# Patient Record
Sex: FEMALE | Race: OTHER | HISPANIC OR LATINO
[De-identification: names, ages, dates, MRNs, and addresses within clinical notes are randomized per-mention and may not be internally consistent; named-entity substitution may affect disease eponyms.]

---

## 2017-01-10 ENCOUNTER — APPOINTMENT (OUTPATIENT)
Age: 27
End: 2017-01-10

## 2017-02-14 ENCOUNTER — APPOINTMENT (OUTPATIENT)
Dept: VASCULAR SURGERY | Facility: CLINIC | Age: 27
End: 2017-02-14

## 2017-04-27 ENCOUNTER — APPOINTMENT (OUTPATIENT)
Age: 27
End: 2017-04-27

## 2017-08-03 ENCOUNTER — APPOINTMENT (OUTPATIENT)
Age: 27
End: 2017-08-03
Payer: MEDICAID

## 2017-08-03 PROCEDURE — 36901 INTRO CATH DIALYSIS CIRCUIT: CPT

## 2017-09-06 ENCOUNTER — OTHER (OUTPATIENT)
Age: 27
End: 2017-09-06

## 2017-09-13 ENCOUNTER — ASOB RESULT (OUTPATIENT)
Age: 27
End: 2017-09-13

## 2017-09-13 ENCOUNTER — LABORATORY RESULT (OUTPATIENT)
Age: 27
End: 2017-09-13

## 2017-09-13 ENCOUNTER — APPOINTMENT (OUTPATIENT)
Dept: ANTEPARTUM | Facility: CLINIC | Age: 27
End: 2017-09-13
Payer: MEDICAID

## 2017-09-13 ENCOUNTER — APPOINTMENT (OUTPATIENT)
Dept: MATERNAL FETAL MEDICINE | Facility: CLINIC | Age: 27
End: 2017-09-13
Payer: MEDICAID

## 2017-09-13 VITALS
HEIGHT: 59 IN | DIASTOLIC BLOOD PRESSURE: 68 MMHG | WEIGHT: 126.4 LBS | SYSTOLIC BLOOD PRESSURE: 120 MMHG | BODY MASS INDEX: 25.48 KG/M2

## 2017-09-13 DIAGNOSIS — O09.90 SUPERVISION OF HIGH RISK PREGNANCY, UNSPECIFIED, UNSPECIFIED TRIMESTER: ICD-10-CM

## 2017-09-13 PROCEDURE — 76801 OB US < 14 WKS SINGLE FETUS: CPT

## 2017-09-13 PROCEDURE — 99243 OFF/OP CNSLTJ NEW/EST LOW 30: CPT

## 2017-09-14 ENCOUNTER — LABORATORY RESULT (OUTPATIENT)
Age: 27
End: 2017-09-14

## 2017-09-15 RX ORDER — CALCIUM ACETATE 667 MG/1
667 CAPSULE ORAL
Qty: 270 | Refills: 3 | Status: DISCONTINUED | COMMUNITY
Start: 2017-04-07 | End: 2017-09-15

## 2017-09-22 PROBLEM — O09.90 HIGH RISK PREGNANCY, ANTEPARTUM: Status: ACTIVE | Noted: 2017-09-13

## 2017-10-02 ENCOUNTER — ASOB RESULT (OUTPATIENT)
Age: 27
End: 2017-10-02

## 2017-10-02 ENCOUNTER — LABORATORY RESULT (OUTPATIENT)
Age: 27
End: 2017-10-02

## 2017-10-02 ENCOUNTER — APPOINTMENT (OUTPATIENT)
Dept: MATERNAL FETAL MEDICINE | Facility: CLINIC | Age: 27
End: 2017-10-02

## 2017-10-02 ENCOUNTER — APPOINTMENT (OUTPATIENT)
Dept: ANTEPARTUM | Facility: CLINIC | Age: 27
End: 2017-10-02
Payer: MEDICAID

## 2017-10-02 ENCOUNTER — NON-APPOINTMENT (OUTPATIENT)
Age: 27
End: 2017-10-02

## 2017-10-02 ENCOUNTER — OUTPATIENT (OUTPATIENT)
Dept: OUTPATIENT SERVICES | Facility: HOSPITAL | Age: 27
LOS: 1 days | End: 2017-10-02
Payer: MEDICAID

## 2017-10-02 ENCOUNTER — APPOINTMENT (OUTPATIENT)
Dept: MATERNAL FETAL MEDICINE | Facility: CLINIC | Age: 27
End: 2017-10-02
Payer: MEDICAID

## 2017-10-02 VITALS
BODY MASS INDEX: 25.6 KG/M2 | SYSTOLIC BLOOD PRESSURE: 120 MMHG | HEIGHT: 59 IN | DIASTOLIC BLOOD PRESSURE: 60 MMHG | WEIGHT: 127 LBS

## 2017-10-02 DIAGNOSIS — O09.899 SUPERVISION OF OTHER HIGH RISK PREGNANCIES, UNSPECIFIED TRIMESTER: ICD-10-CM

## 2017-10-02 DIAGNOSIS — Z98.89 OTHER SPECIFIED POSTPROCEDURAL STATES: Chronic | ICD-10-CM

## 2017-10-02 DIAGNOSIS — I77.0 ARTERIOVENOUS FISTULA, ACQUIRED: Chronic | ICD-10-CM

## 2017-10-02 LAB
BILIRUB UR QL STRIP: NEGATIVE
COLLECTION METHOD: NORMAL
GLUCOSE UR-MCNC: NEGATIVE
HCG UR QL: 0.2 EU/DL
HGB UR QL STRIP.AUTO: NORMAL
KETONES UR-MCNC: NEGATIVE
LEUKOCYTE ESTERASE UR QL STRIP: NEGATIVE
NITRITE UR QL STRIP: NEGATIVE
PH UR STRIP: >=9
PROT UR STRIP-MCNC: NORMAL
SP GR UR STRIP: 1.01

## 2017-10-02 PROCEDURE — 88141 CYTOPATH C/V INTERPRET: CPT

## 2017-10-02 PROCEDURE — 76813 OB US NUCHAL MEAS 1 GEST: CPT | Mod: 26

## 2017-10-02 PROCEDURE — 99214 OFFICE O/P EST MOD 30 MIN: CPT

## 2017-10-02 PROCEDURE — 36416 COLLJ CAPILLARY BLOOD SPEC: CPT

## 2017-10-03 LAB
C TRACH RRNA SPEC QL NAA+PROBE: SIGNIFICANT CHANGE UP
C TRACH RRNA SPEC QL NAA+PROBE: SIGNIFICANT CHANGE UP
C TRACH+GC RRNA SPEC QL PROBE: SIGNIFICANT CHANGE UP
CREAT ?TM UR-MCNC: 80 MG/DL — SIGNIFICANT CHANGE UP
CULTURE RESULTS: SIGNIFICANT CHANGE UP
N GONORRHOEA RRNA SPEC QL NAA+PROBE: SIGNIFICANT CHANGE UP
N GONORRHOEA RRNA SPEC QL NAA+PROBE: SIGNIFICANT CHANGE UP
PROT ?TM UR-MCNC: 80 MG/DL — HIGH (ref 0–12)
PROT/CREAT UR-RTO: 1 RATIO — HIGH (ref 0–0.2)
SPECIMEN SOURCE: SIGNIFICANT CHANGE UP
SPECIMEN SOURCE: SIGNIFICANT CHANGE UP
T3FREE SERPL-MCNC: 3.59 PG/ML — SIGNIFICANT CHANGE UP (ref 1.8–4.6)
T4 AB SER-ACNC: 12 UG/DL — SIGNIFICANT CHANGE UP (ref 4.6–12)
TSH SERPL-MCNC: 0.01 UIU/ML — LOW (ref 0.27–4.2)

## 2017-10-04 DIAGNOSIS — Z3A.12 12 WEEKS GESTATION OF PREGNANCY: ICD-10-CM

## 2017-10-04 DIAGNOSIS — O34.219 MATERNAL CARE FOR UNSPECIFIED TYPE SCAR FROM PREVIOUS CESAREAN DELIVERY: ICD-10-CM

## 2017-10-04 DIAGNOSIS — N18.5 CHRONIC KIDNEY DISEASE, STAGE 5: ICD-10-CM

## 2017-10-05 ENCOUNTER — APPOINTMENT (OUTPATIENT)
Dept: VASCULAR SURGERY | Facility: CLINIC | Age: 27
End: 2017-10-05
Payer: MEDICAID

## 2017-10-05 LAB
1ST TRIMESTER DATA: SIGNIFICANT CHANGE UP
ADDENDUM DOC: SIGNIFICANT CHANGE UP
AFP AMN-MCNC: SIGNIFICANT CHANGE UP
B-HCG FREE SERPL-MCNC: SIGNIFICANT CHANGE UP
CLINICAL BIOCHEMIST REVIEW: ABNORMAL
CLINICAL BIOCHEMIST REVIEW: SIGNIFICANT CHANGE UP
DEMOGRAPHIC DATA: SIGNIFICANT CHANGE UP
NT: SIGNIFICANT CHANGE UP
PAPP-A SERPL-ACNC: SIGNIFICANT CHANGE UP
SCREEN-FOOTER: SIGNIFICANT CHANGE UP
SCREEN-RECOMMENDATIONS: SIGNIFICANT CHANGE UP

## 2017-10-05 PROCEDURE — 99213 OFFICE O/P EST LOW 20 MIN: CPT | Mod: 25

## 2017-10-05 PROCEDURE — 93990 DOPPLER FLOW TESTING: CPT

## 2017-10-09 LAB — CYTOLOGY SPEC DOC CYTO: SIGNIFICANT CHANGE UP

## 2017-10-10 DIAGNOSIS — E83.39 OTHER DISORDERS OF PHOSPHORUS METABOLISM: ICD-10-CM

## 2017-10-11 ENCOUNTER — OTHER (OUTPATIENT)
Age: 27
End: 2017-10-11

## 2017-10-13 ENCOUNTER — LABORATORY RESULT (OUTPATIENT)
Age: 27
End: 2017-10-13

## 2017-10-13 ENCOUNTER — APPOINTMENT (OUTPATIENT)
Dept: PEDIATRIC MEDICAL GENETICS | Facility: CLINIC | Age: 27
End: 2017-10-13
Payer: MEDICAID

## 2017-10-13 PROCEDURE — 96040: CPT

## 2017-10-16 ENCOUNTER — LABORATORY RESULT (OUTPATIENT)
Age: 27
End: 2017-10-16

## 2017-10-16 ENCOUNTER — OUTPATIENT (OUTPATIENT)
Dept: OUTPATIENT SERVICES | Facility: HOSPITAL | Age: 27
LOS: 1 days | End: 2017-10-16
Payer: MEDICAID

## 2017-10-16 ENCOUNTER — APPOINTMENT (OUTPATIENT)
Dept: MATERNAL FETAL MEDICINE | Facility: CLINIC | Age: 27
End: 2017-10-16
Payer: MEDICAID

## 2017-10-16 ENCOUNTER — NON-APPOINTMENT (OUTPATIENT)
Age: 27
End: 2017-10-16

## 2017-10-16 VITALS
SYSTOLIC BLOOD PRESSURE: 106 MMHG | BODY MASS INDEX: 26 KG/M2 | DIASTOLIC BLOOD PRESSURE: 56 MMHG | HEIGHT: 59 IN | WEIGHT: 129 LBS

## 2017-10-16 DIAGNOSIS — Z98.89 OTHER SPECIFIED POSTPROCEDURAL STATES: Chronic | ICD-10-CM

## 2017-10-16 DIAGNOSIS — I77.0 ARTERIOVENOUS FISTULA, ACQUIRED: Chronic | ICD-10-CM

## 2017-10-16 PROCEDURE — 81003 URINALYSIS AUTO W/O SCOPE: CPT

## 2017-10-16 PROCEDURE — 88175 CYTOPATH C/V AUTO FLUID REDO: CPT

## 2017-10-16 PROCEDURE — 84481 FREE ASSAY (FT-3): CPT

## 2017-10-16 PROCEDURE — 81003 URINALYSIS AUTO W/O SCOPE: CPT | Mod: QW,NC

## 2017-10-16 PROCEDURE — G0463: CPT

## 2017-10-16 PROCEDURE — 82575 CREATININE CLEARANCE TEST: CPT

## 2017-10-16 PROCEDURE — 76813 OB US NUCHAL MEAS 1 GEST: CPT

## 2017-10-16 PROCEDURE — 87086 URINE CULTURE/COLONY COUNT: CPT

## 2017-10-16 PROCEDURE — 99213 OFFICE O/P EST LOW 20 MIN: CPT

## 2017-10-16 PROCEDURE — 84436 ASSAY OF TOTAL THYROXINE: CPT

## 2017-10-16 PROCEDURE — 80053 COMPREHEN METABOLIC PANEL: CPT

## 2017-10-16 PROCEDURE — 84443 ASSAY THYROID STIM HORMONE: CPT

## 2017-10-16 PROCEDURE — 84156 ASSAY OF PROTEIN URINE: CPT

## 2017-10-17 LAB
ALBUMIN SERPL ELPH-MCNC: 3.7 G/DL — SIGNIFICANT CHANGE UP (ref 3.3–5)
ALP SERPL-CCNC: 84 U/L — SIGNIFICANT CHANGE UP (ref 40–120)
ALT FLD-CCNC: 12 U/L — SIGNIFICANT CHANGE UP (ref 10–45)
ANION GAP SERPL CALC-SCNC: 18 MMOL/L — HIGH (ref 5–17)
AST SERPL-CCNC: 16 U/L — SIGNIFICANT CHANGE UP (ref 10–40)
BILIRUB SERPL-MCNC: 0.2 MG/DL — SIGNIFICANT CHANGE UP (ref 0.2–1.2)
BODY SURFACE AREA CALCULATION: 1.73 M2 — SIGNIFICANT CHANGE UP
BUN SERPL-MCNC: 4 MG/DL — LOW (ref 7–23)
CALCIUM SERPL-MCNC: 9.4 MG/DL — SIGNIFICANT CHANGE UP (ref 8.4–10.5)
CHLORIDE SERPL-SCNC: 95 MMOL/L — LOW (ref 96–108)
CO2 SERPL-SCNC: 28 MMOL/L — SIGNIFICANT CHANGE UP (ref 22–31)
COLLECT DURATION TIME UR: 24 HR — SIGNIFICANT CHANGE UP
CREAT CL ?TM UR+SERPL-VRATE: 15 ML/MIN — LOW (ref 75–115)
CREAT SERPL-MCNC: 1.51 MG/DL — HIGH (ref 0.5–1.3)
CREAT SERPL-MCNC: 1.51 MG/DL — HIGH (ref 0.5–1.3)
GLUCOSE SERPL-MCNC: 80 MG/DL — SIGNIFICANT CHANGE UP (ref 70–99)
POTASSIUM SERPL-MCNC: 3.9 MMOL/L — SIGNIFICANT CHANGE UP (ref 3.5–5.3)
POTASSIUM SERPL-SCNC: 3.9 MMOL/L — SIGNIFICANT CHANGE UP (ref 3.5–5.3)
PROT SERPL-MCNC: 6.6 G/DL — SIGNIFICANT CHANGE UP (ref 6–8.3)
SODIUM SERPL-SCNC: 141 MMOL/L — SIGNIFICANT CHANGE UP (ref 135–145)
TOTAL VOLUME - 24 HOUR: 700 ML — SIGNIFICANT CHANGE UP
URINE CREATININE CALCULATION: 0.3 G/24 H — LOW (ref 0.8–1.8)

## 2017-10-19 ENCOUNTER — NON-APPOINTMENT (OUTPATIENT)
Age: 27
End: 2017-10-19

## 2017-10-24 DIAGNOSIS — O09.891 SUPERVISION OF OTHER HIGH RISK PREGNANCIES, FIRST TRIMESTER: ICD-10-CM

## 2017-10-27 DIAGNOSIS — O26.839 PREGNANCY RELATED RENAL DISEASE, UNSPECIFIED TRIMESTER: ICD-10-CM

## 2017-10-27 DIAGNOSIS — O09.90 SUPERVISION OF HIGH RISK PREGNANCY, UNSPECIFIED, UNSPECIFIED TRIMESTER: ICD-10-CM

## 2017-10-27 DIAGNOSIS — O28.9 UNSPECIFIED ABNORMAL FINDINGS ON ANTENATAL SCREENING OF MOTHER: ICD-10-CM

## 2017-10-30 ENCOUNTER — APPOINTMENT (OUTPATIENT)
Dept: ANTEPARTUM | Facility: CLINIC | Age: 27
End: 2017-10-30

## 2017-10-30 ENCOUNTER — APPOINTMENT (OUTPATIENT)
Dept: MATERNAL FETAL MEDICINE | Facility: CLINIC | Age: 27
End: 2017-10-30

## 2017-11-27 ENCOUNTER — ASOB RESULT (OUTPATIENT)
Age: 27
End: 2017-11-27

## 2017-11-27 ENCOUNTER — APPOINTMENT (OUTPATIENT)
Dept: ANTEPARTUM | Facility: CLINIC | Age: 27
End: 2017-11-27
Payer: MEDICAID

## 2017-11-27 ENCOUNTER — OUTPATIENT (OUTPATIENT)
Dept: OUTPATIENT SERVICES | Facility: HOSPITAL | Age: 27
LOS: 1 days | End: 2017-11-27
Payer: MEDICAID

## 2017-11-27 ENCOUNTER — APPOINTMENT (OUTPATIENT)
Dept: MATERNAL FETAL MEDICINE | Facility: CLINIC | Age: 27
End: 2017-11-27
Payer: MEDICAID

## 2017-11-27 VITALS
DIASTOLIC BLOOD PRESSURE: 66 MMHG | BODY MASS INDEX: 27.01 KG/M2 | WEIGHT: 134 LBS | SYSTOLIC BLOOD PRESSURE: 120 MMHG | HEIGHT: 59 IN

## 2017-11-27 DIAGNOSIS — I77.0 ARTERIOVENOUS FISTULA, ACQUIRED: Chronic | ICD-10-CM

## 2017-11-27 DIAGNOSIS — Z98.89 OTHER SPECIFIED POSTPROCEDURAL STATES: Chronic | ICD-10-CM

## 2017-11-27 DIAGNOSIS — O09.899 SUPERVISION OF OTHER HIGH RISK PREGNANCIES, UNSPECIFIED TRIMESTER: ICD-10-CM

## 2017-11-27 LAB
BILIRUB UR QL STRIP: NEGATIVE
COLLECTION METHOD: NORMAL
GLUCOSE UR-MCNC: NEGATIVE
HCG UR QL: 0.2 EU/DL
HGB UR QL STRIP.AUTO: NORMAL
KETONES UR-MCNC: NEGATIVE
LEUKOCYTE ESTERASE UR QL STRIP: NEGATIVE
NITRITE UR QL STRIP: NEGATIVE
PH UR STRIP: 8.5
PROT UR STRIP-MCNC: NORMAL
SP GR UR STRIP: 1.01

## 2017-11-27 PROCEDURE — 76811 OB US DETAILED SNGL FETUS: CPT | Mod: 26

## 2017-11-27 PROCEDURE — G0463: CPT

## 2017-11-27 PROCEDURE — 76816 OB US FOLLOW-UP PER FETUS: CPT

## 2017-11-27 PROCEDURE — 81003 URINALYSIS AUTO W/O SCOPE: CPT | Mod: QW,NC

## 2017-11-27 PROCEDURE — 81003 URINALYSIS AUTO W/O SCOPE: CPT

## 2017-11-27 PROCEDURE — 99213 OFFICE O/P EST LOW 20 MIN: CPT | Mod: 25,GC

## 2017-11-29 ENCOUNTER — NON-APPOINTMENT (OUTPATIENT)
Age: 27
End: 2017-11-29

## 2017-12-01 ENCOUNTER — OUTPATIENT (OUTPATIENT)
Dept: OUTPATIENT SERVICES | Facility: HOSPITAL | Age: 27
LOS: 1 days | End: 2017-12-01
Payer: MEDICAID

## 2017-12-01 DIAGNOSIS — Z98.89 OTHER SPECIFIED POSTPROCEDURAL STATES: Chronic | ICD-10-CM

## 2017-12-01 DIAGNOSIS — I77.0 ARTERIOVENOUS FISTULA, ACQUIRED: Chronic | ICD-10-CM

## 2017-12-01 PROCEDURE — G9001: CPT

## 2017-12-05 DIAGNOSIS — N18.5 CHRONIC KIDNEY DISEASE, STAGE 5: ICD-10-CM

## 2017-12-05 DIAGNOSIS — O28.1 ABNORMAL BIOCHEMICAL FINDING ON ANTENATAL SCREENING OF MOTHER: ICD-10-CM

## 2017-12-05 DIAGNOSIS — O34.219 MATERNAL CARE FOR UNSPECIFIED TYPE SCAR FROM PREVIOUS CESAREAN DELIVERY: ICD-10-CM

## 2017-12-12 ENCOUNTER — APPOINTMENT (OUTPATIENT)
Dept: OTHER | Facility: CLINIC | Age: 27
End: 2017-12-12

## 2017-12-14 DIAGNOSIS — E55.9 VITAMIN D DEFICIENCY, UNSPECIFIED: ICD-10-CM

## 2017-12-26 ENCOUNTER — TRANSCRIPTION ENCOUNTER (OUTPATIENT)
Age: 27
End: 2017-12-26

## 2017-12-26 ENCOUNTER — INPATIENT (INPATIENT)
Facility: HOSPITAL | Age: 27
LOS: 0 days | Discharge: HOME | DRG: 781 | End: 2017-12-26
Attending: OBSTETRICS & GYNECOLOGY | Admitting: OBSTETRICS & GYNECOLOGY
Payer: MEDICAID

## 2017-12-26 VITALS — WEIGHT: 130.07 LBS | HEIGHT: 57 IN

## 2017-12-26 VITALS — HEART RATE: 102 BPM | TEMPERATURE: 99 F | DIASTOLIC BLOOD PRESSURE: 69 MMHG | SYSTOLIC BLOOD PRESSURE: 127 MMHG

## 2017-12-26 DIAGNOSIS — N18.9 CHRONIC KIDNEY DISEASE, UNSPECIFIED: ICD-10-CM

## 2017-12-26 DIAGNOSIS — N18.6 END STAGE RENAL DISEASE: ICD-10-CM

## 2017-12-26 DIAGNOSIS — Z98.89 OTHER SPECIFIED POSTPROCEDURAL STATES: Chronic | ICD-10-CM

## 2017-12-26 DIAGNOSIS — I10 ESSENTIAL (PRIMARY) HYPERTENSION: ICD-10-CM

## 2017-12-26 DIAGNOSIS — I77.0 ARTERIOVENOUS FISTULA, ACQUIRED: Chronic | ICD-10-CM

## 2017-12-26 DIAGNOSIS — N18.5 CHRONIC KIDNEY DISEASE, STAGE 5: ICD-10-CM

## 2017-12-26 LAB
ALBUMIN SERPL ELPH-MCNC: 3.1 G/DL — LOW (ref 3.3–5)
ALP SERPL-CCNC: 99 U/L — SIGNIFICANT CHANGE UP (ref 40–120)
ALT FLD-CCNC: 14 U/L RC — SIGNIFICANT CHANGE UP (ref 10–45)
ANION GAP SERPL CALC-SCNC: 13 MMOL/L — SIGNIFICANT CHANGE UP (ref 5–17)
AST SERPL-CCNC: 12 U/L — SIGNIFICANT CHANGE UP (ref 10–40)
BILIRUB SERPL-MCNC: 0.1 MG/DL — LOW (ref 0.2–1.2)
BLD GP AB SCN SERPL QL: NEGATIVE — SIGNIFICANT CHANGE UP
BUN SERPL-MCNC: 24 MG/DL — HIGH (ref 7–23)
CALCIUM SERPL-MCNC: 8.9 MG/DL — SIGNIFICANT CHANGE UP (ref 8.4–10.5)
CHLORIDE SERPL-SCNC: 100 MMOL/L — SIGNIFICANT CHANGE UP (ref 96–108)
CO2 SERPL-SCNC: 21 MMOL/L — LOW (ref 22–31)
CREAT SERPL-MCNC: 4.35 MG/DL — HIGH (ref 0.5–1.3)
GLUCOSE SERPL-MCNC: 72 MG/DL — SIGNIFICANT CHANGE UP (ref 70–99)
HCT VFR BLD CALC: 28.7 % — LOW (ref 34.5–45)
HGB BLD-MCNC: 10.3 G/DL — LOW (ref 11.5–15.5)
MCHC RBC-ENTMCNC: 32.2 PG — SIGNIFICANT CHANGE UP (ref 27–34)
MCHC RBC-ENTMCNC: 35.9 GM/DL — SIGNIFICANT CHANGE UP (ref 32–36)
MCV RBC AUTO: 89.7 FL — SIGNIFICANT CHANGE UP (ref 80–100)
PLATELET # BLD AUTO: 207 K/UL — SIGNIFICANT CHANGE UP (ref 150–400)
POTASSIUM SERPL-MCNC: 3.4 MMOL/L — LOW (ref 3.5–5.3)
POTASSIUM SERPL-SCNC: 3.4 MMOL/L — LOW (ref 3.5–5.3)
PROT SERPL-MCNC: 6.3 G/DL — SIGNIFICANT CHANGE UP (ref 6–8.3)
RBC # BLD: 3.2 M/UL — LOW (ref 3.8–5.2)
RBC # FLD: 13.1 % — SIGNIFICANT CHANGE UP (ref 10.3–14.5)
RH IG SCN BLD-IMP: POSITIVE — SIGNIFICANT CHANGE UP
SODIUM SERPL-SCNC: 134 MMOL/L — LOW (ref 135–145)
WBC # BLD: 9.6 K/UL — SIGNIFICANT CHANGE UP (ref 3.8–10.5)
WBC # FLD AUTO: 9.6 K/UL — SIGNIFICANT CHANGE UP (ref 3.8–10.5)

## 2017-12-26 PROCEDURE — 59025 FETAL NON-STRESS TEST: CPT

## 2017-12-26 PROCEDURE — 99223 1ST HOSP IP/OBS HIGH 75: CPT

## 2017-12-26 PROCEDURE — 86901 BLOOD TYPING SEROLOGIC RH(D): CPT

## 2017-12-26 PROCEDURE — 85027 COMPLETE CBC AUTOMATED: CPT

## 2017-12-26 PROCEDURE — 86900 BLOOD TYPING SEROLOGIC ABO: CPT

## 2017-12-26 PROCEDURE — 90935 HEMODIALYSIS ONE EVALUATION: CPT | Mod: GC

## 2017-12-26 PROCEDURE — 86850 RBC ANTIBODY SCREEN: CPT

## 2017-12-26 PROCEDURE — 80053 COMPREHEN METABOLIC PANEL: CPT

## 2017-12-26 RX ORDER — DOXERCALCIFEROL 2.5 UG/1
1 CAPSULE ORAL ONCE
Qty: 0 | Refills: 0 | Status: COMPLETED | OUTPATIENT
Start: 2017-12-26 | End: 2017-12-26

## 2017-12-26 RX ORDER — CITRIC ACID/SODIUM CITRATE 300-500 MG
15 SOLUTION, ORAL ORAL EVERY 4 HOURS
Qty: 0 | Refills: 0 | Status: DISCONTINUED | OUTPATIENT
Start: 2017-12-26 | End: 2017-12-26

## 2017-12-26 RX ORDER — ERYTHROPOIETIN 10000 [IU]/ML
2000 INJECTION, SOLUTION INTRAVENOUS; SUBCUTANEOUS ONCE
Qty: 0 | Refills: 0 | Status: COMPLETED | OUTPATIENT
Start: 2017-12-26 | End: 2017-12-26

## 2017-12-26 RX ORDER — OXYTOCIN 10 UNIT/ML
333.33 VIAL (ML) INJECTION
Qty: 20 | Refills: 0 | Status: DISCONTINUED | OUTPATIENT
Start: 2017-12-26 | End: 2017-12-26

## 2017-12-26 RX ADMIN — DOXERCALCIFEROL 1 MICROGRAM(S): 2.5 CAPSULE ORAL at 17:40

## 2017-12-26 RX ADMIN — ERYTHROPOIETIN 2000 UNIT(S): 10000 INJECTION, SOLUTION INTRAVENOUS; SUBCUTANEOUS at 17:39

## 2017-12-26 NOTE — CONSULT NOTE ADULT - ASSESSMENT
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

## 2017-12-26 NOTE — CONSULT NOTE ADULT - ATTENDING COMMENTS
Pregnant female, with ESRD on HD, now to receive HD inpatient as needs fetal monitoring with HD.   Seen on HD.   Tolerating HD well, without any UF.   BP maintained during HD.   AVF working well.   Clinically stable, plan for next HD tomorrow.

## 2017-12-26 NOTE — CONSULT NOTE ADULT - SUBJECTIVE AND OBJECTIVE BOX
[facility] DIVISION OF KIDNEY DISEASES AND HYPERTENSION -- INITIAL CONSULT NOTE  --------------------------------------------------------------------------------  HPI:        PAST HISTORY  --------------------------------------------------------------------------------  PAST MEDICAL & SURGICAL HISTORY:  Chronic kidney disease (CKD) stage G5/A1, glomerular filtration rate (GFR) less than or equal to 15 mL/min/1.73 square meter and albuminuria creatinine ratio less than 30 mg/g  A-V fistula: GALO, 16  History of : ,   H/O cervical biopsy: in the context of ELISE II    FAMILY HISTORY:  No pertinent family history in first degree relatives: Pt denied knowledge of anyone with renal failure in the family    PAST SOCIAL HISTORY:    ALLERGIES & MEDICATIONS  --------------------------------------------------------------------------------  Allergies    No Known Allergies    Intolerances      Standing Inpatient Medications    PRN Inpatient Medications      REVIEW OF SYSTEMS  --------------------------------------------------------------------------------  Gen: No weight changes, fatigue, fevers/chills, weakness  Skin: No rashes  Head/Eyes/Ears/Mouth: No headache; Normal hearing; Normal vision w/o blurriness; No sinus pain/discomfort, sore throat  Respiratory: No dyspnea, cough, wheezing, hemoptysis  CV: No chest pain, PND, orthopnea  GI: No abdominal pain, diarrhea, constipation, nausea, vomiting, melena, hematochezia  : No increased frequency, dysuria, hematuria, nocturia  MSK: No joint pain/swelling; no back pain; no edema  Neuro: No dizziness/lightheadedness, weakness, seizures, numbness, tingling  Heme: No easy bruising or bleeding  Endo: No heat/cold intolerance  Psych: No significant nervousness, anxiety, stress, depression    All other systems were reviewed and are negative, except as noted.    VITALS/PHYSICAL EXAM  --------------------------------------------------------------------------------  T(C): --  HR: --  BP: --  RR: --  SpO2: --  Wt(kg): --  Height (cm): 144.78 (-17 @ 12:07)      Physical Exam:  	Gen: NAD, well-appearing  	HEENT: PERRL, supple neck, clear oropharynx  	Pulm: CTA B/L  	CV: RRR, S1S2; no rub  	Back: No spinal or CVA tenderness; no sacral edema  	Abd: +BS, soft, nontender/nondistended  	: No suprapubic tenderness  	UE: Warm, FROM, no clubbing, intact strength; no edema; no asterixis  	LE: Warm, FROM, no clubbing, intact strength; no edema  	Neuro: No focal deficits, intact gait  	Psych: Normal affect and mood  	Skin: Warm, without rashes  	Vascular access:    LABS/STUDIES  --------------------------------------------------------------------------------                Creatinine Trend:    Urinalysis - [07-10-16 @ 04:19]      Color Red / Appearance Turbid / SG 1.016 / pH 8.5      Gluc 50 / Ketone Negative  / Bili Negative / Urobili Negative       Blood Moderate / Protein >600 / Leuk Est Trace / Nitrite Negative      RBC >50 / WBC  / Hyaline  / Gran  / Sq Epi  / Non Sq Epi  / Bacteria       HbA1c 5.7      [07-15-16 @ 08:42]  TSH 0.01      [10-02-17 @ 23:50]    HBsAb Nonreact      [16 @ 16:34]  HBsAg Nonreact      [16 @ 08:35]  HBcAb Nonreact      [16 @ 08:35]  HCV 0.09, Nonreact      [16 @ 08:35]  HIV Nonreact      [16 @ 08:35]    GISSELLE: titer 1:80, pattern Homogeneous      [16 @ 20:54]  dsDNA <12      [16 @ 20:54]  Free Light Chains: kappa 15.60, lambda 11.80, ratio = 1.32      [ @ 08:35] [facility] DIVISION OF KIDNEY DISEASES AND HYPERTENSION -- INITIAL CONSULT NOTE  --------------------------------------------------------------------------------  HPI:  27 year old pregnant woman with ESRD on HD (6 times/week) coming in for maintenance HD in monitored setting. Pt was started on HD last april and cause of  ESRD was IgA nephropathy. Pt gets HD 6 times/week in [facility] dialysis center via LUE AVF and last HD was on .       PAST HISTORY  --------------------------------------------------------------------------------  PAST MEDICAL & SURGICAL HISTORY:  Chronic kidney disease (CKD) stage G5/A1, glomerular filtration rate (GFR) less than or equal to 15 mL/min/1.73 square meter and albuminuria creatinine ratio less than 30 mg/g  A-V fistula: GALO, 16  History of : ,   H/O cervical biopsy: in the context of ELISE II    FAMILY HISTORY:  No pertinent family history in first degree relatives: Pt denied knowledge of anyone with renal failure in the family    PAST SOCIAL HISTORY:    ALLERGIES & MEDICATIONS  --------------------------------------------------------------------------------  Allergies    No Known Allergies    Intolerances      Standing Inpatient Medications    PRN Inpatient Medications      REVIEW OF SYSTEMS  --------------------------------------------------------------------------------  Gen: No weight changes, fatigue, fevers/chills, weakness  Skin: No rashes  Head/Eyes/Ears/Mouth: No headache; Normal hearing; Normal vision w/o blurriness; No sinus pain/discomfort, sore throat  Respiratory: No dyspnea, cough, wheezing, hemoptysis  CV: No chest pain, PND, orthopnea  GI: No abdominal pain, diarrhea, constipation, nausea, vomiting, melena, hematochezia  : No increased frequency, dysuria, hematuria, nocturia  MSK: No joint pain/swelling; no back pain; no edema  Neuro: No dizziness/lightheadedness, weakness, seizures, numbness, tingling  Heme: No easy bruising or bleeding  Endo: No heat/cold intolerance  Psych: No significant nervousness, anxiety, stress, depression    All other systems were reviewed and are negative, except as noted.    VITALS/PHYSICAL EXAM  --------------------------------------------------------------------------------  T(C): --  HR: --  BP: --  RR: --  SpO2: --  Wt(kg): --  Height (cm): 144.78 (-17 @ 12:07)      Physical Exam:  	Gen: NAD, well-appearing  	HEENT: PERRL, supple neck, clear oropharynx  	Pulm: CTA B/L  	CV: RRR, S1S2; no rub  	Back: No spinal or CVA tenderness; no sacral edema  	Abd: +BS, soft, nontender/nondistended  	: No suprapubic tenderness  	UE: Warm, FROM, no clubbing, intact strength; no edema; no asterixis  	LE: Warm, FROM, no clubbing, intact strength; no edema  	Neuro: No focal deficits, intact gait  	Psych: Normal affect and mood  	Skin: Warm, without rashes  	Vascular access: LUE AVF, thrill+    LABS/STUDIES  --------------------------------------------------------------------------------                Creatinine Trend:    Urinalysis - [07-10-16 @ 04:19]      Color Red / Appearance Turbid / SG 1.016 / pH 8.5      Gluc 50 / Ketone Negative  / Bili Negative / Urobili Negative       Blood Moderate / Protein >600 / Leuk Est Trace / Nitrite Negative      RBC >50 / WBC  / Hyaline  / Gran  / Sq Epi  / Non Sq Epi  / Bacteria       HbA1c 5.7      [07-15-16 @ 08:42]  TSH 0.01      [10-02-17 @ 23:50]    HBsAb Nonreact      [16 @ 16:34]  HBsAg Nonreact      [16 @ 08:35]  HBcAb Nonreact      [16 @ 08:35]  HCV 0.09, Nonreact      [16 @ 08:35]  HIV Nonreact      [16 @ 08:35]    GISSELLE: titer 1:80, pattern Homogeneous      [16 @ 20:54]  dsDNA <12      [16 @ 20:54]  Free Light Chains: kappa 15.60, lambda 11.80, ratio = 1.32      [ @ 08:35]

## 2017-12-26 NOTE — DISCHARGE NOTE ANTEPARTUM - CARE PLAN
Principal Discharge DX:	Chronic kidney disease (CKD) stage G5/A1, glomerular filtration rate (GFR) less than or equal to 15 mL/min/1.73 square meter and albuminuria creatinine ratio less than 30 mg/g  Goal:	wellness  Instructions for follow-up, activity and diet:	Follow up for daily dialysis

## 2017-12-26 NOTE — DISCHARGE NOTE ANTEPARTUM - PATIENT PORTAL LINK FT
“You can access the FollowHealth Patient Portal, offered by [facility], by registering with the following website: [email or web address]”

## 2017-12-26 NOTE — DISCHARGE NOTE ANTEPARTUM - PRINCIPAL DIAGNOSIS
Chronic kidney disease (CKD) stage G5/A1, glomerular filtration rate (GFR) less than or equal to 15 mL/min/1.73 square meter and albuminuria creatinine ratio less than 30 mg/g

## 2017-12-26 NOTE — DISCHARGE NOTE ANTEPARTUM - MEDICATION SUMMARY - MEDICATIONS TO TAKE
I will START or STAY ON the medications listed below when I get home from the hospital:    aspirin 81 mg oral tablet  -- 1 tab(s) by mouth once a day  -- Indication: For to decrease risk pec    ferrous sulfate 324 mg (65 mg elemental iron) oral tablet  -- 1 tab(s) by mouth once a day  -- Indication: For pregnancy    folic acid 1 mg oral tablet  -- 1 tab(s) by mouth once a day  -- Indication: For pregnancy

## 2017-12-26 NOTE — DISCHARGE NOTE ANTEPARTUM - CARE PROVIDER_API CALL
-,   Please follow up with us 6 weeks after your delivery date for vaginal deliveries or 2 weeks after your delivery date for  sections.      Your postpartum visit will be at [address] [place] [address], 2nd floor.    Please schedule an appointment (PHONE #  [phone] )  Phone: (   )    -  Fax: (   )    -

## 2017-12-26 NOTE — DISCHARGE NOTE ANTEPARTUM - PROVIDER TOKENS
FREE:[LAST:[-],PHONE:[(   )    -],FAX:[(   )    -],ADDRESS:[Please follow up with us 6 weeks after your delivery date for vaginal deliveries or 2 weeks after your delivery date for  sections.      Your postpartum visit will be at [address] [place] [address], 2nd floor.    Please schedule an appointment (PHONE #  [phone] )]]

## 2017-12-26 NOTE — DISCHARGE NOTE ANTEPARTUM - MEDICATION SUMMARY - MEDICATIONS TO STOP TAKING
I will STOP taking the medications listed below when I get home from the hospital:    sodium bicarbonate 650 mg oral tablet  -- 2 tab(s) by mouth 2 times a day (with meals)    calcium acetate 667 mg oral capsule  -- 3 cap(s) by mouth 3 times a day    predniSONE 20 mg oral tablet  -- 3 tab(s) by mouth once a day    Bactrim  mg-160 mg oral tablet  -- 1 tab(s) by mouth 2 times a week  -- Avoid prolonged or excessive exposure to direct and/or artificial sunlight while taking this medication.  Finish all this medication unless otherwise directed by prescriber.  Medication should be taken with plenty of water.    Plavix 75 mg oral tablet  -- 1 tab(s) by mouth once a day    Percocet 5/325 oral tablet  -- 1 tab(s) by mouth every 6 hours, As Needed -for moderate pain MDD:4 tabs

## 2017-12-26 NOTE — DISCHARGE NOTE ANTEPARTUM - HOSPITAL COURSE
26 yo  at [id number] presenting for dialysis for Stage 5 CKD.  Received dialysis without complication.  No OB complaints.  Discharged home to follow up in high risk clinic and for daily dialysis.

## 2017-12-27 ENCOUNTER — INPATIENT (INPATIENT)
Facility: HOSPITAL | Age: 27
LOS: 0 days | Discharge: HOME | DRG: 781 | End: 2017-12-27
Attending: OBSTETRICS & GYNECOLOGY | Admitting: OBSTETRICS & GYNECOLOGY
Payer: MEDICAID

## 2017-12-27 ENCOUNTER — TRANSCRIPTION ENCOUNTER (OUTPATIENT)
Age: 27
End: 2017-12-27

## 2017-12-27 VITALS
TEMPERATURE: 98 F | HEART RATE: 98 BPM | DIASTOLIC BLOOD PRESSURE: 65 MMHG | OXYGEN SATURATION: 100 % | RESPIRATION RATE: 18 BRPM | SYSTOLIC BLOOD PRESSURE: 106 MMHG

## 2017-12-27 VITALS — WEIGHT: 130.07 LBS | HEIGHT: 57 IN

## 2017-12-27 DIAGNOSIS — Z98.89 OTHER SPECIFIED POSTPROCEDURAL STATES: Chronic | ICD-10-CM

## 2017-12-27 DIAGNOSIS — N18.5 CHRONIC KIDNEY DISEASE, STAGE 5: ICD-10-CM

## 2017-12-27 DIAGNOSIS — I77.0 ARTERIOVENOUS FISTULA, ACQUIRED: Chronic | ICD-10-CM

## 2017-12-27 PROCEDURE — 59025 FETAL NON-STRESS TEST: CPT

## 2017-12-27 PROCEDURE — 90935 HEMODIALYSIS ONE EVALUATION: CPT | Mod: GC

## 2017-12-27 RX ORDER — ACETAMINOPHEN 500 MG
975 TABLET ORAL ONCE
Qty: 0 | Refills: 0 | Status: COMPLETED | OUTPATIENT
Start: 2017-12-27 | End: 2017-12-27

## 2017-12-27 RX ADMIN — Medication 975 MILLIGRAM(S): at 18:37

## 2017-12-27 NOTE — DISCHARGE NOTE ANTEPARTUM - ABILITY TO HEAR (WITH HEARING AID OR HEARING APPLIANCE IF NORMALLY USED):
Voicemail:  Patient just saw Dr. Contreras Us and would like to discuss the medications he was prescribed.   12/20 11:24 Adequate: hears normal conversation without difficulty

## 2017-12-27 NOTE — DISCHARGE NOTE ANTEPARTUM - CARE PLAN
Principal Discharge DX:	Chronic kidney disease (CKD) stage G5/A1, glomerular filtration rate (GFR) less than or equal to 15 mL/min/1.73 square meter and albuminuria creatinine ratio less than 30 mg/g  Goal:	dialysis  Instructions for follow-up, activity and diet:	continue daily dialysis  f/u in [facility]

## 2017-12-27 NOTE — DISCHARGE NOTE ANTEPARTUM - CARE PROVIDERS DIRECT ADDRESSES
[email or web address]

## 2017-12-27 NOTE — DISCHARGE NOTE ANTEPARTUM - PATIENT PORTAL LINK FT
“You can access the FollowHealth Patient Portal, offered by [facility], by registering with the following website: [email or web address]”

## 2017-12-27 NOTE — DISCHARGE NOTE ANTEPARTUM - CARE PROVIDER_API CALL
Simmonds, Lisa E (MD), MaternalFetal Medicine; Obstetrics and Gynecology  [address] [place] [address]  Phone: [phone]  Fax: [phone]

## 2017-12-27 NOTE — PROGRESS NOTE ADULT - ATTENDING COMMENTS
Seen on HD, tolerating HD well, with aim of UF ~2.2L, AVF working well and BP well maintained during HD. Plan for next maintenance HD tomorrow.

## 2017-12-27 NOTE — DISCHARGE NOTE ANTEPARTUM - HOSPITAL COURSE
26 yo  at [id number] presenting for dialysis for Stage 5 CKD.  Received dialysis without complication.  No OB complaints.  Discharged home to follow up in high risk clinic and for daily dialysis.

## 2017-12-28 ENCOUNTER — TRANSCRIPTION ENCOUNTER (OUTPATIENT)
Age: 27
End: 2017-12-28

## 2017-12-28 ENCOUNTER — INPATIENT (INPATIENT)
Facility: HOSPITAL | Age: 27
LOS: 0 days | Discharge: HOME | DRG: 781 | End: 2017-12-28
Attending: OBSTETRICS & GYNECOLOGY | Admitting: OBSTETRICS & GYNECOLOGY
Payer: MEDICAID

## 2017-12-28 VITALS
SYSTOLIC BLOOD PRESSURE: 121 MMHG | OXYGEN SATURATION: 100 % | TEMPERATURE: 98 F | WEIGHT: 132.5 LBS | RESPIRATION RATE: 18 BRPM | HEART RATE: 107 BPM | DIASTOLIC BLOOD PRESSURE: 80 MMHG

## 2017-12-28 VITALS
RESPIRATION RATE: 18 BRPM | WEIGHT: 134.04 LBS | HEART RATE: 95 BPM | TEMPERATURE: 98 F | SYSTOLIC BLOOD PRESSURE: 117 MMHG | DIASTOLIC BLOOD PRESSURE: 74 MMHG | OXYGEN SATURATION: 95 %

## 2017-12-28 DIAGNOSIS — Z98.89 OTHER SPECIFIED POSTPROCEDURAL STATES: Chronic | ICD-10-CM

## 2017-12-28 DIAGNOSIS — N18.5 CHRONIC KIDNEY DISEASE, STAGE 5: ICD-10-CM

## 2017-12-28 DIAGNOSIS — R69 ILLNESS, UNSPECIFIED: ICD-10-CM

## 2017-12-28 DIAGNOSIS — I77.0 ARTERIOVENOUS FISTULA, ACQUIRED: Chronic | ICD-10-CM

## 2017-12-28 PROCEDURE — 59050 FETAL MONITOR W/REPORT: CPT

## 2017-12-28 PROCEDURE — 90935 HEMODIALYSIS ONE EVALUATION: CPT | Mod: GC

## 2017-12-28 RX ORDER — DOXERCALCIFEROL 2.5 UG/1
1 CAPSULE ORAL ONCE
Qty: 0 | Refills: 0 | Status: COMPLETED | OUTPATIENT
Start: 2017-12-28 | End: 2017-12-28

## 2017-12-28 RX ORDER — ERYTHROPOIETIN 10000 [IU]/ML
2000 INJECTION, SOLUTION INTRAVENOUS; SUBCUTANEOUS ONCE
Qty: 0 | Refills: 0 | Status: COMPLETED | OUTPATIENT
Start: 2017-12-28 | End: 2017-12-28

## 2017-12-28 RX ADMIN — DOXERCALCIFEROL 1 MICROGRAM(S): 2.5 CAPSULE ORAL at 10:28

## 2017-12-28 RX ADMIN — ERYTHROPOIETIN 2000 UNIT(S): 10000 INJECTION, SOLUTION INTRAVENOUS; SUBCUTANEOUS at 10:28

## 2017-12-28 NOTE — DISCHARGE NOTE ANTEPARTUM - CARE PLAN
Principal Discharge DX:	Chronic kidney disease (CKD) stage G5/A1, glomerular filtration rate (GFR) less than or equal to 15 mL/min/1.73 square meter and albuminuria creatinine ratio less than 30 mg/g  Goal:	dialysis  Instructions for follow-up, activity and diet:	continue daily dialysis, follow up in high risk clinic

## 2017-12-28 NOTE — CONSULT NOTE ADULT - ATTENDING COMMENTS
Seen while receiving HD, tolerating HD well without any intra-dialytic complications or access issues. AVF working well and BP stable during HD. Plan for next maintenance HD tomorrow. Monitor BMP.

## 2017-12-28 NOTE — DISCHARGE NOTE ANTEPARTUM - HOSPITAL COURSE
28 yo  at [id number] presenting for dialysis for Stage 5 CKD.  Received dialysis without complication.  No OB complaints.  Discharged home to follow up in high risk clinic and for daily dialysis.

## 2017-12-28 NOTE — DISCHARGE NOTE ANTEPARTUM - PATIENT PORTAL LINK FT
“You can access the FollowHealth Patient Portal, offered by [facility], by registering with the following website: [email or web address]”

## 2017-12-28 NOTE — DISCHARGE NOTE ANTEPARTUM - CARE PROVIDER_API CALL
Simmonds, Lisa E (MD), MaternalFetal Medicine; Obstetrics and Gynecology  [address] [place] [address]  Phone: [phone]  Fax: [phone]

## 2017-12-28 NOTE — CONSULT NOTE ADULT - SUBJECTIVE AND OBJECTIVE BOX
[facility] DIVISION OF KIDNEY DISEASES AND HYPERTENSION -- INITIAL CONSULT NOTE  --------------------------------------------------------------------------------  HPI:  27 year old pregnant woman with ESRD on HD (6 times/week) coming in for maintenance HD in monitored setting. Pt was started on HD last april and cause of  ESRD was IgA nephropathy. Pt gets HD 6 times/week in [facility] dialysis center via LUE AVF and last HD was on .         PAST HISTORY  --------------------------------------------------------------------------------  PAST MEDICAL & SURGICAL HISTORY:  Chronic kidney disease (CKD) stage G5/A1, glomerular filtration rate (GFR) less than or equal to 15 mL/min/1.73 square meter and albuminuria creatinine ratio less than 30 mg/g  A-V fistula: GALO, 16  History of : ,   H/O cervical biopsy: in the context of ELISE II    FAMILY HISTORY:  No pertinent family history in first degree relatives: Pt denied knowledge of anyone with renal failure in the family    PAST SOCIAL HISTORY:    ALLERGIES & MEDICATIONS  --------------------------------------------------------------------------------  Allergies    No Known Allergies    Intolerances      Standing Inpatient Medications    PRN Inpatient Medications      REVIEW OF SYSTEMS  --------------------------------------------------------------------------------  Gen: No weight changes, fatigue, fevers/chills, weakness  Skin: No rashes  Head/Eyes/Ears/Mouth: No headache; Normal hearing; Normal vision w/o blurriness; No sinus pain/discomfort, sore throat  Respiratory: No dyspnea, cough, wheezing, hemoptysis  CV: No chest pain, PND, orthopnea  GI: No abdominal pain, diarrhea, constipation, nausea, vomiting, melena, hematochezia  : No increased frequency, dysuria, hematuria, nocturia  MSK: No joint pain/swelling; no back pain; no edema  Neuro: No dizziness/lightheadedness, weakness, seizures, numbness, tingling  Heme: No easy bruising or bleeding  Endo: No heat/cold intolerance  Psych: No significant nervousness, anxiety, stress, depression    All other systems were reviewed and are negative, except as noted.    VITALS/PHYSICAL EXAM  --------------------------------------------------------------------------------  T(C): 36.6 (17 @ 09:20), Max: 36.7 (17 @ 13:34)  HR: 95 (17 @ 09:20) (95 - 98)  BP: 117/74 (17 @ 09:20) (106/65 - 117/74)  RR: 18 (17 @ 09:20) (18 - 18)  SpO2: 95% (17 @ 09:20) (95% - 100%)  Wt(kg): --  Height (cm): 144.78 (17 @ 08:17)      17 @ 07:01  -  17 @ 13:20  --------------------------------------------------------  IN: 0 mL / OUT: 25 mL / NET: -25 mL      Physical Exam:  Gen: NAD  	Pulm: CTA B/L  	CV: RRR, S1S2; no rub  	Abd: +BS, soft, nontender/nondistended, gravid  	: No suprapubic tenderness  	UE: Warm, no edema; no asterixis  	LE: Warm,  no edema  	Skin: Warm, without rashes  	Vascular access: LUE AVf, thrill+  LABS/STUDIES  --------------------------------------------------------------------------------              10.3   9.6   >-----------<  207      [17 @ 16:10]              28.7     134  |  100  |  24  ----------------------------<  72      [17 @ 16:10]  3.4   |  21  |  4.35        Ca     8.9     [17 @ 16:10]    TPro  6.3  /  Alb  3.1  /  TBili  0.1  /  DBili  x   /  AST  12  /  ALT  14  /  AlkPhos  99  [17 @ 16:10]          Creatinine Trend:  SCr 4.35 [ 16:10]    Urinalysis - [07-10-16 @ 04:19]      Color Red / Appearance Turbid / SG 1.016 / pH 8.5      Gluc 50 / Ketone Negative  / Bili Negative / Urobili Negative       Blood Moderate / Protein >600 / Leuk Est Trace / Nitrite Negative      RBC >50 / WBC  / Hyaline  / Gran  / Sq Epi  / Non Sq Epi  / Bacteria       HbA1c 5.7      [07-15-16 @ 08:42]  TSH 0.01      [10-02-17 @ 23:50]    HBsAb Nonreact      [16 @ 16:34]  HBsAg Nonreact      [16 @ 08:35]  HBcAb Nonreact      [16 @ 08:35]  HCV 0.09, Nonreact      [16 @ 08:35]  HIV Nonreact      [16 @ 08:35]    GISSELLE: titer 1:80, pattern Homogeneous      [16 @ 20:54]  dsDNA <12      [16 @ 20:54]  Free Light Chains: kappa 15.60, lambda 11.80, ratio = 1.32      [ @ 08:35]

## 2017-12-28 NOTE — DISCHARGE NOTE ANTEPARTUM - CARE PROVIDERS DIRECT ADDRESSES
[email or web address]

## 2017-12-29 ENCOUNTER — INPATIENT (INPATIENT)
Facility: HOSPITAL | Age: 27
LOS: 0 days | Discharge: HOME | DRG: 781 | End: 2017-12-29
Attending: OBSTETRICS & GYNECOLOGY | Admitting: OBSTETRICS & GYNECOLOGY
Payer: MEDICAID

## 2017-12-29 ENCOUNTER — TRANSCRIPTION ENCOUNTER (OUTPATIENT)
Age: 27
End: 2017-12-29

## 2017-12-29 ENCOUNTER — APPOINTMENT (OUTPATIENT)
Dept: ANTEPARTUM | Facility: CLINIC | Age: 27
End: 2017-12-29

## 2017-12-29 ENCOUNTER — APPOINTMENT (OUTPATIENT)
Dept: MATERNAL FETAL MEDICINE | Facility: CLINIC | Age: 27
End: 2017-12-29

## 2017-12-29 VITALS
HEART RATE: 102 BPM | RESPIRATION RATE: 18 BRPM | WEIGHT: 135.58 LBS | SYSTOLIC BLOOD PRESSURE: 121 MMHG | DIASTOLIC BLOOD PRESSURE: 71 MMHG | TEMPERATURE: 98 F | OXYGEN SATURATION: 99 %

## 2017-12-29 VITALS
RESPIRATION RATE: 18 BRPM | TEMPERATURE: 98 F | DIASTOLIC BLOOD PRESSURE: 66 MMHG | OXYGEN SATURATION: 100 % | SYSTOLIC BLOOD PRESSURE: 110 MMHG | WEIGHT: 135.58 LBS | HEART RATE: 92 BPM

## 2017-12-29 DIAGNOSIS — Z98.89 OTHER SPECIFIED POSTPROCEDURAL STATES: Chronic | ICD-10-CM

## 2017-12-29 DIAGNOSIS — N18.5 CHRONIC KIDNEY DISEASE, STAGE 5: ICD-10-CM

## 2017-12-29 DIAGNOSIS — N25.0 RENAL OSTEODYSTROPHY: ICD-10-CM

## 2017-12-29 DIAGNOSIS — I77.0 ARTERIOVENOUS FISTULA, ACQUIRED: Chronic | ICD-10-CM

## 2017-12-29 PROCEDURE — 59025 FETAL NON-STRESS TEST: CPT

## 2017-12-29 PROCEDURE — 59050 FETAL MONITOR W/REPORT: CPT

## 2017-12-29 PROCEDURE — 99233 SBSQ HOSP IP/OBS HIGH 50: CPT

## 2017-12-29 PROCEDURE — 99261: CPT

## 2017-12-29 NOTE — DISCHARGE NOTE ANTEPARTUM - CARE PROVIDER_API CALL
-,   Please follow up with us as scheduled    [address] [facility], 2nd floor.    Please schedule an appointment (PHONE #  [phone] )  Phone: (   )    -  Fax: (   )    -

## 2017-12-29 NOTE — PROGRESS NOTE ADULT - SUBJECTIVE AND OBJECTIVE BOX
[facility] DIVISION OF KIDNEY DISEASES AND HYPERTENSION -- PROGRESS NOTE    Chief complaint: ESRD on HD    24 hour events/subjective: received maintenance HD yesterday,        PAST HISTORY  --------------------------------------------------------------------------------  No significant changes to PMH, PSH, FHx, SHx, unless otherwise noted    ALLERGIES & MEDICATIONS  --------------------------------------------------------------------------------  Allergies    No Known Allergies    Intolerances      Standing Inpatient Medications    PRN Inpatient Medications      REVIEW OF SYSTEMS  --------------------------------------------------------------------------------  Constitutional: [ ]xno  Fever [ ] Chills [ ] Fatigue [ ] Weight change   HEENT: [ ] Blurred vision [ ] Eye Pain [ ] Headache [ ] Runny nose [ ] Sore Throat   Respiratory: [ ] Cough [ ] Wheezing [x ] no Shortness of breath  Cardiovascular: [x ] no Chest Pain [ ] Palpitations [ ] MARR [ ] PND [ ] Orthopnea  Gastrointestinal: [ ] Abdominal Pain [ ] Diarrhea [ ] Constipation [ ] Hemorrhoids [ ] Nausea [ ] Vomiting  Genitourinary: [ ] Nocturia [ ] Dysuria [ ] Incontinence  Extremities: [ ] Swelling [ ] Joint Pain  Neurologic: [ ] Focal deficit [ ] Paresthesias [ ] Syncope  Lymphatic: [ ] Swelling [ ] Lymphadenopathy   Skin: [ ] Rash [ ] Ecchymoses [ ] Wounds [ ] Lesions  Psychiatry: [ ] Depression [ ] Suicidal/Homicidal Ideation [ ] Anxiety [ ] Sleep Disturbances  [x ] 10 point review of systems is otherwise negative except as mentioned above              [ ]Unable to obtain due to   All other systems were reviewed and are negative, except as noted.    VITALS/PHYSICAL EXAM  --------------------------------------------------------------------------------  T(C): 36.7 (12-28-17 @ 13:05), Max: 36.7 (12-28-17 @ 13:05)  HR: 107 (12-28-17 @ 13:05) (107 - 107)  BP: 121/80 (12-28-17 @ 13:05) (121/80 - 121/80)  RR: 18 (12-28-17 @ 13:05) (18 - 18)  SpO2: 100% (12-28-17 @ 13:05) (100% - 100%)  Wt(kg): --        Physical Exam:  	Gen: NAD, well-appearing  	HEENT: on room air  	Pulm: CTA B/L  	CV: normal S1S2; no rub  	Abd: soft                      Back : No sacral edema  	: No an  	LE: no edema  	Skin: Warm, without rashes  	Vascular access: LUE AVF, covered in bandage, with palpable thrill, surrounding skin intact    LABS/STUDIES  --------------------------------------------------------------------------------                Creatinine Trend:  SCr 4.35 [12-26 @ 16:10]        HbA1c 5.7      [07-15-16 @ 08:42]  TSH 0.01      [10-02-17 @ 23:50]

## 2017-12-29 NOTE — DISCHARGE NOTE ANTEPARTUM - PATIENT PORTAL LINK FT
“You can access the FollowHealth Patient Portal, offered by [facility], by registering with the following website: [email or web address]”

## 2017-12-29 NOTE — DISCHARGE NOTE ANTEPARTUM - CARE PLAN
Principal Discharge DX:	Chronic kidney disease (CKD) stage G5/A1, glomerular filtration rate (GFR) less than or equal to 15 mL/min/1.73 square meter and albuminuria creatinine ratio less than 30 mg/g  Goal:	wellness  Instructions for follow-up, activity and diet:	follow up for dialysis as scheduled

## 2017-12-29 NOTE — PROGRESS NOTE ADULT - PROBLEM SELECTOR PLAN 1
on HD 6 times/week. continue maintenance HD today.  Noted to have tachycardia at end of yesterday's HD, will plan to continue future HDs without any UF.   Plan for next maintenance HD tomorrow.   monitor BMP.

## 2017-12-29 NOTE — DISCHARGE NOTE ANTEPARTUM - PROVIDER TOKENS
FREE:[LAST:[-],PHONE:[(   )    -],FAX:[(   )    -],ADDRESS:[Please follow up with us as scheduled    [address] [place] [address], 2nd floor.    Please schedule an appointment (PHONE #  [phone] )]]

## 2017-12-29 NOTE — DISCHARGE NOTE ANTEPARTUM - HOSPITAL COURSE
28 yo  at [id number] presenting for dialysis for Stage 5 CKD.  Received dialysis without complication.  No OB complaints.  Discharged home to follow up in high risk clinic and for daily dialysis.

## 2017-12-30 ENCOUNTER — TRANSCRIPTION ENCOUNTER (OUTPATIENT)
Age: 27
End: 2017-12-30

## 2017-12-30 ENCOUNTER — INPATIENT (INPATIENT)
Facility: HOSPITAL | Age: 27
LOS: 0 days | Discharge: HOME | DRG: 781 | End: 2017-12-30
Attending: OBSTETRICS & GYNECOLOGY | Admitting: OBSTETRICS & GYNECOLOGY
Payer: MEDICAID

## 2017-12-30 VITALS
OXYGEN SATURATION: 100 % | DIASTOLIC BLOOD PRESSURE: 67 MMHG | SYSTOLIC BLOOD PRESSURE: 104 MMHG | HEART RATE: 92 BPM | RESPIRATION RATE: 18 BRPM | TEMPERATURE: 98 F

## 2017-12-30 VITALS — WEIGHT: 130.07 LBS

## 2017-12-30 DIAGNOSIS — N18.5 CHRONIC KIDNEY DISEASE, STAGE 5: ICD-10-CM

## 2017-12-30 DIAGNOSIS — N18.6 END STAGE RENAL DISEASE: ICD-10-CM

## 2017-12-30 DIAGNOSIS — N18.9 CHRONIC KIDNEY DISEASE, UNSPECIFIED: ICD-10-CM

## 2017-12-30 DIAGNOSIS — Z98.89 OTHER SPECIFIED POSTPROCEDURAL STATES: Chronic | ICD-10-CM

## 2017-12-30 DIAGNOSIS — I77.0 ARTERIOVENOUS FISTULA, ACQUIRED: Chronic | ICD-10-CM

## 2017-12-30 DIAGNOSIS — I10 ESSENTIAL (PRIMARY) HYPERTENSION: ICD-10-CM

## 2017-12-30 DIAGNOSIS — N25.0 RENAL OSTEODYSTROPHY: ICD-10-CM

## 2017-12-30 LAB
ALBUMIN SERPL ELPH-MCNC: 3.2 G/DL — LOW (ref 3.3–5)
ALP SERPL-CCNC: 114 U/L — SIGNIFICANT CHANGE UP (ref 40–120)
ALT FLD-CCNC: 15 U/L RC — SIGNIFICANT CHANGE UP (ref 10–45)
ANION GAP SERPL CALC-SCNC: 9 MMOL/L — SIGNIFICANT CHANGE UP (ref 5–17)
APPEARANCE UR: CLEAR — SIGNIFICANT CHANGE UP
APTT BLD: 25.1 SEC — LOW (ref 27.5–37.4)
AST SERPL-CCNC: 13 U/L — SIGNIFICANT CHANGE UP (ref 10–40)
BILIRUB SERPL-MCNC: 0.2 MG/DL — SIGNIFICANT CHANGE UP (ref 0.2–1.2)
BILIRUB UR-MCNC: NEGATIVE — SIGNIFICANT CHANGE UP
BUN SERPL-MCNC: <4 MG/DL — LOW (ref 7–23)
CALCIUM SERPL-MCNC: 8.7 MG/DL — SIGNIFICANT CHANGE UP (ref 8.4–10.5)
CHLORIDE SERPL-SCNC: 103 MMOL/L — SIGNIFICANT CHANGE UP (ref 96–108)
CO2 SERPL-SCNC: 31 MMOL/L — SIGNIFICANT CHANGE UP (ref 22–31)
COLOR SPEC: YELLOW — SIGNIFICANT CHANGE UP
CREAT SERPL-MCNC: 1.2 MG/DL — SIGNIFICANT CHANGE UP (ref 0.5–1.3)
DIFF PNL FLD: ABNORMAL
EPI CELLS # UR: SIGNIFICANT CHANGE UP /HPF
GLUCOSE SERPL-MCNC: 77 MG/DL — SIGNIFICANT CHANGE UP (ref 70–99)
GLUCOSE UR QL: NEGATIVE — SIGNIFICANT CHANGE UP
HCT VFR BLD CALC: 30.1 % — LOW (ref 34.5–45)
HGB BLD-MCNC: 10.8 G/DL — LOW (ref 11.5–15.5)
INR BLD: 0.87 RATIO — LOW (ref 0.88–1.16)
KETONES UR-MCNC: NEGATIVE — SIGNIFICANT CHANGE UP
LEUKOCYTE ESTERASE UR-ACNC: NEGATIVE — SIGNIFICANT CHANGE UP
MCHC RBC-ENTMCNC: 32.5 PG — SIGNIFICANT CHANGE UP (ref 27–34)
MCHC RBC-ENTMCNC: 35.7 GM/DL — SIGNIFICANT CHANGE UP (ref 32–36)
MCV RBC AUTO: 90.9 FL — SIGNIFICANT CHANGE UP (ref 80–100)
NITRITE UR-MCNC: NEGATIVE — SIGNIFICANT CHANGE UP
PH UR: 8.5 — HIGH (ref 5–8)
PLATELET # BLD AUTO: 208 K/UL — SIGNIFICANT CHANGE UP (ref 150–400)
POTASSIUM SERPL-MCNC: 3.9 MMOL/L — SIGNIFICANT CHANGE UP (ref 3.5–5.3)
POTASSIUM SERPL-SCNC: 3.9 MMOL/L — SIGNIFICANT CHANGE UP (ref 3.5–5.3)
PROT SERPL-MCNC: 6.4 G/DL — SIGNIFICANT CHANGE UP (ref 6–8.3)
PROT UR-MCNC: 300 MG/DL
PROTHROM AB SERPL-ACNC: 9.5 SEC — LOW (ref 9.8–12.7)
RBC # BLD: 3.31 M/UL — LOW (ref 3.8–5.2)
RBC # FLD: 13.5 % — SIGNIFICANT CHANGE UP (ref 10.3–14.5)
RBC CASTS # UR COMP ASSIST: >50 /HPF (ref 0–2)
SODIUM SERPL-SCNC: 143 MMOL/L — SIGNIFICANT CHANGE UP (ref 135–145)
SP GR SPEC: 1.01 — SIGNIFICANT CHANGE UP (ref 1.01–1.02)
UROBILINOGEN FLD QL: NEGATIVE — SIGNIFICANT CHANGE UP
WBC # BLD: 9 K/UL — SIGNIFICANT CHANGE UP (ref 3.8–10.5)
WBC # FLD AUTO: 9 K/UL — SIGNIFICANT CHANGE UP (ref 3.8–10.5)
WBC UR QL: SIGNIFICANT CHANGE UP /HPF (ref 0–5)

## 2017-12-30 PROCEDURE — 59025 FETAL NON-STRESS TEST: CPT

## 2017-12-30 PROCEDURE — 85730 THROMBOPLASTIN TIME PARTIAL: CPT

## 2017-12-30 PROCEDURE — 85027 COMPLETE CBC AUTOMATED: CPT

## 2017-12-30 PROCEDURE — 99261: CPT

## 2017-12-30 PROCEDURE — 87086 URINE CULTURE/COLONY COUNT: CPT

## 2017-12-30 PROCEDURE — 85610 PROTHROMBIN TIME: CPT

## 2017-12-30 PROCEDURE — 80053 COMPREHEN METABOLIC PANEL: CPT

## 2017-12-30 PROCEDURE — 81001 URINALYSIS AUTO W/SCOPE: CPT

## 2017-12-30 PROCEDURE — 59050 FETAL MONITOR W/REPORT: CPT

## 2017-12-30 RX ORDER — INFLUENZA VIRUS VACCINE 15; 15; 15; 15 UG/.5ML; UG/.5ML; UG/.5ML; UG/.5ML
0.5 SUSPENSION INTRAMUSCULAR ONCE
Qty: 0 | Refills: 0 | Status: DISCONTINUED | OUTPATIENT
Start: 2017-12-30 | End: 2017-12-30

## 2017-12-30 RX ORDER — ERYTHROPOIETIN 10000 [IU]/ML
2000 INJECTION, SOLUTION INTRAVENOUS; SUBCUTANEOUS ONCE
Qty: 0 | Refills: 0 | Status: DISCONTINUED | OUTPATIENT
Start: 2017-12-30 | End: 2017-12-30

## 2017-12-30 RX ORDER — DOXERCALCIFEROL 2.5 UG/1
1 CAPSULE ORAL ONCE
Qty: 0 | Refills: 0 | Status: DISCONTINUED | OUTPATIENT
Start: 2017-12-30 | End: 2017-12-30

## 2017-12-30 NOTE — CONSULT NOTE ADULT - PROBLEM SELECTOR RECOMMENDATION 9
on HD 6 times/week  - continue maintenance HD today; no UF today as pt w/ tachycardia after prior HD session  - will plan for next HD tomorrow (Sunday), and then Tuesday   - please monitor BMP, every other day

## 2017-12-30 NOTE — DISCHARGE NOTE ANTEPARTUM - PROVIDER TOKENS
FREE:[LAST:[-],PHONE:[(   )    -],FAX:[(   )    -],ADDRESS:[Please follow up with us for dialysis and prenatal care.  If you need to schedule an outpatient appointment please schedule an appointment (PHONE #  [phone] )]]

## 2017-12-30 NOTE — PATIENT PROFILE OB - LANGUAGE ASSISTANCE NEEDED
Yes-Patient/Caregiver accepts free interpretation services.../Patient refused, states she feels comfortable with Burkinan speaking RN Yes-Patient/Caregiver accepts free interpretation services...

## 2017-12-30 NOTE — DISCHARGE NOTE ANTEPARTUM - CARE PROVIDER_API CALL
-,   Please follow up with us for dialysis and prenatal care.  If you need to schedule an outpatient appointment please schedule an appointment (PHONE #  [phone] )  Phone: (   )    -  Fax: (   )    -

## 2017-12-30 NOTE — DISCHARGE NOTE ANTEPARTUM - PATIENT PORTAL LINK FT
“You can access the FollowHealth Patient Portal, offered by [facility], by registering with the following website: [email or web address]”

## 2017-12-30 NOTE — DISCHARGE NOTE ANTEPARTUM - HOSPITAL COURSE
Pt presented for daily dialysis.  C/o hematuria x1 today, renal consulted, stated likely secondary to IgA nephropathy, no intervention indicated.  CBC, CMP and coags drawn after dialysis, appropriate.  Discharged home.  Dialysis without incident.  Renal cleared for discharge.

## 2017-12-30 NOTE — CONSULT NOTE ADULT - SUBJECTIVE AND OBJECTIVE BOX
[facility] DIVISION OF KIDNEY DISEASES AND HYPERTENSION -- FOLLOW UP NOTE  --------------------------------------------------------------------------------  Chief Complaint: ESRD, pregnancy    24 hour events/subjective:  Pt here for dialysis session. Seen during HD at bedside. Tolerating well.       PAST HISTORY  --------------------------------------------------------------------------------  No significant changes to PMH, PSH, FHx, SHx, unless otherwise noted    ALLERGIES & MEDICATIONS  --------------------------------------------------------------------------------  Allergies    No Known Allergies    Intolerances      Standing Inpatient Medications    PRN Inpatient Medications      REVIEW OF SYSTEMS  --------------------------------------------------------------------------------  Gen: no fatigue, fevers/chills, weakness  Skin: No rashes  Respiratory: No dyspnea, cough, wheezing, hemoptysis  CV: No chest pain, PND, orthopnea  GI: No abdominal pain, diarrhea, constipation, nausea, vomiting  : No dysuria, hematuria  MSK: No joint pain/swelling; no edema  Neuro: no confusion    VITALS/PHYSICAL EXAM  --------------------------------------------------------------------------------  T(C): 36.8 (12-30-17 @ 08:55), Max: 36.8 (12-30-17 @ 08:55)  HR: 101 (12-30-17 @ 08:55) (92 - 101)  BP: 113/78 (12-30-17 @ 08:55) (110/66 - 113/78)  RR: 18 (12-30-17 @ 08:55) (18 - 18)  SpO2: 98% (12-30-17 @ 08:55) (98% - 100%)  Wt(kg): --  Height (cm): 144.78 (12-29-17 @ 10:57)      Physical Exam:  	Gen: NAD, well-appearing young female, lying in bed  	HEENT: on room air, NC/AT  	Pulm: CTA B/L  	CV: normal S1S2; no rub  	Abd: soft              Back : No sacral edema  	: No an  	LE: no edema  	Skin: Warm, without rashes  	Vascular access: LUE AVF, + thrill, +bruit, + skin intact    LABS/STUDIES  --------------------------------------------------------------------------------                Creatinine Trend:  SCr 4.35 [12-26 @ 16:10]    Urinalysis - [12-30-17 @ 09:11]      Color Yellow / Appearance Clear / SG 1.012 / pH 8.5      Gluc Negative / Ketone Negative  / Bili Negative / Urobili Negative       Blood Large / Protein 300 / Leuk Est Negative / Nitrite Negative      RBC >50 / WBC 0-2 / Hyaline  / Gran  / Sq Epi  / Non Sq Epi Occasional / Bacteria       HbA1c 5.7      [07-15-16 @ 08:42]  TSH 0.01      [10-02-17 @ 23:50]

## 2017-12-31 ENCOUNTER — TRANSCRIPTION ENCOUNTER (OUTPATIENT)
Age: 27
End: 2017-12-31

## 2017-12-31 ENCOUNTER — INPATIENT (INPATIENT)
Facility: HOSPITAL | Age: 27
LOS: 0 days | Discharge: HOME | DRG: 781 | End: 2017-12-31
Attending: OBSTETRICS & GYNECOLOGY | Admitting: OBSTETRICS & GYNECOLOGY
Payer: MEDICAID

## 2017-12-31 VITALS
HEART RATE: 90 BPM | DIASTOLIC BLOOD PRESSURE: 71 MMHG | SYSTOLIC BLOOD PRESSURE: 120 MMHG | WEIGHT: 137.79 LBS | RESPIRATION RATE: 18 BRPM | TEMPERATURE: 98 F | OXYGEN SATURATION: 99 %

## 2017-12-31 VITALS
TEMPERATURE: 99 F | RESPIRATION RATE: 18 BRPM | OXYGEN SATURATION: 98 % | HEART RATE: 96 BPM | WEIGHT: 136.25 LBS | SYSTOLIC BLOOD PRESSURE: 128 MMHG | DIASTOLIC BLOOD PRESSURE: 74 MMHG

## 2017-12-31 DIAGNOSIS — N18.5 CHRONIC KIDNEY DISEASE, STAGE 5: ICD-10-CM

## 2017-12-31 DIAGNOSIS — I77.0 ARTERIOVENOUS FISTULA, ACQUIRED: Chronic | ICD-10-CM

## 2017-12-31 DIAGNOSIS — Z98.89 OTHER SPECIFIED POSTPROCEDURAL STATES: Chronic | ICD-10-CM

## 2017-12-31 LAB
CULTURE RESULTS: SIGNIFICANT CHANGE UP
SPECIMEN SOURCE: SIGNIFICANT CHANGE UP

## 2017-12-31 PROCEDURE — 99261: CPT

## 2017-12-31 PROCEDURE — 90935 HEMODIALYSIS ONE EVALUATION: CPT | Mod: GC

## 2017-12-31 PROCEDURE — 59050 FETAL MONITOR W/REPORT: CPT

## 2017-12-31 PROCEDURE — 59025 FETAL NON-STRESS TEST: CPT

## 2017-12-31 RX ORDER — ERYTHROPOIETIN 10000 [IU]/ML
2000 INJECTION, SOLUTION INTRAVENOUS; SUBCUTANEOUS ONCE
Qty: 0 | Refills: 0 | Status: COMPLETED | OUTPATIENT
Start: 2017-12-31 | End: 2017-12-31

## 2017-12-31 RX ORDER — DOXERCALCIFEROL 2.5 UG/1
1 CAPSULE ORAL ONCE
Qty: 0 | Refills: 0 | Status: COMPLETED | OUTPATIENT
Start: 2017-12-31 | End: 2017-12-31

## 2017-12-31 RX ADMIN — ERYTHROPOIETIN 2000 UNIT(S): 10000 INJECTION, SOLUTION INTRAVENOUS; SUBCUTANEOUS at 09:50

## 2017-12-31 RX ADMIN — DOXERCALCIFEROL 1 MICROGRAM(S): 2.5 CAPSULE ORAL at 09:50

## 2017-12-31 NOTE — PROGRESS NOTE ADULT - SUBJECTIVE AND OBJECTIVE BOX
[facility] DIVISION OF KIDNEY DISEASES AND HYPERTENSION -- FOLLOW UP NOTE  --------------------------------------------------------------------------------  Chief Complaint:  ESRD, pregnancy    24 hour events/subjective:  Pt feeling well. No acute complaints.      PAST HISTORY  --------------------------------------------------------------------------------  No significant changes to PMH, PSH, FHx, SHx, unless otherwise noted    ALLERGIES & MEDICATIONS  --------------------------------------------------------------------------------  Allergies    No Known Allergies    Intolerances      Standing Inpatient Medications  doxercalciferol Injectable 1 MICROGram(s) IV Push once  epoetin leeann Injectable 2000 Unit(s) IV Push once    PRN Inpatient Medications      REVIEW OF SYSTEMS  --------------------------------------------------------------------------------  Gen: no fatigue, fevers/chills, weakness  Skin: No rashes  Respiratory: No dyspnea, cough, wheezing, hemoptysis  CV: No chest pain, PND, orthopnea  GI: No abdominal pain, diarrhea, constipation, nausea, vomiting  : No dysuria, hematuria  MSK: No joint pain/swelling; no edema  Neuro: no confusion    VITALS/PHYSICAL EXAM  --------------------------------------------------------------------------------  T(C): 36.4 (12-30-17 @ 13:00), Max: 36.4 (12-30-17 @ 13:00)  HR: 92 (12-30-17 @ 13:00) (92 - 92)  BP: 104/67 (12-30-17 @ 13:00) (104/67 - 104/67)  RR: 18 (12-30-17 @ 13:00) (18 - 18)  SpO2: 100% (12-30-17 @ 13:00) (100% - 100%)  Wt(kg): --  Height (cm): 144.78 (12-29-17 @ 10:57)      Physical Exam:  	Gen: NAD, well-appearing young female  	HEENT: on room air, NC/AT  	Pulm: CTA B/L  	CV: normal S1S2; no rub  	Abd: soft              Back : No sacral edema  	: No an  	LE: no edema  	Skin: Warm, without rashes  	Vascular access: GALO AVF, + thrill, +bruit, + skin intact    LABS/STUDIES  --------------------------------------------------------------------------------              10.8   9.0   >-----------<  208      [12-30-17 @ 14:46]              30.1     143  |  103  |  <4  ----------------------------<  77      [12-30-17 @ 14:46]  3.9   |  31  |  1.20        Ca     8.7     [12-30-17 @ 14:46]    TPro  6.4  /  Alb  3.2  /  TBili  0.2  /  DBili  x   /  AST  13  /  ALT  15  /  AlkPhos  114  [12-30-17 @ 14:46]    PT/INR: PT 9.5  , INR 0.87       [12-30-17 @ 13:24]  PTT: 25.1       [12-30-17 @ 13:24]      Creatinine Trend:  SCr 1.20 [12-30 @ 14:46]  SCr 4.35 [12-26 @ 16:10]    Urinalysis - [12-30-17 @ 09:11]      Color Yellow / Appearance Clear / SG 1.012 / pH 8.5      Gluc Negative / Ketone Negative  / Bili Negative / Urobili Negative       Blood Large / Protein 300 / Leuk Est Negative / Nitrite Negative      RBC >50 / WBC 0-2 / Hyaline  / Gran  / Sq Epi  / Non Sq Epi Occasional / Bacteria       HbA1c 5.7      [07-15-16 @ 08:42]  TSH 0.01      [10-02-17 @ 23:50]

## 2017-12-31 NOTE — DISCHARGE NOTE ANTEPARTUM - PATIENT PORTAL LINK FT
“You can access the FollowHealth Patient Portal, offered by [facility], by registering with the following website: [email or web address]”

## 2017-12-31 NOTE — PROGRESS NOTE ADULT - PROBLEM SELECTOR PLAN 1
- on HD 6 times/week  - continue maintenance HD today; no UF today as pt w/ tachycardia after prior HD session  - no HD planned for Monday   - please monitor BMP, every other day.

## 2017-12-31 NOTE — DISCHARGE NOTE ANTEPARTUM - CARE PLAN
Principal Discharge DX:	Chronic kidney disease (CKD) stage G5/A1, glomerular filtration rate (GFR) less than or equal to 15 mL/min/1.73 square meter and albuminuria creatinine ratio less than 30 mg/g  Goal:	wellness  Instructions for follow-up, activity and diet:	follow up for q6 day dialysis

## 2017-12-31 NOTE — DISCHARGE NOTE ANTEPARTUM - CARE PROVIDER_API CALL
-,   Please follow up with us 6 weeks after your delivery date for vaginal deliveries or 2 weeks after your delivery date for  sections.      Your postpartum visit will be at [address] [place] [address], 2nd floor.    Please schedule an appointment (PHONE #  [phone] )  Phone: (   )    -  Fax: (   )    -

## 2017-12-31 NOTE — PROGRESS NOTE ADULT - PROBLEM SELECTOR PLAN 2
- Monitor CBC every other day  - Epo 2000 Units TTS (did not receive Saturday; will schedule for today)

## 2017-12-31 NOTE — DISCHARGE NOTE ANTEPARTUM - PROVIDER TOKENS
FREE:[LAST:[-],PHONE:[(   )    -],FAX:[(   )    -],ADDRESS:[Please follow up with us 6 weeks after your delivery date for vaginal deliveries or 2 weeks after your delivery date for  sections.      Your postpartum visit will be at [address] [place] [address], 2nd floor.    Please schedule an appointment (PHONE #  [phone] )]]

## 2018-01-02 ENCOUNTER — TRANSCRIPTION ENCOUNTER (OUTPATIENT)
Age: 28
End: 2018-01-02

## 2018-01-02 ENCOUNTER — INPATIENT (INPATIENT)
Facility: HOSPITAL | Age: 28
LOS: 0 days | Discharge: HOME | DRG: 781 | End: 2018-01-02
Attending: OBSTETRICS & GYNECOLOGY | Admitting: OBSTETRICS & GYNECOLOGY
Payer: MEDICAID

## 2018-01-02 VITALS
SYSTOLIC BLOOD PRESSURE: 112 MMHG | TEMPERATURE: 98 F | HEART RATE: 105 BPM | OXYGEN SATURATION: 100 % | DIASTOLIC BLOOD PRESSURE: 74 MMHG | RESPIRATION RATE: 15 BRPM

## 2018-01-02 VITALS — WEIGHT: 130.07 LBS

## 2018-01-02 DIAGNOSIS — Z98.89 OTHER SPECIFIED POSTPROCEDURAL STATES: Chronic | ICD-10-CM

## 2018-01-02 DIAGNOSIS — D64.9 ANEMIA, UNSPECIFIED: ICD-10-CM

## 2018-01-02 DIAGNOSIS — I77.0 ARTERIOVENOUS FISTULA, ACQUIRED: Chronic | ICD-10-CM

## 2018-01-02 DIAGNOSIS — N18.6 END STAGE RENAL DISEASE: ICD-10-CM

## 2018-01-02 DIAGNOSIS — N25.0 RENAL OSTEODYSTROPHY: ICD-10-CM

## 2018-01-02 DIAGNOSIS — N18.5 CHRONIC KIDNEY DISEASE, STAGE 5: ICD-10-CM

## 2018-01-02 LAB
ALBUMIN SERPL ELPH-MCNC: 3.1 G/DL — LOW (ref 3.3–5)
ANION GAP SERPL CALC-SCNC: 14 MMOL/L — SIGNIFICANT CHANGE UP (ref 5–17)
BUN SERPL-MCNC: 26 MG/DL — HIGH (ref 7–23)
CALCIUM SERPL-MCNC: 9 MG/DL — SIGNIFICANT CHANGE UP (ref 8.4–10.5)
CHLORIDE SERPL-SCNC: 102 MMOL/L — SIGNIFICANT CHANGE UP (ref 96–108)
CO2 SERPL-SCNC: 20 MMOL/L — LOW (ref 22–31)
CREAT SERPL-MCNC: 4.22 MG/DL — HIGH (ref 0.5–1.3)
GLUCOSE SERPL-MCNC: 68 MG/DL — LOW (ref 70–99)
HCT VFR BLD CALC: 27.1 % — LOW (ref 34.5–45)
HGB BLD-MCNC: 9.4 G/DL — LOW (ref 11.5–15.5)
MCHC RBC-ENTMCNC: 31.3 PG — SIGNIFICANT CHANGE UP (ref 27–34)
MCHC RBC-ENTMCNC: 34.9 GM/DL — SIGNIFICANT CHANGE UP (ref 32–36)
MCV RBC AUTO: 89.8 FL — SIGNIFICANT CHANGE UP (ref 80–100)
PHOSPHATE SERPL-MCNC: 3.1 MG/DL — SIGNIFICANT CHANGE UP (ref 2.5–4.5)
PLATELET # BLD AUTO: 210 K/UL — SIGNIFICANT CHANGE UP (ref 150–400)
POTASSIUM SERPL-MCNC: 3.8 MMOL/L — SIGNIFICANT CHANGE UP (ref 3.5–5.3)
POTASSIUM SERPL-SCNC: 3.8 MMOL/L — SIGNIFICANT CHANGE UP (ref 3.5–5.3)
RBC # BLD: 3.01 M/UL — LOW (ref 3.8–5.2)
RBC # FLD: 13.7 % — SIGNIFICANT CHANGE UP (ref 10.3–14.5)
SODIUM SERPL-SCNC: 136 MMOL/L — SIGNIFICANT CHANGE UP (ref 135–145)
WBC # BLD: 8.9 K/UL — SIGNIFICANT CHANGE UP (ref 3.8–10.5)
WBC # FLD AUTO: 8.9 K/UL — SIGNIFICANT CHANGE UP (ref 3.8–10.5)

## 2018-01-02 PROCEDURE — 90935 HEMODIALYSIS ONE EVALUATION: CPT | Mod: GC

## 2018-01-02 RX ORDER — INFLUENZA VIRUS VACCINE 15; 15; 15; 15 UG/.5ML; UG/.5ML; UG/.5ML; UG/.5ML
0.5 SUSPENSION INTRAMUSCULAR ONCE
Qty: 0 | Refills: 0 | Status: DISCONTINUED | OUTPATIENT
Start: 2018-01-02 | End: 2018-01-02

## 2018-01-02 RX ORDER — ERYTHROPOIETIN 10000 [IU]/ML
3000 INJECTION, SOLUTION INTRAVENOUS; SUBCUTANEOUS ONCE
Qty: 0 | Refills: 0 | Status: COMPLETED | OUTPATIENT
Start: 2018-01-02 | End: 2018-01-02

## 2018-01-02 RX ORDER — DOXERCALCIFEROL 2.5 UG/1
2 CAPSULE ORAL ONCE
Qty: 0 | Refills: 0 | Status: COMPLETED | OUTPATIENT
Start: 2018-01-02 | End: 2018-01-02

## 2018-01-02 RX ADMIN — ERYTHROPOIETIN 3000 UNIT(S): 10000 INJECTION, SOLUTION INTRAVENOUS; SUBCUTANEOUS at 14:22

## 2018-01-02 RX ADMIN — DOXERCALCIFEROL 2 MICROGRAM(S): 2.5 CAPSULE ORAL at 14:24

## 2018-01-02 NOTE — DISCHARGE NOTE OB - CARE PLAN
Principal Discharge DX:	ESRD (end stage renal disease)  Goal:	maintain on dialysys  Instructions for follow-up, activity and diet:	Return to L&D tomorrow for dialysis  Secondary Diagnosis:	Anemia in chronic kidney disease, on chronic dialysis  Secondary Diagnosis:	IUP (intrauterine pregnancy), incidental

## 2018-01-02 NOTE — DISCHARGE NOTE ANTEPARTUM - PATIENT PORTAL LINK FT
“You can access the FollowHealth Patient Portal, offered by [facility], by registering with the following website: [email or web address]”

## 2018-01-02 NOTE — PROGRESS NOTE ADULT - ASSESSMENT
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

## 2018-01-02 NOTE — PROGRESS NOTE ADULT - PROBLEM SELECTOR PLAN 1
Pt with ESRD on HD x 6 days a week. Plan for HD today. Monitor BMP and BP. Plan to give heparin 1000 units bolus and 500/hr infusion with HD tomorrow as pt clotting.

## 2018-01-02 NOTE — DISCHARGE NOTE OB - PATIENT PORTAL LINK FT
“You can access the FollowHealth Patient Portal, offered by [facility], by registering with the following website: [email or web address]”

## 2018-01-02 NOTE — DISCHARGE NOTE OB - HOSPITAL COURSE
Pt. admitted in order to facilitate dialysis which was uncomplicated.  Pt. to return to L&D tomorrow for dialysis

## 2018-01-02 NOTE — DISCHARGE NOTE OB - CARE PROVIDER_API CALL
Jennifer Oreilly), Obstetrics and Gynecology  [address] [address]  Phone: [phone]  Fax: [phone]

## 2018-01-02 NOTE — PROGRESS NOTE ADULT - ATTENDING COMMENTS
Pt ESRD pregnant DELROY 4/16/18 seen on dialysis  Initially with no issues however was noted to clot the dialyzer  3 k bath and epo given with hectorol  Spoke to Dr. Murrell and will give low dose heparin with treatment to prevent dialyzer clotting starting tomorrow's treatment  Reviewed with Dr. De Jesus as well from our division  Continue epo dosing according to h/h  Sheldon for secondary HPT

## 2018-01-02 NOTE — PROGRESS NOTE ADULT - PROBLEM SELECTOR PLAN 3
in setting of renal failure. Continue hectoral 2mcg three times a week.  Monitor calcium, phosphorus and PTH

## 2018-01-02 NOTE — PROGRESS NOTE ADULT - SUBJECTIVE AND OBJECTIVE BOX
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 Pt seen and examined at bedside. Pt denies sob, chest pain.     PAST HISTORY  --------------------------------------------------------------------------------  No significant changes to PMH, PSH, FHx, SHx, unless otherwise noted    ALLERGIES & MEDICATIONS  --------------------------------------------------------------------------------  Allergies    No Known Allergies    Intolerances      Standing Inpatient Medications  influenza   Vaccine 0.5 milliLiter(s) IntraMuscular once    PRN Inpatient Medications      REVIEW OF SYSTEMS  --------------------------------------------------------------------------------  General: no fever  CVS: no chest pain  RESP: no sob, no cough  ABD: no abdominal pain  MSK: no edema     VITALS/PHYSICAL EXAM  --------------------------------------------------------------------------------  T(C): 36.9 (01-02-18 @ 14:45), Max: 36.9 (01-02-18 @ 10:13)  HR: 84 (01-02-18 @ 10:13) (84 - 84)  BP: 134/73 (01-02-18 @ 10:13) (134/73 - 134/73)  RR: 15 (01-02-18 @ 14:45) (15 - 16)  SpO2: 100% (01-02-18 @ 14:45) (100% - 100%)  Wt(kg): --        01-02-18 @ 07:01  -  01-02-18 @ 14:46  --------------------------------------------------------  IN: 0 mL / OUT: 300 mL / NET: -300 mL      Physical Exam:  	Gen: NAD  	HEENT: MMM  	Pulm: CTA B/L  	CV: S1S2  	Abd: Soft, +BS  	Ext: No LE edema B/L                      Neuro: Awake   	Skin: Warm and Dry   	Vascular access: LUE AVF + thrill +bruit, skin intact     LABS/STUDIES  --------------------------------------------------------------------------------              9.4    8.9   >-----------<  210      [01-02-18 @ 10:42]              27.1     136  |  102  |  26  ----------------------------<  68      [01-02-18 @ 10:42]  3.8   |  20  |  4.22        Ca     9.0     [01-02-18 @ 10:42]      Phos  3.1     [01-02-18 @ 10:42]    TPro  x   /  Alb  3.1  /  TBili  x   /  DBili  x   /  AST  x   /  ALT  x   /  AlkPhos  x   [01-02-18 @ 10:42]          Creatinine Trend:  SCr 4.22 [01-02 @ 10:42]  SCr 1.20 [12-30 @ 14:46]  SCr 4.35 [12-26 @ 16:10]    Urinalysis - [12-30-17 @ 09:11]      Color Yellow / Appearance Clear / SG 1.012 / pH 8.5      Gluc Negative / Ketone Negative  / Bili Negative / Urobili Negative       Blood Large / Protein 300 / Leuk Est Negative / Nitrite Negative      RBC >50 / WBC 0-2 / Hyaline  / Gran  / Sq Epi  / Non Sq Epi Occasional / Bacteria       HbA1c 5.7      [07-15-16 @ 08:42]  TSH 0.01      [10-02-17 @ 23:50]

## 2018-01-03 ENCOUNTER — INPATIENT (INPATIENT)
Facility: HOSPITAL | Age: 28
LOS: 0 days | Discharge: HOME | End: 2018-01-03
Attending: OBSTETRICS & GYNECOLOGY | Admitting: OBSTETRICS & GYNECOLOGY
Payer: MEDICAID

## 2018-01-03 ENCOUNTER — NON-APPOINTMENT (OUTPATIENT)
Age: 28
End: 2018-01-03

## 2018-01-03 ENCOUNTER — TRANSCRIPTION ENCOUNTER (OUTPATIENT)
Age: 28
End: 2018-01-03

## 2018-01-03 VITALS
WEIGHT: 139.11 LBS | HEART RATE: 99 BPM | RESPIRATION RATE: 18 BRPM | DIASTOLIC BLOOD PRESSURE: 67 MMHG | OXYGEN SATURATION: 100 % | TEMPERATURE: 98 F | SYSTOLIC BLOOD PRESSURE: 113 MMHG

## 2018-01-03 VITALS
OXYGEN SATURATION: 100 % | RESPIRATION RATE: 18 BRPM | HEART RATE: 94 BPM | SYSTOLIC BLOOD PRESSURE: 110 MMHG | TEMPERATURE: 98 F | DIASTOLIC BLOOD PRESSURE: 65 MMHG

## 2018-01-03 DIAGNOSIS — Z98.89 OTHER SPECIFIED POSTPROCEDURAL STATES: Chronic | ICD-10-CM

## 2018-01-03 DIAGNOSIS — N18.5 CHRONIC KIDNEY DISEASE, STAGE 5: ICD-10-CM

## 2018-01-03 DIAGNOSIS — I77.0 ARTERIOVENOUS FISTULA, ACQUIRED: Chronic | ICD-10-CM

## 2018-01-03 PROCEDURE — 99233 SBSQ HOSP IP/OBS HIGH 50: CPT | Mod: GC

## 2018-01-03 NOTE — PROGRESS NOTE ADULT - PROBLEM SELECTOR PLAN 1
Pt with ESRD on HD x 6 days a week. Plan for HD today. Monitor BMP and BP. Plan to give heparin 1000 units bolus and 500/hr infusion with HD today

## 2018-01-03 NOTE — PROGRESS NOTE ADULT - ATTENDING COMMENTS
Pt ESRD pregnant DELROY 4/16/18 seen on dialysis  So far no clotting issues  3 k bath No UF  Spoke to Dr. Murrell and will give low dose heparin 1000 unit bolus and 500 hourly

## 2018-01-03 NOTE — DISCHARGE NOTE ANTEPARTUM - PATIENT PORTAL LINK FT
“You can access the FollowHealth Patient Portal, offered by [facility], by registering with the following website: [email or web address]”

## 2018-01-03 NOTE — PROGRESS NOTE ADULT - SUBJECTIVE AND OBJECTIVE BOX
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 Pt seen and examined at bedside. Pt denies sob, chest pain.    PAST HISTORY  --------------------------------------------------------------------------------  No significant changes to PMH, PSH, FHx, SHx, unless otherwise noted    ALLERGIES & MEDICATIONS  --------------------------------------------------------------------------------  Allergies    No Known Allergies    Intolerances      Standing Inpatient Medications    PRN Inpatient Medications      REVIEW OF SYSTEMS  --------------------------------------------------------------------------------  General: no fever  CVS: no chest pain  RESP: no sob, no cough  ABD: no abdominal pain  : no dysuria,  MSK: no edema     VITALS/PHYSICAL EXAM  --------------------------------------------------------------------------------  T(C): 36.9 (01-02-18 @ 14:45), Max: 36.9 (01-02-18 @ 10:13)  HR: 105 (01-02-18 @ 14:45) (84 - 105)  BP: 112/74 (01-02-18 @ 14:45) (112/74 - 134/73)  RR: 15 (01-02-18 @ 14:45) (15 - 16)  SpO2: 100% (01-02-18 @ 14:45) (100% - 100%)  Wt(kg): --        Physical Exam:  	Gen: NAD  	HEENT: MMM  	Pulm: CTA B/L  	CV: S1S2  	Abd: Soft, +BS  	Ext: No LE edema B/L                      Neuro: Awake   	Skin: Warm and Dry   	Vascular access: LUE AVF + thrill +bruit, skin intact      LABS/STUDIES  --------------------------------------------------------------------------------              9.4    8.9   >-----------<  210      [01-02-18 @ 10:42]              27.1     136  |  102  |  26  ----------------------------<  68      [01-02-18 @ 10:42]  3.8   |  20  |  4.22        Ca     9.0     [01-02-18 @ 10:42]      Phos  3.1     [01-02-18 @ 10:42]    TPro  x   /  Alb  3.1  /  TBili  x   /  DBili  x   /  AST  x   /  ALT  x   /  AlkPhos  x   [01-02-18 @ 10:42]          Creatinine Trend:  SCr 4.22 [01-02 @ 10:42]  SCr 1.20 [12-30 @ 14:46]  SCr 4.35 [12-26 @ 16:10]    Urinalysis - [12-30-17 @ 09:11]      Color Yellow / Appearance Clear / SG 1.012 / pH 8.5      Gluc Negative / Ketone Negative  / Bili Negative / Urobili Negative       Blood Large / Protein 300 / Leuk Est Negative / Nitrite Negative      RBC >50 / WBC 0-2 / Hyaline  / Gran  / Sq Epi  / Non Sq Epi Occasional / Bacteria       HbA1c 5.7      [07-15-16 @ 08:42]  TSH 0.01      [10-02-17 @ 23:50]

## 2018-01-04 ENCOUNTER — INPATIENT (INPATIENT)
Facility: HOSPITAL | Age: 28
LOS: 0 days | Discharge: HOME | DRG: 781 | End: 2018-01-04
Attending: OBSTETRICS & GYNECOLOGY | Admitting: OBSTETRICS & GYNECOLOGY
Payer: MEDICAID

## 2018-01-04 ENCOUNTER — TRANSCRIPTION ENCOUNTER (OUTPATIENT)
Age: 28
End: 2018-01-04

## 2018-01-04 VITALS
TEMPERATURE: 98 F | SYSTOLIC BLOOD PRESSURE: 119 MMHG | DIASTOLIC BLOOD PRESSURE: 80 MMHG | OXYGEN SATURATION: 100 % | WEIGHT: 137.13 LBS | RESPIRATION RATE: 18 BRPM | HEART RATE: 98 BPM

## 2018-01-04 VITALS — WEIGHT: 130.07 LBS | HEIGHT: 57 IN

## 2018-01-04 DIAGNOSIS — N18.5 CHRONIC KIDNEY DISEASE, STAGE 5: ICD-10-CM

## 2018-01-04 DIAGNOSIS — Z98.89 OTHER SPECIFIED POSTPROCEDURAL STATES: Chronic | ICD-10-CM

## 2018-01-04 DIAGNOSIS — I77.0 ARTERIOVENOUS FISTULA, ACQUIRED: Chronic | ICD-10-CM

## 2018-01-04 PROCEDURE — 59025 FETAL NON-STRESS TEST: CPT

## 2018-01-04 PROCEDURE — 99261: CPT

## 2018-01-04 PROCEDURE — 59050 FETAL MONITOR W/REPORT: CPT

## 2018-01-04 PROCEDURE — 90935 HEMODIALYSIS ONE EVALUATION: CPT

## 2018-01-04 RX ORDER — DOXERCALCIFEROL 2.5 UG/1
2 CAPSULE ORAL ONCE
Qty: 0 | Refills: 0 | Status: COMPLETED | OUTPATIENT
Start: 2018-01-04 | End: 2018-01-04

## 2018-01-04 RX ORDER — ERYTHROPOIETIN 10000 [IU]/ML
3000 INJECTION, SOLUTION INTRAVENOUS; SUBCUTANEOUS ONCE
Qty: 0 | Refills: 0 | Status: COMPLETED | OUTPATIENT
Start: 2018-01-04 | End: 2018-01-04

## 2018-01-04 RX ADMIN — DOXERCALCIFEROL 2 MICROGRAM(S): 2.5 CAPSULE ORAL at 13:03

## 2018-01-04 RX ADMIN — ERYTHROPOIETIN 3000 UNIT(S): 10000 INJECTION, SOLUTION INTRAVENOUS; SUBCUTANEOUS at 13:03

## 2018-01-04 NOTE — DISCHARGE NOTE ANTEPARTUM - CARE PROVIDER_API CALL
[facility],   [address] [place]  Phone: [phone]  Fax: (   )    -

## 2018-01-04 NOTE — DISCHARGE NOTE ANTEPARTUM - CARE PLAN
Principal Discharge DX:	Chronic kidney disease (CKD) stage G5/A1, glomerular filtration rate (GFR) less than or equal to 15 mL/min/1.73 square meter and albuminuria creatinine ratio less than 30 mg/g  Goal:	recovery  Instructions for follow-up, activity and diet:	to be seen on L&D tomorrow for dialysis

## 2018-01-04 NOTE — PROGRESS NOTE ADULT - SUBJECTIVE AND OBJECTIVE BOX
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 Pt seen and examined at bedside. Pt denies sob, chest pain.    PAST HISTORY  --------------------------------------------------------------------------------  No significant changes to PMH, PSH, FHx, SHx, unless otherwise noted    ALLERGIES & MEDICATIONS  --------------------------------------------------------------------------------  Allergies    No Known Allergies    Intolerances      Standing Inpatient Medications  doxercalciferol Injectable 2 MICROGram(s) IV Push once  epoetin leeann Injectable 3000 Unit(s) IV Push once    PRN Inpatient Medications      REVIEW OF SYSTEMS  --------------------------------------------------------------------------------  General: no fever  CVS: no chest pain  RESP: no sob, no cough  ABD: no abdominal pain  MSK: no edema     VITALS/PHYSICAL EXAM  --------------------------------------------------------------------------------  T(C): 36.7 (01-03-18 @ 12:55), Max: 36.7 (01-03-18 @ 12:55)  HR: 94 (01-03-18 @ 12:55) (94 - 94)  BP: 110/65 (01-03-18 @ 12:55) (110/65 - 110/65)  RR: 18 (01-03-18 @ 12:55) (18 - 18)  SpO2: 100% (01-03-18 @ 12:55) (100% - 100%)  Wt(kg): --        Physical Exam:  	Gen: NAD  	HEENT: MMM  	Pulm: CTA B/L  	CV: S1S2  	Abd: Soft, +BS  	Ext: No LE edema B/L                      Neuro: Awake   	Skin: Warm and Dry   	Vascular access: LUE AVF + thrill +bruit, skin intact      LABS/STUDIES  --------------------------------------------------------------------------------              9.4    8.9   >-----------<  210      [01-02-18 @ 10:42]              27.1     136  |  102  |  26  ----------------------------<  68      [01-02-18 @ 10:42]  3.8   |  20  |  4.22        Ca     9.0     [01-02-18 @ 10:42]      Phos  3.1     [01-02-18 @ 10:42]    TPro  x   /  Alb  3.1  /  TBili  x   /  DBili  x   /  AST  x   /  ALT  x   /  AlkPhos  x   [01-02-18 @ 10:42]          Creatinine Trend:  SCr 4.22 [01-02 @ 10:42]  SCr 1.20 [12-30 @ 14:46]  SCr 4.35 [12-26 @ 16:10]    Urinalysis - [12-30-17 @ 09:11]      Color Yellow / Appearance Clear / SG 1.012 / pH 8.5      Gluc Negative / Ketone Negative  / Bili Negative / Urobili Negative       Blood Large / Protein 300 / Leuk Est Negative / Nitrite Negative      RBC >50 / WBC 0-2 / Hyaline  / Gran  / Sq Epi  / Non Sq Epi Occasional / Bacteria       HbA1c 5.7      [07-15-16 @ 08:42]  TSH 0.01      [10-02-17 @ 23:50]

## 2018-01-04 NOTE — DISCHARGE NOTE ANTEPARTUM - PROVIDER TOKENS
FREE:[LAST:[[facility]],PHONE:[phone]],FAX:[(   )    -],ADDRESS:[[address]]]

## 2018-01-04 NOTE — DISCHARGE NOTE ANTEPARTUM - PATIENT PORTAL LINK FT
“You can access the FollowHealth Patient Portal, offered by [facility], by registering with the following website: [email or web address]”

## 2018-01-05 ENCOUNTER — INPATIENT (INPATIENT)
Facility: HOSPITAL | Age: 28
LOS: 0 days | Discharge: HOME | End: 2018-01-05
Attending: OBSTETRICS & GYNECOLOGY | Admitting: OBSTETRICS & GYNECOLOGY
Payer: MEDICAID

## 2018-01-05 VITALS
RESPIRATION RATE: 18 BRPM | DIASTOLIC BLOOD PRESSURE: 75 MMHG | WEIGHT: 139.11 LBS | SYSTOLIC BLOOD PRESSURE: 117 MMHG | HEART RATE: 106 BPM | TEMPERATURE: 98 F

## 2018-01-05 VITALS — WEIGHT: 119.05 LBS

## 2018-01-05 DIAGNOSIS — Z98.89 OTHER SPECIFIED POSTPROCEDURAL STATES: Chronic | ICD-10-CM

## 2018-01-05 DIAGNOSIS — N18.5 CHRONIC KIDNEY DISEASE, STAGE 5: ICD-10-CM

## 2018-01-05 DIAGNOSIS — I77.0 ARTERIOVENOUS FISTULA, ACQUIRED: Chronic | ICD-10-CM

## 2018-01-05 LAB
HBV CORE AB SER-ACNC: SIGNIFICANT CHANGE UP
HBV SURFACE AB SER-ACNC: <3 MIU/ML — LOW
HBV SURFACE AG SER-ACNC: SIGNIFICANT CHANGE UP
HCV AB S/CO SERPL IA: 0.07 S/CO — SIGNIFICANT CHANGE UP
HCV AB SERPL-IMP: SIGNIFICANT CHANGE UP

## 2018-01-05 PROCEDURE — 86803 HEPATITIS C AB TEST: CPT

## 2018-01-05 PROCEDURE — 99261: CPT

## 2018-01-05 PROCEDURE — 86706 HEP B SURFACE ANTIBODY: CPT

## 2018-01-05 PROCEDURE — 86704 HEP B CORE ANTIBODY TOTAL: CPT

## 2018-01-05 PROCEDURE — 87340 HEPATITIS B SURFACE AG IA: CPT

## 2018-01-05 RX ORDER — DOXERCALCIFEROL 2.5 UG/1
2 CAPSULE ORAL ONCE
Qty: 0 | Refills: 0 | Status: COMPLETED | OUTPATIENT
Start: 2018-01-05 | End: 2018-01-05

## 2018-01-05 RX ORDER — INFLUENZA VIRUS VACCINE 15; 15; 15; 15 UG/.5ML; UG/.5ML; UG/.5ML; UG/.5ML
0.5 SUSPENSION INTRAMUSCULAR ONCE
Qty: 0 | Refills: 0 | Status: DISCONTINUED | OUTPATIENT
Start: 2018-01-05 | End: 2018-01-05

## 2018-01-05 RX ORDER — ERYTHROPOIETIN 10000 [IU]/ML
2000 INJECTION, SOLUTION INTRAVENOUS; SUBCUTANEOUS ONCE
Qty: 0 | Refills: 0 | Status: COMPLETED | OUTPATIENT
Start: 2018-01-05 | End: 2018-01-05

## 2018-01-05 RX ADMIN — DOXERCALCIFEROL 2 MICROGRAM(S): 2.5 CAPSULE ORAL at 10:31

## 2018-01-05 RX ADMIN — ERYTHROPOIETIN 2000 UNIT(S): 10000 INJECTION, SOLUTION INTRAVENOUS; SUBCUTANEOUS at 10:30

## 2018-01-05 NOTE — PROGRESS NOTE ADULT - PROBLEM SELECTOR PLAN 1
Pt with ESRD on HD x 6 days a week. Plan for HD today. Monitor BMP and BP. Continue HD with  heparin 1000 units bolus and 500/hr infusion.

## 2018-01-05 NOTE — DISCHARGE NOTE ANTEPARTUM - PROVIDER TOKENS
FREE:[LAST:[-],PHONE:[(   )    -],FAX:[(   )    -],ADDRESS:[Please follow up with us at [facility], (PHONE #  [phone] )]]

## 2018-01-05 NOTE — DISCHARGE NOTE ANTEPARTUM - CARE PROVIDER_API CALL
-,   Please follow up with us at [facility], (PHONE #  [phone] )  Phone: (   )    -  Fax: (   )    -

## 2018-01-05 NOTE — PROGRESS NOTE ADULT - SUBJECTIVE AND OBJECTIVE BOX
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 Pt seen and examined at bedside. Pt denies sob, chest pain.  PAST HISTORY  --------------------------------------------------------------------------------  No significant changes to PMH, PSH, FHx, SHx, unless otherwise noted    ALLERGIES & MEDICATIONS  --------------------------------------------------------------------------------  Allergies    No Known Allergies    Intolerances      Standing Inpatient Medications    PRN Inpatient Medications      REVIEW OF SYSTEMS  --------------------------------------------------------------------------------  General: no fever  CVS: no chest pain  RESP: no sob, no cough  ABD: no abdominal pain  : no dysuria,  MSK: no edema     VITALS/PHYSICAL EXAM  --------------------------------------------------------------------------------  T(C): 36.7 (01-05-18 @ 09:50), Max: 36.8 (01-04-18 @ 14:58)  HR: 86 (01-05-18 @ 09:50) (86 - 98)  BP: 125/84 (01-05-18 @ 09:50) (119/80 - 125/84)  RR: 18 (01-05-18 @ 09:50) (18 - 18)  SpO2: 99% (01-05-18 @ 09:50) (99% - 100%)  Wt(kg): --  Height (cm): 144.78 (01-04-18 @ 10:07)      Physical Exam:  	Gen: NAD  	HEENT: MMM  	Pulm: CTA B/L  	CV: S1S2  	Abd: Soft, +BS  	Ext: No LE edema B/L                      Neuro: Awake   	Skin: Warm and Dry   	Vascular access: LUE AVF + thrill +bruit, skin intact      LABS/STUDIES  --------------------------------------------------------------------------------                Creatinine Trend:  SCr 4.22 [01-02 @ 10:42]  SCr 1.20 [12-30 @ 14:46]  SCr 4.35 [12-26 @ 16:10]    Urinalysis - [12-30-17 @ 09:11]      Color Yellow / Appearance Clear / SG 1.012 / pH 8.5      Gluc Negative / Ketone Negative  / Bili Negative / Urobili Negative       Blood Large / Protein 300 / Leuk Est Negative / Nitrite Negative      RBC >50 / WBC 0-2 / Hyaline  / Gran  / Sq Epi  / Non Sq Epi Occasional / Bacteria       HbA1c 5.7      [07-15-16 @ 08:42]  TSH 0.01      [10-02-17 @ 23:50]

## 2018-01-05 NOTE — DISCHARGE NOTE ANTEPARTUM - CARE PLAN
Principal Discharge DX:	Chronic kidney disease (CKD) stage G5/A1, glomerular filtration rate (GFR) less than or equal to 15 mL/min/1.73 square meter and albuminuria creatinine ratio less than 30 mg/g  Goal:	wellness  Instructions for follow-up, activity and diet:	per routine

## 2018-01-05 NOTE — DISCHARGE NOTE ANTEPARTUM - PATIENT PORTAL LINK FT
“You can access the FollowHealth Patient Portal, offered by [facility], by registering with the following website: [email or web address]”

## 2018-01-06 ENCOUNTER — INPATIENT (INPATIENT)
Facility: HOSPITAL | Age: 28
LOS: 0 days | Discharge: HOME | DRG: 781 | End: 2018-01-06
Attending: OBSTETRICS & GYNECOLOGY | Admitting: OBSTETRICS & GYNECOLOGY
Payer: MEDICAID

## 2018-01-06 ENCOUNTER — TRANSCRIPTION ENCOUNTER (OUTPATIENT)
Age: 28
End: 2018-01-06

## 2018-01-06 VITALS
HEART RATE: 88 BPM | DIASTOLIC BLOOD PRESSURE: 75 MMHG | TEMPERATURE: 98 F | RESPIRATION RATE: 16 BRPM | WEIGHT: 138.89 LBS | OXYGEN SATURATION: 99 % | SYSTOLIC BLOOD PRESSURE: 122 MMHG

## 2018-01-06 VITALS
RESPIRATION RATE: 16 BRPM | DIASTOLIC BLOOD PRESSURE: 77 MMHG | HEART RATE: 90 BPM | WEIGHT: 138.89 LBS | SYSTOLIC BLOOD PRESSURE: 132 MMHG | OXYGEN SATURATION: 99 % | TEMPERATURE: 98 F

## 2018-01-06 DIAGNOSIS — N18.5 CHRONIC KIDNEY DISEASE, STAGE 5: ICD-10-CM

## 2018-01-06 DIAGNOSIS — I77.0 ARTERIOVENOUS FISTULA, ACQUIRED: Chronic | ICD-10-CM

## 2018-01-06 DIAGNOSIS — Z98.89 OTHER SPECIFIED POSTPROCEDURAL STATES: Chronic | ICD-10-CM

## 2018-01-06 LAB
ALBUMIN SERPL ELPH-MCNC: 3 G/DL — LOW (ref 3.3–5)
ALP SERPL-CCNC: 104 U/L — SIGNIFICANT CHANGE UP (ref 40–120)
ALT FLD-CCNC: 9 U/L RC — LOW (ref 10–45)
ANION GAP SERPL CALC-SCNC: 8 MMOL/L — SIGNIFICANT CHANGE UP (ref 5–17)
AST SERPL-CCNC: 13 U/L — SIGNIFICANT CHANGE UP (ref 10–40)
BASOPHILS # BLD AUTO: 0 K/UL — SIGNIFICANT CHANGE UP (ref 0–0.2)
BASOPHILS NFR BLD AUTO: 0.2 % — SIGNIFICANT CHANGE UP (ref 0–2)
BILIRUB SERPL-MCNC: 0.1 MG/DL — LOW (ref 0.2–1.2)
BUN SERPL-MCNC: <4 MG/DL — LOW (ref 7–23)
CALCIUM SERPL-MCNC: 8.5 MG/DL — SIGNIFICANT CHANGE UP (ref 8.4–10.5)
CHLORIDE SERPL-SCNC: 102 MMOL/L — SIGNIFICANT CHANGE UP (ref 96–108)
CO2 SERPL-SCNC: 30 MMOL/L — SIGNIFICANT CHANGE UP (ref 22–31)
CREAT SERPL-MCNC: 1.23 MG/DL — SIGNIFICANT CHANGE UP (ref 0.5–1.3)
EOSINOPHIL # BLD AUTO: 0 K/UL — SIGNIFICANT CHANGE UP (ref 0–0.5)
EOSINOPHIL NFR BLD AUTO: 0.4 % — SIGNIFICANT CHANGE UP (ref 0–6)
GLUCOSE SERPL-MCNC: 73 MG/DL — SIGNIFICANT CHANGE UP (ref 70–99)
HCT VFR BLD CALC: 27.2 % — LOW (ref 34.5–45)
HGB BLD-MCNC: 9.4 G/DL — LOW (ref 11.5–15.5)
LYMPHOCYTES # BLD AUTO: 1.2 K/UL — SIGNIFICANT CHANGE UP (ref 1–3.3)
LYMPHOCYTES # BLD AUTO: 18.2 % — SIGNIFICANT CHANGE UP (ref 13–44)
MCHC RBC-ENTMCNC: 31.3 PG — SIGNIFICANT CHANGE UP (ref 27–34)
MCHC RBC-ENTMCNC: 34.5 GM/DL — SIGNIFICANT CHANGE UP (ref 32–36)
MCV RBC AUTO: 90.9 FL — SIGNIFICANT CHANGE UP (ref 80–100)
MONOCYTES # BLD AUTO: 0.6 K/UL — SIGNIFICANT CHANGE UP (ref 0–0.9)
MONOCYTES NFR BLD AUTO: 9.7 % — SIGNIFICANT CHANGE UP (ref 2–14)
NEUTROPHILS # BLD AUTO: 4.7 K/UL — SIGNIFICANT CHANGE UP (ref 1.8–7.4)
NEUTROPHILS NFR BLD AUTO: 71.5 % — SIGNIFICANT CHANGE UP (ref 43–77)
PLATELET # BLD AUTO: 229 K/UL — SIGNIFICANT CHANGE UP (ref 150–400)
POTASSIUM SERPL-MCNC: 4.3 MMOL/L — SIGNIFICANT CHANGE UP (ref 3.5–5.3)
POTASSIUM SERPL-SCNC: 4.3 MMOL/L — SIGNIFICANT CHANGE UP (ref 3.5–5.3)
PROT SERPL-MCNC: 6.1 G/DL — SIGNIFICANT CHANGE UP (ref 6–8.3)
RBC # BLD: 3 M/UL — LOW (ref 3.8–5.2)
RBC # FLD: 13.7 % — SIGNIFICANT CHANGE UP (ref 10.3–14.5)
SODIUM SERPL-SCNC: 140 MMOL/L — SIGNIFICANT CHANGE UP (ref 135–145)
WBC # BLD: 6.6 K/UL — SIGNIFICANT CHANGE UP (ref 3.8–10.5)
WBC # FLD AUTO: 6.6 K/UL — SIGNIFICANT CHANGE UP (ref 3.8–10.5)

## 2018-01-06 PROCEDURE — 99261: CPT

## 2018-01-06 PROCEDURE — 59050 FETAL MONITOR W/REPORT: CPT

## 2018-01-06 PROCEDURE — 90935 HEMODIALYSIS ONE EVALUATION: CPT | Mod: GC

## 2018-01-06 PROCEDURE — 85027 COMPLETE CBC AUTOMATED: CPT

## 2018-01-06 PROCEDURE — 80053 COMPREHEN METABOLIC PANEL: CPT

## 2018-01-06 PROCEDURE — 59025 FETAL NON-STRESS TEST: CPT

## 2018-01-06 NOTE — PROGRESS NOTE ADULT - SUBJECTIVE AND OBJECTIVE BOX
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 Pt seen and examined at bedside. Pt denies sob, chest pain.  PAST HISTORY  --------------------------------------------------------------------------------  No significant changes to PMH, PSH, FHx, SHx, unless otherwise noted    ALLERGIES & MEDICATIONS  --------------------------------------------------------------------------------  Allergies    No Known Allergies    Intolerances      Standing Inpatient Medications    PRN Inpatient Medications      REVIEW OF SYSTEMS  --------------------------------------------------------------------------------  General: no fever  CVS: no chest pain  RESP: no sob, no cough  ABD: no abdominal pain  : no dysuria,  MSK: no edema     VITALS/PHYSICAL EXAM  --------------------------------------------------------------------------------  T(C): 36.7 (01-05-18 @ 09:50), Max: 36.8 (01-04-18 @ 14:58)  HR: 86 (01-05-18 @ 09:50) (86 - 98)  BP: 125/84 (01-05-18 @ 09:50) (119/80 - 125/84)  RR: 18 (01-05-18 @ 09:50) (18 - 18)  SpO2: 99% (01-05-18 @ 09:50) (99% - 100%)  Wt(kg): --  Height (cm): 144.78 (01-04-18 @ 10:07)      Physical Exam:  	Gen: NAD  	HEENT: MMM  	Pulm: CTA B/L  	CV: S1S2  	Abd: Soft, +BS  	Ext: No LE edema B/L                      Neuro: Awake   	Skin: Warm and Dry   	Vascular access: LUE AVF + thrill +bruit, skin intact      LABS/STUDIES  --------------------------------------------------------------------------------      Creatinine Trend:  SCr 4.22 [01-02 @ 10:42]  SCr 1.20 [12-30 @ 14:46]  SCr 4.35 [12-26 @ 16:10]    Urinalysis - [12-30-17 @ 09:11]      Color Yellow / Appearance Clear / SG 1.012 / pH 8.5      Gluc Negative / Ketone Negative  / Bili Negative / Urobili Negative       Blood Large / Protein 300 / Leuk Est Negative / Nitrite Negative      RBC >50 / WBC 0-2 / Hyaline  / Gran  / Sq Epi  / Non Sq Epi Occasional / Bacteria       HbA1c 5.7      [07-15-16 @ 08:42]  TSH 0.01      [10-02-17 @ 23:50]

## 2018-01-06 NOTE — PROGRESS NOTE ADULT - ATTENDING COMMENTS
Pt. seen while receiving HD, tolerating HD well without any intra-dialytic complications. No UF with HD. BP well maintained and LUE AVF working well during HD. Clinically stable, plan for next maintenance HD on Monday.

## 2018-01-06 NOTE — PROGRESS NOTE ADULT - PROBLEM SELECTOR PLAN 3
in setting of renal failure. Continue hectoral 2mcg three times a week.  Monitor calcium, phosphorus and PTH in setting of renal failure. Continue Hectorol 2mcg three times a week.  Monitor calcium, phosphorus and PTH

## 2018-01-06 NOTE — DISCHARGE NOTE ANTEPARTUM - CARE PROVIDER_API CALL
Tammy Murrell), MaternalFetal Medicine; Obstetrics and Gynecology  [address] [place] [address]  Phone: [phone]  Fax: [phone]

## 2018-01-06 NOTE — DISCHARGE NOTE ANTEPARTUM - CARE PLAN
Principal Discharge DX:	Chronic kidney disease (CKD) stage G5/A1, glomerular filtration rate (GFR) less than or equal to 15 mL/min/1.73 square meter and albuminuria creatinine ratio less than 30 mg/g  Goal:	Return Monday for Dialysis  Instructions for follow-up, activity and diet:	As above

## 2018-01-06 NOTE — DISCHARGE NOTE ANTEPARTUM - PATIENT PORTAL LINK FT
“You can access the FollowHealth Patient Portal, offered by [facility], by registering with the following website: [email or web address]”

## 2018-01-08 ENCOUNTER — TRANSCRIPTION ENCOUNTER (OUTPATIENT)
Age: 28
End: 2018-01-08

## 2018-01-08 ENCOUNTER — INPATIENT (INPATIENT)
Facility: HOSPITAL | Age: 28
LOS: 0 days | Discharge: HOME | DRG: 781 | End: 2018-01-08
Attending: OBSTETRICS & GYNECOLOGY | Admitting: OBSTETRICS & GYNECOLOGY
Payer: MEDICAID

## 2018-01-08 VITALS
HEART RATE: 81 BPM | RESPIRATION RATE: 18 BRPM | OXYGEN SATURATION: 100 % | SYSTOLIC BLOOD PRESSURE: 119 MMHG | TEMPERATURE: 98 F | DIASTOLIC BLOOD PRESSURE: 75 MMHG

## 2018-01-08 VITALS
OXYGEN SATURATION: 100 % | TEMPERATURE: 98 F | SYSTOLIC BLOOD PRESSURE: 119 MMHG | HEART RATE: 80 BPM | DIASTOLIC BLOOD PRESSURE: 72 MMHG | RESPIRATION RATE: 18 BRPM

## 2018-01-08 DIAGNOSIS — I77.0 ARTERIOVENOUS FISTULA, ACQUIRED: Chronic | ICD-10-CM

## 2018-01-08 DIAGNOSIS — Z98.89 OTHER SPECIFIED POSTPROCEDURAL STATES: Chronic | ICD-10-CM

## 2018-01-08 DIAGNOSIS — N18.5 CHRONIC KIDNEY DISEASE, STAGE 5: ICD-10-CM

## 2018-01-08 LAB
ALBUMIN SERPL ELPH-MCNC: 2.8 G/DL — LOW (ref 3.3–5)
ALP SERPL-CCNC: 98 U/L — SIGNIFICANT CHANGE UP (ref 40–120)
ALT FLD-CCNC: 8 U/L RC — LOW (ref 10–45)
ANION GAP SERPL CALC-SCNC: 13 MMOL/L — SIGNIFICANT CHANGE UP (ref 5–17)
AST SERPL-CCNC: 10 U/L — SIGNIFICANT CHANGE UP (ref 10–40)
BASOPHILS # BLD AUTO: 0 K/UL — SIGNIFICANT CHANGE UP (ref 0–0.2)
BASOPHILS NFR BLD AUTO: 0 % — SIGNIFICANT CHANGE UP (ref 0–2)
BILIRUB SERPL-MCNC: 0.1 MG/DL — LOW (ref 0.2–1.2)
BUN SERPL-MCNC: 19 MG/DL — SIGNIFICANT CHANGE UP (ref 7–23)
CALCIUM SERPL-MCNC: 8.4 MG/DL — SIGNIFICANT CHANGE UP (ref 8.4–10.5)
CHLORIDE SERPL-SCNC: 105 MMOL/L — SIGNIFICANT CHANGE UP (ref 96–108)
CO2 SERPL-SCNC: 20 MMOL/L — LOW (ref 22–31)
CREAT SERPL-MCNC: 4.13 MG/DL — HIGH (ref 0.5–1.3)
EOSINOPHIL # BLD AUTO: 0.1 K/UL — SIGNIFICANT CHANGE UP (ref 0–0.5)
EOSINOPHIL NFR BLD AUTO: 0.7 % — SIGNIFICANT CHANGE UP (ref 0–6)
GLUCOSE 1H P MEAL SERPL-MCNC: 133 MG/DL — SIGNIFICANT CHANGE UP (ref 70–134)
GLUCOSE SERPL-MCNC: 73 MG/DL — SIGNIFICANT CHANGE UP (ref 70–99)
HCT VFR BLD CALC: 24.9 % — LOW (ref 34.5–45)
HGB BLD-MCNC: 8.5 G/DL — LOW (ref 11.5–15.5)
LYMPHOCYTES # BLD AUTO: 1.4 K/UL — SIGNIFICANT CHANGE UP (ref 1–3.3)
LYMPHOCYTES # BLD AUTO: 15.2 % — SIGNIFICANT CHANGE UP (ref 13–44)
MCHC RBC-ENTMCNC: 31 PG — SIGNIFICANT CHANGE UP (ref 27–34)
MCHC RBC-ENTMCNC: 33.9 GM/DL — SIGNIFICANT CHANGE UP (ref 32–36)
MCV RBC AUTO: 91.3 FL — SIGNIFICANT CHANGE UP (ref 80–100)
MONOCYTES # BLD AUTO: 0.6 K/UL — SIGNIFICANT CHANGE UP (ref 0–0.9)
MONOCYTES NFR BLD AUTO: 6.7 % — SIGNIFICANT CHANGE UP (ref 2–14)
NEUTROPHILS # BLD AUTO: 7.4 K/UL — SIGNIFICANT CHANGE UP (ref 1.8–7.4)
NEUTROPHILS NFR BLD AUTO: 77.4 % — HIGH (ref 43–77)
PLAT MORPH BLD: NORMAL — SIGNIFICANT CHANGE UP
PLATELET # BLD AUTO: 232 K/UL — SIGNIFICANT CHANGE UP (ref 150–400)
POTASSIUM SERPL-MCNC: 3.9 MMOL/L — SIGNIFICANT CHANGE UP (ref 3.5–5.3)
POTASSIUM SERPL-SCNC: 3.9 MMOL/L — SIGNIFICANT CHANGE UP (ref 3.5–5.3)
PROT SERPL-MCNC: 5.7 G/DL — LOW (ref 6–8.3)
RBC # BLD: 2.73 M/UL — LOW (ref 3.8–5.2)
RBC # FLD: 14.1 % — SIGNIFICANT CHANGE UP (ref 10.3–14.5)
RBC BLD AUTO: SIGNIFICANT CHANGE UP
SODIUM SERPL-SCNC: 138 MMOL/L — SIGNIFICANT CHANGE UP (ref 135–145)
WBC # BLD: 9.6 K/UL — SIGNIFICANT CHANGE UP (ref 3.8–10.5)
WBC # FLD AUTO: 9.6 K/UL — SIGNIFICANT CHANGE UP (ref 3.8–10.5)

## 2018-01-08 PROCEDURE — 85027 COMPLETE CBC AUTOMATED: CPT

## 2018-01-08 PROCEDURE — 80053 COMPREHEN METABOLIC PANEL: CPT

## 2018-01-08 PROCEDURE — 90935 HEMODIALYSIS ONE EVALUATION: CPT | Mod: GC

## 2018-01-08 PROCEDURE — 99261: CPT

## 2018-01-08 PROCEDURE — 59025 FETAL NON-STRESS TEST: CPT

## 2018-01-08 PROCEDURE — 82950 GLUCOSE TEST: CPT

## 2018-01-08 RX ORDER — DOXERCALCIFEROL 2.5 UG/1
2 CAPSULE ORAL ONCE
Qty: 0 | Refills: 0 | Status: COMPLETED | OUTPATIENT
Start: 2018-01-08 | End: 2018-01-08

## 2018-01-08 RX ORDER — ERYTHROPOIETIN 10000 [IU]/ML
2000 INJECTION, SOLUTION INTRAVENOUS; SUBCUTANEOUS ONCE
Qty: 0 | Refills: 0 | Status: COMPLETED | OUTPATIENT
Start: 2018-01-08 | End: 2018-01-08

## 2018-01-08 RX ADMIN — ERYTHROPOIETIN 2000 UNIT(S): 10000 INJECTION, SOLUTION INTRAVENOUS; SUBCUTANEOUS at 13:30

## 2018-01-08 RX ADMIN — DOXERCALCIFEROL 2 MICROGRAM(S): 2.5 CAPSULE ORAL at 13:31

## 2018-01-08 NOTE — DISCHARGE NOTE ANTEPARTUM - HOSPITAL COURSE
26 yo  @ 26+0 presents for dialysis, uncomplicated. Pt underwent GCT today, f/u results. Return for dialysis 18.

## 2018-01-08 NOTE — DISCHARGE NOTE ANTEPARTUM - CARE PROVIDER_API CALL
Tammy Murrell), MaternalFetal Medicine; Obstetrics and Gynecology  [address] [place] [address]  Phone: [phone]  Fax: [phone]

## 2018-01-08 NOTE — DISCHARGE NOTE ANTEPARTUM - PATIENT PORTAL LINK FT
“You can access the FollowHealth Patient Portal, offered by [facility], by registering with the following website: [email or web address]”

## 2018-01-08 NOTE — PROGRESS NOTE ADULT - SUBJECTIVE AND OBJECTIVE BOX
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 Pt seen and examined at bedside. Pt denies sob, chest pain.    PAST HISTORY  --------------------------------------------------------------------------------  No significant changes to PMH, PSH, FHx, SHx, unless otherwise noted    ALLERGIES & MEDICATIONS  --------------------------------------------------------------------------------  Allergies    No Known Allergies    Intolerances      Standing Inpatient Medications  doxercalciferol Injectable 2 MICROGram(s) IV Push once  epoetin leeann Injectable 2000 Unit(s) IV Push once    PRN Inpatient Medications      REVIEW OF SYSTEMS  --------------------------------------------------------------------------------  General: no fever  CVS: no chest pain  RESP: no sob, no cough  ABD: no abdominal pain  MSK: no edema     VITALS/PHYSICAL EXAM  --------------------------------------------------------------------------------  T(C): --  HR: --  BP: --  RR: --  SpO2: --  Wt(kg): --        Physical Exam:  	Gen: NAD  	HEENT: MMM  	Pulm: CTA B/L  	CV: S1S2  	Abd: Soft, +BS  	Ext: No LE edema B/L                      Neuro: Awake   	Skin: Warm and Dry   	Vascular access: LUE AVF + thrill +bruit, skin intact    LABS/STUDIES  --------------------------------------------------------------------------------              9.4    6.6   >-----------<  229      [01-06-18 @ 16:44]              27.2     138  |  105  |  19  ----------------------------<  73      [01-08-18 @ 11:24]  3.9   |  20  |  4.13        Ca     8.4     [01-08-18 @ 11:24]    TPro  5.7  /  Alb  2.8  /  TBili  0.1  /  DBili  x   /  AST  10  /  ALT  8   /  AlkPhos  98  [01-08-18 @ 11:24]          Creatinine Trend:  SCr 4.13 [01-08 @ 11:24]  SCr 1.23 [01-06 @ 16:44]  SCr 4.22 [01-02 @ 10:42]  SCr 1.20 [12-30 @ 14:46]  SCr 4.35 [12-26 @ 16:10]    Urinalysis - [12-30-17 @ 09:11]      Color Yellow / Appearance Clear / SG 1.012 / pH 8.5      Gluc Negative / Ketone Negative  / Bili Negative / Urobili Negative       Blood Large / Protein 300 / Leuk Est Negative / Nitrite Negative      RBC >50 / WBC 0-2 / Hyaline  / Gran  / Sq Epi  / Non Sq Epi Occasional / Bacteria       HbA1c 5.7      [07-15-16 @ 08:42]  TSH 0.01      [10-02-17 @ 23:50]    HBsAb <3.0      [01-05-18 @ 13:45]  HBsAg Nonreact      [01-05-18 @ 13:45]  HBcAb Nonreact      [01-05-18 @ 13:45]  HCV 0.07, Nonreact      [01-05-18 @ 13:45]

## 2018-01-08 NOTE — PROGRESS NOTE ADULT - PROBLEM SELECTOR PLAN 3
in setting of renal failure. Continue Hectorol 2mcg three times a week.  Monitor calcium, phosphorus and PTH

## 2018-01-08 NOTE — DISCHARGE NOTE ANTEPARTUM - CARE PLAN
Principal Discharge DX:	Chronic kidney disease (CKD) stage G5/A1, glomerular filtration rate (GFR) less than or equal to 15 mL/min/1.73 square meter and albuminuria creatinine ratio less than 30 mg/g  Goal:	dialysis  Instructions for follow-up, activity and diet:	return for dialysis 1/9/18

## 2018-01-09 ENCOUNTER — NON-APPOINTMENT (OUTPATIENT)
Age: 28
End: 2018-01-09

## 2018-01-09 ENCOUNTER — INPATIENT (INPATIENT)
Facility: HOSPITAL | Age: 28
LOS: 0 days | Discharge: HOME | End: 2018-01-09
Attending: OBSTETRICS & GYNECOLOGY | Admitting: OBSTETRICS & GYNECOLOGY
Payer: MEDICAID

## 2018-01-09 ENCOUNTER — TRANSCRIPTION ENCOUNTER (OUTPATIENT)
Age: 28
End: 2018-01-09

## 2018-01-09 VITALS
HEART RATE: 84 BPM | WEIGHT: 142.2 LBS | TEMPERATURE: 98 F | OXYGEN SATURATION: 100 % | DIASTOLIC BLOOD PRESSURE: 78 MMHG | SYSTOLIC BLOOD PRESSURE: 137 MMHG | RESPIRATION RATE: 18 BRPM

## 2018-01-09 VITALS
DIASTOLIC BLOOD PRESSURE: 83 MMHG | SYSTOLIC BLOOD PRESSURE: 130 MMHG | WEIGHT: 142.2 LBS | TEMPERATURE: 98 F | RESPIRATION RATE: 18 BRPM | OXYGEN SATURATION: 100 % | HEART RATE: 82 BPM

## 2018-01-09 DIAGNOSIS — Z98.89 OTHER SPECIFIED POSTPROCEDURAL STATES: Chronic | ICD-10-CM

## 2018-01-09 DIAGNOSIS — N18.5 CHRONIC KIDNEY DISEASE, STAGE 5: ICD-10-CM

## 2018-01-09 DIAGNOSIS — I77.0 ARTERIOVENOUS FISTULA, ACQUIRED: Chronic | ICD-10-CM

## 2018-01-09 RX ORDER — ERYTHROPOIETIN 10000 [IU]/ML
4000 INJECTION, SOLUTION INTRAVENOUS; SUBCUTANEOUS ONCE
Qty: 0 | Refills: 0 | Status: COMPLETED | OUTPATIENT
Start: 2018-01-09 | End: 2018-01-09

## 2018-01-09 RX ADMIN — ERYTHROPOIETIN 4000 UNIT(S): 10000 INJECTION, SOLUTION INTRAVENOUS; SUBCUTANEOUS at 09:33

## 2018-01-09 NOTE — DISCHARGE NOTE ANTEPARTUM - CARE PROVIDER_API CALL
-,   Please follow up with us at L&D for dialysis and prenatal care.  If you need to be seen as an outpatient you can contact us at clinic (PHONE #  [phone] )  Phone: (   )    -  Fax: (   )    -

## 2018-01-09 NOTE — DISCHARGE NOTE ANTEPARTUM - PATIENT PORTAL LINK FT
“You can access the FollowHealth Patient Portal, offered by [facility], by registering with the following website: [email or web address]”

## 2018-01-09 NOTE — DISCHARGE NOTE ANTEPARTUM - CARE PLAN
Principal Discharge DX:	Chronic kidney disease (CKD) stage G5/A1, glomerular filtration rate (GFR) less than or equal to 15 mL/min/1.73 square meter and albuminuria creatinine ratio less than 30 mg/g  Goal:	wellness  Instructions for follow-up, activity and diet:	follow up for dialysis

## 2018-01-09 NOTE — DISCHARGE NOTE ANTEPARTUM - PROVIDER TOKENS
FREE:[LAST:[-],PHONE:[(   )    -],FAX:[(   )    -],ADDRESS:[Please follow up with us at L&D for dialysis and prenatal care.  If you need to be seen as an outpatient you can contact us at clinic (PHONE #  [phone] )]]

## 2018-01-09 NOTE — PROGRESS NOTE ADULT - SUBJECTIVE AND OBJECTIVE BOX
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 Pt seen and examined at bedside. Pt denies sob, chest pain.  PAST HISTORY  --------------------------------------------------------------------------------  No significant changes to PMH, PSH, FHx, SHx, unless otherwise noted    ALLERGIES & MEDICATIONS  --------------------------------------------------------------------------------  Allergies    No Known Allergies    Intolerances      Standing Inpatient Medications    PRN Inpatient Medications      REVIEW OF SYSTEMS  --------------------------------------------------------------------------------  General: no fever  CVS: no chest pain  RESP: no sob, no cough  ABD: no abdominal pain  MSK: no edema     VITALS/PHYSICAL EXAM  --------------------------------------------------------------------------------  T(C): 36.7 (01-09-18 @ 09:25), Max: 36.9 (01-08-18 @ 15:05)  HR: 84 (01-09-18 @ 09:25) (80 - 84)  BP: 137/78 (01-09-18 @ 09:25) (119/72 - 137/78)  RR: 18 (01-09-18 @ 09:25) (18 - 18)  SpO2: 100% (01-09-18 @ 09:25) (100% - 100%)  Wt(kg): --        Physical Exam:  	Gen: NAD  	HEENT: MMM  	Pulm: CTA B/L  	CV: S1S2  	Abd: Soft, +BS  	Ext: No LE edema B/L                      Neuro: Awake   	Skin: Warm and Dry   	Vascular access: LUE AVF + thrill +bruit, skin intact    LABS/STUDIES  --------------------------------------------------------------------------------              8.5    9.6   >-----------<  232      [01-08-18 @ 11:24]              24.9     138  |  105  |  19  ----------------------------<  73      [01-08-18 @ 11:24]  3.9   |  20  |  4.13        Ca     8.4     [01-08-18 @ 11:24]    TPro  5.7  /  Alb  2.8  /  TBili  0.1  /  DBili  x   /  AST  10  /  ALT  8   /  AlkPhos  98  [01-08-18 @ 11:24]          Creatinine Trend:  SCr 4.13 [01-08 @ 11:24]  SCr 1.23 [01-06 @ 16:44]  SCr 4.22 [01-02 @ 10:42]  SCr 1.20 [12-30 @ 14:46]  SCr 4.35 [12-26 @ 16:10]    Urinalysis - [12-30-17 @ 09:11]      Color Yellow / Appearance Clear / SG 1.012 / pH 8.5      Gluc Negative / Ketone Negative  / Bili Negative / Urobili Negative       Blood Large / Protein 300 / Leuk Est Negative / Nitrite Negative      RBC >50 / WBC 0-2 / Hyaline  / Gran  / Sq Epi  / Non Sq Epi Occasional / Bacteria       HbA1c 5.7      [07-15-16 @ 08:42]  TSH 0.01      [10-02-17 @ 23:50]    HBsAb <3.0      [01-05-18 @ 13:45]  HBsAg Nonreact      [01-05-18 @ 13:45]  HBcAb Nonreact      [01-05-18 @ 13:45]  HCV 0.07, Nonreact      [01-05-18 @ 13:45]

## 2018-01-09 NOTE — PROGRESS NOTE ADULT - PROBLEM SELECTOR PLAN 3
in setting of renal failure.   -Continue Hectorol 2mcg three times a week.    -Monitor calcium, phosphorus and PTH

## 2018-01-10 ENCOUNTER — INPATIENT (INPATIENT)
Facility: HOSPITAL | Age: 28
LOS: 0 days | Discharge: HOME | DRG: 781 | End: 2018-01-10
Attending: OBSTETRICS & GYNECOLOGY | Admitting: OBSTETRICS & GYNECOLOGY
Payer: MEDICAID

## 2018-01-10 ENCOUNTER — TRANSCRIPTION ENCOUNTER (OUTPATIENT)
Age: 28
End: 2018-01-10

## 2018-01-10 VITALS
HEART RATE: 77 BPM | SYSTOLIC BLOOD PRESSURE: 141 MMHG | OXYGEN SATURATION: 100 % | WEIGHT: 142.42 LBS | TEMPERATURE: 98 F | DIASTOLIC BLOOD PRESSURE: 93 MMHG | RESPIRATION RATE: 16 BRPM

## 2018-01-10 VITALS
WEIGHT: 142.42 LBS | RESPIRATION RATE: 15 BRPM | SYSTOLIC BLOOD PRESSURE: 123 MMHG | HEART RATE: 88 BPM | TEMPERATURE: 99 F | DIASTOLIC BLOOD PRESSURE: 77 MMHG | OXYGEN SATURATION: 100 %

## 2018-01-10 DIAGNOSIS — Z98.89 OTHER SPECIFIED POSTPROCEDURAL STATES: Chronic | ICD-10-CM

## 2018-01-10 DIAGNOSIS — I77.0 ARTERIOVENOUS FISTULA, ACQUIRED: Chronic | ICD-10-CM

## 2018-01-10 DIAGNOSIS — N18.5 CHRONIC KIDNEY DISEASE, STAGE 5: ICD-10-CM

## 2018-01-10 PROCEDURE — 59050 FETAL MONITOR W/REPORT: CPT

## 2018-01-10 PROCEDURE — 59025 FETAL NON-STRESS TEST: CPT

## 2018-01-10 PROCEDURE — 99261: CPT

## 2018-01-10 RX ORDER — ERYTHROPOIETIN 10000 [IU]/ML
4000 INJECTION, SOLUTION INTRAVENOUS; SUBCUTANEOUS
Qty: 0 | Refills: 0 | Status: DISCONTINUED | OUTPATIENT
Start: 2018-01-10 | End: 2018-01-10

## 2018-01-10 RX ORDER — ERYTHROPOIETIN 10000 [IU]/ML
4000 INJECTION, SOLUTION INTRAVENOUS; SUBCUTANEOUS ONCE
Qty: 0 | Refills: 0 | Status: ACTIVE | OUTPATIENT
Start: 2018-01-10

## 2018-01-10 RX ORDER — DOXERCALCIFEROL 2.5 UG/1
2 CAPSULE ORAL ONCE
Qty: 0 | Refills: 0 | Status: ACTIVE | OUTPATIENT
Start: 2018-01-10

## 2018-01-10 RX ORDER — DOXERCALCIFEROL 2.5 UG/1
2 CAPSULE ORAL
Qty: 0 | Refills: 0 | Status: DISCONTINUED | OUTPATIENT
Start: 2018-01-10 | End: 2018-01-10

## 2018-01-10 RX ADMIN — ERYTHROPOIETIN 4000 UNIT(S): 10000 INJECTION, SOLUTION INTRAVENOUS; SUBCUTANEOUS at 11:42

## 2018-01-10 RX ADMIN — DOXERCALCIFEROL 2 MICROGRAM(S): 2.5 CAPSULE ORAL at 11:43

## 2018-01-10 NOTE — DISCHARGE NOTE ANTEPARTUM - PATIENT PORTAL LINK FT
“You can access the FollowHealth Patient Portal, offered by [facility], by registering with the following website: [email or web address]”

## 2018-01-10 NOTE — DISCHARGE NOTE ANTEPARTUM - HOSPITAL COURSE
26 wks and 2 days pregnant w/ stage 5 CKD arrived for dialysis performed without complication, reassuring fetal testing.

## 2018-01-10 NOTE — PROGRESS NOTE ADULT - SUBJECTIVE AND OBJECTIVE BOX
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 Pt seen and examined at bedside. Pt denies sob, chest pain.  PAST HISTORY  --------------------------------------------------------------------------------  No significant changes to PMH, PSH, FHx, SHx, unless otherwise noted    ALLERGIES & MEDICATIONS  --------------------------------------------------------------------------------  Allergies    No Known Allergies    Intolerances      Standing Inpatient Medications  doxercalciferol Injectable 2 MICROGram(s) IV Push once  epoetin leeann Injectable 4000 Unit(s) IV Push once    PRN Inpatient Medications      REVIEW OF SYSTEMS  --------------------------------------------------------------------------------  General: no fever  CVS: no chest pain  RESP: no sob, no cough  ABD: no abdominal pain  : no dysuria,  MSK: no edema     VITALS/PHYSICAL EXAM  --------------------------------------------------------------------------------  T(C): 36.8 (01-09-18 @ 13:30), Max: 36.8 (01-09-18 @ 13:30)  HR: 82 (01-09-18 @ 13:30) (82 - 84)  BP: 130/83 (01-09-18 @ 13:30) (130/83 - 137/78)  RR: 18 (01-09-18 @ 13:30) (18 - 18)  SpO2: 100% (01-09-18 @ 13:30) (100% - 100%)  Wt(kg): --        01-09-18 @ 07:01  -  01-10-18 @ 07:00  --------------------------------------------------------  IN: 0 mL / OUT: 0 mL / NET: 0 mL      Physical Exam:  	Gen: NAD  	HEENT: MMM  	Pulm: CTA B/L  	CV: S1S2  	Abd: Soft, +BS  	Ext: No LE edema B/L                      Neuro: Awake   	Skin: Warm and Dry   	Vascular access: LUE AVF + thrill +bruit, skin intact    LABS/STUDIES  --------------------------------------------------------------------------------              8.5    9.6   >-----------<  232      [01-08-18 @ 11:24]              24.9     138  |  105  |  19  ----------------------------<  73      [01-08-18 @ 11:24]  3.9   |  20  |  4.13        Ca     8.4     [01-08-18 @ 11:24]    TPro  5.7  /  Alb  2.8  /  TBili  0.1  /  DBili  x   /  AST  10  /  ALT  8   /  AlkPhos  98  [01-08-18 @ 11:24]          Creatinine Trend:  SCr 4.13 [01-08 @ 11:24]  SCr 1.23 [01-06 @ 16:44]  SCr 4.22 [01-02 @ 10:42]  SCr 1.20 [12-30 @ 14:46]  SCr 4.35 [12-26 @ 16:10]    Urinalysis - [12-30-17 @ 09:11]      Color Yellow / Appearance Clear / SG 1.012 / pH 8.5      Gluc Negative / Ketone Negative  / Bili Negative / Urobili Negative       Blood Large / Protein 300 / Leuk Est Negative / Nitrite Negative      RBC >50 / WBC 0-2 / Hyaline  / Gran  / Sq Epi  / Non Sq Epi Occasional / Bacteria       HbA1c 5.7      [07-15-16 @ 08:42]  TSH 0.01      [10-02-17 @ 23:50]    HBsAb <3.0      [01-05-18 @ 13:45]  HBsAg Nonreact      [01-05-18 @ 13:45]  HBcAb Nonreact      [01-05-18 @ 13:45]  HCV 0.07, Nonreact      [01-05-18 @ 13:45]

## 2018-01-10 NOTE — DISCHARGE NOTE ANTEPARTUM - CARE PLAN
Principal Discharge DX:	Chronic kidney disease (CKD) stage G5/A1, glomerular filtration rate (GFR) less than or equal to 15 mL/min/1.73 square meter and albuminuria creatinine ratio less than 30 mg/g  Goal:	continued dialysis daily  Instructions for follow-up, activity and diet:	regular diet, follow-up as scheduled.

## 2018-01-10 NOTE — DISCHARGE NOTE ANTEPARTUM - CARE PROVIDER_API CALL
Tammy Murrell), MaternalFetal Medicine; Obstetrics and Gynecology  [address] [place] [address]  Phone: [phone]  Fax: [phone]

## 2018-01-11 ENCOUNTER — INPATIENT (INPATIENT)
Facility: HOSPITAL | Age: 28
LOS: 0 days | Discharge: HOME | DRG: 781 | End: 2018-01-11
Attending: OBSTETRICS & GYNECOLOGY | Admitting: OBSTETRICS & GYNECOLOGY
Payer: MEDICAID

## 2018-01-11 ENCOUNTER — TRANSCRIPTION ENCOUNTER (OUTPATIENT)
Age: 28
End: 2018-01-11

## 2018-01-11 VITALS
RESPIRATION RATE: 16 BRPM | TEMPERATURE: 98 F | OXYGEN SATURATION: 100 % | SYSTOLIC BLOOD PRESSURE: 138 MMHG | DIASTOLIC BLOOD PRESSURE: 72 MMHG | HEART RATE: 84 BPM

## 2018-01-11 VITALS
DIASTOLIC BLOOD PRESSURE: 74 MMHG | HEART RATE: 84 BPM | TEMPERATURE: 98 F | SYSTOLIC BLOOD PRESSURE: 122 MMHG | RESPIRATION RATE: 16 BRPM | OXYGEN SATURATION: 100 %

## 2018-01-11 DIAGNOSIS — Z98.89 OTHER SPECIFIED POSTPROCEDURAL STATES: Chronic | ICD-10-CM

## 2018-01-11 DIAGNOSIS — O99.89 OTHER SPECIFIED DISEASES AND CONDITIONS COMPLICATING PREGNANCY, CHILDBIRTH AND THE PUERPERIUM: ICD-10-CM

## 2018-01-11 DIAGNOSIS — I77.0 ARTERIOVENOUS FISTULA, ACQUIRED: Chronic | ICD-10-CM

## 2018-01-11 DIAGNOSIS — N18.5 CHRONIC KIDNEY DISEASE, STAGE 5: ICD-10-CM

## 2018-01-11 PROCEDURE — 90935 HEMODIALYSIS ONE EVALUATION: CPT | Mod: GC

## 2018-01-11 NOTE — DISCHARGE NOTE ANTEPARTUM - CARE PLAN
Principal Discharge DX:	Chronic kidney disease (CKD) stage G5/A1, glomerular filtration rate (GFR) less than or equal to 15 mL/min/1.73 square meter and albuminuria creatinine ratio less than 30 mg/g  Goal:	dialysis  Instructions for follow-up, activity and diet:	return 1/12  Instructions for follow-up, activity and diet:	return for dialysis 1/12

## 2018-01-11 NOTE — PROGRESS NOTE ADULT - ATTENDING COMMENTS
ESRD on HD 6days a week  Has not required UF at HD  3 K bath  Anemia: we had increased her epo at dialysis this week  Repeat on labs Monday and weekly  Continue hectoral

## 2018-01-11 NOTE — DISCHARGE NOTE OB - PATIENT PORTAL LINK FT
“You can access the FollowHealth Patient Portal, offered by [facility], by registering with the following website: [email or web address]”

## 2018-01-11 NOTE — PROGRESS NOTE ADULT - SUBJECTIVE AND OBJECTIVE BOX
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 Pt seen and examined at bedside. Pt denies sob, chest pain.  PAST HISTORY  --------------------------------------------------------------------------------  No significant changes to PMH, PSH, FHx, SHx, unless otherwise noted    ALLERGIES & MEDICATIONS  --------------------------------------------------------------------------------  Allergies    No Known Allergies    Intolerances      Standing Inpatient Medications    PRN Inpatient Medications      REVIEW OF SYSTEMS  --------------------------------------------------------------------------------  General: no fever  CVS: no chest pain  RESP: no sob, no cough  ABD: no abdominal pain  : no dysuria,  MSK: no edema     VITALS/PHYSICAL EXAM  --------------------------------------------------------------------------------  T(C): 37.1 (01-10-18 @ 13:15), Max: 37.1 (01-10-18 @ 13:15)  HR: 88 (01-10-18 @ 13:15) (88 - 88)  BP: 123/77 (01-10-18 @ 13:15) (123/77 - 123/77)  RR: 15 (01-10-18 @ 13:15) (15 - 15)  SpO2: 100% (01-10-18 @ 13:15) (100% - 100%)  Wt(kg): --        Physical Exam:  	Gen: NAD  	HEENT: MMM  	Pulm: CTA B/L  	CV: S1S2  	Abd: Soft, +BS  	Ext: No LE edema B/L                      Neuro: Awake   	Skin: Warm and Dry   	Vascular access: LUE AVF + thrill +bruit, skin intact      LABS/STUDIES  --------------------------------------------------------------------------------                Creatinine Trend:  SCr 4.13 [01-08 @ 11:24]  SCr 1.23 [01-06 @ 16:44]  SCr 4.22 [01-02 @ 10:42]  SCr 1.20 [12-30 @ 14:46]  SCr 4.35 [12-26 @ 16:10]    Urinalysis - [12-30-17 @ 09:11]      Color Yellow / Appearance Clear / SG 1.012 / pH 8.5      Gluc Negative / Ketone Negative  / Bili Negative / Urobili Negative       Blood Large / Protein 300 / Leuk Est Negative / Nitrite Negative      RBC >50 / WBC 0-2 / Hyaline  / Gran  / Sq Epi  / Non Sq Epi Occasional / Bacteria       HbA1c 5.7      [07-15-16 @ 08:42]  TSH 0.01      [10-02-17 @ 23:50]    HBsAb <3.0      [01-05-18 @ 13:45]  HBsAg Nonreact      [01-05-18 @ 13:45]  HBcAb Nonreact      [01-05-18 @ 13:45]  HCV 0.07, Nonreact      [01-05-18 @ 13:45]

## 2018-01-11 NOTE — DISCHARGE NOTE ANTEPARTUM - CARE PROVIDER_API CALL
Tammy Murrell), MaternalFetal Medicine; Obstetrics and Gynecology  [address] [place] [address]  Phone: [phone]  Fax: [phone]

## 2018-01-11 NOTE — DISCHARGE NOTE ANTEPARTUM - CARE PROVIDERS DIRECT ADDRESSES
[email or web address]

## 2018-01-11 NOTE — PROGRESS NOTE ADULT - ASSESSMENT
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

## 2018-01-12 ENCOUNTER — TRANSCRIPTION ENCOUNTER (OUTPATIENT)
Age: 28
End: 2018-01-12

## 2018-01-12 ENCOUNTER — INPATIENT (INPATIENT)
Facility: HOSPITAL | Age: 28
LOS: 0 days | Discharge: HOME | DRG: 781 | End: 2018-01-12
Attending: OBSTETRICS & GYNECOLOGY | Admitting: OBSTETRICS & GYNECOLOGY
Payer: MEDICAID

## 2018-01-12 VITALS
DIASTOLIC BLOOD PRESSURE: 84 MMHG | OXYGEN SATURATION: 100 % | RESPIRATION RATE: 17 BRPM | TEMPERATURE: 98 F | SYSTOLIC BLOOD PRESSURE: 128 MMHG | HEART RATE: 89 BPM

## 2018-01-12 VITALS — WEIGHT: 130.07 LBS | HEIGHT: 61 IN

## 2018-01-12 DIAGNOSIS — N18.5 CHRONIC KIDNEY DISEASE, STAGE 5: ICD-10-CM

## 2018-01-12 DIAGNOSIS — I77.0 ARTERIOVENOUS FISTULA, ACQUIRED: Chronic | ICD-10-CM

## 2018-01-12 DIAGNOSIS — Z98.89 OTHER SPECIFIED POSTPROCEDURAL STATES: Chronic | ICD-10-CM

## 2018-01-12 PROCEDURE — 90935 HEMODIALYSIS ONE EVALUATION: CPT | Mod: GC

## 2018-01-12 RX ORDER — ERYTHROPOIETIN 10000 [IU]/ML
4000 INJECTION, SOLUTION INTRAVENOUS; SUBCUTANEOUS ONCE
Qty: 0 | Refills: 0 | Status: COMPLETED | OUTPATIENT
Start: 2018-01-12 | End: 2018-01-12

## 2018-01-12 RX ORDER — DOXERCALCIFEROL 2.5 UG/1
2 CAPSULE ORAL ONCE
Qty: 0 | Refills: 0 | Status: COMPLETED | OUTPATIENT
Start: 2018-01-12 | End: 2018-01-12

## 2018-01-12 RX ADMIN — ERYTHROPOIETIN 4000 UNIT(S): 10000 INJECTION, SOLUTION INTRAVENOUS; SUBCUTANEOUS at 10:43

## 2018-01-12 RX ADMIN — DOXERCALCIFEROL 2 MICROGRAM(S): 2.5 CAPSULE ORAL at 10:43

## 2018-01-12 NOTE — DISCHARGE NOTE ANTEPARTUM - PATIENT PORTAL LINK FT
“You can access the FollowHealth Patient Portal, offered by [facility], by registering with the following website: [email or web address]”

## 2018-01-12 NOTE — DISCHARGE NOTE ANTEPARTUM - CARE PROVIDER_API CALL
Tammy Murrell), MaternalFetal Medicine; Obstetrics and Gynecology  [address] [place] [address]  Phone: [phone]  Fax: [phone]

## 2018-01-12 NOTE — DISCHARGE NOTE ANTEPARTUM - HOSPITAL COURSE
Patient presented for scheduled dialysis. Uncomplicated, discharged home to return for dialysis tomorrow.

## 2018-01-12 NOTE — PROGRESS NOTE ADULT - SUBJECTIVE AND OBJECTIVE BOX
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 Pt seen and examined at bedside. Pt denies sob, chest pain.    PAST HISTORY  --------------------------------------------------------------------------------  No significant changes to PMH, PSH, FHx, SHx, unless otherwise noted    ALLERGIES & MEDICATIONS  --------------------------------------------------------------------------------  Allergies    No Known Allergies    Intolerances      Standing Inpatient Medications  doxercalciferol Injectable 2 MICROGram(s) IV Push once  epoetin leeann Injectable 4000 Unit(s) IV Push once    PRN Inpatient Medications      REVIEW OF SYSTEMS  --------------------------------------------------------------------------------  General: no fever  CVS: no chest pain  RESP: no sob, no cough  ABD: no abdominal pain  MSK: no edema     VITALS/PHYSICAL EXAM  --------------------------------------------------------------------------------  T(C): 36.9 (01-11-18 @ 14:30), Max: 36.9 (01-11-18 @ 14:30)  HR: 84 (01-11-18 @ 14:30) (84 - 84)  BP: 138/72 (01-11-18 @ 14:30) (122/74 - 138/72)  RR: 16 (01-11-18 @ 14:30) (16 - 16)  SpO2: 100% (01-11-18 @ 14:30) (100% - 100%)  Wt(kg): --  Height (cm): 154.94 (01-12-18 @ 08:38)      Physical Exam:  	Gen: NAD  	HEENT: MMM  	Pulm: CTA B/L  	CV: S1S2  	Abd: Soft, +BS  	Ext: No LE edema B/L                      Neuro: Awake   	Skin: Warm and Dry   	Vascular access: LUE AVF + thrill +bruit, skin intact    LABS/STUDIES  --------------------------------------------------------------------------------                Creatinine Trend:  SCr 4.13 [01-08 @ 11:24]  SCr 1.23 [01-06 @ 16:44]  SCr 4.22 [01-02 @ 10:42]  SCr 1.20 [12-30 @ 14:46]  SCr 4.35 [12-26 @ 16:10]    Urinalysis - [12-30-17 @ 09:11]      Color Yellow / Appearance Clear / SG 1.012 / pH 8.5      Gluc Negative / Ketone Negative  / Bili Negative / Urobili Negative       Blood Large / Protein 300 / Leuk Est Negative / Nitrite Negative      RBC >50 / WBC 0-2 / Hyaline  / Gran  / Sq Epi  / Non Sq Epi Occasional / Bacteria       HbA1c 5.7      [07-15-16 @ 08:42]  TSH 0.01      [10-02-17 @ 23:50]    HBsAb <3.0      [01-05-18 @ 13:45]  HBsAg Nonreact      [01-05-18 @ 13:45]  HBcAb Nonreact      [01-05-18 @ 13:45]  HCV 0.07, Nonreact      [01-05-18 @ 13:45]

## 2018-01-12 NOTE — DISCHARGE NOTE ANTEPARTUM - CARE PLAN
Principal Discharge DX:	Chronic kidney disease (CKD) stage G5/A1, glomerular filtration rate (GFR) less than or equal to 15 mL/min/1.73 square meter and albuminuria creatinine ratio less than 30 mg/g  Goal:	Return to normal activity.  Instructions for follow-up, activity and diet:	Return for dialysis tomorrow 1/13.

## 2018-01-12 NOTE — PROGRESS NOTE ADULT - ATTENDING COMMENTS
ESRD on HD 6days a week- seen on HD  Has not required UF at HD  3 K bath  Anemia: we had increased her epo at dialysis this week  Repeat on labs Monday and weekly  Continue hectoral for secondary HPT

## 2018-01-13 ENCOUNTER — INPATIENT (INPATIENT)
Facility: HOSPITAL | Age: 28
LOS: 0 days | Discharge: HOME | DRG: 781 | End: 2018-01-13
Attending: OBSTETRICS & GYNECOLOGY | Admitting: OBSTETRICS & GYNECOLOGY
Payer: MEDICAID

## 2018-01-13 ENCOUNTER — TRANSCRIPTION ENCOUNTER (OUTPATIENT)
Age: 28
End: 2018-01-13

## 2018-01-13 VITALS
SYSTOLIC BLOOD PRESSURE: 124 MMHG | DIASTOLIC BLOOD PRESSURE: 74 MMHG | WEIGHT: 139.99 LBS | OXYGEN SATURATION: 98 % | TEMPERATURE: 99 F | RESPIRATION RATE: 18 BRPM | HEART RATE: 95 BPM

## 2018-01-13 VITALS
DIASTOLIC BLOOD PRESSURE: 82 MMHG | OXYGEN SATURATION: 98 % | RESPIRATION RATE: 18 BRPM | SYSTOLIC BLOOD PRESSURE: 129 MMHG | HEART RATE: 85 BPM | TEMPERATURE: 98 F

## 2018-01-13 DIAGNOSIS — N18.5 CHRONIC KIDNEY DISEASE, STAGE 5: ICD-10-CM

## 2018-01-13 DIAGNOSIS — Z98.89 OTHER SPECIFIED POSTPROCEDURAL STATES: Chronic | ICD-10-CM

## 2018-01-13 DIAGNOSIS — I77.0 ARTERIOVENOUS FISTULA, ACQUIRED: Chronic | ICD-10-CM

## 2018-01-13 LAB
ALBUMIN SERPL ELPH-MCNC: 3 G/DL — LOW (ref 3.3–5)
ALP SERPL-CCNC: 100 U/L — SIGNIFICANT CHANGE UP (ref 40–120)
ALT FLD-CCNC: 8 U/L RC — LOW (ref 10–45)
ANION GAP SERPL CALC-SCNC: 10 MMOL/L — SIGNIFICANT CHANGE UP (ref 5–17)
APTT BLD: 23.2 SEC — LOW (ref 27.5–37.4)
AST SERPL-CCNC: 12 U/L — SIGNIFICANT CHANGE UP (ref 10–40)
BASOPHILS # BLD AUTO: 0 K/UL — SIGNIFICANT CHANGE UP (ref 0–0.2)
BASOPHILS NFR BLD AUTO: 0.2 % — SIGNIFICANT CHANGE UP (ref 0–2)
BILIRUB SERPL-MCNC: 0.1 MG/DL — LOW (ref 0.2–1.2)
BUN SERPL-MCNC: 12 MG/DL — SIGNIFICANT CHANGE UP (ref 7–23)
CALCIUM SERPL-MCNC: 8.6 MG/DL — SIGNIFICANT CHANGE UP (ref 8.4–10.5)
CHLORIDE SERPL-SCNC: 101 MMOL/L — SIGNIFICANT CHANGE UP (ref 96–108)
CO2 SERPL-SCNC: 25 MMOL/L — SIGNIFICANT CHANGE UP (ref 22–31)
CREAT ?TM UR-MCNC: 21 MG/DL — SIGNIFICANT CHANGE UP
CREAT SERPL-MCNC: 2.89 MG/DL — HIGH (ref 0.5–1.3)
EOSINOPHIL # BLD AUTO: 0.1 K/UL — SIGNIFICANT CHANGE UP (ref 0–0.5)
EOSINOPHIL NFR BLD AUTO: 1.1 % — SIGNIFICANT CHANGE UP (ref 0–6)
FIBRINOGEN PPP-MCNC: 632 MG/DL — HIGH (ref 310–510)
GLUCOSE SERPL-MCNC: 66 MG/DL — LOW (ref 70–99)
HCT VFR BLD CALC: 26.6 % — LOW (ref 34.5–45)
HGB BLD-MCNC: 9 G/DL — LOW (ref 11.5–15.5)
INR BLD: 0.86 RATIO — LOW (ref 0.88–1.16)
LDH SERPL L TO P-CCNC: 143 U/L — SIGNIFICANT CHANGE UP (ref 50–242)
LYMPHOCYTES # BLD AUTO: 1.4 K/UL — SIGNIFICANT CHANGE UP (ref 1–3.3)
LYMPHOCYTES # BLD AUTO: 17.4 % — SIGNIFICANT CHANGE UP (ref 13–44)
MCHC RBC-ENTMCNC: 31.1 PG — SIGNIFICANT CHANGE UP (ref 27–34)
MCHC RBC-ENTMCNC: 33.7 GM/DL — SIGNIFICANT CHANGE UP (ref 32–36)
MCV RBC AUTO: 92.3 FL — SIGNIFICANT CHANGE UP (ref 80–100)
MONOCYTES # BLD AUTO: 0.6 K/UL — SIGNIFICANT CHANGE UP (ref 0–0.9)
MONOCYTES NFR BLD AUTO: 7.4 % — SIGNIFICANT CHANGE UP (ref 2–14)
NEUTROPHILS # BLD AUTO: 6 K/UL — SIGNIFICANT CHANGE UP (ref 1.8–7.4)
NEUTROPHILS NFR BLD AUTO: 73.8 % — SIGNIFICANT CHANGE UP (ref 43–77)
PLATELET # BLD AUTO: 201 K/UL — SIGNIFICANT CHANGE UP (ref 150–400)
POTASSIUM SERPL-MCNC: 3.7 MMOL/L — SIGNIFICANT CHANGE UP (ref 3.5–5.3)
POTASSIUM SERPL-SCNC: 3.7 MMOL/L — SIGNIFICANT CHANGE UP (ref 3.5–5.3)
PROT ?TM UR-MCNC: 55 MG/DL — HIGH (ref 0–12)
PROT SERPL-MCNC: 5.7 G/DL — LOW (ref 6–8.3)
PROT/CREAT UR-RTO: 2.6 RATIO — HIGH (ref 0–0.2)
PROTHROM AB SERPL-ACNC: 9.3 SEC — LOW (ref 9.8–12.7)
RBC # BLD: 2.88 M/UL — LOW (ref 3.8–5.2)
RBC # FLD: 14.9 % — HIGH (ref 10.3–14.5)
SODIUM SERPL-SCNC: 136 MMOL/L — SIGNIFICANT CHANGE UP (ref 135–145)
URATE SERPL-MCNC: 3.2 MG/DL — SIGNIFICANT CHANGE UP (ref 2.5–7)
WBC # BLD: 8.2 K/UL — SIGNIFICANT CHANGE UP (ref 3.8–10.5)
WBC # FLD AUTO: 8.2 K/UL — SIGNIFICANT CHANGE UP (ref 3.8–10.5)

## 2018-01-13 NOTE — PROGRESS NOTE ADULT - PROBLEM SELECTOR PLAN 1
Pt with ESRD on HD x 6 days a week. Plan for HD today. Monitor BMP and BP. Continue HD with  heparin 1000 units bolus and 500/hr infusion.  Plan to check pregnancy labs on Monday.

## 2018-01-13 NOTE — PROGRESS NOTE ADULT - SUBJECTIVE AND OBJECTIVE BOX
[facility] DIVISION OF KIDNEY DISEASES AND HYPERTENSION -- FOLLOW UP NOTE  --------------------------------------------------------------------------------  Chief Complaint:  ESRD on HD in pregnancy    24 hour events/subjective:  Patient denies any complaints at this time        PAST HISTORY  --------------------------------------------------------------------------------  No significant changes to PMH, PSH, FHx, SHx, unless otherwise noted    ALLERGIES & MEDICATIONS  --------------------------------------------------------------------------------  Allergies    No Known Allergies    Intolerances      Standing Inpatient Medications    PRN Inpatient Medications      REVIEW OF SYSTEMS  --------------------------------------------------------------------------------  Gen: No fevers/chills  Skin: No rashes  Head/Eyes/Ears/Mouth: No headache  Respiratory: No dyspnea  CV: No chest pain  GI: No abdominal pain  : No dysuria  MSK: No edema  Neuro: No dizziness/lightheadedness    VITALS/PHYSICAL EXAM  --------------------------------------------------------------------------------  T(C): 36.7 (01-12-18 @ 14:43), Max: 36.8 (01-12-18 @ 12:15)  HR: 89 (01-12-18 @ 14:43) (84 - 89)  BP: 128/84 (01-12-18 @ 14:43) (128/84 - 129/87)  RR: 17 (01-12-18 @ 14:43) (17 - 18)  SpO2: 100% (01-12-18 @ 14:43) (100% - 100%)  Wt(kg): --  Height (cm): 154.94 (01-12-18 @ 08:38)      Physical Exam:  	Gen: NAD, well-appearing  	HEENT: MMM  	Pulm: CTA B/L  	CV: RRR, S1S2; no rub  	Abd: +BS, soft, nontender/nondistended  	: No suprapubic tenderness  	UE:  no edema  	LE:  no edema  	Neuro: No focal deficits  	Psych: Normal affect and mood  	Skin: Warm  	Vascular access: +bruit and thrill in AVF    LABS/STUDIES  --------------------------------------------------------------------------------      Creatinine Trend:  SCr 4.13 [01-08 @ 11:24]  SCr 1.23 [01-06 @ 16:44]  SCr 4.22 [01-02 @ 10:42]  SCr 1.20 [12-30 @ 14:46]  SCr 4.35 [12-26 @ 16:10]

## 2018-01-13 NOTE — DISCHARGE NOTE ANTEPARTUM - PATIENT PORTAL LINK FT
“You can access the FollowHealth Patient Portal, offered by [facility], by registering with the following website: [email or web address]”

## 2018-01-13 NOTE — DISCHARGE NOTE ANTEPARTUM - PLAN OF CARE
return for dialysis Patient presented for scheduled dialysis. Uncomplicated, discharged home to return for dialysis tomorrow.

## 2018-01-13 NOTE — DISCHARGE NOTE ANTEPARTUM - CARE PROVIDER_API CALL
Tammy Murrell), MaternalFetal Medicine; Obstetrics and Gynecology  [address] [place] [address]  Phone: [phone]  Fax: [phone]

## 2018-01-13 NOTE — DISCHARGE NOTE ANTEPARTUM - CARE PLAN
Principal Discharge DX:	Chronic kidney disease (CKD) stage G5/A1, glomerular filtration rate (GFR) less than or equal to 15 mL/min/1.73 square meter and albuminuria creatinine ratio less than 30 mg/g  Goal:	return for dialysis  Instructions for follow-up, activity and diet:	Patient presented for scheduled dialysis. Uncomplicated, discharged home to return for dialysis tomorrow.

## 2018-01-15 ENCOUNTER — TRANSCRIPTION ENCOUNTER (OUTPATIENT)
Age: 28
End: 2018-01-15

## 2018-01-15 ENCOUNTER — INPATIENT (INPATIENT)
Facility: HOSPITAL | Age: 28
LOS: 0 days | Discharge: HOME | DRG: 781 | End: 2018-01-15
Attending: OBSTETRICS & GYNECOLOGY | Admitting: OBSTETRICS & GYNECOLOGY
Payer: MEDICAID

## 2018-01-15 VITALS
RESPIRATION RATE: 18 BRPM | DIASTOLIC BLOOD PRESSURE: 71 MMHG | HEART RATE: 91 BPM | TEMPERATURE: 99 F | WEIGHT: 139.11 LBS | OXYGEN SATURATION: 98 % | SYSTOLIC BLOOD PRESSURE: 122 MMHG

## 2018-01-15 VITALS — HEIGHT: 67 IN | WEIGHT: 293 LBS

## 2018-01-15 DIAGNOSIS — N18.5 CHRONIC KIDNEY DISEASE, STAGE 5: ICD-10-CM

## 2018-01-15 DIAGNOSIS — I77.0 ARTERIOVENOUS FISTULA, ACQUIRED: Chronic | ICD-10-CM

## 2018-01-15 DIAGNOSIS — Z98.89 OTHER SPECIFIED POSTPROCEDURAL STATES: Chronic | ICD-10-CM

## 2018-01-15 LAB
ALBUMIN SERPL ELPH-MCNC: 3 G/DL — LOW (ref 3.3–5)
ALBUMIN SERPL ELPH-MCNC: 3 G/DL — LOW (ref 3.3–5)
ALP SERPL-CCNC: 109 U/L — SIGNIFICANT CHANGE UP (ref 40–120)
ALT FLD-CCNC: 9 U/L RC — LOW (ref 10–45)
ANION GAP SERPL CALC-SCNC: 12 MMOL/L — SIGNIFICANT CHANGE UP (ref 5–17)
AST SERPL-CCNC: 10 U/L — SIGNIFICANT CHANGE UP (ref 10–40)
BASOPHILS # BLD AUTO: 0 K/UL — SIGNIFICANT CHANGE UP (ref 0–0.2)
BASOPHILS NFR BLD AUTO: 0.2 % — SIGNIFICANT CHANGE UP (ref 0–2)
BILIRUB DIRECT SERPL-MCNC: 0.1 MG/DL — SIGNIFICANT CHANGE UP (ref 0–0.2)
BILIRUB INDIRECT FLD-MCNC: 0 MG/DL — LOW (ref 0.2–1)
BILIRUB SERPL-MCNC: 0.1 MG/DL — LOW (ref 0.2–1.2)
BUN SERPL-MCNC: 23 MG/DL — SIGNIFICANT CHANGE UP (ref 7–23)
CALCIUM SERPL-MCNC: 8.6 MG/DL — SIGNIFICANT CHANGE UP (ref 8.4–10.5)
CHLORIDE SERPL-SCNC: 101 MMOL/L — SIGNIFICANT CHANGE UP (ref 96–108)
CO2 SERPL-SCNC: 20 MMOL/L — LOW (ref 22–31)
CREAT SERPL-MCNC: 4.13 MG/DL — HIGH (ref 0.5–1.3)
EOSINOPHIL # BLD AUTO: 0.1 K/UL — SIGNIFICANT CHANGE UP (ref 0–0.5)
EOSINOPHIL NFR BLD AUTO: 0.8 % — SIGNIFICANT CHANGE UP (ref 0–6)
GLUCOSE SERPL-MCNC: 80 MG/DL — SIGNIFICANT CHANGE UP (ref 70–99)
HCT VFR BLD CALC: 26 % — LOW (ref 34.5–45)
HGB BLD-MCNC: 9.2 G/DL — LOW (ref 11.5–15.5)
LDH SERPL L TO P-CCNC: 137 U/L — SIGNIFICANT CHANGE UP (ref 50–242)
LYMPHOCYTES # BLD AUTO: 1.4 K/UL — SIGNIFICANT CHANGE UP (ref 1–3.3)
LYMPHOCYTES # BLD AUTO: 15.6 % — SIGNIFICANT CHANGE UP (ref 13–44)
MCHC RBC-ENTMCNC: 31.8 PG — SIGNIFICANT CHANGE UP (ref 27–34)
MCHC RBC-ENTMCNC: 35.3 GM/DL — SIGNIFICANT CHANGE UP (ref 32–36)
MCV RBC AUTO: 90.2 FL — SIGNIFICANT CHANGE UP (ref 80–100)
MONOCYTES # BLD AUTO: 0.7 K/UL — SIGNIFICANT CHANGE UP (ref 0–0.9)
MONOCYTES NFR BLD AUTO: 7.2 % — SIGNIFICANT CHANGE UP (ref 2–14)
NEUTROPHILS # BLD AUTO: 7.1 K/UL — SIGNIFICANT CHANGE UP (ref 1.8–7.4)
NEUTROPHILS NFR BLD AUTO: 76.2 % — SIGNIFICANT CHANGE UP (ref 43–77)
PHOSPHATE SERPL-MCNC: 3.8 MG/DL — SIGNIFICANT CHANGE UP (ref 2.5–4.5)
PLATELET # BLD AUTO: 185 K/UL — SIGNIFICANT CHANGE UP (ref 150–400)
POTASSIUM SERPL-MCNC: 4.3 MMOL/L — SIGNIFICANT CHANGE UP (ref 3.5–5.3)
POTASSIUM SERPL-SCNC: 4.3 MMOL/L — SIGNIFICANT CHANGE UP (ref 3.5–5.3)
PROT SERPL-MCNC: 6.1 G/DL — SIGNIFICANT CHANGE UP (ref 6–8.3)
RBC # BLD: 2.88 M/UL — LOW (ref 3.8–5.2)
RBC # FLD: 14.7 % — HIGH (ref 10.3–14.5)
SODIUM SERPL-SCNC: 133 MMOL/L — LOW (ref 135–145)
URATE SERPL-MCNC: 5.4 MG/DL — SIGNIFICANT CHANGE UP (ref 2.5–7)
WBC # BLD: 9.3 K/UL — SIGNIFICANT CHANGE UP (ref 3.8–10.5)
WBC # FLD AUTO: 9.3 K/UL — SIGNIFICANT CHANGE UP (ref 3.8–10.5)

## 2018-01-15 RX ORDER — DOXERCALCIFEROL 2.5 UG/1
2 CAPSULE ORAL ONCE
Qty: 0 | Refills: 0 | Status: COMPLETED | OUTPATIENT
Start: 2018-01-15 | End: 2018-01-15

## 2018-01-15 RX ORDER — DOXERCALCIFEROL 2.5 UG/1
2 CAPSULE ORAL ONCE
Qty: 0 | Refills: 0 | Status: DISCONTINUED | OUTPATIENT
Start: 2018-01-15 | End: 2018-01-15

## 2018-01-15 RX ORDER — ERYTHROPOIETIN 10000 [IU]/ML
4000 INJECTION, SOLUTION INTRAVENOUS; SUBCUTANEOUS ONCE
Qty: 0 | Refills: 0 | Status: DISCONTINUED | OUTPATIENT
Start: 2018-01-15 | End: 2018-01-15

## 2018-01-15 RX ORDER — ERYTHROPOIETIN 10000 [IU]/ML
4000 INJECTION, SOLUTION INTRAVENOUS; SUBCUTANEOUS ONCE
Qty: 0 | Refills: 0 | Status: COMPLETED | OUTPATIENT
Start: 2018-01-15 | End: 2018-01-15

## 2018-01-15 RX ADMIN — DOXERCALCIFEROL 2 MICROGRAM(S): 2.5 CAPSULE ORAL at 12:19

## 2018-01-15 RX ADMIN — ERYTHROPOIETIN 4000 UNIT(S): 10000 INJECTION, SOLUTION INTRAVENOUS; SUBCUTANEOUS at 12:19

## 2018-01-15 NOTE — DISCHARGE NOTE ANTEPARTUM - PATIENT PORTAL LINK FT
“You can access the FollowHealth Patient Portal, offered by [facility], by registering with the following website: [email or web address]”

## 2018-01-15 NOTE — DISCHARGE NOTE ANTEPARTUM - CARE PROVIDER_API CALL
[facility],   [address]  [facility]  Phone: (   )    -  Fax: (   )    -

## 2018-01-15 NOTE — DISCHARGE NOTE ANTEPARTUM - PLAN OF CARE
healthy pregnancy Please return to labor and delivery triage area if you have contractions, vaginal bleeding, leakage of fluid, decreased fetal movements, fevers, chills, nausea, vomiting, or any other symptoms that are concerning to you.

## 2018-01-15 NOTE — PROGRESS NOTE ADULT - SUBJECTIVE AND OBJECTIVE BOX
[facility] DIVISION OF KIDNEY DISEASES AND HYPERTENSION -- FOLLOW UP NOTE  --------------------------------------------------------------------------------  Chief Complaint:  ESRD on HD    24 hour events/subjective:  Patient presents for daily dialysis given status of ESRD and pregnancy.        PAST HISTORY  --------------------------------------------------------------------------------  No significant changes to PMH, PSH, FHx, SHx, unless otherwise noted    ALLERGIES & MEDICATIONS  --------------------------------------------------------------------------------  Allergies    No Known Allergies    Intolerances      Standing Inpatient Medications    PRN Inpatient Medications      REVIEW OF SYSTEMS  --------------------------------------------------------------------------------  Gen: No fevers/chills  Skin: No rashes  Head/Eyes/Ears/Mouth: No headache  Respiratory: No dyspnea  CV: No chest pain  GI: No abdominal pain  : No dysuria  MSK: No edema  Neuro: No dizziness/lightheadedness    VITALS/PHYSICAL EXAM  --------------------------------------------------------------------------------  Height (cm): 170.18 (01-15-18 @ 09:23)      Physical Exam:  	Gen: NAD, well-appearing  	HEENT: MMM  	Pulm: CTA B/L  	CV: RRR, S1S2; no rub  	Abd: +BS, soft, nontender/nondistended  	: No suprapubic tenderness  	UE:  no edema  	LE:  no edema  	Neuro: No focal deficits  	Psych: Normal affect and mood  	Skin: Warm  	Vascular access:  LUE AVF +thrill and bruit    LABS/STUDIES  --------------------------------------------------------------------------------              9.0    8.2   >-----------<  201      [01-13-18 @ 12:48]              26.6     136  |  101  |  12  ----------------------------<  66      [01-13-18 @ 12:48]  3.7   |  25  |  2.89        Ca     8.6     [01-13-18 @ 12:48]    TPro  5.7  /  Alb  3.0  /  TBili  0.1  /  DBili  x   /  AST  12  /  ALT  8   /  AlkPhos  100  [01-13-18 @ 12:48]    PT/INR: PT 9.3  , INR 0.86       [01-13-18 @ 12:48]  PTT: 23.2       [01-13-18 @ 12:48]    Uric acid 3.2      [01-13-18 @ 12:48]        [01-13-18 @ 12:48]    Creatinine Trend:  SCr 2.89 [01-13 @ 12:48]  SCr 4.13 [01-08 @ 11:24]  SCr 1.23 [01-06 @ 16:44]  SCr 4.22 [01-02 @ 10:42]  SCr 1.20 [12-30 @ 14:46]

## 2018-01-15 NOTE — DISCHARGE NOTE ANTEPARTUM - CARE PLAN
Principal Discharge DX:	Chronic kidney disease (CKD) stage G5/A1, glomerular filtration rate (GFR) less than or equal to 15 mL/min/1.73 square meter and albuminuria creatinine ratio less than 30 mg/g  Goal:	healthy pregnancy  Instructions for follow-up, activity and diet:	Please return to labor and delivery triage area if you have contractions, vaginal bleeding, leakage of fluid, decreased fetal movements, fevers, chills, nausea, vomiting, or any other symptoms that are concerning to you.

## 2018-01-16 ENCOUNTER — TRANSCRIPTION ENCOUNTER (OUTPATIENT)
Age: 28
End: 2018-01-16

## 2018-01-16 ENCOUNTER — INPATIENT (INPATIENT)
Facility: HOSPITAL | Age: 28
LOS: 0 days | Discharge: HOME | DRG: 781 | End: 2018-01-16
Attending: OBSTETRICS & GYNECOLOGY | Admitting: OBSTETRICS & GYNECOLOGY
Payer: MEDICAID

## 2018-01-16 ENCOUNTER — APPOINTMENT (OUTPATIENT)
Dept: ANTEPARTUM | Facility: CLINIC | Age: 28
End: 2018-01-16
Payer: MEDICAID

## 2018-01-16 ENCOUNTER — ASOB RESULT (OUTPATIENT)
Age: 28
End: 2018-01-16

## 2018-01-16 VITALS
DIASTOLIC BLOOD PRESSURE: 67 MMHG | TEMPERATURE: 98 F | SYSTOLIC BLOOD PRESSURE: 148 MMHG | OXYGEN SATURATION: 98 % | RESPIRATION RATE: 16 BRPM | HEART RATE: 85 BPM

## 2018-01-16 VITALS
DIASTOLIC BLOOD PRESSURE: 76 MMHG | OXYGEN SATURATION: 99 % | RESPIRATION RATE: 16 BRPM | SYSTOLIC BLOOD PRESSURE: 127 MMHG | TEMPERATURE: 98 F | HEART RATE: 81 BPM

## 2018-01-16 DIAGNOSIS — Z98.89 OTHER SPECIFIED POSTPROCEDURAL STATES: Chronic | ICD-10-CM

## 2018-01-16 DIAGNOSIS — N18.5 CHRONIC KIDNEY DISEASE, STAGE 5: ICD-10-CM

## 2018-01-16 DIAGNOSIS — I77.0 ARTERIOVENOUS FISTULA, ACQUIRED: Chronic | ICD-10-CM

## 2018-01-16 PROCEDURE — 90935 HEMODIALYSIS ONE EVALUATION: CPT | Mod: GC

## 2018-01-16 PROCEDURE — 76816 OB US FOLLOW-UP PER FETUS: CPT | Mod: 26

## 2018-01-16 NOTE — PROGRESS NOTE ADULT - ATTENDING COMMENTS
ESRD on HD 6days a week  Has not required UF at HD  BP stable and no edema  3 K bath  Anemia: improved this week on higher epo MWF  Labs from Monday noted  Continue hectoral for secondary HPT

## 2018-01-16 NOTE — DISCHARGE NOTE ANTEPARTUM - CARE PROVIDER_API CALL
Tammy Murrell), MaternalFetal Medicine; Obstetrics and Gynecology  [address] [place] [address]  Phone: [phone]  Fax: [phone]

## 2018-01-16 NOTE — PROGRESS NOTE ADULT - SUBJECTIVE AND OBJECTIVE BOX
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    PAST HISTORY  --------------------------------------------------------------------------------  No significant changes to PMH, PSH, FHx, SHx, unless otherwise noted    ALLERGIES & MEDICATIONS  --------------------------------------------------------------------------------  Allergies    No Known Allergies    Intolerances      Standing Inpatient Medications    PRN Inpatient Medications      REVIEW OF SYSTEMS  --------------------------------------------------------------------------------  General: no fever  CVS: no chest pain  RESP: no sob, no cough  ABD: no abdominal pain  MSK: no edema     VITALS/PHYSICAL EXAM  --------------------------------------------------------------------------------  T(C): 37.1 (01-15-18 @ 14:50), Max: 37.1 (01-15-18 @ 14:50)  HR: 91 (01-15-18 @ 14:50) (90 - 91)  BP: 122/71 (01-15-18 @ 14:50) (117/72 - 122/71)  RR: 18 (01-15-18 @ 14:50) (18 - 18)  SpO2: 98% (01-15-18 @ 14:50) (97% - 98%)  Wt(kg): --  Height (cm): 170.18 (01-15-18 @ 09:23)      Physical Exam:  	Gen: NAD, well-appearing  	HEENT: MMM  	Pulm: CTA B/L  	CV: RRR, S1S2; no rub  	Abd: +BS, soft  	: No suprapubic tenderness  	LE:  no edema  	Neuro: No focal deficits  	Psych: Normal affect and mood  	Skin: Warm  	Vascular access:  LUE AVF +thrill and bruit, skin intact       LABS/STUDIES  --------------------------------------------------------------------------------              9.2    9.3   >-----------<  185      [01-15-18 @ 11:02]              26.0     133  |  101  |  23  ----------------------------<  80      [01-15-18 @ 11:02]  4.3   |  20  |  4.13        Ca     8.6     [01-15-18 @ 11:02]      Phos  3.8     [01-15-18 @ 11:02]    TPro  6.1  /  Alb  3.0  /  TBili  0.1  /  DBili  0.1  /  AST  10  /  ALT  9   /  AlkPhos  109  [01-15-18 @ 11:02]        Uric acid 5.4      [01-15-18 @ 11:02]        [01-15-18 @ 11:02]    Creatinine Trend:  SCr 4.13 [01-15 @ 11:02]  SCr 2.89 [01-13 @ 12:48]  SCr 4.13 [01-08 @ 11:24]  SCr 1.23 [01-06 @ 16:44]  SCr 4.22 [01-02 @ 10:42]    Urinalysis - [12-30-17 @ 09:11]      Color Yellow / Appearance Clear / SG 1.012 / pH 8.5      Gluc Negative / Ketone Negative  / Bili Negative / Urobili Negative       Blood Large / Protein 300 / Leuk Est Negative / Nitrite Negative      RBC >50 / WBC 0-2 / Hyaline  / Gran  / Sq Epi  / Non Sq Epi Occasional / Bacteria     Urine Creatinine 21      [01-13-18 @ 14:57]  Urine Protein 55      [01-13-18 @ 14:57]    HbA1c 5.7      [07-15-16 @ 08:42]  TSH 0.01      [10-02-17 @ 23:50]    HBsAb <3.0      [01-05-18 @ 13:45]  HBsAg Nonreact      [01-05-18 @ 13:45]  HBcAb Nonreact      [01-05-18 @ 13:45]  HCV 0.07, Nonreact      [01-05-18 @ 13:45]

## 2018-01-16 NOTE — DISCHARGE NOTE ANTEPARTUM - CARE PLAN
Principal Discharge DX:	Chronic kidney disease (CKD) stage G5/A1, glomerular filtration rate (GFR) less than or equal to 15 mL/min/1.73 square meter and albuminuria creatinine ratio less than 30 mg/g  Goal:	pt to return tomorrow for dialysis- healthy pregnancy  Assessment and plan of treatment:	pt receiving daily dialysis

## 2018-01-16 NOTE — DISCHARGE NOTE ANTEPARTUM - PATIENT PORTAL LINK FT
“You can access the FollowHealth Patient Portal, offered by [facility], by registering with the following website: [email or web address]”

## 2018-01-17 ENCOUNTER — NON-APPOINTMENT (OUTPATIENT)
Age: 28
End: 2018-01-17

## 2018-01-17 ENCOUNTER — INPATIENT (INPATIENT)
Facility: HOSPITAL | Age: 28
LOS: 0 days | Discharge: HOME | End: 2018-01-17
Attending: OBSTETRICS & GYNECOLOGY | Admitting: OBSTETRICS & GYNECOLOGY
Payer: MEDICAID

## 2018-01-17 ENCOUNTER — TRANSCRIPTION ENCOUNTER (OUTPATIENT)
Age: 28
End: 2018-01-17

## 2018-01-17 VITALS
TEMPERATURE: 97 F | HEART RATE: 79 BPM | SYSTOLIC BLOOD PRESSURE: 136 MMHG | OXYGEN SATURATION: 97 % | DIASTOLIC BLOOD PRESSURE: 76 MMHG | RESPIRATION RATE: 17 BRPM

## 2018-01-17 VITALS
SYSTOLIC BLOOD PRESSURE: 134 MMHG | RESPIRATION RATE: 18 BRPM | HEART RATE: 84 BPM | OXYGEN SATURATION: 95 % | TEMPERATURE: 98 F | DIASTOLIC BLOOD PRESSURE: 79 MMHG | WEIGHT: 140.21 LBS

## 2018-01-17 DIAGNOSIS — Z98.89 OTHER SPECIFIED POSTPROCEDURAL STATES: Chronic | ICD-10-CM

## 2018-01-17 DIAGNOSIS — N18.5 CHRONIC KIDNEY DISEASE, STAGE 5: ICD-10-CM

## 2018-01-17 DIAGNOSIS — I77.0 ARTERIOVENOUS FISTULA, ACQUIRED: Chronic | ICD-10-CM

## 2018-01-17 LAB
ALBUMIN SERPL ELPH-MCNC: 3 G/DL — LOW (ref 3.3–5)
ALP SERPL-CCNC: 102 U/L — SIGNIFICANT CHANGE UP (ref 40–120)
ALT FLD-CCNC: 8 U/L RC — LOW (ref 10–45)
ANION GAP SERPL CALC-SCNC: 11 MMOL/L — SIGNIFICANT CHANGE UP (ref 5–17)
APPEARANCE UR: CLEAR — SIGNIFICANT CHANGE UP
APTT BLD: 23.7 SEC — LOW (ref 27.5–37.4)
AST SERPL-CCNC: 12 U/L — SIGNIFICANT CHANGE UP (ref 10–40)
BASOPHILS # BLD AUTO: 0 K/UL — SIGNIFICANT CHANGE UP (ref 0–0.2)
BASOPHILS NFR BLD AUTO: 0.1 % — SIGNIFICANT CHANGE UP (ref 0–2)
BILIRUB SERPL-MCNC: 0.2 MG/DL — SIGNIFICANT CHANGE UP (ref 0.2–1.2)
BILIRUB UR-MCNC: NEGATIVE — SIGNIFICANT CHANGE UP
BUN SERPL-MCNC: 14 MG/DL — SIGNIFICANT CHANGE UP (ref 7–23)
CALCIUM SERPL-MCNC: 8.7 MG/DL — SIGNIFICANT CHANGE UP (ref 8.4–10.5)
CHLORIDE SERPL-SCNC: 100 MMOL/L — SIGNIFICANT CHANGE UP (ref 96–108)
CO2 SERPL-SCNC: 26 MMOL/L — SIGNIFICANT CHANGE UP (ref 22–31)
COLOR SPEC: SIGNIFICANT CHANGE UP
CREAT SERPL-MCNC: 3.35 MG/DL — HIGH (ref 0.5–1.3)
DIFF PNL FLD: ABNORMAL
EOSINOPHIL # BLD AUTO: 0.1 K/UL — SIGNIFICANT CHANGE UP (ref 0–0.5)
EOSINOPHIL NFR BLD AUTO: 0.8 % — SIGNIFICANT CHANGE UP (ref 0–6)
EPI CELLS # UR: ABNORMAL /HPF
FIBRINOGEN PPP-MCNC: 673 MG/DL — HIGH (ref 310–510)
GLUCOSE SERPL-MCNC: 79 MG/DL — SIGNIFICANT CHANGE UP (ref 70–99)
GLUCOSE UR QL: NEGATIVE — SIGNIFICANT CHANGE UP
HCT VFR BLD CALC: 25.7 % — LOW (ref 34.5–45)
HGB BLD-MCNC: 9.2 G/DL — LOW (ref 11.5–15.5)
INR BLD: 0.87 RATIO — LOW (ref 0.88–1.16)
KETONES UR-MCNC: NEGATIVE — SIGNIFICANT CHANGE UP
LDH SERPL L TO P-CCNC: 136 U/L — SIGNIFICANT CHANGE UP (ref 50–242)
LEUKOCYTE ESTERASE UR-ACNC: NEGATIVE — SIGNIFICANT CHANGE UP
LYMPHOCYTES # BLD AUTO: 1.5 K/UL — SIGNIFICANT CHANGE UP (ref 1–3.3)
LYMPHOCYTES # BLD AUTO: 15.8 % — SIGNIFICANT CHANGE UP (ref 13–44)
MCHC RBC-ENTMCNC: 32.1 PG — SIGNIFICANT CHANGE UP (ref 27–34)
MCHC RBC-ENTMCNC: 35.7 GM/DL — SIGNIFICANT CHANGE UP (ref 32–36)
MCV RBC AUTO: 90 FL — SIGNIFICANT CHANGE UP (ref 80–100)
MONOCYTES # BLD AUTO: 0.8 K/UL — SIGNIFICANT CHANGE UP (ref 0–0.9)
MONOCYTES NFR BLD AUTO: 8.6 % — SIGNIFICANT CHANGE UP (ref 2–14)
NEUTROPHILS # BLD AUTO: 7 K/UL — SIGNIFICANT CHANGE UP (ref 1.8–7.4)
NEUTROPHILS NFR BLD AUTO: 74.7 % — SIGNIFICANT CHANGE UP (ref 43–77)
NITRITE UR-MCNC: NEGATIVE — SIGNIFICANT CHANGE UP
PH UR: 8.5 — HIGH (ref 5–8)
PLATELET # BLD AUTO: 197 K/UL — SIGNIFICANT CHANGE UP (ref 150–400)
POTASSIUM SERPL-MCNC: 3.8 MMOL/L — SIGNIFICANT CHANGE UP (ref 3.5–5.3)
POTASSIUM SERPL-SCNC: 3.8 MMOL/L — SIGNIFICANT CHANGE UP (ref 3.5–5.3)
PROT SERPL-MCNC: 5.9 G/DL — LOW (ref 6–8.3)
PROT UR-MCNC: 150 MG/DL
PROTHROM AB SERPL-ACNC: 9.4 SEC — LOW (ref 9.8–12.7)
RBC # BLD: 2.86 M/UL — LOW (ref 3.8–5.2)
RBC # FLD: 14.3 % — SIGNIFICANT CHANGE UP (ref 10.3–14.5)
RBC CASTS # UR COMP ASSIST: ABNORMAL /HPF (ref 0–2)
SODIUM SERPL-SCNC: 137 MMOL/L — SIGNIFICANT CHANGE UP (ref 135–145)
SP GR SPEC: 1.01 — LOW (ref 1.01–1.02)
URATE SERPL-MCNC: 4.1 MG/DL — SIGNIFICANT CHANGE UP (ref 2.5–7)
UROBILINOGEN FLD QL: NEGATIVE — SIGNIFICANT CHANGE UP
WBC # BLD: 9.4 K/UL — SIGNIFICANT CHANGE UP (ref 3.8–10.5)
WBC # FLD AUTO: 9.4 K/UL — SIGNIFICANT CHANGE UP (ref 3.8–10.5)

## 2018-01-17 PROCEDURE — 80053 COMPREHEN METABOLIC PANEL: CPT

## 2018-01-17 PROCEDURE — 85384 FIBRINOGEN ACTIVITY: CPT

## 2018-01-17 PROCEDURE — 59025 FETAL NON-STRESS TEST: CPT

## 2018-01-17 PROCEDURE — 85730 THROMBOPLASTIN TIME PARTIAL: CPT

## 2018-01-17 PROCEDURE — 84550 ASSAY OF BLOOD/URIC ACID: CPT

## 2018-01-17 PROCEDURE — 85610 PROTHROMBIN TIME: CPT

## 2018-01-17 PROCEDURE — 81001 URINALYSIS AUTO W/SCOPE: CPT

## 2018-01-17 PROCEDURE — 99261: CPT

## 2018-01-17 PROCEDURE — 83615 LACTATE (LD) (LDH) ENZYME: CPT

## 2018-01-17 PROCEDURE — 90935 HEMODIALYSIS ONE EVALUATION: CPT | Mod: GC

## 2018-01-17 PROCEDURE — 85027 COMPLETE CBC AUTOMATED: CPT

## 2018-01-17 RX ORDER — ERYTHROPOIETIN 10000 [IU]/ML
4000 INJECTION, SOLUTION INTRAVENOUS; SUBCUTANEOUS ONCE
Qty: 0 | Refills: 0 | Status: COMPLETED | OUTPATIENT
Start: 2018-01-17 | End: 2018-01-17

## 2018-01-17 RX ORDER — DOXERCALCIFEROL 2.5 UG/1
2 CAPSULE ORAL ONCE
Qty: 0 | Refills: 0 | Status: COMPLETED | OUTPATIENT
Start: 2018-01-17 | End: 2018-01-17

## 2018-01-17 RX ADMIN — DOXERCALCIFEROL 2 MICROGRAM(S): 2.5 CAPSULE ORAL at 10:00

## 2018-01-17 RX ADMIN — ERYTHROPOIETIN 4000 UNIT(S): 10000 INJECTION, SOLUTION INTRAVENOUS; SUBCUTANEOUS at 10:00

## 2018-01-17 NOTE — DISCHARGE NOTE ANTEPARTUM - PATIENT PORTAL LINK FT
“You can access the FollowHealth Patient Portal, offered by [facility], by registering with the following website: [email or web address]”

## 2018-01-17 NOTE — DISCHARGE NOTE ANTEPARTUM - CARE PLAN
Principal Discharge DX:	Chronic kidney disease (CKD) stage G5/A1, glomerular filtration rate (GFR) less than or equal to 15 mL/min/1.73 square meter and albuminuria creatinine ratio less than 30 mg/g  Goal:	wellness  Assessment and plan of treatment:	follow up for dialysis

## 2018-01-17 NOTE — DISCHARGE NOTE OB - CARE PLAN
Principal Discharge DX:	Chronic kidney disease (CKD) stage G5/A1, glomerular filtration rate (GFR) less than or equal to 15 mL/min/1.73 square meter and albuminuria creatinine ratio less than 30 mg/g  Goal:	wellness  Assessment and plan of treatment:	follow up for 6x wk dialysis

## 2018-01-17 NOTE — PROGRESS NOTE ADULT - SUBJECTIVE AND OBJECTIVE BOX
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    PAST HISTORY  --------------------------------------------------------------------------------  No significant changes to PMH, PSH, FHx, SHx, unless otherwise noted    ALLERGIES & MEDICATIONS  --------------------------------------------------------------------------------  Allergies    No Known Allergies    Intolerances      Standing Inpatient Medications    PRN Inpatient Medications      REVIEW OF SYSTEMS  --------------------------------------------------------------------------------  General: no fever  CVS: no chest pain  RESP: no sob, no cough  ABD: no abdominal pain  : no dysuria,  MSK: no edema     VITALS/PHYSICAL EXAM  --------------------------------------------------------------------------------  T(C): 36.7 (01-17-18 @ 09:32), Max: 36.7 (01-16-18 @ 14:20)  HR: 84 (01-17-18 @ 09:32) (84 - 85)  BP: 134/79 (01-17-18 @ 09:32) (134/79 - 148/67)  RR: 18 (01-17-18 @ 09:32) (16 - 18)  SpO2: 95% (01-17-18 @ 09:32) (95% - 98%)  Wt(kg): --  Height (cm): 147.32 (01-16-18 @ 12:10)      Physical Exam:  	Gen: NAD, well-appearing  	HEENT: MMM  	Pulm: CTA B/L  	CV: RRR, S1S2; no rub  	Abd: +BS, soft  	: No suprapubic tenderness  	LE:  no edema  	Neuro: No focal deficits  	Psych: Normal affect and mood  	Skin: Warm  	Vascular access:  LUE AVF +thrill and bruit, skin intact   LABS/STUDIES  --------------------------------------------------------------------------------              9.2    9.4   >-----------<  197      [01-17-18 @ 09:32]              25.7     137  |  100  |  14  ----------------------------<  79      [01-17-18 @ 09:32]  3.8   |  26  |  3.35        Ca     8.7     [01-17-18 @ 09:32]    TPro  5.9  /  Alb  3.0  /  TBili  0.2  /  DBili  x   /  AST  12  /  ALT  8   /  AlkPhos  102  [01-17-18 @ 09:32]    PT/INR: PT 9.4  , INR 0.87       [01-17-18 @ 09:32]  PTT: 23.7       [01-17-18 @ 09:32]    Uric acid 4.1      [01-17-18 @ 09:32]        [01-17-18 @ 09:32]    Creatinine Trend:  SCr 3.35 [01-17 @ 09:32]  SCr 4.13 [01-15 @ 11:02]  SCr 2.89 [01-13 @ 12:48]  SCr 4.13 [01-08 @ 11:24]  SCr 1.23 [01-06 @ 16:44]    Urinalysis - [01-17-18 @ 09:32]      Color PL Yellow / Appearance Clear / SG 1.009 / pH 8.5      Gluc Negative / Ketone Negative  / Bili Negative / Urobili Negative       Blood Small / Protein 150 / Leuk Est Negative / Nitrite Negative      RBC 5-10 / WBC  / Hyaline  / Gran  / Sq Epi  / Non Sq Epi Moderate / Bacteria     Urine Creatinine 21      [01-13-18 @ 14:57]  Urine Protein 55      [01-13-18 @ 14:57]    HbA1c 5.7      [07-15-16 @ 08:42]  TSH 0.01      [10-02-17 @ 23:50]    HBsAb <3.0      [01-05-18 @ 13:45]  HBsAg Nonreact      [01-05-18 @ 13:45]  HBcAb Nonreact      [01-05-18 @ 13:45]  HCV 0.07, Nonreact      [01-05-18 @ 13:45]

## 2018-01-17 NOTE — DISCHARGE NOTE ANTEPARTUM - PROVIDER TOKENS
FREE:[LAST:[-],PHONE:[(   )    -],FAX:[(   )    -],ADDRESS:[Please follow up with us for dialysis.  If you need to contact the clinic:  [address] [place] [address], 2nd floor.  (PHONE #  [phone] )]]

## 2018-01-17 NOTE — DISCHARGE NOTE OB - CARE PROVIDER_API CALL
-,   Please follow up for daily dialysis if you need.  [address] [place] [address], 2nd floor. (PHONE #  [phone] )  Phone: (   )    -  Fax: (   )    -

## 2018-01-17 NOTE — DISCHARGE NOTE OB - PROVIDER TOKENS
FREE:[LAST:[-],PHONE:[(   )    -],FAX:[(   )    -],ADDRESS:[Please follow up for daily dialysis if you need.  [address] [place] [address], 2nd floor. (PHONE #  [phone] )]]

## 2018-01-17 NOTE — DISCHARGE NOTE ANTEPARTUM - CARE PROVIDER_API CALL
-,   Please follow up with us for dialysis.  If you need to contact the clinic:  [address] [place] [address], 2nd floor.  (PHONE #  [phone] )  Phone: (   )    -  Fax: (   )    -

## 2018-01-17 NOTE — PROGRESS NOTE ADULT - ATTENDING COMMENTS
Pt. seen while receiving HD, tolerating HD well without any intra-dialytic hypotension, BP maintained without any UF, and AVF working well during HD. Plan for next maintenance HD tomorrow. Monitor BMP.

## 2018-01-18 ENCOUNTER — TRANSCRIPTION ENCOUNTER (OUTPATIENT)
Age: 28
End: 2018-01-18

## 2018-01-18 ENCOUNTER — INPATIENT (INPATIENT)
Facility: HOSPITAL | Age: 28
LOS: 0 days | Discharge: HOME | DRG: 781 | End: 2018-01-18
Attending: OBSTETRICS & GYNECOLOGY | Admitting: OBSTETRICS & GYNECOLOGY
Payer: MEDICAID

## 2018-01-18 VITALS
RESPIRATION RATE: 16 BRPM | DIASTOLIC BLOOD PRESSURE: 85 MMHG | HEART RATE: 85 BPM | OXYGEN SATURATION: 98 % | SYSTOLIC BLOOD PRESSURE: 140 MMHG | TEMPERATURE: 98 F

## 2018-01-18 VITALS
OXYGEN SATURATION: 98 % | DIASTOLIC BLOOD PRESSURE: 89 MMHG | SYSTOLIC BLOOD PRESSURE: 143 MMHG | TEMPERATURE: 98 F | HEART RATE: 84 BPM | RESPIRATION RATE: 16 BRPM

## 2018-01-18 DIAGNOSIS — Z98.89 OTHER SPECIFIED POSTPROCEDURAL STATES: Chronic | ICD-10-CM

## 2018-01-18 DIAGNOSIS — I77.0 ARTERIOVENOUS FISTULA, ACQUIRED: Chronic | ICD-10-CM

## 2018-01-18 DIAGNOSIS — N18.5 CHRONIC KIDNEY DISEASE, STAGE 5: ICD-10-CM

## 2018-01-18 PROCEDURE — 90935 HEMODIALYSIS ONE EVALUATION: CPT | Mod: GC

## 2018-01-18 NOTE — PROGRESS NOTE ADULT - ATTENDING COMMENTS
Pt. seen while receiving HD, tolerating HD well without any intra-dialytic complications or access issues. AVF working well and BP maintained on HD. Monitor BMP. Plan for next maintenance HD tomorrow.

## 2018-01-18 NOTE — DISCHARGE NOTE ANTEPARTUM - PROVIDER TOKENS
FREE:[LAST:[-],PHONE:[(   )    -],FAX:[(   )    -],ADDRESS:[Clinic number (PHONE #  [phone] )]]

## 2018-01-18 NOTE — DISCHARGE NOTE ANTEPARTUM - PATIENT PORTAL LINK FT
“You can access the FollowHealth Patient Portal, offered by [facility], by registering with the following website: [email or web address]”

## 2018-01-18 NOTE — PROGRESS NOTE ADULT - SUBJECTIVE AND OBJECTIVE BOX
[facility] DIVISION OF KIDNEY DISEASES AND HYPERTENSION -- FOLLOW UP NOTE  --------------------------------------------------------------------------------  Chief Complaint:  ESRD on HD    24 hour events/subjective:  Patient denies any complaints at this time        PAST HISTORY  --------------------------------------------------------------------------------  No significant changes to PMH, PSH, FHx, SHx, unless otherwise noted    ALLERGIES & MEDICATIONS  --------------------------------------------------------------------------------  Allergies    No Known Allergies    Intolerances      Standing Inpatient Medications    PRN Inpatient Medications      REVIEW OF SYSTEMS  --------------------------------------------------------------------------------  Gen: No fevers/chills  Skin: No rashes  Head/Eyes/Ears/Mouth: No headache  Respiratory: No dyspnea  CV: No chest pain  GI: No abdominal pain  : No dysuria  MSK: No edema  Neuro: No dizziness/lightheadedness    VITALS/PHYSICAL EXAM  --------------------------------------------------------------------------------  T(C): 36.9 (01-18-18 @ 09:25), Max: 36.9 (01-18-18 @ 09:25)  HR: 84 (01-18-18 @ 09:25) (79 - 84)  BP: 143/89 (01-18-18 @ 09:25) (136/76 - 143/89)  RR: 16 (01-18-18 @ 09:25) (16 - 17)  SpO2: 98% (01-18-18 @ 09:25) (97% - 98%)  Wt(kg): --  Height (cm): 147.32 (01-16-18 @ 12:10)      Physical Exam:  	Gen: NAD, well-appearing  	HEENT: MMM  	Pulm: CTA B/L  	CV: RRR, S1S2; no rub  	Abd: +BS, soft, nontender/nondistended  	: No suprapubic tenderness  	UE:  no edema  	LE:  no edema  	Neuro: No focal deficits  	Psych: Normal affect and mood  	Skin: Warm  	Vascular access:  LUE AVF + thrill and bruit    LABS/STUDIES  --------------------------------------------------------------------------------              9.2    9.4   >-----------<  197      [01-17-18 @ 09:32]              25.7     137  |  100  |  14  ----------------------------<  79      [01-17-18 @ 09:32]  3.8   |  26  |  3.35        Ca     8.7     [01-17-18 @ 09:32]    TPro  5.9  /  Alb  3.0  /  TBili  0.2  /  DBili  x   /  AST  12  /  ALT  8   /  AlkPhos  102  [01-17-18 @ 09:32]    PT/INR: PT 9.4  , INR 0.87       [01-17-18 @ 09:32]  PTT: 23.7       [01-17-18 @ 09:32]    Uric acid 4.1      [01-17-18 @ 09:32]        [01-17-18 @ 09:32]    Creatinine Trend:  SCr 3.35 [01-17 @ 09:32]  SCr 4.13 [01-15 @ 11:02]  SCr 2.89 [01-13 @ 12:48]  SCr 4.13 [01-08 @ 11:24]  SCr 1.23 [01-06 @ 16:44]    Urinalysis - [01-17-18 @ 09:32]      Color PL Yellow / Appearance Clear / SG 1.009 / pH 8.5      Gluc Negative / Ketone Negative  / Bili Negative / Urobili Negative       Blood Small / Protein 150 / Leuk Est Negative / Nitrite Negative      RBC 5-10 / WBC  / Hyaline  / Gran  / Sq Epi  / Non Sq Epi Moderate / Bacteria     Urine Creatinine 21      [01-13-18 @ 14:57]  Urine Protein 55      [01-13-18 @ 14:57]    HbA1c 5.7      [07-15-16 @ 08:42]  TSH 0.01      [10-02-17 @ 23:50]    HBsAb <3.0      [01-05-18 @ 13:45]  HBsAg Nonreact      [01-05-18 @ 13:45]  HBcAb Nonreact      [01-05-18 @ 13:45]  HCV 0.07, Nonreact      [01-05-18 @ 13:45] [facility] DIVISION OF KIDNEY DISEASES AND HYPERTENSION -- FOLLOW UP NOTE  --------------------------------------------------------------------------------  Chief Complaint:  ESRD on HD    24 hour events/subjective:  Patient denies any complaints at this time        PAST HISTORY  --------------------------------------------------------------------------------  No significant changes to PMH, PSH, FHx, SHx, unless otherwise noted    ALLERGIES & MEDICATIONS  --------------------------------------------------------------------------------  Allergies    No Known Allergies    Intolerances      Standing Inpatient Medications    PRN Inpatient Medications      REVIEW OF SYSTEMS  --------------------------------------------------------------------------------  Gen: No fevers/chills  Skin: No rashes  Head/Eyes/Ears/Mouth: No headache  Respiratory: No dyspnea  CV: No chest pain  GI: No abdominal pain  : No dysuria  MSK: No edema  Neuro: No dizziness/lightheadedness    VITALS/PHYSICAL EXAM  --------------------------------------------------------------------------------  T(C): 36.9 (01-18-18 @ 09:25), Max: 36.9 (01-18-18 @ 09:25)  HR: 84 (01-18-18 @ 09:25) (79 - 84)  BP: 143/89 (01-18-18 @ 09:25) (136/76 - 143/89)  RR: 16 (01-18-18 @ 09:25) (16 - 17)  SpO2: 98% (01-18-18 @ 09:25) (97% - 98%)  Wt(kg): --  Height (cm): 147.32 (01-16-18 @ 12:10)      Physical Exam:  	Gen: NAD, well-appearing  	HEENT: MMM  	Pulm: CTA B/L  	CV: RRR, S1S2; no rub  	Abd: +BS, soft, nontender/nondistended  	: No suprapubic tenderness  	UE:  no edema  	LE:  no edema  	Neuro: No focal deficits  	Psych: Normal affect and mood  	Skin: Warm  	Vascular access:  LUE AVF + thrill and bruit, skin intact    LABS/STUDIES  --------------------------------------------------------------------------------              9.2    9.4   >-----------<  197      [01-17-18 @ 09:32]              25.7     137  |  100  |  14  ----------------------------<  79      [01-17-18 @ 09:32]  3.8   |  26  |  3.35        Ca     8.7     [01-17-18 @ 09:32]    TPro  5.9  /  Alb  3.0  /  TBili  0.2  /  DBili  x   /  AST  12  /  ALT  8   /  AlkPhos  102  [01-17-18 @ 09:32]    PT/INR: PT 9.4  , INR 0.87       [01-17-18 @ 09:32]  PTT: 23.7       [01-17-18 @ 09:32]    Uric acid 4.1      [01-17-18 @ 09:32]        [01-17-18 @ 09:32]    Creatinine Trend:  SCr 3.35 [01-17 @ 09:32]  SCr 4.13 [01-15 @ 11:02]  SCr 2.89 [01-13 @ 12:48]  SCr 4.13 [01-08 @ 11:24]  SCr 1.23 [01-06 @ 16:44]    Urinalysis - [01-17-18 @ 09:32]      Color PL Yellow / Appearance Clear / SG 1.009 / pH 8.5      Gluc Negative / Ketone Negative  / Bili Negative / Urobili Negative       Blood Small / Protein 150 / Leuk Est Negative / Nitrite Negative      RBC 5-10 / WBC  / Hyaline  / Gran  / Sq Epi  / Non Sq Epi Moderate / Bacteria     Urine Creatinine 21      [01-13-18 @ 14:57]  Urine Protein 55      [01-13-18 @ 14:57]    HbA1c 5.7      [07-15-16 @ 08:42]  TSH 0.01      [10-02-17 @ 23:50]    HBsAb <3.0      [01-05-18 @ 13:45]  HBsAg Nonreact      [01-05-18 @ 13:45]  HBcAb Nonreact      [01-05-18 @ 13:45]  HCV 0.07, Nonreact      [01-05-18 @ 13:45]

## 2018-01-19 ENCOUNTER — TRANSCRIPTION ENCOUNTER (OUTPATIENT)
Age: 28
End: 2018-01-19

## 2018-01-19 ENCOUNTER — INPATIENT (INPATIENT)
Facility: HOSPITAL | Age: 28
LOS: 0 days | Discharge: HOME | DRG: 781 | End: 2018-01-19
Attending: OBSTETRICS & GYNECOLOGY | Admitting: OBSTETRICS & GYNECOLOGY
Payer: MEDICAID

## 2018-01-19 VITALS
SYSTOLIC BLOOD PRESSURE: 144 MMHG | WEIGHT: 136.47 LBS | RESPIRATION RATE: 18 BRPM | DIASTOLIC BLOOD PRESSURE: 85 MMHG | OXYGEN SATURATION: 98 % | HEART RATE: 93 BPM | TEMPERATURE: 98 F

## 2018-01-19 VITALS
RESPIRATION RATE: 18 BRPM | WEIGHT: 137.79 LBS | TEMPERATURE: 98 F | HEART RATE: 76 BPM | OXYGEN SATURATION: 98 % | SYSTOLIC BLOOD PRESSURE: 140 MMHG | DIASTOLIC BLOOD PRESSURE: 89 MMHG

## 2018-01-19 DIAGNOSIS — I77.0 ARTERIOVENOUS FISTULA, ACQUIRED: Chronic | ICD-10-CM

## 2018-01-19 DIAGNOSIS — Z98.89 OTHER SPECIFIED POSTPROCEDURAL STATES: Chronic | ICD-10-CM

## 2018-01-19 DIAGNOSIS — N18.5 CHRONIC KIDNEY DISEASE, STAGE 5: ICD-10-CM

## 2018-01-19 PROCEDURE — 99261: CPT

## 2018-01-19 PROCEDURE — 59025 FETAL NON-STRESS TEST: CPT

## 2018-01-19 PROCEDURE — 90935 HEMODIALYSIS ONE EVALUATION: CPT | Mod: GC

## 2018-01-19 RX ORDER — DOXERCALCIFEROL 2.5 UG/1
2 CAPSULE ORAL ONCE
Qty: 0 | Refills: 0 | Status: COMPLETED | OUTPATIENT
Start: 2018-01-19 | End: 2018-01-19

## 2018-01-19 RX ORDER — ERYTHROPOIETIN 10000 [IU]/ML
6000 INJECTION, SOLUTION INTRAVENOUS; SUBCUTANEOUS ONCE
Qty: 0 | Refills: 0 | Status: COMPLETED | OUTPATIENT
Start: 2018-01-19 | End: 2018-01-19

## 2018-01-19 RX ORDER — ERYTHROPOIETIN 10000 [IU]/ML
4000 INJECTION, SOLUTION INTRAVENOUS; SUBCUTANEOUS ONCE
Qty: 0 | Refills: 0 | Status: DISCONTINUED | OUTPATIENT
Start: 2018-01-19 | End: 2018-01-19

## 2018-01-19 RX ADMIN — DOXERCALCIFEROL 2 MICROGRAM(S): 2.5 CAPSULE ORAL at 10:41

## 2018-01-19 RX ADMIN — ERYTHROPOIETIN 6000 UNIT(S): 10000 INJECTION, SOLUTION INTRAVENOUS; SUBCUTANEOUS at 10:40

## 2018-01-19 NOTE — DISCHARGE NOTE ANTEPARTUM - CARE PLAN
Principal Discharge DX:	Chronic kidney disease (CKD) stage G5/A1, glomerular filtration rate (GFR) less than or equal to 15 mL/min/1.73 square meter and albuminuria creatinine ratio less than 30 mg/g  Goal:	Return to normal activity  Assessment and plan of treatment:	Return tomorrow for dialysis

## 2018-01-19 NOTE — PROGRESS NOTE ADULT - PROBLEM SELECTOR PLAN 2
In setting of renal failure.   -Monitor H/H.   -Continue JORDEN  4000 units  with HD(MWF) In setting of renal failure.   -Monitor H/H.   -increased JORDEN  dose to 6000 units on 1/19/18. continue JORDEN with HD(MWF)

## 2018-01-19 NOTE — DISCHARGE NOTE ANTEPARTUM - PATIENT PORTAL LINK FT
“You can access the FollowHealth Patient Portal, offered by [facility], by registering with the following website: [email or web address]”

## 2018-01-19 NOTE — PROGRESS NOTE ADULT - SUBJECTIVE AND OBJECTIVE BOX
[facility] DIVISION OF KIDNEY DISEASES AND HYPERTENSION -- FOLLOW UP NOTE  --------------------------------------------------------------------------------  Chief Complaint:  ESRD on HD    24 hour events/subjective:  Patient reports doing well, has no complaints at this time        PAST HISTORY  --------------------------------------------------------------------------------  No significant changes to PMH, PSH, FHx, SHx, unless otherwise noted    ALLERGIES & MEDICATIONS  --------------------------------------------------------------------------------  Allergies    No Known Allergies    Intolerances      Standing Inpatient Medications    PRN Inpatient Medications      REVIEW OF SYSTEMS  --------------------------------------------------------------------------------  Gen: No fevers/chills  Skin: No rashes  Head/Eyes/Ears/Mouth: No headache  Respiratory: No dyspnea  CV: No chest pain  GI: No abdominal pain  : No dysuria  MSK: No edema  Neuro: No dizziness/lightheadedness    VITALS/PHYSICAL EXAM  --------------------------------------------------------------------------------  T(C): 36.7 (01-18-18 @ 13:30), Max: 36.9 (01-18-18 @ 09:25)  HR: 85 (01-18-18 @ 13:30) (84 - 85)  BP: 140/85 (01-18-18 @ 13:30) (140/85 - 143/89)  RR: 16 (01-18-18 @ 13:30) (16 - 16)  SpO2: 98% (01-18-18 @ 13:30) (98% - 98%)  Wt(kg): --        Physical Exam:  	Gen: NAD, well-appearing  	HEENT: MMM  	Pulm: CTA B/L  	CV: RRR, S1S2; no rub  	Abd: +BS, soft, nontender  	: No suprapubic tenderness  	UE:  no edema  	LE:  no edema  	Neuro: No focal deficits  	Psych: Normal affect and mood  	Skin: Warm  	Vascular access:  LUE AVF +thrill and bruit    LABS/STUDIES  --------------------------------------------------------------------------------              9.2    9.4   >-----------<  197      [01-17-18 @ 09:32]              25.7     137  |  100  |  14  ----------------------------<  79      [01-17-18 @ 09:32]  3.8   |  26  |  3.35        Ca     8.7     [01-17-18 @ 09:32]    TPro  5.9  /  Alb  3.0  /  TBili  0.2  /  DBili  x   /  AST  12  /  ALT  8   /  AlkPhos  102  [01-17-18 @ 09:32]    PT/INR: PT 9.4  , INR 0.87       [01-17-18 @ 09:32]  PTT: 23.7       [01-17-18 @ 09:32]    Uric acid 4.1      [01-17-18 @ 09:32]        [01-17-18 @ 09:32]    Creatinine Trend:  SCr 3.35 [01-17 @ 09:32]  SCr 4.13 [01-15 @ 11:02]  SCr 2.89 [01-13 @ 12:48]  SCr 4.13 [01-08 @ 11:24]  SCr 1.23 [01-06 @ 16:44]

## 2018-01-19 NOTE — DISCHARGE NOTE ANTEPARTUM - CARE PROVIDER_API CALL
Telly Solis (LEE ANN), Obstetrics and Gynecology  [address] [place] [address]  Phone: [phone]  Fax: [phone]

## 2018-01-19 NOTE — PROGRESS NOTE ADULT - ATTENDING COMMENTS
Pt. seen on HD, tolerating HD without any fluid removal. Plan for next maintenance HD tomorrow. Monitor BMP

## 2018-01-20 ENCOUNTER — INPATIENT (INPATIENT)
Facility: HOSPITAL | Age: 28
LOS: 0 days | Discharge: HOME | DRG: 684 | End: 2018-01-20
Attending: OBSTETRICS & GYNECOLOGY | Admitting: OBSTETRICS & GYNECOLOGY
Payer: MEDICAID

## 2018-01-20 ENCOUNTER — TRANSCRIPTION ENCOUNTER (OUTPATIENT)
Age: 28
End: 2018-01-20

## 2018-01-20 VITALS
SYSTOLIC BLOOD PRESSURE: 119 MMHG | RESPIRATION RATE: 13 BRPM | TEMPERATURE: 99 F | HEART RATE: 83 BPM | DIASTOLIC BLOOD PRESSURE: 76 MMHG | OXYGEN SATURATION: 98 %

## 2018-01-20 VITALS — WEIGHT: 134.48 LBS | HEIGHT: 55 IN

## 2018-01-20 DIAGNOSIS — I77.0 ARTERIOVENOUS FISTULA, ACQUIRED: Chronic | ICD-10-CM

## 2018-01-20 DIAGNOSIS — N18.5 CHRONIC KIDNEY DISEASE, STAGE 5: ICD-10-CM

## 2018-01-20 DIAGNOSIS — Z98.89 OTHER SPECIFIED POSTPROCEDURAL STATES: Chronic | ICD-10-CM

## 2018-01-20 PROCEDURE — 99233 SBSQ HOSP IP/OBS HIGH 50: CPT | Mod: GC

## 2018-01-20 NOTE — DISCHARGE NOTE ANTEPARTUM - CARE PROVIDER_API CALL
Telly Solis (LEE ANN), Obstetrics and Gynecology  [address] [place] [address]  Phone: [phone]  Fax: [phone]

## 2018-01-20 NOTE — DISCHARGE NOTE ANTEPARTUM - CARE PLAN
Principal Discharge DX:	Chronic kidney disease (CKD) stage G5/A1, glomerular filtration rate (GFR) less than or equal to 15 mL/min/1.73 square meter and albuminuria creatinine ratio less than 30 mg/g  Goal:	daily dialysis  Assessment and plan of treatment:	daily dialysis

## 2018-01-20 NOTE — DISCHARGE NOTE ANTEPARTUM - PATIENT PORTAL LINK FT
“You can access the FollowHealth Patient Portal, offered by [facility], by registering with the following website: [email or web address]”

## 2018-01-20 NOTE — PROGRESS NOTE ADULT - PROBLEM SELECTOR PLAN 2
In setting of renal failure.   -Monitor H/H.   -increased JORDEN  dose to 6000 units on 1/19/18. continue JORDEN with HD(MWF)

## 2018-01-20 NOTE — PROGRESS NOTE ADULT - SUBJECTIVE AND OBJECTIVE BOX
[facility] DIVISION OF KIDNEY DISEASES AND HYPERTENSION -- HEMODIALYSIS NOTE  --------------------------------------------------------------------------------  Chief Complaint: ESRD/Ongoing hemodialysis requirement    24 hour events/subjective:  no acute complaint        PAST HISTORY  --------------------------------------------------------------------------------  No significant changes to PMH, PSH, FHx, SHx, unless otherwise noted    ALLERGIES & MEDICATIONS  --------------------------------------------------------------------------------  Allergies    No Known Allergies    Intolerances      Standing Inpatient Medications    PRN Inpatient Medications      REVIEW OF SYSTEMS  --------------------------------------------------------------------------------  Gen: No weight changes, fatigue, fevers/chills, weakness  Skin: No rashes  Head/Eyes/Ears/Mouth: No headache; Normal hearing; Normal vision w/o blurriness; No sinus pain/discomfort, sore throat  Respiratory: No dyspnea, cough, wheezing, hemoptysis  CV: No chest pain, PND, orthopnea  GI: No abdominal pain, diarrhea, constipation, nausea, vomiting, melena, hematochezia  : No increased frequency, dysuria, hematuria, nocturia  MSK: No joint pain/swelling; no back pain; no edema  Neuro: No dizziness/lightheadedness, weakness, seizures, numbness, tingling  Heme: No easy bruising or bleeding  Endo: No heat/cold intolerance  Psych: No significant nervousness, anxiety, stress, depression    All other systems were reviewed and are negative, except as noted.    VITALS/PHYSICAL EXAM  --------------------------------------------------------------------------------  T(C): 37 (01-20-18 @ 14:35), Max: 37 (01-20-18 @ 14:35)  HR: 83 (01-20-18 @ 14:35) (83 - 85)  BP: 119/76 (01-20-18 @ 14:35) (119/76 - 123/73)  RR: 13 (01-20-18 @ 14:35) (13 - 18)  SpO2: 98% (01-20-18 @ 14:35) (98% - 98%)  Wt(kg): --  Height (cm): 57 (01-20-18 @ 07:58)      01-20-18 @ 07:01  -  01-20-18 @ 17:10  --------------------------------------------------------  IN: 700 mL / OUT: 1050 mL / NET: -350 mL      Physical Exam:  	Gen: NAD, well-appearing  	HEENT: PERRL, supple neck, clear oropharynx  	Pulm: CTA B/L  	CV: RRR, S1S2; no rub  	Back: No spinal or CVA tenderness; no sacral edema  	Abd: +BS, soft, nontender. gravid uterus+  	: No suprapubic tenderness  	UE: Warm, FROM, no clubbing, intact strength; no edema; no asterixis  	LE: Warm, FROM, no clubbing, intact strength; no edema  	Neuro: No focal deficits, intact gait  	Psych: Normal affect and mood  	Skin: Warm, without rashes  	Vascular access: kristi ROCHE+ veronique+    LABS/STUDIES  --------------------------------------------------------------------------------                HbA1c 5.7      [07-15-16 @ 08:42]  TSH 0.01      [10-02-17 @ 23:50]    HBsAb <3.0      [01-05-18 @ 13:45]  HBsAg Nonreact      [01-05-18 @ 13:45]  HBcAb Nonreact      [01-05-18 @ 13:45]  HCV 0.07, Nonreact      [01-05-18 @ 13:45]

## 2018-01-22 ENCOUNTER — INPATIENT (INPATIENT)
Facility: HOSPITAL | Age: 28
LOS: 0 days | Discharge: HOME | DRG: 684 | End: 2018-01-22
Attending: OBSTETRICS & GYNECOLOGY | Admitting: OBSTETRICS & GYNECOLOGY
Payer: MEDICAID

## 2018-01-22 ENCOUNTER — TRANSCRIPTION ENCOUNTER (OUTPATIENT)
Age: 28
End: 2018-01-22

## 2018-01-22 VITALS
RESPIRATION RATE: 16 BRPM | DIASTOLIC BLOOD PRESSURE: 84 MMHG | TEMPERATURE: 98 F | SYSTOLIC BLOOD PRESSURE: 136 MMHG | HEART RATE: 84 BPM | OXYGEN SATURATION: 100 %

## 2018-01-22 VITALS
HEART RATE: 79 BPM | DIASTOLIC BLOOD PRESSURE: 86 MMHG | OXYGEN SATURATION: 100 % | SYSTOLIC BLOOD PRESSURE: 130 MMHG | RESPIRATION RATE: 18 BRPM | TEMPERATURE: 98 F

## 2018-01-22 DIAGNOSIS — Z98.89 OTHER SPECIFIED POSTPROCEDURAL STATES: Chronic | ICD-10-CM

## 2018-01-22 DIAGNOSIS — I77.0 ARTERIOVENOUS FISTULA, ACQUIRED: Chronic | ICD-10-CM

## 2018-01-22 DIAGNOSIS — N18.5 CHRONIC KIDNEY DISEASE, STAGE 5: ICD-10-CM

## 2018-01-22 LAB
ALBUMIN SERPL ELPH-MCNC: 3 G/DL — LOW (ref 3.3–5)
ALBUMIN SERPL ELPH-MCNC: 3 G/DL — LOW (ref 3.3–5)
ALP SERPL-CCNC: 116 U/L — SIGNIFICANT CHANGE UP (ref 40–120)
ALT FLD-CCNC: 7 U/L — LOW (ref 10–45)
ANION GAP SERPL CALC-SCNC: 17 MMOL/L — SIGNIFICANT CHANGE UP (ref 5–17)
APTT BLD: 26.5 SEC — LOW (ref 27.5–37.4)
AST SERPL-CCNC: 12 U/L — SIGNIFICANT CHANGE UP (ref 10–40)
BASOPHILS # BLD AUTO: 0.02 K/UL — SIGNIFICANT CHANGE UP (ref 0–0.2)
BASOPHILS NFR BLD AUTO: 0.2 % — SIGNIFICANT CHANGE UP (ref 0–2)
BILIRUB DIRECT SERPL-MCNC: 0.1 MG/DL — SIGNIFICANT CHANGE UP (ref 0–0.2)
BILIRUB INDIRECT FLD-MCNC: 0.1 MG/DL — LOW (ref 0.2–1)
BILIRUB SERPL-MCNC: 0.2 MG/DL — SIGNIFICANT CHANGE UP (ref 0.2–1.2)
BUN SERPL-MCNC: 20 MG/DL — SIGNIFICANT CHANGE UP (ref 7–23)
CALCIUM SERPL-MCNC: 9.1 MG/DL — SIGNIFICANT CHANGE UP (ref 8.4–10.5)
CHLORIDE SERPL-SCNC: 100 MMOL/L — SIGNIFICANT CHANGE UP (ref 96–108)
CO2 SERPL-SCNC: 19 MMOL/L — LOW (ref 22–31)
CREAT SERPL-MCNC: 4.2 MG/DL — HIGH (ref 0.5–1.3)
EOSINOPHIL # BLD AUTO: 0.08 K/UL — SIGNIFICANT CHANGE UP (ref 0–0.5)
EOSINOPHIL NFR BLD AUTO: 0.8 % — SIGNIFICANT CHANGE UP (ref 0–6)
FIBRINOGEN PPP-MCNC: 674 MG/DL — HIGH (ref 310–510)
GLUCOSE SERPL-MCNC: 76 MG/DL — SIGNIFICANT CHANGE UP (ref 70–99)
HCT VFR BLD CALC: 26.6 % — LOW (ref 34.5–45)
HGB BLD-MCNC: 8.6 G/DL — LOW (ref 11.5–15.5)
IMM GRANULOCYTES NFR BLD AUTO: 0.5 % — SIGNIFICANT CHANGE UP (ref 0–1.5)
INR BLD: 0.85 RATIO — LOW (ref 0.88–1.16)
LDH SERPL L TO P-CCNC: 187 U/L — SIGNIFICANT CHANGE UP (ref 50–242)
LYMPHOCYTES # BLD AUTO: 1.46 K/UL — SIGNIFICANT CHANGE UP (ref 1–3.3)
LYMPHOCYTES # BLD AUTO: 14.6 % — SIGNIFICANT CHANGE UP (ref 13–44)
MCHC RBC-ENTMCNC: 29.9 PG — SIGNIFICANT CHANGE UP (ref 27–34)
MCHC RBC-ENTMCNC: 32.3 GM/DL — SIGNIFICANT CHANGE UP (ref 32–36)
MCV RBC AUTO: 92.4 FL — SIGNIFICANT CHANGE UP (ref 80–100)
MONOCYTES # BLD AUTO: 0.92 K/UL — HIGH (ref 0–0.9)
MONOCYTES NFR BLD AUTO: 9.2 % — SIGNIFICANT CHANGE UP (ref 2–14)
NEUTROPHILS # BLD AUTO: 7.49 K/UL — HIGH (ref 1.8–7.4)
NEUTROPHILS NFR BLD AUTO: 74.7 % — SIGNIFICANT CHANGE UP (ref 43–77)
PHOSPHATE SERPL-MCNC: 2.6 MG/DL — SIGNIFICANT CHANGE UP (ref 2.5–4.5)
PLATELET # BLD AUTO: 196 K/UL — SIGNIFICANT CHANGE UP (ref 150–400)
POTASSIUM SERPL-MCNC: 3.8 MMOL/L — SIGNIFICANT CHANGE UP (ref 3.5–5.3)
POTASSIUM SERPL-SCNC: 3.8 MMOL/L — SIGNIFICANT CHANGE UP (ref 3.5–5.3)
PROT SERPL-MCNC: 5.9 G/DL — LOW (ref 6–8.3)
PROTHROM AB SERPL-ACNC: 9.2 SEC — LOW (ref 9.8–12.7)
RBC # BLD: 2.88 M/UL — LOW (ref 3.8–5.2)
RBC # FLD: 16.2 % — HIGH (ref 10.3–14.5)
SODIUM SERPL-SCNC: 136 MMOL/L — SIGNIFICANT CHANGE UP (ref 135–145)
URATE SERPL-MCNC: 5.1 MG/DL — SIGNIFICANT CHANGE UP (ref 2.5–7)
WBC # BLD: 10.02 K/UL — SIGNIFICANT CHANGE UP (ref 3.8–10.5)
WBC # FLD AUTO: 10.02 K/UL — SIGNIFICANT CHANGE UP (ref 3.8–10.5)

## 2018-01-22 PROCEDURE — 90935 HEMODIALYSIS ONE EVALUATION: CPT | Mod: GC

## 2018-01-22 RX ORDER — DOXERCALCIFEROL 2.5 UG/1
2 CAPSULE ORAL ONCE
Qty: 0 | Refills: 0 | Status: COMPLETED | OUTPATIENT
Start: 2018-01-22 | End: 2018-01-22

## 2018-01-22 RX ORDER — ERYTHROPOIETIN 10000 [IU]/ML
4000 INJECTION, SOLUTION INTRAVENOUS; SUBCUTANEOUS ONCE
Qty: 0 | Refills: 0 | Status: COMPLETED | OUTPATIENT
Start: 2018-01-22 | End: 2018-01-22

## 2018-01-22 RX ADMIN — ERYTHROPOIETIN 4000 UNIT(S): 10000 INJECTION, SOLUTION INTRAVENOUS; SUBCUTANEOUS at 10:05

## 2018-01-22 RX ADMIN — DOXERCALCIFEROL 2 MICROGRAM(S): 2.5 CAPSULE ORAL at 10:09

## 2018-01-22 NOTE — DISCHARGE NOTE OB - PATIENT PORTAL LINK FT
“You can access the FollowHealth Patient Portal, offered by [facility], by registering with the following website: [email or web address]”

## 2018-01-22 NOTE — DISCHARGE NOTE ANTEPARTUM - CARE PLAN
Principal Discharge DX:	28 weeks gestation of pregnancy  Goal:	maintain BUN, Creatinine  Assessment and plan of treatment:	cont. Dialysis daily and weekly bloodwork  Secondary Diagnosis:	Chronic kidney disease (CKD) stage G5/A1, glomerular filtration rate (GFR) less than or equal to 15 mL/min/1.73 square meter and albuminuria creatinine ratio less than 30 mg/g  Secondary Diagnosis:	History of

## 2018-01-22 NOTE — DISCHARGE NOTE OB - MEDICATION SUMMARY - MEDICATIONS TO TAKE
I will START or STAY ON the medications listed below when I get home from the hospital:    Prenatal Multivitamins oral tablet  -- 1 tab(s) by mouth once a day  -- Indication: For nutrition

## 2018-01-22 NOTE — DISCHARGE NOTE ANTEPARTUM - CARE PROVIDER_API CALL
Jennifer Oreilly), Obstetrics and Gynecology  [address] [address]  Phone: [phone]  Fax: [phone]

## 2018-01-22 NOTE — DISCHARGE NOTE OB - SECONDARY DIAGNOSIS.
Chronic kidney disease (CKD) stage G5/A1, glomerular filtration rate (GFR) less than or equal to 15 mL/min/1.73 square meter and albuminuria creatinine ratio less than 30 mg/g History of

## 2018-01-22 NOTE — DISCHARGE NOTE OB - CARE PROVIDER_API CALL
Jennifer Oreilly), Obstetrics and Gynecology  [address] [address]  Phone: [phone]  Fax: [phone]

## 2018-01-22 NOTE — DISCHARGE NOTE ANTEPARTUM - PATIENT PORTAL LINK FT
“You can access the FollowHealth Patient Portal, offered by [facility], by registering with the following website: [email or web address]”

## 2018-01-22 NOTE — DISCHARGE NOTE ANTEPARTUM - HOSPITAL COURSE
Pt. received daily dialysis.  [facility] which was normal except for pre-dialysis values

## 2018-01-22 NOTE — DISCHARGE NOTE OB - CARE PLAN
Principal Discharge DX:	28 weeks gestation of pregnancy  Goal:	clear blood through dialysis  Assessment and plan of treatment:	cont. kidney dialysis on L&D daily except Sundays  Secondary Diagnosis:	Chronic kidney disease (CKD) stage G5/A1, glomerular filtration rate (GFR) less than or equal to 15 mL/min/1.73 square meter and albuminuria creatinine ratio less than 30 mg/g  Secondary Diagnosis:	History of

## 2018-01-23 ENCOUNTER — APPOINTMENT (OUTPATIENT)
Dept: ANTEPARTUM | Facility: CLINIC | Age: 28
End: 2018-01-23

## 2018-01-23 ENCOUNTER — INPATIENT (INPATIENT)
Facility: HOSPITAL | Age: 28
LOS: 0 days | Discharge: HOME | DRG: 684 | End: 2018-01-23
Attending: OBSTETRICS & GYNECOLOGY | Admitting: OBSTETRICS & GYNECOLOGY
Payer: MEDICAID

## 2018-01-23 ENCOUNTER — TRANSCRIPTION ENCOUNTER (OUTPATIENT)
Age: 28
End: 2018-01-23

## 2018-01-23 ENCOUNTER — ASOB RESULT (OUTPATIENT)
Age: 28
End: 2018-01-23

## 2018-01-23 VITALS
DIASTOLIC BLOOD PRESSURE: 83 MMHG | HEART RATE: 87 BPM | OXYGEN SATURATION: 99 % | TEMPERATURE: 98 F | RESPIRATION RATE: 18 BRPM | SYSTOLIC BLOOD PRESSURE: 137 MMHG

## 2018-01-23 VITALS
OXYGEN SATURATION: 100 % | RESPIRATION RATE: 18 BRPM | DIASTOLIC BLOOD PRESSURE: 87 MMHG | SYSTOLIC BLOOD PRESSURE: 133 MMHG | TEMPERATURE: 98 F | HEART RATE: 75 BPM

## 2018-01-23 DIAGNOSIS — N18.5 CHRONIC KIDNEY DISEASE, STAGE 5: ICD-10-CM

## 2018-01-23 DIAGNOSIS — Z98.89 OTHER SPECIFIED POSTPROCEDURAL STATES: Chronic | ICD-10-CM

## 2018-01-23 DIAGNOSIS — I77.0 ARTERIOVENOUS FISTULA, ACQUIRED: Chronic | ICD-10-CM

## 2018-01-23 LAB — GLUCOSE 1H P MEAL SERPL-MCNC: 89 MG/DL — SIGNIFICANT CHANGE UP (ref 70–134)

## 2018-01-23 PROCEDURE — 99261: CPT

## 2018-01-23 PROCEDURE — 90935 HEMODIALYSIS ONE EVALUATION: CPT | Mod: GC

## 2018-01-23 PROCEDURE — 59050 FETAL MONITOR W/REPORT: CPT

## 2018-01-23 PROCEDURE — 59025 FETAL NON-STRESS TEST: CPT

## 2018-01-23 PROCEDURE — 82950 GLUCOSE TEST: CPT

## 2018-01-23 RX ORDER — DEXTROSE 50 % IN WATER 50 %
100 SYRINGE (ML) INTRAVENOUS ONCE
Qty: 0 | Refills: 0 | Status: DISCONTINUED | OUTPATIENT
Start: 2018-01-23 | End: 2018-01-23

## 2018-01-23 NOTE — DISCHARGE NOTE ANTEPARTUM - MEDICATION SUMMARY - MEDICATIONS TO TAKE
I will START or STAY ON the medications listed below when I get home from the hospital:    Prenatal Multivitamins oral tablet  -- 1 tab(s) by mouth once a day  -- Indication: For Pregnancy

## 2018-01-23 NOTE — PROGRESS NOTE ADULT - PROBLEM SELECTOR PLAN 2
In setting of renal failure.   -will check H/H today  -continue 6000 units epogen TIW with HD MWF
In setting of renal failure.   -will check H/H today  -will dose epogen based on H/H trend, last given 6000 units on 1/19

## 2018-01-23 NOTE — PROGRESS NOTE ADULT - PROBLEM SELECTOR PLAN 1
Pt with ESRD on HD x 6 days a week. Plan for HD today. Monitor BMP and BP.  Labs drawn yesterday reviewed, unremarkable
Pt with ESRD on HD x 6 days a week. Plan for HD today. Monitor BMP and BP.

## 2018-01-23 NOTE — PROGRESS NOTE ADULT - ASSESSMENT
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

## 2018-01-23 NOTE — DISCHARGE NOTE ANTEPARTUM - CARE PROVIDER_API CALL
Miguelito Mendoza), Obstetrics and Gynecology  [address] [place] [address]  Phone: [phone]  Fax: [phone]

## 2018-01-23 NOTE — DISCHARGE NOTE ANTEPARTUM - HOSPITAL COURSE
26 yo  @28w1d with a hx of Stage 5 CKD who presents for daily dialysis. Pt doing well. Will return tomorrow for dialysis.

## 2018-01-23 NOTE — DISCHARGE NOTE ANTEPARTUM - CARE PLAN
Principal Discharge DX:	Chronic kidney disease (CKD) stage G5/A1, glomerular filtration rate (GFR) less than or equal to 15 mL/min/1.73 square meter and albuminuria creatinine ratio less than 30 mg/g  Goal:	Return tomorrow for scheduled dialysis  Assessment and plan of treatment:	Return tomorrow for scheduled dialysis

## 2018-01-23 NOTE — DISCHARGE NOTE ANTEPARTUM - PATIENT PORTAL LINK FT
“You can access the FollowHealth Patient Portal, offered by [facility], by registering with the following website: [email or web address]”

## 2018-01-23 NOTE — PROGRESS NOTE ADULT - PROBLEM SELECTOR PLAN 3
in setting of renal failure.   -Continue Hectorol 2mcg three times a week.    -Monitor calcium, phosphorus and PTH
in setting of renal failure.   -Continue Hectorol 2mcg three times a week.    -Monitor calcium, phosphorus and PTH

## 2018-01-24 ENCOUNTER — INPATIENT (INPATIENT)
Facility: HOSPITAL | Age: 28
LOS: 0 days | Discharge: HOME | DRG: 781 | End: 2018-01-24
Attending: OBSTETRICS & GYNECOLOGY | Admitting: OBSTETRICS & GYNECOLOGY
Payer: MEDICAID

## 2018-01-24 ENCOUNTER — TRANSCRIPTION ENCOUNTER (OUTPATIENT)
Age: 28
End: 2018-01-24

## 2018-01-24 VITALS
WEIGHT: 145.28 LBS | RESPIRATION RATE: 16 BRPM | SYSTOLIC BLOOD PRESSURE: 142 MMHG | OXYGEN SATURATION: 99 % | TEMPERATURE: 98 F | HEART RATE: 86 BPM | DIASTOLIC BLOOD PRESSURE: 92 MMHG

## 2018-01-24 VITALS
RESPIRATION RATE: 18 BRPM | DIASTOLIC BLOOD PRESSURE: 80 MMHG | HEART RATE: 73 BPM | TEMPERATURE: 98 F | SYSTOLIC BLOOD PRESSURE: 138 MMHG | OXYGEN SATURATION: 100 %

## 2018-01-24 DIAGNOSIS — I77.0 ARTERIOVENOUS FISTULA, ACQUIRED: Chronic | ICD-10-CM

## 2018-01-24 DIAGNOSIS — Z98.89 OTHER SPECIFIED POSTPROCEDURAL STATES: Chronic | ICD-10-CM

## 2018-01-24 DIAGNOSIS — N18.5 CHRONIC KIDNEY DISEASE, STAGE 5: ICD-10-CM

## 2018-01-24 PROCEDURE — 90935 HEMODIALYSIS ONE EVALUATION: CPT

## 2018-01-24 RX ORDER — DOXERCALCIFEROL 2.5 UG/1
2 CAPSULE ORAL ONCE
Qty: 0 | Refills: 0 | Status: COMPLETED | OUTPATIENT
Start: 2018-01-24 | End: 2018-01-24

## 2018-01-24 RX ORDER — ERYTHROPOIETIN 10000 [IU]/ML
6000 INJECTION, SOLUTION INTRAVENOUS; SUBCUTANEOUS ONCE
Qty: 0 | Refills: 0 | Status: COMPLETED | OUTPATIENT
Start: 2018-01-24 | End: 2018-01-24

## 2018-01-24 RX ADMIN — DOXERCALCIFEROL 2 MICROGRAM(S): 2.5 CAPSULE ORAL at 09:49

## 2018-01-24 RX ADMIN — ERYTHROPOIETIN 6000 UNIT(S): 10000 INJECTION, SOLUTION INTRAVENOUS; SUBCUTANEOUS at 09:48

## 2018-01-24 NOTE — DISCHARGE NOTE ANTEPARTUM - CARE PLAN
Principal Discharge DX:	Chronic kidney disease (CKD) stage G5/A1, glomerular filtration rate (GFR) less than or equal to 15 mL/min/1.73 square meter and albuminuria creatinine ratio less than 30 mg/g  Goal:	Home  Assessment and plan of treatment:	Normal diet and activity as tolerated. Dialysis 6x weekly

## 2018-01-24 NOTE — DISCHARGE NOTE ANTEPARTUM - HOSPITAL COURSE
Patient admitted for daily dialysis. Dialysis uncomplicated. Fetal monitoring reassuring. Patient to return tomorrow for dialysis as scheduled.

## 2018-01-24 NOTE — DISCHARGE NOTE ANTEPARTUM - PROVIDER TOKENS
FREE:[LAST:[[facility]],PHONE:[phone]],FAX:[(   )    -],ADDRESS:[[address] [place] [address]]]

## 2018-01-24 NOTE — PROGRESS NOTE ADULT - ASSESSMENT
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

## 2018-01-24 NOTE — PROGRESS NOTE ADULT - SUBJECTIVE AND OBJECTIVE BOX
[facility] DIVISION OF KIDNEY DISEASES AND HYPERTENSION -- HEMODIALYSIS NOTE  --------------------------------------------------------------------------------  Chief Complaint: ESRD/Ongoing hemodialysis requirement    24 hour events/subjective: received maintenance HD yesterday        PAST HISTORY  --------------------------------------------------------------------------------  No significant changes to PMH, PSH, FHx, SHx, unless otherwise noted    ALLERGIES & MEDICATIONS  --------------------------------------------------------------------------------  Allergies    No Known Allergies    Intolerances      Standing Inpatient Medications    PRN Inpatient Medications      REVIEW OF SYSTEMS  --------------------------------------------------------------------------------  Constitutional: [x ] no Fever [ ] Chills [ ] Fatigue [ ] Weight change   HEENT: [ ] Blurred vision [ ] Eye Pain [ ] Headache [ ] Runny nose [ ] Sore Throat   Respiratory: [ ] Cough [ ] Wheezing [ ] Shortness of breath  Cardiovascular: [ ] Chest Pain [ ] Palpitations [ ] MARR [ ] PND [ ] Orthopnea  Gastrointestinal: [ ] Abdominal Pain [ ] Diarrhea [ ] Constipation [ ] Hemorrhoids [ ] Nausea [ ] Vomiting  Genitourinary: [ ] Nocturia [ ] Dysuria [ ] Incontinence  Extremities: [ ] Swelling [ ] Joint Pain  Neurologic: [ ] Focal deficit [ ] Paresthesias [ ] Syncope  Lymphatic: [ ] Swelling [ ] Lymphadenopathy   Skin: [ ] Rash [ ] Ecchymoses [ ] Wounds [ ] Lesions  Psychiatry: [ ] Depression [ ] Suicidal/Homicidal Ideation [ ] Anxiety [ ] Sleep Disturbances  [x ] 10 point review of systems is otherwise negative except as mentioned above              [ ]Unable to obtain due to   All other systems were reviewed and are negative, except as noted.      VITALS/PHYSICAL EXAM  --------------------------------------------------------------------------------  T(C): 36.8 (01-24-18 @ 09:25), Max: 36.8 (01-23-18 @ 14:30)  HR: 86 (01-24-18 @ 09:25) (86 - 87)  BP: 142/92 (01-24-18 @ 09:25) (137/83 - 142/92)  RR: 16 (01-24-18 @ 09:25) (16 - 18)  SpO2: 99% (01-24-18 @ 09:25) (99% - 99%)  Wt(kg): --  Height (cm): 144.78 (01-22-18 @ 13:36)      Physical Exam:  	Gen: NAD, well-appearing  	HEENT: on room air  	Pulm: CTA B/L  	CV: normal S1S2; no rub  	Abd: soft                      Back : No sacral edema  	: No an  	LE: no edema  	Skin: Warm, without rashes  	Vascular access: LUE AVF, skin intact, no swelling/ discharge/ pain over AVF site    LABS/STUDIES  --------------------------------------------------------------------------------              8.6    10.02 >-----------<  196      [01-22-18 @ 12:42]              26.6     136  |  100  |  20  ----------------------------<  76      [01-22-18 @ 12:42]  3.8   |  19  |  4.20        Ca     9.1     [01-22-18 @ 12:42]      Phos  2.6     [01-22-18 @ 12:42]    TPro  5.9  /  Alb  3.0  /  TBili  0.2  /  DBili  0.1  /  AST  12  /  ALT  7   /  AlkPhos  116  [01-22-18 @ 12:42]        Uric acid 5.1      [01-22-18 @ 12:42]        [01-22-18 @ 12:42]    HbA1c 5.7      [07-15-16 @ 08:42]  TSH 0.01      [10-02-17 @ 23:50]    HBsAb <3.0      [01-05-18 @ 13:45]  HBsAg Nonreact      [01-05-18 @ 13:45]  HBcAb Nonreact      [01-05-18 @ 13:45]  HCV 0.07, Nonreact      [01-05-18 @ 13:45]

## 2018-01-24 NOTE — DISCHARGE NOTE ANTEPARTUM - CARE PROVIDER_API CALL
[facility],   [address] [place] [address]  [address]  Phone: [phone]  Fax: (   )    -

## 2018-01-24 NOTE — PROGRESS NOTE ADULT - PROBLEM SELECTOR PLAN 1
Pt with ESRD on HD x 6 days a week. seen on maintenance HD today. Tolerating HD well, BP well maintained without any fluid removal. AVF working well. Plan for next maintenance HD tomorrow. Monitor BMP and BP.

## 2018-01-24 NOTE — DISCHARGE NOTE ANTEPARTUM - PATIENT PORTAL LINK FT
“You can access the FollowHealth Patient Portal, offered by [facility], by registering with the following website: [email or web address]”

## 2018-01-25 ENCOUNTER — APPOINTMENT (OUTPATIENT)
Dept: ANTEPARTUM | Facility: CLINIC | Age: 28
End: 2018-01-25

## 2018-01-25 ENCOUNTER — TRANSCRIPTION ENCOUNTER (OUTPATIENT)
Age: 28
End: 2018-01-25

## 2018-01-25 ENCOUNTER — INPATIENT (INPATIENT)
Facility: HOSPITAL | Age: 28
LOS: 0 days | Discharge: HOME | DRG: 684 | End: 2018-01-25
Attending: OBSTETRICS & GYNECOLOGY | Admitting: OBSTETRICS & GYNECOLOGY
Payer: MEDICAID

## 2018-01-25 VITALS
OXYGEN SATURATION: 100 % | TEMPERATURE: 98 F | RESPIRATION RATE: 18 BRPM | HEART RATE: 86 BPM | SYSTOLIC BLOOD PRESSURE: 140 MMHG | DIASTOLIC BLOOD PRESSURE: 84 MMHG

## 2018-01-25 VITALS — WEIGHT: 141.1 LBS

## 2018-01-25 DIAGNOSIS — N18.5 CHRONIC KIDNEY DISEASE, STAGE 5: ICD-10-CM

## 2018-01-25 DIAGNOSIS — Z98.89 OTHER SPECIFIED POSTPROCEDURAL STATES: Chronic | ICD-10-CM

## 2018-01-25 DIAGNOSIS — I77.0 ARTERIOVENOUS FISTULA, ACQUIRED: Chronic | ICD-10-CM

## 2018-01-25 LAB
ALBUMIN SERPL ELPH-MCNC: 3.1 G/DL — LOW (ref 3.3–5)
ALBUMIN SERPL ELPH-MCNC: 3.1 G/DL — LOW (ref 3.3–5)
ALP SERPL-CCNC: 113 U/L — SIGNIFICANT CHANGE UP (ref 40–120)
ALT FLD-CCNC: 8 U/L RC — LOW (ref 10–45)
ANION GAP SERPL CALC-SCNC: 11 MMOL/L — SIGNIFICANT CHANGE UP (ref 5–17)
AST SERPL-CCNC: 12 U/L — SIGNIFICANT CHANGE UP (ref 10–40)
BILIRUB DIRECT SERPL-MCNC: <0.1 MG/DL — SIGNIFICANT CHANGE UP (ref 0–0.2)
BILIRUB INDIRECT FLD-MCNC: >0.1 MG/DL — LOW (ref 0.2–1)
BILIRUB SERPL-MCNC: 0.2 MG/DL — SIGNIFICANT CHANGE UP (ref 0.2–1.2)
BUN SERPL-MCNC: 12 MG/DL — SIGNIFICANT CHANGE UP (ref 7–23)
CALCIUM SERPL-MCNC: 9.1 MG/DL — SIGNIFICANT CHANGE UP (ref 8.4–10.5)
CHLORIDE SERPL-SCNC: 102 MMOL/L — SIGNIFICANT CHANGE UP (ref 96–108)
CO2 SERPL-SCNC: 25 MMOL/L — SIGNIFICANT CHANGE UP (ref 22–31)
CREAT SERPL-MCNC: 3.04 MG/DL — HIGH (ref 0.5–1.3)
GLUCOSE SERPL-MCNC: 77 MG/DL — SIGNIFICANT CHANGE UP (ref 70–99)
HCT VFR BLD CALC: 26.6 % — LOW (ref 34.5–45)
HGB BLD-MCNC: 9.6 G/DL — LOW (ref 11.5–15.5)
LDH SERPL L TO P-CCNC: 209 U/L — SIGNIFICANT CHANGE UP (ref 50–242)
MCHC RBC-ENTMCNC: 33.2 PG — SIGNIFICANT CHANGE UP (ref 27–34)
MCHC RBC-ENTMCNC: 36.1 GM/DL — HIGH (ref 32–36)
MCV RBC AUTO: 92 FL — SIGNIFICANT CHANGE UP (ref 80–100)
PHOSPHATE SERPL-MCNC: 2.2 MG/DL — LOW (ref 2.5–4.5)
PLATELET # BLD AUTO: 184 K/UL — SIGNIFICANT CHANGE UP (ref 150–400)
POTASSIUM SERPL-MCNC: 3.7 MMOL/L — SIGNIFICANT CHANGE UP (ref 3.5–5.3)
POTASSIUM SERPL-SCNC: 3.7 MMOL/L — SIGNIFICANT CHANGE UP (ref 3.5–5.3)
PROT SERPL-MCNC: 6.2 G/DL — SIGNIFICANT CHANGE UP (ref 6–8.3)
RBC # BLD: 2.89 M/UL — LOW (ref 3.8–5.2)
RBC # FLD: 14.6 % — HIGH (ref 10.3–14.5)
SODIUM SERPL-SCNC: 138 MMOL/L — SIGNIFICANT CHANGE UP (ref 135–145)
WBC # BLD: 11.1 K/UL — HIGH (ref 3.8–10.5)
WBC # FLD AUTO: 11.1 K/UL — HIGH (ref 3.8–10.5)

## 2018-01-25 PROCEDURE — 99261: CPT

## 2018-01-25 PROCEDURE — 90935 HEMODIALYSIS ONE EVALUATION: CPT

## 2018-01-25 PROCEDURE — 59025 FETAL NON-STRESS TEST: CPT

## 2018-01-25 NOTE — DISCHARGE NOTE ANTEPARTUM - CARE PROVIDER_API CALL
Jennifer Oreilly), Obstetrics and Gynecology  [address] [address]  Phone: [phone]  Fax: [phone]

## 2018-01-25 NOTE — DISCHARGE NOTE ANTEPARTUM - CARE PLAN
Principal Discharge DX:	Chronic kidney disease (CKD) stage G5/A1, glomerular filtration rate (GFR) less than or equal to 15 mL/min/1.73 square meter and albuminuria creatinine ratio less than 30 mg/g  Goal:	Stay pregnant  Assessment and plan of treatment:	Come back tomorrow for dialysis.

## 2018-01-25 NOTE — PROGRESS NOTE ADULT - SUBJECTIVE AND OBJECTIVE BOX
[facility] DIVISION OF KIDNEY DISEASES AND HYPERTENSION -- PROGRESS NOTE    Chief complaint: ESRD on HD    24 hour events/subjective: received maintenance HD yesterdayx        PAST HISTORY  --------------------------------------------------------------------------------  No significant changes to PMH, PSH, FHx, SHx, unless otherwise noted    ALLERGIES & MEDICATIONS  --------------------------------------------------------------------------------  Allergies    No Known Allergies    Intolerances      Standing Inpatient Medications    PRN Inpatient Medications      REVIEW OF SYSTEMS  --------------------------------------------------------------------------------  Constitutional: [x ] no Fever [ ] Chills [ ] Fatigue [ ] Weight change   HEENT: [ ] Blurred vision [ ] Eye Pain [ ] Headache [ ] Runny nose [ ] Sore Throat   Respiratory: [ ] Cough [ ] Wheezing [ ] Shortness of breath  Cardiovascular: [ ] Chest Pain [ ] Palpitations [ ] MARR [ ] PND [ ] Orthopnea  Gastrointestinal: [ ] Abdominal Pain [ ] Diarrhea [ ] Constipation [ ] Hemorrhoids [ ] Nausea [ ] Vomiting  Genitourinary: [ ] Nocturia [ ] Dysuria [ ] Incontinence  Extremities: [ ] Swelling [ ] Joint Pain  Neurologic: [ ] Focal deficit [ ] Paresthesias [ ] Syncope  Lymphatic: [ ] Swelling [ ] Lymphadenopathy   Skin: [ ] Rash [ ] Ecchymoses [ ] Wounds [ ] Lesions  Psychiatry: [ ] Depression [ ] Suicidal/Homicidal Ideation [ ] Anxiety [ ] Sleep Disturbances  [x ] 10 point review of systems is otherwise negative except as mentioned above              [ ]Unable to obtain due to   All other systems were reviewed and are negative, except as noted.    VITALS/PHYSICAL EXAM  --------------------------------------------------------------------------------  T(C): 36.8 (01-25-18 @ 14:40), Max: 36.9 (01-25-18 @ 10:20)  HR: 86 (01-25-18 @ 14:40) (80 - 86)  BP: 140/84 (01-25-18 @ 14:40) (140/84 - 147/86)  RR: 18 (01-25-18 @ 14:40) (18 - 18)  SpO2: 100% (01-25-18 @ 14:40) (100% - 100%)  Wt(kg): --        01-25-18 @ 07:01  -  01-25-18 @ 15:39  --------------------------------------------------------  IN: 0 mL / OUT: 0 mL / NET: 0 mL      Physical Exam:  	Gen: NAD, well-appearing  	HEENT: on room air  	Pulm: CTA B/L  	CV: normal S1S2; no rub  	Abd: soft                      Back : No sacral edema  	: No an  	LE: no edema  	Skin: Warm, without rashes  	Vascular access: LUE AVF +, with thrill and bruit, no bleed/ swelling/ discharge around AVF    LABS/STUDIES  --------------------------------------------------------------------------------              9.6    11.1  >-----------<  184      [01-25-18 @ 10:59]              26.6     138  |  102  |  12  ----------------------------<  77      [01-25-18 @ 10:59]  3.7   |  25  |  3.04        Ca     9.1     [01-25-18 @ 10:59]      Phos  2.2     [01-25-18 @ 10:59]    TPro  6.2  /  Alb  3.1  /  TBili  0.2  /  DBili  <0.1  /  AST  12  /  ALT  8   /  AlkPhos  113  [01-25-18 @ 10:59]              [01-25-18 @ 10:59]    Creatinine Trend:  SCr 3.04 [01-25 @ 10:59]  SCr 4.20 [01-22 @ 12:42]  SCr 3.35 [01-17 @ 09:32]  SCr 4.13 [01-15 @ 11:02]  SCr 2.89 [01-13 @ 12:48]    Urinalysis - [01-17-18 @ 09:32]      Color PL Yellow / Appearance Clear / SG 1.009 / pH 8.5      Gluc Negative / Ketone Negative  / Bili Negative / Urobili Negative       Blood Small / Protein 150 / Leuk Est Negative / Nitrite Negative      RBC 5-10 / WBC  / Hyaline  / Gran  / Sq Epi  / Non Sq Epi Moderate / Bacteria       HbA1c 5.7      [07-15-16 @ 08:42]  TSH 0.01      [10-02-17 @ 23:50]    HBsAb <3.0      [01-05-18 @ 13:45]  HBsAg Nonreact      [01-05-18 @ 13:45]  HBcAb Nonreact      [01-05-18 @ 13:45]  HCV 0.07, Nonreact      [01-05-18 @ 13:45]

## 2018-01-25 NOTE — DISCHARGE NOTE ANTEPARTUM - CARE PROVIDERS DIRECT ADDRESSES
[email or web address]

## 2018-01-25 NOTE — PATIENT PROFILE OB - ATTEMPT TO OOB
Medical Necessity Information: It is in your best interest to select a reason for this procedure from the list below. All of these items fulfill various CMS LCD requirements except the new and changing color options. Add 52 Modifier (Optional): no Detail Level: Detailed Post-Care Instructions: I reviewed with the patient in detail post-care instructions. Patient is to wear sunprotection, and avoid picking at any of the treated lesions. Pt may apply Vaseline to crusted or scabbing areas. Medical Necessity Clause: This procedure was medically necessary because the lesions that were treated were: Render Post-Care Instructions In Note?: yes Number Of Freeze-Thaw Cycles: 2 freeze-thaw cycles Consent: The patient's consent was obtained including but not limited to risks of crusting, scabbing, blistering, scarring, darker or lighter pigmentary change, recurrence, incomplete removal and infection. no

## 2018-01-26 ENCOUNTER — APPOINTMENT (OUTPATIENT)
Dept: ANTEPARTUM | Facility: CLINIC | Age: 28
End: 2018-01-26
Payer: MEDICAID

## 2018-01-26 ENCOUNTER — ASOB RESULT (OUTPATIENT)
Age: 28
End: 2018-01-26

## 2018-01-26 ENCOUNTER — INPATIENT (INPATIENT)
Facility: HOSPITAL | Age: 28
LOS: 0 days | Discharge: HOME | DRG: 781 | End: 2018-01-26
Attending: OBSTETRICS & GYNECOLOGY | Admitting: OBSTETRICS & GYNECOLOGY
Payer: MEDICAID

## 2018-01-26 VITALS
HEART RATE: 89 BPM | RESPIRATION RATE: 18 BRPM | OXYGEN SATURATION: 99 % | WEIGHT: 142.2 LBS | DIASTOLIC BLOOD PRESSURE: 95 MMHG | TEMPERATURE: 99 F | SYSTOLIC BLOOD PRESSURE: 159 MMHG

## 2018-01-26 VITALS
RESPIRATION RATE: 18 BRPM | HEART RATE: 87 BPM | SYSTOLIC BLOOD PRESSURE: 141 MMHG | WEIGHT: 142.2 LBS | OXYGEN SATURATION: 97 % | DIASTOLIC BLOOD PRESSURE: 85 MMHG | TEMPERATURE: 98 F

## 2018-01-26 DIAGNOSIS — Z98.89 OTHER SPECIFIED POSTPROCEDURAL STATES: Chronic | ICD-10-CM

## 2018-01-26 DIAGNOSIS — N18.5 CHRONIC KIDNEY DISEASE, STAGE 5: ICD-10-CM

## 2018-01-26 DIAGNOSIS — I77.0 ARTERIOVENOUS FISTULA, ACQUIRED: Chronic | ICD-10-CM

## 2018-01-26 DIAGNOSIS — I10 ESSENTIAL (PRIMARY) HYPERTENSION: ICD-10-CM

## 2018-01-26 LAB
ALBUMIN SERPL ELPH-MCNC: 2.9 G/DL — LOW (ref 3.3–5)
ALP SERPL-CCNC: 109 U/L — SIGNIFICANT CHANGE UP (ref 40–120)
ALT FLD-CCNC: 7 U/L RC — LOW (ref 10–45)
ANION GAP SERPL CALC-SCNC: 10 MMOL/L — SIGNIFICANT CHANGE UP (ref 5–17)
APTT BLD: 41.2 SEC — HIGH (ref 27.5–37.4)
AST SERPL-CCNC: 11 U/L — SIGNIFICANT CHANGE UP (ref 10–40)
BASOPHILS # BLD AUTO: 0 K/UL — SIGNIFICANT CHANGE UP (ref 0–0.2)
BASOPHILS NFR BLD AUTO: 0.2 % — SIGNIFICANT CHANGE UP (ref 0–2)
BILIRUB SERPL-MCNC: 0.2 MG/DL — SIGNIFICANT CHANGE UP (ref 0.2–1.2)
BUN SERPL-MCNC: 8 MG/DL — SIGNIFICANT CHANGE UP (ref 7–23)
CALCIUM SERPL-MCNC: 8.6 MG/DL — SIGNIFICANT CHANGE UP (ref 8.4–10.5)
CHLORIDE SERPL-SCNC: 103 MMOL/L — SIGNIFICANT CHANGE UP (ref 96–108)
CO2 SERPL-SCNC: 26 MMOL/L — SIGNIFICANT CHANGE UP (ref 22–31)
CREAT ?TM UR-MCNC: 15 MG/DL — SIGNIFICANT CHANGE UP
CREAT SERPL-MCNC: 2.36 MG/DL — HIGH (ref 0.5–1.3)
EOSINOPHIL # BLD AUTO: 0 K/UL — SIGNIFICANT CHANGE UP (ref 0–0.5)
EOSINOPHIL NFR BLD AUTO: 0.5 % — SIGNIFICANT CHANGE UP (ref 0–6)
FIBRINOGEN PPP-MCNC: 632 MG/DL — HIGH (ref 310–510)
GLUCOSE SERPL-MCNC: 85 MG/DL — SIGNIFICANT CHANGE UP (ref 70–99)
HCT VFR BLD CALC: 25.3 % — LOW (ref 34.5–45)
HGB BLD-MCNC: 9 G/DL — LOW (ref 11.5–15.5)
INR BLD: 0.87 RATIO — LOW (ref 0.88–1.16)
LDH SERPL L TO P-CCNC: 148 U/L — SIGNIFICANT CHANGE UP (ref 50–242)
LYMPHOCYTES # BLD AUTO: 1.6 K/UL — SIGNIFICANT CHANGE UP (ref 1–3.3)
LYMPHOCYTES # BLD AUTO: 19.7 % — SIGNIFICANT CHANGE UP (ref 13–44)
MCHC RBC-ENTMCNC: 32.8 PG — SIGNIFICANT CHANGE UP (ref 27–34)
MCHC RBC-ENTMCNC: 35.7 GM/DL — SIGNIFICANT CHANGE UP (ref 32–36)
MCV RBC AUTO: 91.7 FL — SIGNIFICANT CHANGE UP (ref 80–100)
MONOCYTES # BLD AUTO: 0.4 K/UL — SIGNIFICANT CHANGE UP (ref 0–0.9)
MONOCYTES NFR BLD AUTO: 4.9 % — SIGNIFICANT CHANGE UP (ref 2–14)
NEUTROPHILS # BLD AUTO: 6 K/UL — SIGNIFICANT CHANGE UP (ref 1.8–7.4)
NEUTROPHILS NFR BLD AUTO: 74.7 % — SIGNIFICANT CHANGE UP (ref 43–77)
PLATELET # BLD AUTO: 189 K/UL — SIGNIFICANT CHANGE UP (ref 150–400)
POTASSIUM SERPL-MCNC: 3.6 MMOL/L — SIGNIFICANT CHANGE UP (ref 3.5–5.3)
POTASSIUM SERPL-SCNC: 3.6 MMOL/L — SIGNIFICANT CHANGE UP (ref 3.5–5.3)
PROT ?TM UR-MCNC: 84 MG/DL — HIGH (ref 0–12)
PROT SERPL-MCNC: 5.7 G/DL — LOW (ref 6–8.3)
PROT/CREAT UR-RTO: 5.6 RATIO — HIGH (ref 0–0.2)
PROTHROM AB SERPL-ACNC: 9.5 SEC — LOW (ref 9.8–12.7)
RBC # BLD: 2.75 M/UL — LOW (ref 3.8–5.2)
RBC # FLD: 14.5 % — SIGNIFICANT CHANGE UP (ref 10.3–14.5)
SODIUM SERPL-SCNC: 139 MMOL/L — SIGNIFICANT CHANGE UP (ref 135–145)
URATE SERPL-MCNC: 2.6 MG/DL — SIGNIFICANT CHANGE UP (ref 2.5–7)
WBC # BLD: 8 K/UL — SIGNIFICANT CHANGE UP (ref 3.8–10.5)
WBC # FLD AUTO: 8 K/UL — SIGNIFICANT CHANGE UP (ref 3.8–10.5)

## 2018-01-26 PROCEDURE — 76819 FETAL BIOPHYS PROFIL W/O NST: CPT

## 2018-01-26 PROCEDURE — 83615 LACTATE (LD) (LDH) ENZYME: CPT

## 2018-01-26 PROCEDURE — 59025 FETAL NON-STRESS TEST: CPT

## 2018-01-26 PROCEDURE — 84550 ASSAY OF BLOOD/URIC ACID: CPT

## 2018-01-26 PROCEDURE — 80053 COMPREHEN METABOLIC PANEL: CPT

## 2018-01-26 PROCEDURE — 76820 UMBILICAL ARTERY ECHO: CPT

## 2018-01-26 PROCEDURE — 90935 HEMODIALYSIS ONE EVALUATION: CPT | Mod: GC

## 2018-01-26 PROCEDURE — 85730 THROMBOPLASTIN TIME PARTIAL: CPT

## 2018-01-26 PROCEDURE — 59050 FETAL MONITOR W/REPORT: CPT

## 2018-01-26 PROCEDURE — 85027 COMPLETE CBC AUTOMATED: CPT

## 2018-01-26 PROCEDURE — 85610 PROTHROMBIN TIME: CPT

## 2018-01-26 PROCEDURE — 84156 ASSAY OF PROTEIN URINE: CPT

## 2018-01-26 PROCEDURE — 85384 FIBRINOGEN ACTIVITY: CPT

## 2018-01-26 RX ORDER — ERYTHROPOIETIN 10000 [IU]/ML
6000 INJECTION, SOLUTION INTRAVENOUS; SUBCUTANEOUS ONCE
Qty: 0 | Refills: 0 | Status: COMPLETED | OUTPATIENT
Start: 2018-01-26 | End: 2018-01-26

## 2018-01-26 RX ORDER — FOLIC ACID 0.8 MG
1 TABLET ORAL DAILY
Qty: 0 | Refills: 0 | Status: DISCONTINUED | OUTPATIENT
Start: 2018-01-26 | End: 2018-01-26

## 2018-01-26 RX ORDER — DOXERCALCIFEROL 2.5 UG/1
2 CAPSULE ORAL ONCE
Qty: 0 | Refills: 0 | Status: COMPLETED | OUTPATIENT
Start: 2018-01-26 | End: 2018-01-26

## 2018-01-26 RX ORDER — FERROUS SULFATE 325(65) MG
325 TABLET ORAL DAILY
Qty: 0 | Refills: 0 | Status: DISCONTINUED | OUTPATIENT
Start: 2018-01-26 | End: 2018-01-26

## 2018-01-26 RX ADMIN — ERYTHROPOIETIN 6000 UNIT(S): 10000 INJECTION, SOLUTION INTRAVENOUS; SUBCUTANEOUS at 10:30

## 2018-01-26 RX ADMIN — DOXERCALCIFEROL 2 MICROGRAM(S): 2.5 CAPSULE ORAL at 10:29

## 2018-01-26 NOTE — DISCHARGE NOTE ANTEPARTUM - COMMENTS
Miguel Ángel rausch pression dos veces cada rosalba. Llame a la clinica para pression mas de 160/110 o cambios visuales o dolor de la ja

## 2018-01-26 NOTE — PROGRESS NOTE ADULT - ATTENDING COMMENTS
Spoke with patient with help of  Blanche (ID [id number]), pt. denied any complaints and mentioned that she is unable to check her BP at home. She came with elevated SBP today that improved with HD. She denied noticing foamy urine or LE swelling. Pt. advised to monitor her BP at home. May need to start labetalol or start UF with HD if BP remains elevated. Will also possibly need inpatient admission if BP starts rising again.

## 2018-01-26 NOTE — DISCHARGE NOTE ANTEPARTUM - HOSPITAL COURSE
Patient came for hemodialysis, which was uneventful. HELLP labs appropriate given clinical diagnosis. NST was reassuring. Pt discharged home with BP monitoring.

## 2018-01-26 NOTE — DISCHARGE NOTE ANTEPARTUM - CARE PLAN
Principal Discharge DX:	Chronic kidney disease (CKD) stage G5/A1, glomerular filtration rate (GFR) less than or equal to 15 mL/min/1.73 square meter and albuminuria creatinine ratio less than 30 mg/g  Goal:	Home  Assessment and plan of treatment:	Miguel Ángel rausch pression dos veces cada rosalba. Llame a la clinica para pression mas de 160/110 o cambios visuales o dolor de la ja

## 2018-01-26 NOTE — DISCHARGE NOTE ANTEPARTUM - PATIENT PORTAL LINK FT
“You can access the Follow[employer] Patient Portal, offered by [facility], by registering with the following website: [email or web address]”

## 2018-01-26 NOTE — PROGRESS NOTE ADULT - SUBJECTIVE AND OBJECTIVE BOX
[facility] DIVISION OF KIDNEY DISEASES AND HYPERTENSION -- FOLLOW UP NOTE  --------------------------------------------------------------------------------  Chief Complaint:  ESRD on HD    24 hour events/subjective:  Patient reports feeling well today.  In AM before initiation of HD, found with SBP of 170s.  During exam SBP improved to 140.  Patient denies any additional salt intake, denies any salty food intake. Patient seen on dialysis.        PAST HISTORY  --------------------------------------------------------------------------------  No significant changes to PMH, PSH, FHx, SHx, unless otherwise noted    ALLERGIES & MEDICATIONS  --------------------------------------------------------------------------------  Allergies    No Known Allergies    Intolerances      Standing Inpatient Medications  doxercalciferol Injectable 2 MICROGram(s) IV Push once  epoetin leeann Injectable 6000 Unit(s) IV Push once    PRN Inpatient Medications      REVIEW OF SYSTEMS  --------------------------------------------------------------------------------  Gen: No fevers/chills  Skin: No rashes  Head/Eyes/Ears/Mouth: No headache  Respiratory: No dyspnea  CV: No chest pain  GI: No abdominal pain  : No dysuria  MSK: No edema  Neuro: No dizziness/lightheadedness    VITALS/PHYSICAL EXAM  --------------------------------------------------------------------------------  T(C): 36.8 (01-25-18 @ 14:40), Max: 36.9 (01-25-18 @ 10:20)  HR: 86 (01-25-18 @ 14:40) (80 - 86)  BP: 140/84 (01-25-18 @ 14:40) (140/84 - 147/86)  RR: 18 (01-25-18 @ 14:40) (18 - 18)  SpO2: 100% (01-25-18 @ 14:40) (100% - 100%)  Wt(kg): --        Physical Exam:  	Gen: NAD, well-appearing  	HEENT: MMM  	Pulm: CTA B/L  	CV: RRR, S1S2; no rub  	Abd: +BS, nontender, +distended  	: No suprapubic tenderness  	UE:  no edema  	LE:  no edema  	Neuro: No focal deficits  	Psych: Normal affect and mood  	Skin: Warm  	Vascular access:  LUE AVF connected to machine    LABS/STUDIES  --------------------------------------------------------------------------------              9.6    11.1  >-----------<  184      [01-25-18 @ 10:59]              26.6     138  |  102  |  12  ----------------------------<  77      [01-25-18 @ 10:59]  3.7   |  25  |  3.04        Ca     9.1     [01-25-18 @ 10:59]      Phos  2.2     [01-25-18 @ 10:59]    TPro  6.2  /  Alb  3.1  /  TBili  0.2  /  DBili  <0.1  /  AST  12  /  ALT  8   /  AlkPhos  113  [01-25-18 @ 10:59]              [01-25-18 @ 10:59]    Creatinine Trend:  SCr 3.04 [01-25 @ 10:59]  SCr 4.20 [01-22 @ 12:42]  SCr 3.35 [01-17 @ 09:32]  SCr 4.13 [01-15 @ 11:02]  SCr 2.89 [01-13 @ 12:48]

## 2018-01-26 NOTE — DISCHARGE NOTE ANTEPARTUM - PLAN OF CARE
Home Miguel Ángel rausch pression dos veces cada rosalba. Llame a la clinica para pression mas de 160/110 o cambios visuales o dolor de la ja

## 2018-01-26 NOTE — PROGRESS NOTE ADULT - PROBLEM SELECTOR PLAN 2
In setting of renal failure.   -monitor H/H   -continue 6000 units epogen TIW with HD MWF In setting of renal failure.   -Hb improving  -monitor H/H   -continue 6000 units epogen TIW with HD MWF

## 2018-01-26 NOTE — PROGRESS NOTE ADULT - PROBLEM SELECTOR PLAN 4
Will continue to follow daily weights  Discussed with OB resident, to check HELLP labs today  Isolated HTN, unlikely pre-eclampsia  Will consider gentle volume removal vs starting labetalol if BP continues to be elevated over

## 2018-01-27 ENCOUNTER — INPATIENT (INPATIENT)
Facility: HOSPITAL | Age: 28
LOS: 0 days | Discharge: HOME | DRG: 781 | End: 2018-01-27
Attending: OBSTETRICS & GYNECOLOGY | Admitting: OBSTETRICS & GYNECOLOGY
Payer: MEDICAID

## 2018-01-27 ENCOUNTER — TRANSCRIPTION ENCOUNTER (OUTPATIENT)
Age: 28
End: 2018-01-27

## 2018-01-27 VITALS
OXYGEN SATURATION: 98 % | DIASTOLIC BLOOD PRESSURE: 82 MMHG | TEMPERATURE: 98 F | HEART RATE: 92 BPM | RESPIRATION RATE: 18 BRPM

## 2018-01-27 VITALS
TEMPERATURE: 98 F | RESPIRATION RATE: 16 BRPM | SYSTOLIC BLOOD PRESSURE: 146 MMHG | DIASTOLIC BLOOD PRESSURE: 82 MMHG | HEART RATE: 88 BPM | OXYGEN SATURATION: 93 %

## 2018-01-27 DIAGNOSIS — Z98.89 OTHER SPECIFIED POSTPROCEDURAL STATES: Chronic | ICD-10-CM

## 2018-01-27 DIAGNOSIS — I77.0 ARTERIOVENOUS FISTULA, ACQUIRED: Chronic | ICD-10-CM

## 2018-01-27 DIAGNOSIS — N18.5 CHRONIC KIDNEY DISEASE, STAGE 5: ICD-10-CM

## 2018-01-27 LAB
ALBUMIN SERPL ELPH-MCNC: 2.9 G/DL — LOW (ref 3.3–5)
ALP SERPL-CCNC: 107 U/L — SIGNIFICANT CHANGE UP (ref 40–120)
ALT FLD-CCNC: 9 U/L RC — LOW (ref 10–45)
ANION GAP SERPL CALC-SCNC: 9 MMOL/L — SIGNIFICANT CHANGE UP (ref 5–17)
APTT BLD: 24.6 SEC — LOW (ref 27.5–37.4)
AST SERPL-CCNC: 14 U/L — SIGNIFICANT CHANGE UP (ref 10–40)
BASOPHILS # BLD AUTO: 0 K/UL — SIGNIFICANT CHANGE UP (ref 0–0.2)
BASOPHILS NFR BLD AUTO: 0.4 % — SIGNIFICANT CHANGE UP (ref 0–2)
BILIRUB SERPL-MCNC: 0.1 MG/DL — LOW (ref 0.2–1.2)
BUN SERPL-MCNC: 13 MG/DL — SIGNIFICANT CHANGE UP (ref 7–23)
CALCIUM SERPL-MCNC: 8.9 MG/DL — SIGNIFICANT CHANGE UP (ref 8.4–10.5)
CHLORIDE SERPL-SCNC: 102 MMOL/L — SIGNIFICANT CHANGE UP (ref 96–108)
CO2 SERPL-SCNC: 27 MMOL/L — SIGNIFICANT CHANGE UP (ref 22–31)
CREAT SERPL-MCNC: 2.84 MG/DL — HIGH (ref 0.5–1.3)
EOSINOPHIL # BLD AUTO: 0 K/UL — SIGNIFICANT CHANGE UP (ref 0–0.5)
EOSINOPHIL NFR BLD AUTO: 0.2 % — SIGNIFICANT CHANGE UP (ref 0–6)
FIBRINOGEN PPP-MCNC: 667 MG/DL — HIGH (ref 310–510)
GLUCOSE SERPL-MCNC: 77 MG/DL — SIGNIFICANT CHANGE UP (ref 70–99)
HCT VFR BLD CALC: 25.1 % — LOW (ref 34.5–45)
HGB BLD-MCNC: 9 G/DL — LOW (ref 11.5–15.5)
INR BLD: 0.84 RATIO — LOW (ref 0.88–1.16)
LDH SERPL L TO P-CCNC: 173 U/L — SIGNIFICANT CHANGE UP (ref 50–242)
LYMPHOCYTES # BLD AUTO: 1.6 K/UL — SIGNIFICANT CHANGE UP (ref 1–3.3)
LYMPHOCYTES # BLD AUTO: 15.7 % — SIGNIFICANT CHANGE UP (ref 13–44)
MCHC RBC-ENTMCNC: 32.8 PG — SIGNIFICANT CHANGE UP (ref 27–34)
MCHC RBC-ENTMCNC: 35.8 GM/DL — SIGNIFICANT CHANGE UP (ref 32–36)
MCV RBC AUTO: 91.6 FL — SIGNIFICANT CHANGE UP (ref 80–100)
MONOCYTES # BLD AUTO: 0.6 K/UL — SIGNIFICANT CHANGE UP (ref 0–0.9)
MONOCYTES NFR BLD AUTO: 5.8 % — SIGNIFICANT CHANGE UP (ref 2–14)
NEUTROPHILS # BLD AUTO: 7.7 K/UL — HIGH (ref 1.8–7.4)
NEUTROPHILS NFR BLD AUTO: 77.9 % — HIGH (ref 43–77)
PLATELET # BLD AUTO: 188 K/UL — SIGNIFICANT CHANGE UP (ref 150–400)
POTASSIUM SERPL-MCNC: 3.6 MMOL/L — SIGNIFICANT CHANGE UP (ref 3.5–5.3)
POTASSIUM SERPL-SCNC: 3.6 MMOL/L — SIGNIFICANT CHANGE UP (ref 3.5–5.3)
PROT SERPL-MCNC: 5.9 G/DL — LOW (ref 6–8.3)
PROTHROM AB SERPL-ACNC: 9 SEC — LOW (ref 9.8–12.7)
RBC # BLD: 2.74 M/UL — LOW (ref 3.8–5.2)
RBC # FLD: 14.8 % — HIGH (ref 10.3–14.5)
SODIUM SERPL-SCNC: 138 MMOL/L — SIGNIFICANT CHANGE UP (ref 135–145)
URATE SERPL-MCNC: 3.3 MG/DL — SIGNIFICANT CHANGE UP (ref 2.5–7)
WBC # BLD: 9.9 K/UL — SIGNIFICANT CHANGE UP (ref 3.8–10.5)
WBC # FLD AUTO: 9.9 K/UL — SIGNIFICANT CHANGE UP (ref 3.8–10.5)

## 2018-01-27 PROCEDURE — 59050 FETAL MONITOR W/REPORT: CPT

## 2018-01-27 PROCEDURE — 85384 FIBRINOGEN ACTIVITY: CPT

## 2018-01-27 PROCEDURE — 59025 FETAL NON-STRESS TEST: CPT

## 2018-01-27 PROCEDURE — 80053 COMPREHEN METABOLIC PANEL: CPT

## 2018-01-27 PROCEDURE — 85027 COMPLETE CBC AUTOMATED: CPT

## 2018-01-27 PROCEDURE — 99232 SBSQ HOSP IP/OBS MODERATE 35: CPT | Mod: GC

## 2018-01-27 PROCEDURE — 99261: CPT

## 2018-01-27 PROCEDURE — 85610 PROTHROMBIN TIME: CPT

## 2018-01-27 PROCEDURE — 85730 THROMBOPLASTIN TIME PARTIAL: CPT

## 2018-01-27 PROCEDURE — 83615 LACTATE (LD) (LDH) ENZYME: CPT

## 2018-01-27 PROCEDURE — 84550 ASSAY OF BLOOD/URIC ACID: CPT

## 2018-01-27 RX ORDER — LABETALOL HCL 100 MG
1 TABLET ORAL
Qty: 60 | Refills: 0 | OUTPATIENT
Start: 2018-01-27 | End: 2018-02-25

## 2018-01-27 NOTE — DISCHARGE NOTE ANTEPARTUM - HOSPITAL COURSE
28 yo P2 @28w5d w/  hx of stage 5 CKD who presents for daily dialysis. At the time of admission pt had elevated BPs in the 150s. Pt underwent went routine dialysis and 500 cc were removed. Blood pressures remained elevated in the 130s-150s/70s-80s. Per nephrology labetalol 100 BID was prescribed. Pt was counseled and encouraged to stay overnight for close BP monitoring however she refused. Prior to discharge was NST reactive and BPP 8/8.  Pt will return to L&D on Monday for dialysis and monitoring.

## 2018-01-27 NOTE — DISCHARGE NOTE ANTEPARTUM - MEDICATION SUMMARY - MEDICATIONS TO TAKE
I will START or STAY ON the medications listed below when I get home from the hospital:    labetalol 100 mg oral tablet  -- 1 tab(s) by mouth 2 times a day MDD:2  -- It is very important that you take or use this exactly as directed.  Do not skip doses or discontinue unless directed by your doctor.  May cause drowsiness.  Alcohol may intensify this effect.  Use care when operating dangerous machinery.  Some non-prescription drugs may aggravate your condition.  Read all labels carefully.  If a warning appears, check with your doctor before taking.    -- Indication: For Stage 5 chronic kidney disease

## 2018-01-27 NOTE — PROGRESS NOTE ADULT - ATTENDING COMMENTS
29 wks pregnant, crescentic IgA, HD dependent, now with increased BP  1.  Hypertension--agree with modest volume reduction and initiation of BP med  2.  CKD--6d/wk HD;excellent clearances,UF as needed to optimize1

## 2018-01-27 NOTE — DISCHARGE NOTE ANTEPARTUM - CARE PLAN
Principal Discharge DX:	Chronic kidney disease (CKD) stage G5/A1, glomerular filtration rate (GFR) less than or equal to 15 mL/min/1.73 square meter and albuminuria creatinine ratio less than 30 mg/g  Goal:	Routine normal function  Assessment and plan of treatment:	Monitor BPs three times a day  Start Labetalol 100 BID  Return on Monday for Dialysis

## 2018-01-27 NOTE — PROGRESS NOTE ADULT - PROBLEM SELECTOR PLAN 4
Will continue to follow daily weights  HTN- cannot r/o possible pre-eclampsia in the setting of worsening proteinuria   Improved with 500cc UF. Continue to monitor and may require labetalol if remains elevated

## 2018-01-27 NOTE — PROGRESS NOTE ADULT - PROBLEM SELECTOR PLAN 1
Pt with ESRD on HD x 6 days a week. seen on maintenance HD today. Tolerating HD well, BP well maintained with 500cc fluid removal today. AVF working well. Plan for next maintenance HD tomorrow.  Patient however has had elevated BP and may require further monitoring or BP medication especially in the setting of worsening proteinuria (5.6g). Monitor BMP and BP closely  P

## 2018-01-27 NOTE — DISCHARGE NOTE ANTEPARTUM - CARE PROVIDER_API CALL
Telly Solis (LEE ANN), Obstetrics and Gynecology  [address] [place] [address]  Phone: [phone]  Fax: [phone]

## 2018-01-27 NOTE — DISCHARGE NOTE ANTEPARTUM - PATIENT PORTAL LINK FT
“You can access the Follow[employer] Patient Portal, offered by [facility], by registering with the following website: [email or web address]”

## 2018-01-27 NOTE — PROGRESS NOTE ADULT - PROBLEM SELECTOR PLAN 2
In setting of renal failure. Heparin held today due to elevated BP   -Hb improving  -monitor H/H   -continue 6000 units epogen TIW with HD MWF

## 2018-01-27 NOTE — DISCHARGE NOTE ANTEPARTUM - PLAN OF CARE
Routine normal function Monitor BPs three times a day  Start Labetalol 100 BID  Return on Monday for Dialysis

## 2018-01-27 NOTE — PROGRESS NOTE ADULT - SUBJECTIVE AND OBJECTIVE BOX
[facility] DIVISION OF KIDNEY DISEASES AND HYPERTENSION -- FOLLOW UP NOTE  --------------------------------------------------------------------------------  Chief Complaint:  ESRD on HD    24 hour events/subjective:  Patient arrived with elevated BP.  In AM before initiation of HD, found with SBP of 150s.  During exam SBP improved to 130. Patient denies any additional salt intake, denies any salty food intake. Patient seen on dialysis. Plan for 500cc UF today.     PAST HISTORY  --------------------------------------------------------------------------------  No significant changes to PMH, PSH, FHx, SHx, unless otherwise noted    ALLERGIES & MEDICATIONS  --------------------------------------------------------------------------------  Allergies    No Known Allergies    Intolerances      Standing Inpatient Medications    PRN Inpatient Medications      REVIEW OF SYSTEMS  --------------------------------------------------------------------------------  Gen: No weakness  Skin: No rashes  Head/Eyes/Ears/Mouth: No headache  Respiratory: No dyspnea, cough  CV: No chest pain, PND  GI: No abdominal pain, diarrhea, constipation  : No increased frequency, dysuria  MSK: No edema  Neuro: No dizziness/lightheadedness  Heme: No bleeding    All other systems were reviewed and are negative, except as noted.    VITALS/PHYSICAL EXAM  --------------------------------------------------------------------------------  T(C): 36.7 (01-27-18 @ 14:50), Max: 36.7 (01-27-18 @ 14:50)  HR: 92 (01-27-18 @ 14:50) (88 - 92)  BP: 146/82 (01-27-18 @ 10:40) (146/82 - 146/82)  RR: 18 (01-27-18 @ 14:50) (16 - 18)  SpO2: 98% (01-27-18 @ 14:50) (93% - 98%)  Wt(kg): --        01-27-18 @ 07:01  -  01-27-18 @ 17:22  --------------------------------------------------------  IN: 0 mL / OUT: 500 mL / NET: -500 mL    Physical Exam:  	Gen: NAD, well-appearing  	HEENT: MMM  	Pulm: CTA B/L  	CV: RRR, S1S2; no rub  	Abd: +BS, nontender, +distended + gravid uterus   	: No suprapubic tenderness  	UE:  no edema  	LE:  no edema  	Neuro: No focal deficits  	Psych: Normal affect and mood  	Skin: Warm  	Vascular access:  +GALO KANG     LABS/STUDIES  --------------------------------------------------------------------------------              9.0    9.9   >-----------<  188      [01-27-18 @ 11:03]              25.1     138  |  102  |  13  ----------------------------<  77      [01-27-18 @ 11:03]  3.6   |  27  |  2.84        Ca     8.9     [01-27-18 @ 11:03]    TPro  5.9  /  Alb  2.9  /  TBili  0.1  /  DBili  x   /  AST  14  /  ALT  9   /  AlkPhos  107  [01-27-18 @ 11:03]    PT/INR: PT 9.0  , INR 0.84       [01-27-18 @ 11:03]  PTT: 24.6       [01-27-18 @ 11:03]    Uric acid 3.3      [01-27-18 @ 11:03]        [01-27-18 @ 11:03]    Creatinine Trend:  SCr 2.84 [01-27 @ 11:03]  SCr 2.36 [01-26 @ 09:57]  SCr 3.04 [01-25 @ 10:59]  SCr 4.20 [01-22 @ 12:42]  SCr 3.35 [01-17 @ 09:32]    Urinalysis - [01-17-18 @ 09:32]      Color PL Yellow / Appearance Clear / SG 1.009 / pH 8.5      Gluc Negative / Ketone Negative  / Bili Negative / Urobili Negative       Blood Small / Protein 150 / Leuk Est Negative / Nitrite Negative      RBC 5-10 / WBC  / Hyaline  / Gran  / Sq Epi  / Non Sq Epi Moderate / Bacteria     Urine Creatinine 15      [01-26-18 @ 16:43]  Urine Protein 84      [01-26-18 @ 16:43]    HbA1c 5.7      [07-15-16 @ 08:42]  TSH 0.01      [10-02-17 @ 23:50]    HBsAb <3.0      [01-05-18 @ 13:45]  HBsAg Nonreact      [01-05-18 @ 13:45]  HBcAb Nonreact      [01-05-18 @ 13:45]  HCV 0.07, Nonreact      [01-05-18 @ 13:45]

## 2018-01-29 ENCOUNTER — INPATIENT (INPATIENT)
Facility: HOSPITAL | Age: 28
LOS: 0 days | Discharge: HOME | DRG: 781 | End: 2018-01-29
Attending: OBSTETRICS & GYNECOLOGY | Admitting: OBSTETRICS & GYNECOLOGY
Payer: MEDICAID

## 2018-01-29 ENCOUNTER — TRANSCRIPTION ENCOUNTER (OUTPATIENT)
Age: 28
End: 2018-01-29

## 2018-01-29 VITALS
RESPIRATION RATE: 18 BRPM | OXYGEN SATURATION: 100 % | SYSTOLIC BLOOD PRESSURE: 121 MMHG | WEIGHT: 137.13 LBS | TEMPERATURE: 98 F | DIASTOLIC BLOOD PRESSURE: 80 MMHG | HEART RATE: 97 BPM

## 2018-01-29 VITALS
WEIGHT: 137.13 LBS | DIASTOLIC BLOOD PRESSURE: 87 MMHG | TEMPERATURE: 98 F | SYSTOLIC BLOOD PRESSURE: 130 MMHG | RESPIRATION RATE: 18 BRPM | HEART RATE: 89 BPM | OXYGEN SATURATION: 100 %

## 2018-01-29 DIAGNOSIS — Z98.89 OTHER SPECIFIED POSTPROCEDURAL STATES: Chronic | ICD-10-CM

## 2018-01-29 DIAGNOSIS — N18.5 CHRONIC KIDNEY DISEASE, STAGE 5: ICD-10-CM

## 2018-01-29 DIAGNOSIS — I77.0 ARTERIOVENOUS FISTULA, ACQUIRED: Chronic | ICD-10-CM

## 2018-01-29 LAB
ALBUMIN SERPL ELPH-MCNC: 3 G/DL — LOW (ref 3.3–5)
ALBUMIN SERPL ELPH-MCNC: 3 G/DL — LOW (ref 3.3–5)
ALP SERPL-CCNC: 105 U/L — SIGNIFICANT CHANGE UP (ref 40–120)
ALT FLD-CCNC: 11 U/L RC — SIGNIFICANT CHANGE UP (ref 10–45)
ANION GAP SERPL CALC-SCNC: 13 MMOL/L — SIGNIFICANT CHANGE UP (ref 5–17)
APTT BLD: 24.7 SEC — LOW (ref 27.5–37.4)
AST SERPL-CCNC: 16 U/L — SIGNIFICANT CHANGE UP (ref 10–40)
BILIRUB DIRECT SERPL-MCNC: <0.1 MG/DL — SIGNIFICANT CHANGE UP (ref 0–0.2)
BILIRUB INDIRECT FLD-MCNC: >0.1 MG/DL — LOW (ref 0.2–1)
BILIRUB SERPL-MCNC: 0.2 MG/DL — SIGNIFICANT CHANGE UP (ref 0.2–1.2)
BUN SERPL-MCNC: 26 MG/DL — HIGH (ref 7–23)
CALCIUM SERPL-MCNC: 8.4 MG/DL — SIGNIFICANT CHANGE UP (ref 8.4–10.5)
CALCIUM SERPL-MCNC: 8.6 MG/DL — SIGNIFICANT CHANGE UP (ref 8.4–10.5)
CHLORIDE SERPL-SCNC: 102 MMOL/L — SIGNIFICANT CHANGE UP (ref 96–108)
CO2 SERPL-SCNC: 23 MMOL/L — SIGNIFICANT CHANGE UP (ref 22–31)
CREAT SERPL-MCNC: 4.19 MG/DL — HIGH (ref 0.5–1.3)
GLUCOSE SERPL-MCNC: 75 MG/DL — SIGNIFICANT CHANGE UP (ref 70–99)
HCT VFR BLD CALC: 24 % — LOW (ref 34.5–45)
HGB BLD-MCNC: 8.7 G/DL — LOW (ref 11.5–15.5)
INR BLD: 0.84 RATIO — LOW (ref 0.88–1.16)
LDH SERPL L TO P-CCNC: 169 U/L — SIGNIFICANT CHANGE UP (ref 50–242)
MCHC RBC-ENTMCNC: 33.3 PG — SIGNIFICANT CHANGE UP (ref 27–34)
MCHC RBC-ENTMCNC: 36.4 GM/DL — HIGH (ref 32–36)
MCV RBC AUTO: 91.6 FL — SIGNIFICANT CHANGE UP (ref 80–100)
PHOSPHATE SERPL-MCNC: 3.3 MG/DL — SIGNIFICANT CHANGE UP (ref 2.5–4.5)
PLATELET # BLD AUTO: 170 K/UL — SIGNIFICANT CHANGE UP (ref 150–400)
POTASSIUM SERPL-MCNC: 3.6 MMOL/L — SIGNIFICANT CHANGE UP (ref 3.5–5.3)
POTASSIUM SERPL-SCNC: 3.6 MMOL/L — SIGNIFICANT CHANGE UP (ref 3.5–5.3)
PROT SERPL-MCNC: 6 G/DL — SIGNIFICANT CHANGE UP (ref 6–8.3)
PROTHROM AB SERPL-ACNC: 9 SEC — LOW (ref 9.8–12.7)
PTH-INTACT FLD-MCNC: 225 PG/ML — HIGH (ref 15–65)
RBC # BLD: 2.62 M/UL — LOW (ref 3.8–5.2)
RBC # FLD: 14.9 % — HIGH (ref 10.3–14.5)
SODIUM SERPL-SCNC: 138 MMOL/L — SIGNIFICANT CHANGE UP (ref 135–145)
URATE SERPL-MCNC: 4.6 MG/DL — SIGNIFICANT CHANGE UP (ref 2.5–7)
WBC # BLD: 10.3 K/UL — SIGNIFICANT CHANGE UP (ref 3.8–10.5)
WBC # FLD AUTO: 10.3 K/UL — SIGNIFICANT CHANGE UP (ref 3.8–10.5)

## 2018-01-29 PROCEDURE — 83970 ASSAY OF PARATHORMONE: CPT

## 2018-01-29 PROCEDURE — 82310 ASSAY OF CALCIUM: CPT

## 2018-01-29 PROCEDURE — 90935 HEMODIALYSIS ONE EVALUATION: CPT | Mod: GC

## 2018-01-29 PROCEDURE — 80076 HEPATIC FUNCTION PANEL: CPT

## 2018-01-29 PROCEDURE — 84550 ASSAY OF BLOOD/URIC ACID: CPT

## 2018-01-29 PROCEDURE — 85610 PROTHROMBIN TIME: CPT

## 2018-01-29 PROCEDURE — 99222 1ST HOSP IP/OBS MODERATE 55: CPT

## 2018-01-29 PROCEDURE — 85027 COMPLETE CBC AUTOMATED: CPT

## 2018-01-29 PROCEDURE — 59025 FETAL NON-STRESS TEST: CPT

## 2018-01-29 PROCEDURE — 80069 RENAL FUNCTION PANEL: CPT

## 2018-01-29 PROCEDURE — 99261: CPT

## 2018-01-29 PROCEDURE — 59050 FETAL MONITOR W/REPORT: CPT

## 2018-01-29 PROCEDURE — 85730 THROMBOPLASTIN TIME PARTIAL: CPT

## 2018-01-29 PROCEDURE — 83615 LACTATE (LD) (LDH) ENZYME: CPT

## 2018-01-29 RX ORDER — DOXERCALCIFEROL 2.5 UG/1
2 CAPSULE ORAL ONCE
Qty: 0 | Refills: 0 | Status: COMPLETED | OUTPATIENT
Start: 2018-01-29 | End: 2018-01-29

## 2018-01-29 RX ORDER — ERYTHROPOIETIN 10000 [IU]/ML
6000 INJECTION, SOLUTION INTRAVENOUS; SUBCUTANEOUS ONCE
Qty: 0 | Refills: 0 | Status: COMPLETED | OUTPATIENT
Start: 2018-01-29 | End: 2018-01-29

## 2018-01-29 RX ADMIN — ERYTHROPOIETIN 6000 UNIT(S): 10000 INJECTION, SOLUTION INTRAVENOUS; SUBCUTANEOUS at 09:40

## 2018-01-29 RX ADMIN — DOXERCALCIFEROL 2 MICROGRAM(S): 2.5 CAPSULE ORAL at 09:40

## 2018-01-29 RX ADMIN — Medication 12 MILLIGRAM(S): at 16:11

## 2018-01-29 NOTE — PROGRESS NOTE ADULT - PROBLEM SELECTOR PLAN 2
In setting of renal failure. Heparin held today due to elevated BP   -Hb improving  -monitor H/H   -continue 6000 units epogen TIW with HD MWF, will consider increasing to 10,000 during pregnancy In setting of renal failure.    -Hb improving  -monitor H/H   -continue 6000 units epogen TIW with HD MWF, will consider increasing to 10,000 during pregnancy

## 2018-01-29 NOTE — DISCHARGE NOTE ANTEPARTUM - CARE PROVIDER_API CALL
[facility],   [address] [place] [address]  [address] Floor  [place], [place]  Phone: [phone]  Fax: (   )    -

## 2018-01-29 NOTE — DISCHARGE NOTE ANTEPARTUM - PATIENT PORTAL LINK FT
“You can access the Follow[employer] Patient Portal, offered by [facility], by registering with the following website: [email or web address]”

## 2018-01-29 NOTE — DISCHARGE NOTE ANTEPARTUM - CARE PLAN
Principal Discharge DX:	Chronic kidney disease (CKD) stage G5/A1, glomerular filtration rate (GFR) less than or equal to 15 mL/min/1.73 square meter and albuminuria creatinine ratio less than 30 mg/g  Goal:	Home  Assessment and plan of treatment:	Home BP monitoring

## 2018-01-29 NOTE — PROGRESS NOTE ADULT - PROBLEM SELECTOR PLAN 1
Pt with ESRD on HD x 6 days a week. seen on maintenance HD today. AVF working well. Plan for next maintenance HD tomorrow. Pt with ESRD on HD x 6 days a week. seen on maintenance HD today. Tolerating HD well. BP maintained and AVF working well during HD. Plan for next maintenance HD tomorrow.

## 2018-01-29 NOTE — PROGRESS NOTE ADULT - SUBJECTIVE AND OBJECTIVE BOX
[facility] DIVISION OF KIDNEY DISEASES AND HYPERTENSION -- FOLLOW UP NOTE  --------------------------------------------------------------------------------  Chief Complaint:  ESRD on HD    24 hour events/subjective:  Patient reports feeling well.  Denies any acute events overnight at home.  Denies headaches.  Patient seen on dialysis.        PAST HISTORY  --------------------------------------------------------------------------------  No significant changes to PMH, PSH, FHx, SHx, unless otherwise noted    ALLERGIES & MEDICATIONS  --------------------------------------------------------------------------------  Allergies    No Known Allergies    Intolerances      Standing Inpatient Medications    PRN Inpatient Medications      REVIEW OF SYSTEMS  --------------------------------------------------------------------------------  Gen: No fevers/chills  Skin: No rashes  Head/Eyes/Ears/Mouth: No headache  Respiratory: No dyspnea  CV: No chest pain  GI: No abdominal pain  : No dysuria  MSK: No edema  Neuro: No dizziness/lightheadedness    VITALS/PHYSICAL EXAM  --------------------------------------------------------------------------------  T(C): 36.4 (01-29-18 @ 09:15), Max: 36.4 (01-29-18 @ 09:15)  HR: 89 (01-29-18 @ 09:15) (89 - 89)  BP: 130/87 (01-29-18 @ 09:15) (130/87 - 130/87)  RR: 18 (01-29-18 @ 09:15) (18 - 18)  SpO2: 100% (01-29-18 @ 09:15) (100% - 100%)  Wt(kg): --        Physical Exam:  	Gen: NAD, well-appearing  	HEENT: MMM  	Pulm: CTA B/L  	CV: RRR, S1S2; no rub  	Abd: +BS, soft, nontender/nondistended  	: No suprapubic tenderness  	UE:  no edema  	LE:  no edema  	Neuro: No focal deficits  	Psych: Normal affect and mood  	Skin: Warm  	Vascular access: GALO KANG    LABS/STUDIES  --------------------------------------------------------------------------------              8.7    10.3  >-----------<  170      [01-29-18 @ 09:51]              24.0     138  |  102  |  13  ----------------------------<  77      [01-27-18 @ 11:03]  3.6   |  27  |  2.84        Ca     8.9     [01-27-18 @ 11:03]    TPro  5.9  /  Alb  2.9  /  TBili  0.1  /  DBili  x   /  AST  14  /  ALT  9   /  AlkPhos  107  [01-27-18 @ 11:03]    PT/INR: PT 9.0  , INR 0.84       [01-29-18 @ 09:52]  PTT: 24.7       [01-29-18 @ 09:52]    Uric acid 3.3      [01-27-18 @ 11:03]        [01-27-18 @ 11:03]    Creatinine Trend:  SCr 2.84 [01-27 @ 11:03]  SCr 2.36 [01-26 @ 09:57]  SCr 3.04 [01-25 @ 10:59]  SCr 4.20 [01-22 @ 12:42]  SCr 3.35 [01-17 @ 09:32] [facility] DIVISION OF KIDNEY DISEASES AND HYPERTENSION -- FOLLOW UP NOTE  --------------------------------------------------------------------------------  Chief Complaint:  ESRD on HD    24 hour events/subjective:  Patient reports feeling well.  Denies any acute events overnight at home.  Denies headaches.  Patient seen on dialysis.        PAST HISTORY  --------------------------------------------------------------------------------  No significant changes to PMH, PSH, FHx, SHx, unless otherwise noted    ALLERGIES & MEDICATIONS  --------------------------------------------------------------------------------  Allergies    No Known Allergies    Intolerances      Standing Inpatient Medications    PRN Inpatient Medications      REVIEW OF SYSTEMS  --------------------------------------------------------------------------------  Gen: No fevers/chills  Skin: No rashes  Head/Eyes/Ears/Mouth: No headache  Respiratory: No dyspnea  CV: No chest pain  GI: No abdominal pain  : No dysuria  MSK: No edema  Neuro: No dizziness/lightheadedness    VITALS/PHYSICAL EXAM  --------------------------------------------------------------------------------  T(C): 36.4 (01-29-18 @ 09:15), Max: 36.4 (01-29-18 @ 09:15)  HR: 89 (01-29-18 @ 09:15) (89 - 89)  BP: 130/87 (01-29-18 @ 09:15) (130/87 - 130/87)  RR: 18 (01-29-18 @ 09:15) (18 - 18)  SpO2: 100% (01-29-18 @ 09:15) (100% - 100%)  Wt(kg): --        Physical Exam:  	Gen: NAD, well-appearing  	HEENT: MMM  	Pulm: CTA B/L  	CV: RRR, S1S2; no rub  	Abd: +BS, soft, nontender/nondistended  	: No suprapubic tenderness  	UE:  no edema  	LE:  no edema  	Neuro: No focal deficits  	Psych: Normal affect and mood  	Skin: Warm  	Vascular access: LUE AVF, with some aneurysmal dilatation    LABS/STUDIES  --------------------------------------------------------------------------------              8.7    10.3  >-----------<  170      [01-29-18 @ 09:51]              24.0     138  |  102  |  13  ----------------------------<  77      [01-27-18 @ 11:03]  3.6   |  27  |  2.84        Ca     8.9     [01-27-18 @ 11:03]    TPro  5.9  /  Alb  2.9  /  TBili  0.1  /  DBili  x   /  AST  14  /  ALT  9   /  AlkPhos  107  [01-27-18 @ 11:03]    PT/INR: PT 9.0  , INR 0.84       [01-29-18 @ 09:52]  PTT: 24.7       [01-29-18 @ 09:52]    Uric acid 3.3      [01-27-18 @ 11:03]        [01-27-18 @ 11:03]    Creatinine Trend:  SCr 2.84 [01-27 @ 11:03]  SCr 2.36 [01-26 @ 09:57]  SCr 3.04 [01-25 @ 10:59]  SCr 4.20 [01-22 @ 12:42]  SCr 3.35 [01-17 @ 09:32]

## 2018-01-29 NOTE — PROGRESS NOTE ADULT - PROBLEM SELECTOR PLAN 3
in setting of renal failure.   -Continue Hectorol 2mcg three times a week.    -will check PTH, calcium, phos today

## 2018-01-29 NOTE — DISCHARGE NOTE ANTEPARTUM - HOSPITAL COURSE
26 yo P2 @29w0d w/  hx of stage 5 CKD who presents for daily dialysis. At the time of admission pt had elevated BPs in the 150s. Pt underwent went routine dialysis. Blood pressures mostly 130-140/80-90's.  Patient asymptomatic. Patient instructed on use of home BP machine. Prior to discharge was NST reactive with spontaneous decel x1 and BPP 8/8. Patient given betamethasone x1 for fetal lung maturity given elevated BP and decels on tracing.  Pt will return to L&D on Tuesday for dialysis and monitoring.

## 2018-01-29 NOTE — DISCHARGE NOTE ANTEPARTUM - PROVIDER TOKENS
FREE:[LAST:[[facility]],PHONE:[phone]],FAX:[(   )    -],ADDRESS:[[address] [place] [address]]]

## 2018-01-29 NOTE — PROGRESS NOTE ADULT - PROBLEM SELECTOR PLAN 4
Will continue to follow daily weights  BP better improved on labetalol  No indication for ultrafiltration today, will continue to monitor needs for fluid removal Will continue to follow daily weights  BP better improved on labetalol  No indication for ultrafiltration today, will continue to monitor needs for fluid removal  monitor BP

## 2018-01-29 NOTE — DISCHARGE NOTE ANTEPARTUM - MEDICATION SUMMARY - MEDICATIONS TO TAKE
I will START or STAY ON the medications listed below when I get home from the hospital:    labetalol 100 mg oral tablet  -- 1 tab(s) by mouth 2 times a day MDD:2  -- It is very important that you take or use this exactly as directed.  Do not skip doses or discontinue unless directed by your doctor.  May cause drowsiness.  Alcohol may intensify this effect.  Use care when operating dangerous machinery.  Some non-prescription drugs may aggravate your condition.  Read all labels carefully.  If a warning appears, check with your doctor before taking.    -- Indication: For hypertension

## 2018-01-30 ENCOUNTER — INPATIENT (INPATIENT)
Facility: HOSPITAL | Age: 28
LOS: 13 days | Discharge: HOME | End: 2018-02-13
Attending: OBSTETRICS & GYNECOLOGY | Admitting: OBSTETRICS & GYNECOLOGY
Payer: MEDICAID

## 2018-01-30 VITALS
TEMPERATURE: 98 F | SYSTOLIC BLOOD PRESSURE: 140 MMHG | HEART RATE: 92 BPM | WEIGHT: 144.4 LBS | RESPIRATION RATE: 18 BRPM | OXYGEN SATURATION: 98 % | DIASTOLIC BLOOD PRESSURE: 83 MMHG

## 2018-01-30 DIAGNOSIS — I77.0 ARTERIOVENOUS FISTULA, ACQUIRED: Chronic | ICD-10-CM

## 2018-01-30 DIAGNOSIS — Z98.89 OTHER SPECIFIED POSTPROCEDURAL STATES: Chronic | ICD-10-CM

## 2018-01-30 DIAGNOSIS — N18.5 CHRONIC KIDNEY DISEASE, STAGE 5: ICD-10-CM

## 2018-01-30 LAB
ALBUMIN SERPL ELPH-MCNC: 3.2 G/DL — LOW (ref 3.3–5)
ALBUMIN SERPL ELPH-MCNC: 3.2 G/DL — LOW (ref 3.3–5)
ALP SERPL-CCNC: 106 U/L — SIGNIFICANT CHANGE UP (ref 40–120)
ALP SERPL-CCNC: 106 U/L — SIGNIFICANT CHANGE UP (ref 40–120)
ALT FLD-CCNC: 11 U/L RC — SIGNIFICANT CHANGE UP (ref 10–45)
ALT FLD-CCNC: 9 U/L RC — LOW (ref 10–45)
ANION GAP SERPL CALC-SCNC: 10 MMOL/L — SIGNIFICANT CHANGE UP (ref 5–17)
APPEARANCE UR: ABNORMAL
APTT BLD: 23.8 SEC — LOW (ref 27.5–37.4)
APTT BLD: 30.3 SEC — SIGNIFICANT CHANGE UP (ref 27.5–37.4)
AST SERPL-CCNC: 13 U/L — SIGNIFICANT CHANGE UP (ref 10–40)
AST SERPL-CCNC: 14 U/L — SIGNIFICANT CHANGE UP (ref 10–40)
BASOPHILS # BLD AUTO: 0 K/UL — SIGNIFICANT CHANGE UP (ref 0–0.2)
BASOPHILS # BLD AUTO: 0.1 K/UL — SIGNIFICANT CHANGE UP (ref 0–0.2)
BASOPHILS NFR BLD AUTO: 0 % — SIGNIFICANT CHANGE UP (ref 0–2)
BASOPHILS NFR BLD AUTO: 0.6 % — SIGNIFICANT CHANGE UP (ref 0–2)
BILIRUB SERPL-MCNC: 0.1 MG/DL — LOW (ref 0.2–1.2)
BILIRUB SERPL-MCNC: 0.2 MG/DL — SIGNIFICANT CHANGE UP (ref 0.2–1.2)
BILIRUB UR-MCNC: NEGATIVE — SIGNIFICANT CHANGE UP
BLD GP AB SCN SERPL QL: NEGATIVE — SIGNIFICANT CHANGE UP
BUN SERPL-MCNC: 10 MG/DL — SIGNIFICANT CHANGE UP (ref 7–23)
BUN SERPL-MCNC: <4 MG/DL — LOW (ref 7–23)
CALCIUM SERPL-MCNC: 8.6 MG/DL — SIGNIFICANT CHANGE UP (ref 8.4–10.5)
CALCIUM SERPL-MCNC: 8.7 MG/DL — SIGNIFICANT CHANGE UP (ref 8.4–10.5)
CHLORIDE SERPL-SCNC: 96 MMOL/L — SIGNIFICANT CHANGE UP (ref 96–108)
CHLORIDE SERPL-SCNC: 97 MMOL/L — SIGNIFICANT CHANGE UP (ref 96–108)
CO2 SERPL-SCNC: 30 MMOL/L — SIGNIFICANT CHANGE UP (ref 22–31)
CO2 SERPL-SCNC: 33 MMOL/L — HIGH (ref 22–31)
COLOR SPEC: SIGNIFICANT CHANGE UP
CREAT SERPL-MCNC: 1.37 MG/DL — HIGH (ref 0.5–1.3)
CREAT SERPL-MCNC: 1.84 MG/DL — HIGH (ref 0.5–1.3)
DIFF PNL FLD: NEGATIVE — SIGNIFICANT CHANGE UP
EOSINOPHIL # BLD AUTO: 0 K/UL — SIGNIFICANT CHANGE UP (ref 0–0.5)
EOSINOPHIL # BLD AUTO: 0.1 K/UL — SIGNIFICANT CHANGE UP (ref 0–0.5)
EOSINOPHIL NFR BLD AUTO: 0.1 % — SIGNIFICANT CHANGE UP (ref 0–6)
EOSINOPHIL NFR BLD AUTO: 0.4 % — SIGNIFICANT CHANGE UP (ref 0–6)
FIBRINOGEN PPP-MCNC: 818 MG/DL — SIGNIFICANT CHANGE UP (ref 310–510)
FIBRINOGEN PPP-MCNC: 910 MG/DL — SIGNIFICANT CHANGE UP (ref 310–510)
GLUCOSE SERPL-MCNC: 100 MG/DL — HIGH (ref 70–99)
GLUCOSE SERPL-MCNC: 92 MG/DL — SIGNIFICANT CHANGE UP (ref 70–99)
GLUCOSE UR QL: NEGATIVE — SIGNIFICANT CHANGE UP
HCT VFR BLD CALC: 24.6 % — LOW (ref 34.5–45)
HCT VFR BLD CALC: 25.6 % — LOW (ref 34.5–45)
HGB BLD-MCNC: 8.9 G/DL — LOW (ref 11.5–15.5)
HGB BLD-MCNC: 9.2 G/DL — LOW (ref 11.5–15.5)
INR BLD: 0.95 RATIO — SIGNIFICANT CHANGE UP (ref 0.88–1.16)
INR BLD: 0.99 RATIO — SIGNIFICANT CHANGE UP (ref 0.88–1.16)
KETONES UR-MCNC: NEGATIVE — SIGNIFICANT CHANGE UP
LDH SERPL L TO P-CCNC: 166 U/L — SIGNIFICANT CHANGE UP (ref 50–242)
LDH SERPL L TO P-CCNC: 176 U/L — SIGNIFICANT CHANGE UP (ref 50–242)
LEUKOCYTE ESTERASE UR-ACNC: NEGATIVE — SIGNIFICANT CHANGE UP
LYMPHOCYTES # BLD AUTO: 0.7 K/UL — LOW (ref 1–3.3)
LYMPHOCYTES # BLD AUTO: 1 K/UL — SIGNIFICANT CHANGE UP (ref 1–3.3)
LYMPHOCYTES # BLD AUTO: 5 % — LOW (ref 13–44)
LYMPHOCYTES # BLD AUTO: 6.8 % — LOW (ref 13–44)
MCHC RBC-ENTMCNC: 33 PG — SIGNIFICANT CHANGE UP (ref 27–34)
MCHC RBC-ENTMCNC: 33.6 PG — SIGNIFICANT CHANGE UP (ref 27–34)
MCHC RBC-ENTMCNC: 36 GM/DL — SIGNIFICANT CHANGE UP (ref 32–36)
MCHC RBC-ENTMCNC: 36.4 GM/DL — HIGH (ref 32–36)
MCV RBC AUTO: 91.7 FL — SIGNIFICANT CHANGE UP (ref 80–100)
MCV RBC AUTO: 92.2 FL — SIGNIFICANT CHANGE UP (ref 80–100)
MONOCYTES # BLD AUTO: 0.3 K/UL — SIGNIFICANT CHANGE UP (ref 0–0.9)
MONOCYTES # BLD AUTO: 0.8 K/UL — SIGNIFICANT CHANGE UP (ref 0–0.9)
MONOCYTES NFR BLD AUTO: 2 % — SIGNIFICANT CHANGE UP (ref 2–14)
MONOCYTES NFR BLD AUTO: 5.4 % — SIGNIFICANT CHANGE UP (ref 2–14)
NEUTROPHILS # BLD AUTO: 12.3 K/UL — HIGH (ref 1.8–7.4)
NEUTROPHILS # BLD AUTO: 13.1 K/UL — HIGH (ref 1.8–7.4)
NEUTROPHILS NFR BLD AUTO: 87.4 % — HIGH (ref 43–77)
NEUTROPHILS NFR BLD AUTO: 92.4 % — HIGH (ref 43–77)
NITRITE UR-MCNC: NEGATIVE — SIGNIFICANT CHANGE UP
PH UR: 8.5 — HIGH (ref 5–8)
PLATELET # BLD AUTO: 165 K/UL — SIGNIFICANT CHANGE UP (ref 150–400)
PLATELET # BLD AUTO: 191 K/UL — SIGNIFICANT CHANGE UP (ref 150–400)
POTASSIUM SERPL-MCNC: 3.3 MMOL/L — LOW (ref 3.5–5.3)
POTASSIUM SERPL-MCNC: 3.5 MMOL/L — SIGNIFICANT CHANGE UP (ref 3.5–5.3)
POTASSIUM SERPL-SCNC: 3.3 MMOL/L — LOW (ref 3.5–5.3)
POTASSIUM SERPL-SCNC: 3.5 MMOL/L — SIGNIFICANT CHANGE UP (ref 3.5–5.3)
PROT SERPL-MCNC: 6.3 G/DL — SIGNIFICANT CHANGE UP (ref 6–8.3)
PROT SERPL-MCNC: 6.4 G/DL — SIGNIFICANT CHANGE UP (ref 6–8.3)
PROT UR-MCNC: 500 MG/DL
PROTHROM AB SERPL-ACNC: 10.3 SEC — SIGNIFICANT CHANGE UP (ref 9.8–12.7)
PROTHROM AB SERPL-ACNC: 10.8 SEC — SIGNIFICANT CHANGE UP (ref 9.8–12.7)
RBC # BLD: 2.66 M/UL — LOW (ref 3.8–5.2)
RBC # BLD: 2.8 M/UL — LOW (ref 3.8–5.2)
RBC # FLD: 15.1 % — HIGH (ref 10.3–14.5)
RBC # FLD: 15.3 % — HIGH (ref 10.3–14.5)
RH IG SCN BLD-IMP: POSITIVE — SIGNIFICANT CHANGE UP
SODIUM SERPL-SCNC: 138 MMOL/L — SIGNIFICANT CHANGE UP (ref 135–145)
SODIUM SERPL-SCNC: 139 MMOL/L — SIGNIFICANT CHANGE UP (ref 135–145)
SP GR SPEC: 1.01 — LOW (ref 1.01–1.02)
URATE SERPL-MCNC: 1.3 MG/DL — LOW (ref 2.5–7)
URATE SERPL-MCNC: 1.7 MG/DL — LOW (ref 2.5–7)
UROBILINOGEN FLD QL: NEGATIVE — SIGNIFICANT CHANGE UP
WBC # BLD: 13.3 K/UL — HIGH (ref 3.8–10.5)
WBC # BLD: 15 K/UL — HIGH (ref 3.8–10.5)
WBC # FLD AUTO: 13.3 K/UL — HIGH (ref 3.8–10.5)
WBC # FLD AUTO: 15 K/UL — HIGH (ref 3.8–10.5)

## 2018-01-30 PROCEDURE — 76819 FETAL BIOPHYS PROFIL W/O NST: CPT | Mod: 26

## 2018-01-30 PROCEDURE — 99232 SBSQ HOSP IP/OBS MODERATE 35: CPT

## 2018-01-30 PROCEDURE — 90935 HEMODIALYSIS ONE EVALUATION: CPT | Mod: GC

## 2018-01-30 RX ORDER — ERYTHROPOIETIN 10000 [IU]/ML
2000 INJECTION, SOLUTION INTRAVENOUS; SUBCUTANEOUS ONCE
Qty: 0 | Refills: 0 | Status: DISCONTINUED | OUTPATIENT
Start: 2018-01-30 | End: 2018-01-30

## 2018-01-30 RX ADMIN — Medication 12 MILLIGRAM(S): at 17:25

## 2018-01-30 NOTE — PROGRESS NOTE ADULT - PROBLEM SELECTOR PLAN 3
in setting of renal failure.   -Continue Hectorol 2mcg three times a week.    -calcium, phos and PTH within normal limits.  Will trend.

## 2018-01-30 NOTE — PROGRESS NOTE ADULT - PROBLEM SELECTOR PLAN 1
Pt with ESRD on HD x 6 days a week. seen prior to maintenance HD today.   Plan for next maintenance HD tomorrow.

## 2018-01-30 NOTE — PROGRESS NOTE ADULT - SUBJECTIVE AND OBJECTIVE BOX
[facility] DIVISION OF KIDNEY DISEASES AND HYPERTENSION -- FOLLOW UP NOTE  --------------------------------------------------------------------------------  Chief Complaint:  ESRD on HD    24 hour events/subjective:  Patient with over 3kg weight since discharge yesterday.        PAST HISTORY  --------------------------------------------------------------------------------  No significant changes to PMH, PSH, FHx, SHx, unless otherwise noted    ALLERGIES & MEDICATIONS  --------------------------------------------------------------------------------  Allergies    No Known Allergies    Intolerances      Standing Inpatient Medications    PRN Inpatient Medications      REVIEW OF SYSTEMS  --------------------------------------------------------------------------------  Gen: No fevers/chills  Skin: No rashes  Head/Eyes/Ears/Mouth: No headache  Respiratory: No dyspnea  CV: No chest pain  GI: No abdominal pain  : No dysuria  MSK: No edema  Neuro: No dizziness/lightheadedness    VITALS/PHYSICAL EXAM  --------------------------------------------------------------------------------          Physical Exam:  	Gen: NAD, well-appearing  	HEENT: MMM  	Pulm: CTA B/L  	CV: RRR, S1S2; no rub  	Abd: +BS, soft, nontender/nondistended  	: No suprapubic tenderness  	UE:  no pitting edema  	LE:  no pitting edema  	Neuro: No focal deficits  	Psych: Normal affect and mood  	Skin: Warm  	Vascular access:  LUE AVF +thrill and bruit    LABS/STUDIES  --------------------------------------------------------------------------------              8.7    10.3  >-----------<  170      [01-29-18 @ 09:51]              24.0     138  |  102  |  26  ----------------------------<  75      [01-29-18 @ 09:51]  3.6   |  23  |  4.19        Ca     8.6     [01-29-18 @ 09:51]      Phos  3.3     [01-29-18 @ 09:51]    TPro  6.0  /  Alb  3.0  /  TBili  0.2  /  DBili  <0.1  /  AST  16  /  ALT  11  /  AlkPhos  105  [01-29-18 @ 09:51]    PT/INR: PT 9.0  , INR 0.84       [01-29-18 @ 09:52]  PTT: 24.7       [01-29-18 @ 09:52]    Uric acid 4.6      [01-29-18 @ 09:51]        [01-29-18 @ 09:51]    Creatinine Trend:  SCr 4.19 [01-29 @ 09:51]  SCr 2.84 [01-27 @ 11:03]  SCr 2.36 [01-26 @ 09:57]  SCr 3.04 [01-25 @ 10:59]  SCr 4.20 [01-22 @ 12:42]

## 2018-01-30 NOTE — PROGRESS NOTE ADULT - PROBLEM SELECTOR PLAN 4
Will continue to follow daily weights  Significant weight gained from yesterday.  OB resident at bedside providing counseling on fluid and salt restriction.  Plan for 1kg fluid removal today.    Daily Monitoring need for ultrafiltration  monitor BP

## 2018-01-31 ENCOUNTER — APPOINTMENT (OUTPATIENT)
Dept: ANTEPARTUM | Facility: CLINIC | Age: 28
End: 2018-01-31

## 2018-01-31 ENCOUNTER — ASOB RESULT (OUTPATIENT)
Age: 28
End: 2018-01-31

## 2018-01-31 LAB
ALBUMIN SERPL ELPH-MCNC: 3.1 G/DL — LOW (ref 3.3–5)
ALP SERPL-CCNC: 103 U/L — SIGNIFICANT CHANGE UP (ref 40–120)
ALT FLD-CCNC: 8 U/L RC — LOW (ref 10–45)
APPEARANCE UR: CLEAR — SIGNIFICANT CHANGE UP
APTT BLD: 22.1 SEC — LOW (ref 27.5–37.4)
AST SERPL-CCNC: 15 U/L — SIGNIFICANT CHANGE UP (ref 10–40)
BASOPHILS # BLD AUTO: 0 K/UL — SIGNIFICANT CHANGE UP (ref 0–0.2)
BASOPHILS NFR BLD AUTO: 0 % — SIGNIFICANT CHANGE UP (ref 0–2)
BILIRUB SERPL-MCNC: 0.2 MG/DL — SIGNIFICANT CHANGE UP (ref 0.2–1.2)
BILIRUB UR-MCNC: NEGATIVE — SIGNIFICANT CHANGE UP
BUN SERPL-MCNC: 10 MG/DL — SIGNIFICANT CHANGE UP (ref 7–23)
CALCIUM SERPL-MCNC: 8.9 MG/DL — SIGNIFICANT CHANGE UP (ref 8.4–10.5)
CHLORIDE SERPL-SCNC: 99 MMOL/L — SIGNIFICANT CHANGE UP (ref 96–108)
CO2 SERPL-SCNC: 29 MMOL/L — SIGNIFICANT CHANGE UP (ref 22–31)
COLOR SPEC: SIGNIFICANT CHANGE UP
CREAT SERPL-MCNC: 2.28 MG/DL — HIGH (ref 0.5–1.3)
DIFF PNL FLD: NEGATIVE — SIGNIFICANT CHANGE UP
EOSINOPHIL # BLD AUTO: 0 K/UL — SIGNIFICANT CHANGE UP (ref 0–0.5)
EOSINOPHIL NFR BLD AUTO: 0.1 % — SIGNIFICANT CHANGE UP (ref 0–6)
FIBRINOGEN PPP-MCNC: 531 MG/DL — HIGH (ref 310–510)
GLUCOSE SERPL-MCNC: 109 MG/DL — HIGH (ref 70–99)
GLUCOSE UR QL: NEGATIVE — SIGNIFICANT CHANGE UP
HCT VFR BLD CALC: 24.3 % — LOW (ref 34.5–45)
HGB BLD-MCNC: 8.7 G/DL — LOW (ref 11.5–15.5)
INR BLD: 0.86 RATIO — LOW (ref 0.88–1.16)
KETONES UR-MCNC: NEGATIVE — SIGNIFICANT CHANGE UP
LDH SERPL L TO P-CCNC: 162 U/L — SIGNIFICANT CHANGE UP (ref 50–242)
LEUKOCYTE ESTERASE UR-ACNC: NEGATIVE — SIGNIFICANT CHANGE UP
LYMPHOCYTES # BLD AUTO: 0.9 K/UL — LOW (ref 1–3.3)
LYMPHOCYTES # BLD AUTO: 6.7 % — LOW (ref 13–44)
MCHC RBC-ENTMCNC: 33.1 PG — SIGNIFICANT CHANGE UP (ref 27–34)
MCHC RBC-ENTMCNC: 35.8 GM/DL — SIGNIFICANT CHANGE UP (ref 32–36)
MCV RBC AUTO: 92.3 FL — SIGNIFICANT CHANGE UP (ref 80–100)
MONOCYTES # BLD AUTO: 0.4 K/UL — SIGNIFICANT CHANGE UP (ref 0–0.9)
MONOCYTES NFR BLD AUTO: 3.1 % — SIGNIFICANT CHANGE UP (ref 2–14)
NEUTROPHILS # BLD AUTO: 11.6 K/UL — HIGH (ref 1.8–7.4)
NEUTROPHILS NFR BLD AUTO: 90.2 % — HIGH (ref 43–77)
NITRITE UR-MCNC: NEGATIVE — SIGNIFICANT CHANGE UP
PH UR: 8.5 — HIGH (ref 5–8)
PLATELET # BLD AUTO: 175 K/UL — SIGNIFICANT CHANGE UP (ref 150–400)
POTASSIUM SERPL-MCNC: 4.2 MMOL/L — SIGNIFICANT CHANGE UP (ref 3.5–5.3)
POTASSIUM SERPL-SCNC: 4.2 MMOL/L — SIGNIFICANT CHANGE UP (ref 3.5–5.3)
PROT SERPL-MCNC: 6.2 G/DL — SIGNIFICANT CHANGE UP (ref 6–8.3)
PROT UR-MCNC: 500 MG/DL
PROTHROM AB SERPL-ACNC: 9.4 SEC — LOW (ref 9.8–12.7)
RBC # BLD: 2.63 M/UL — LOW (ref 3.8–5.2)
RBC # FLD: 15.2 % — HIGH (ref 10.3–14.5)
SODIUM SERPL-SCNC: 139 MMOL/L — SIGNIFICANT CHANGE UP (ref 135–145)
SP GR SPEC: 1.01 — LOW (ref 1.01–1.02)
T PALLIDUM AB TITR SER: NEGATIVE — SIGNIFICANT CHANGE UP
URATE SERPL-MCNC: 2.7 MG/DL — SIGNIFICANT CHANGE UP (ref 2.5–7)
UROBILINOGEN FLD QL: NEGATIVE — SIGNIFICANT CHANGE UP
WBC # BLD: 12.8 K/UL — HIGH (ref 3.8–10.5)
WBC # FLD AUTO: 12.8 K/UL — HIGH (ref 3.8–10.5)

## 2018-01-31 PROCEDURE — 99233 SBSQ HOSP IP/OBS HIGH 50: CPT

## 2018-01-31 PROCEDURE — 99232 SBSQ HOSP IP/OBS MODERATE 35: CPT

## 2018-01-31 RX ORDER — LABETALOL HCL 100 MG
200 TABLET ORAL
Qty: 0 | Refills: 0 | Status: DISCONTINUED | OUTPATIENT
Start: 2018-01-31 | End: 2018-02-01

## 2018-01-31 RX ORDER — ERYTHROPOIETIN 10000 [IU]/ML
8000 INJECTION, SOLUTION INTRAVENOUS; SUBCUTANEOUS
Qty: 0 | Refills: 0 | Status: DISCONTINUED | OUTPATIENT
Start: 2018-01-31 | End: 2018-02-13

## 2018-01-31 RX ORDER — ASPIRIN/CALCIUM CARB/MAGNESIUM 324 MG
81 TABLET ORAL DAILY
Qty: 0 | Refills: 0 | Status: DISCONTINUED | OUTPATIENT
Start: 2018-01-31 | End: 2018-02-10

## 2018-01-31 RX ORDER — LABETALOL HCL 100 MG
100 TABLET ORAL
Qty: 0 | Refills: 0 | Status: DISCONTINUED | OUTPATIENT
Start: 2018-01-31 | End: 2018-01-31

## 2018-01-31 RX ORDER — ACETAMINOPHEN 500 MG
975 TABLET ORAL ONCE
Qty: 0 | Refills: 0 | Status: COMPLETED | OUTPATIENT
Start: 2018-01-31 | End: 2018-01-31

## 2018-01-31 RX ORDER — LABETALOL HCL 100 MG
200 TABLET ORAL ONCE
Qty: 0 | Refills: 0 | Status: COMPLETED | OUTPATIENT
Start: 2018-01-31 | End: 2018-01-31

## 2018-01-31 RX ORDER — FERROUS SULFATE 325(65) MG
325 TABLET ORAL DAILY
Qty: 0 | Refills: 0 | Status: DISCONTINUED | OUTPATIENT
Start: 2018-01-31 | End: 2018-02-11

## 2018-01-31 RX ADMIN — Medication 100 MILLIGRAM(S): at 08:46

## 2018-01-31 RX ADMIN — Medication 975 MILLIGRAM(S): at 04:05

## 2018-01-31 RX ADMIN — Medication 200 MILLIGRAM(S): at 20:30

## 2018-01-31 RX ADMIN — Medication 200 MILLIGRAM(S): at 02:57

## 2018-01-31 NOTE — PROGRESS NOTE ADULT - SUBJECTIVE AND OBJECTIVE BOX
[facility] DIVISION OF KIDNEY DISEASES AND HYPERTENSION -- HEMODIALYSIS NOTE  --------------------------------------------------------------------------------  Chief Complaint: ESRD/Ongoing hemodialysis requirement    24 hour events/subjective: was admitted inpatient due to some fetal distress noted during HD.        PAST HISTORY  --------------------------------------------------------------------------------  No significant changes to PMH, PSH, FHx, SHx, unless otherwise noted    ALLERGIES & MEDICATIONS  --------------------------------------------------------------------------------  Allergies    No Known Allergies    Intolerances      Standing Inpatient Medications  aspirin  chewable 81 milliGRAM(s) Oral daily  ferrous    sulfate 325 milliGRAM(s) Oral daily  labetalol 100 milliGRAM(s) Oral two times a day    PRN Inpatient Medications      REVIEW OF SYSTEMS  --------------------------------------------------------------------------------  Constitutional: [x ] no Fever [ ] Chills [ ] Fatigue [ ] Weight change   HEENT: [ ] Blurred vision [ ] Eye Pain [ ] Headache [ ] Runny nose [ ] Sore Throat   Respiratory: [ ] Cough [ ] Wheezing [ ] Shortness of breath  Cardiovascular: [ ] Chest Pain [ ] Palpitations [ ] MARR [ ] PND [ ] Orthopnea  Gastrointestinal: [ ] Abdominal Pain [ ] Diarrhea [ ] Constipation [ ] Hemorrhoids [ ] Nausea [ ] Vomiting  Genitourinary: [ ] Nocturia [ ] Dysuria [ ] Incontinence  Extremities: [ ] Swelling [ ] Joint Pain  Neurologic: [ ] Focal deficit [ ] Paresthesias [ ] Syncope  Lymphatic: [ ] Swelling [ ] Lymphadenopathy   Skin: [ ] Rash [ ] Ecchymoses [ ] Wounds [ ] Lesions  Psychiatry: [ ] Depression [ ] Suicidal/Homicidal Ideation [ ] Anxiety [ ] Sleep Disturbances  [x ] 10 point review of systems is otherwise negative except as mentioned above              [ ]Unable to obtain due to   All other systems were reviewed and are negative, except as noted.      VITALS/PHYSICAL EXAM  --------------------------------------------------------------------------------  T(C): 36.7 (01-30-18 @ 18:15), Max: 36.8 (01-30-18 @ 14:10)  HR: 90 (01-30-18 @ 18:15) (90 - 92)  BP: 135/73 (01-30-18 @ 18:15) (135/73 - 140/83)  RR: 18 (01-30-18 @ 18:15) (18 - 18)  SpO2: 100% (01-30-18 @ 18:15) (98% - 100%)  Wt(kg): --        01-30-18 @ 07:01  -  01-31-18 @ 07:00  --------------------------------------------------------  IN: 0 mL / OUT: 325 mL / NET: -325 mL      Physical Exam:  	Gen: NAD, well-appearing  	HEENT: on room air  	Pulm: CTA B/L  	CV: normal S1S2; no rub  	Abd: soft                      Back : unable to examine  	: No an  	LE: no edema  	Skin: Warm, without rashes  	Vascular access: LUE AVF with aneurysmal dilatation, skin intact, no bleed/ swelling/ discharge around the AVF site    LABS/STUDIES  --------------------------------------------------------------------------------              8.7    12.8  >-----------<  175      [01-31-18 @ 05:30]              24.3     139  |  99  |  10  ----------------------------<  109      [01-31-18 @ 05:30]  4.2   |  29  |  2.28        Ca     8.9     [01-31-18 @ 05:30]    TPro  6.2  /  Alb  3.1  /  TBili  0.2  /  DBili  x   /  AST  15  /  ALT  8   /  AlkPhos  103  [01-31-18 @ 05:30]    PT/INR: PT 9.4  , INR 0.86       [01-31-18 @ 05:30]  PTT: 22.1       [01-31-18 @ 05:30]    Uric acid 2.7      [01-31-18 @ 05:30]        [01-31-18 @ 05:30]    PTH -- (Ca 8.4)      [01-29-18 @ 13:57]   225  HbA1c 5.7      [07-15-16 @ 08:42]  TSH 0.01      [10-02-17 @ 23:50]    HBsAb <3.0      [01-05-18 @ 13:45]  HBsAg Nonreact      [01-05-18 @ 13:45]  HBcAb Nonreact      [01-05-18 @ 13:45]  HCV 0.07, Nonreact      [01-05-18 @ 13:45]

## 2018-01-31 NOTE — DISCHARGE NOTE ANTEPARTUM - PATIENT PORTAL LINK FT
“You can access the [employer] Patient Portal, offered by [facility], by registering with the following website: [email or web address]”

## 2018-01-31 NOTE — PROGRESS NOTE ADULT - PROBLEM SELECTOR PLAN 4
BP better with labetalol.   PEC labs negative.  continue with labetalol, can increase dose of labetalol to 200 mg BID as needed to have optimal BP control during pregnancy.  no plan for UF with HD due to fetal distress noted during HD yesterday  monitor BP

## 2018-01-31 NOTE — PROGRESS NOTE ADULT - PROBLEM SELECTOR PLAN 1
Pt with ESRD on HD x 6 days a week. seen prior to maintenance HD today.   would not do any UF with HD today as had some fetal distress noted during HD yesterday.  Plan for next maintenance HD tomorrow.

## 2018-02-01 ENCOUNTER — APPOINTMENT (OUTPATIENT)
Dept: ANTEPARTUM | Facility: CLINIC | Age: 28
End: 2018-02-01

## 2018-02-01 ENCOUNTER — ASOB RESULT (OUTPATIENT)
Age: 28
End: 2018-02-01

## 2018-02-01 PROCEDURE — 99232 SBSQ HOSP IP/OBS MODERATE 35: CPT

## 2018-02-01 PROCEDURE — 90935 HEMODIALYSIS ONE EVALUATION: CPT | Mod: GC

## 2018-02-01 PROCEDURE — 59025 FETAL NON-STRESS TEST: CPT | Mod: 26

## 2018-02-01 RX ORDER — LABETALOL HCL 100 MG
300 TABLET ORAL EVERY 8 HOURS
Qty: 0 | Refills: 0 | Status: DISCONTINUED | OUTPATIENT
Start: 2018-02-01 | End: 2018-02-05

## 2018-02-01 RX ORDER — LABETALOL HCL 100 MG
300 TABLET ORAL THREE TIMES A DAY
Qty: 0 | Refills: 0 | Status: DISCONTINUED | OUTPATIENT
Start: 2018-02-01 | End: 2018-02-01

## 2018-02-01 RX ORDER — HEPARIN SODIUM 5000 [USP'U]/ML
5000 INJECTION INTRAVENOUS; SUBCUTANEOUS EVERY 12 HOURS
Qty: 0 | Refills: 0 | Status: DISCONTINUED | OUTPATIENT
Start: 2018-02-01 | End: 2018-02-08

## 2018-02-01 RX ADMIN — Medication 975 MILLIGRAM(S): at 07:00

## 2018-02-01 RX ADMIN — Medication 1 TABLET(S): at 15:25

## 2018-02-01 RX ADMIN — Medication 81 MILLIGRAM(S): at 15:24

## 2018-02-01 RX ADMIN — Medication 300 MILLIGRAM(S): at 17:29

## 2018-02-01 RX ADMIN — Medication 325 MILLIGRAM(S): at 15:24

## 2018-02-01 RX ADMIN — HEPARIN SODIUM 5000 UNIT(S): 5000 INJECTION INTRAVENOUS; SUBCUTANEOUS at 20:54

## 2018-02-01 RX ADMIN — Medication 200 MILLIGRAM(S): at 08:50

## 2018-02-01 NOTE — PROGRESS NOTE ADULT - PROBLEM SELECTOR PLAN 1
Pt with ESRD on HD x 6 days a week. seen on HD today.    would not do any UF with HD today as had some fetal distress noted during HD previously.  No evidence of volume overload but blood pressure is creeping up.  Spoke with Primary attending will attempt 500 cc UF tomorrow.

## 2018-02-01 NOTE — PROGRESS NOTE ADULT - ASSESSMENT
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

## 2018-02-01 NOTE — PROGRESS NOTE ADULT - PROBLEM SELECTOR PLAN 4
BP better with labetalol.  titrate to BP < 140/80.  Will attempt UF tomorrow.  PEC labs negative.  continue with labetalol, can increase dose of labetalol to 200 mg BID as needed to have optimal BP control during pregnancy.

## 2018-02-01 NOTE — PROGRESS NOTE ADULT - SUBJECTIVE AND OBJECTIVE BOX
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  Recently had fetal distress when UF was tried.  Titrating up labetalol as BP has been rising.  Saw and evaluated the patient on HD today.  Feeling well no CP no SOB no vomiting no diarrhea, no pain.    PAST HISTORY  --------------------------------------------------------------------------------  No significant changes to PMH, PSH, FHx, SHx, unless otherwise noted    ALLERGIES & MEDICATIONS  --------------------------------------------------------------------------------  Allergies    No Known Allergies    Intolerances      Standing Inpatient Medications  aspirin  chewable 81 milliGRAM(s) Oral daily  epoetin leeann Injectable 8000 Unit(s) IV Push <User Schedule>  ferrous    sulfate 325 milliGRAM(s) Oral daily  labetalol 300 milliGRAM(s) Oral every 8 hours  prenatal multivitamin 1 Tablet(s) Oral daily    PRN Inpatient Medications      REVIEW OF SYSTEMS  --------------------------------------------------------------------------------    as noted above    VITALS/PHYSICAL EXAM  --------------------------------------------------------------------------------  T(C): 36.7 (02-01-18 @ 10:50), Max: 36.8 (01-31-18 @ 12:45)  HR: 89 (01-31-18 @ 16:55) (89 - 103)  BP: 152/97 (02-01-18 @ 10:50) (138/84 - 152/97)  RR: 18 (02-01-18 @ 10:50) (17 - 18)  SpO2: 94% (02-01-18 @ 10:50) (94% - 96%)  Wt(kg): --        01-31-18 @ 07:01  -  02-01-18 @ 07:00  --------------------------------------------------------  IN: 0 mL / OUT: 500 mL / NET: -500 mL    02-01-18 @ 07:01  -  02-01-18 @ 12:35  --------------------------------------------------------  IN: 0 mL / OUT: 50 mL / NET: -50 mL      Physical Exam:              Gen: NAD, well-appearing  	HEENT: on room air moist membranes  	Pulm: CTA B/L  	CV: normal S1S2; no rub  	Abd: gravid uterus  	: No an  	LE: no edema  	Skin: Warm  	Vascular access: GALO KANG     LABS/STUDIES  --------------------------------------------------------------------------------              8.7    12.8  >-----------<  175      [01-31-18 @ 05:30]              24.3     139  |  99  |  10  ----------------------------<  109      [01-31-18 @ 05:30]  4.2   |  29  |  2.28        Ca     8.9     [01-31-18 @ 05:30]    TPro  6.2  /  Alb  3.1  /  TBili  0.2  /  DBili  x   /  AST  15  /  ALT  8   /  AlkPhos  103  [01-31-18 @ 05:30]    PT/INR: PT 9.4  , INR 0.86       [01-31-18 @ 05:30]  PTT: 22.1       [01-31-18 @ 05:30]    Uric acid 2.7      [01-31-18 @ 05:30]        [01-31-18 @ 05:30]    Creatinine Trend:  SCr 2.28 [01-31 @ 05:30]  SCr 1.37 [01-30 @ 20:47]  SCr 1.84 [01-30 @ 15:53]  SCr 4.19 [01-29 @ 09:51]  SCr 2.84 [01-27 @ 11:03]    Urinalysis - [01-31-18 @ 07:07]      Color PL Yellow / Appearance Clear / SG 1.008 / pH 8.5      Gluc Negative / Ketone Negative  / Bili Negative / Urobili Negative       Blood Negative / Protein 500 / Leuk Est Negative / Nitrite Negative      RBC 5-10 / WBC 2-5 / Hyaline  / Gran  / Sq Epi  / Non Sq Epi Few / Bacteria     Urine Creatinine 15      [01-26-18 @ 16:43]  Urine Protein 84      [01-26-18 @ 16:43]    PTH -- (Ca 8.4)      [01-29-18 @ 13:57]   225      Syphilis Screen (Treponema Pallidum Ab) Negative      [01-31-18 @ 05:51]

## 2018-02-02 LAB
ALBUMIN SERPL ELPH-MCNC: 2.9 G/DL — LOW (ref 3.3–5)
ALP SERPL-CCNC: 106 U/L — SIGNIFICANT CHANGE UP (ref 40–120)
ALT FLD-CCNC: 15 U/L RC — SIGNIFICANT CHANGE UP (ref 10–45)
ANION GAP SERPL CALC-SCNC: 11 MMOL/L — SIGNIFICANT CHANGE UP (ref 5–17)
APTT BLD: 25.6 SEC — LOW (ref 27.5–37.4)
AST SERPL-CCNC: 17 U/L — SIGNIFICANT CHANGE UP (ref 10–40)
BASOPHILS # BLD AUTO: 0 K/UL — SIGNIFICANT CHANGE UP (ref 0–0.2)
BASOPHILS NFR BLD AUTO: 0.1 % — SIGNIFICANT CHANGE UP (ref 0–2)
BILIRUB SERPL-MCNC: 0.4 MG/DL — SIGNIFICANT CHANGE UP (ref 0.2–1.2)
BUN SERPL-MCNC: 16 MG/DL — SIGNIFICANT CHANGE UP (ref 7–23)
CALCIUM SERPL-MCNC: 8.9 MG/DL — SIGNIFICANT CHANGE UP (ref 8.4–10.5)
CHLORIDE SERPL-SCNC: 100 MMOL/L — SIGNIFICANT CHANGE UP (ref 96–108)
CO2 SERPL-SCNC: 27 MMOL/L — SIGNIFICANT CHANGE UP (ref 22–31)
CREAT SERPL-MCNC: 2.74 MG/DL — HIGH (ref 0.5–1.3)
EOSINOPHIL # BLD AUTO: 0 K/UL — SIGNIFICANT CHANGE UP (ref 0–0.5)
EOSINOPHIL NFR BLD AUTO: 0.3 % — SIGNIFICANT CHANGE UP (ref 0–6)
FIBRINOGEN PPP-MCNC: 576 MG/DL — HIGH (ref 310–510)
GLUCOSE SERPL-MCNC: 70 MG/DL — SIGNIFICANT CHANGE UP (ref 70–99)
HCT VFR BLD CALC: 24.6 % — LOW (ref 34.5–45)
HGB BLD-MCNC: 8.9 G/DL — LOW (ref 11.5–15.5)
INR BLD: 0.87 RATIO — LOW (ref 0.88–1.16)
LDH SERPL L TO P-CCNC: 212 U/L — SIGNIFICANT CHANGE UP (ref 50–242)
LYMPHOCYTES # BLD AUTO: 1 K/UL — SIGNIFICANT CHANGE UP (ref 1–3.3)
LYMPHOCYTES # BLD AUTO: 8.2 % — LOW (ref 13–44)
MCHC RBC-ENTMCNC: 33.8 PG — SIGNIFICANT CHANGE UP (ref 27–34)
MCHC RBC-ENTMCNC: 36.2 GM/DL — HIGH (ref 32–36)
MCV RBC AUTO: 93.3 FL — SIGNIFICANT CHANGE UP (ref 80–100)
MONOCYTES # BLD AUTO: 0.7 K/UL — SIGNIFICANT CHANGE UP (ref 0–0.9)
MONOCYTES NFR BLD AUTO: 5.7 % — SIGNIFICANT CHANGE UP (ref 2–14)
NEUTROPHILS # BLD AUTO: 11 K/UL — HIGH (ref 1.8–7.4)
NEUTROPHILS NFR BLD AUTO: 85.7 % — HIGH (ref 43–77)
PLATELET # BLD AUTO: 163 K/UL — SIGNIFICANT CHANGE UP (ref 150–400)
POTASSIUM SERPL-MCNC: 4.1 MMOL/L — SIGNIFICANT CHANGE UP (ref 3.5–5.3)
POTASSIUM SERPL-SCNC: 4.1 MMOL/L — SIGNIFICANT CHANGE UP (ref 3.5–5.3)
PROT SERPL-MCNC: 5.8 G/DL — LOW (ref 6–8.3)
PROTHROM AB SERPL-ACNC: 9.5 SEC — LOW (ref 9.8–12.7)
RBC # BLD: 2.63 M/UL — LOW (ref 3.8–5.2)
RBC # FLD: 15.2 % — HIGH (ref 10.3–14.5)
SODIUM SERPL-SCNC: 138 MMOL/L — SIGNIFICANT CHANGE UP (ref 135–145)
URATE SERPL-MCNC: 3.4 MG/DL — SIGNIFICANT CHANGE UP (ref 2.5–7)
WBC # BLD: 12.8 K/UL — HIGH (ref 3.8–10.5)
WBC # FLD AUTO: 12.8 K/UL — HIGH (ref 3.8–10.5)

## 2018-02-02 PROCEDURE — 59025 FETAL NON-STRESS TEST: CPT | Mod: 26

## 2018-02-02 PROCEDURE — 99233 SBSQ HOSP IP/OBS HIGH 50: CPT | Mod: GC

## 2018-02-02 PROCEDURE — 99232 SBSQ HOSP IP/OBS MODERATE 35: CPT

## 2018-02-02 RX ORDER — ACETAMINOPHEN 500 MG
975 TABLET ORAL ONCE
Qty: 0 | Refills: 0 | Status: COMPLETED | OUTPATIENT
Start: 2018-02-02 | End: 2018-02-02

## 2018-02-02 RX ORDER — DOXERCALCIFEROL 2.5 UG/1
2 CAPSULE ORAL
Qty: 0 | Refills: 0 | Status: DISCONTINUED | OUTPATIENT
Start: 2018-02-02 | End: 2018-02-13

## 2018-02-02 RX ADMIN — Medication 30 MILLIGRAM(S): at 17:53

## 2018-02-02 RX ADMIN — HEPARIN SODIUM 5000 UNIT(S): 5000 INJECTION INTRAVENOUS; SUBCUTANEOUS at 08:42

## 2018-02-02 RX ADMIN — Medication 300 MILLIGRAM(S): at 15:32

## 2018-02-02 RX ADMIN — HEPARIN SODIUM 5000 UNIT(S): 5000 INJECTION INTRAVENOUS; SUBCUTANEOUS at 20:06

## 2018-02-02 RX ADMIN — Medication 300 MILLIGRAM(S): at 23:30

## 2018-02-02 RX ADMIN — ERYTHROPOIETIN 8000 UNIT(S): 10000 INJECTION, SOLUTION INTRAVENOUS; SUBCUTANEOUS at 12:20

## 2018-02-02 RX ADMIN — Medication 1 TABLET(S): at 17:53

## 2018-02-02 RX ADMIN — DOXERCALCIFEROL 2 MICROGRAM(S): 2.5 CAPSULE ORAL at 12:27

## 2018-02-02 RX ADMIN — Medication 81 MILLIGRAM(S): at 17:52

## 2018-02-02 RX ADMIN — Medication 325 MILLIGRAM(S): at 17:53

## 2018-02-02 RX ADMIN — Medication 300 MILLIGRAM(S): at 01:33

## 2018-02-02 RX ADMIN — Medication 975 MILLIGRAM(S): at 10:17

## 2018-02-02 NOTE — DIETITIAN INITIAL EVALUATION ADULT. - OTHER INFO
Pt seen for pregnancy with ESRD on HD. 25 year old female  at 29 gestational weeks. Pt with ESRD secondary to crescentic IgA nephropathy and is receiving HD 6 day/week. Pt reports that her appetite and PO intake is good. Pt denies any GI distress, +BM today. Pt denies any food allergies.

## 2018-02-02 NOTE — PROGRESS NOTE ADULT - PROBLEM SELECTOR PLAN 4
BP worsening today.  Will attempt 500 cc fluid removal.  During previous attempt to remove 1 kg fluid, fetus had signifant fetal decelerations.  If BP continues to worsens even after dialysis today then will discuss need to deliver with OB. BP worsening today.  Will attempt 500 cc fluid removal.  During previous attempt to remove 1 kg fluid, fetus had signifant fetal decelerations.  If BP continues to worsens will need to deliver with OB.

## 2018-02-02 NOTE — DIETITIAN INITIAL EVALUATION ADULT. - ENERGY NEEDS
Ht: 57in, Wt: 120lbs (pre-pregnancy weight), 142 lbs (pregnancy weight), BMI: 26.0, IBW: 94lbs Defer fluid needs to nephrology.   Ht: 57in, Wt: 120lbs (pre-pregnancy weight), 142 lbs (pregnancy weight), BMI: 26.0, IBW: 94lbs

## 2018-02-02 NOTE — PROGRESS NOTE ADULT - SUBJECTIVE AND OBJECTIVE BOX
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  Recently had fetal distress when UF was tried.  Titrating up labetalol as BP has been rising.  Saw and evaluated the patient on HD today.  Feeling well no CP no SOB no vomiting no diarrhea, no pain.    24 hour events/subjective:  BP continues to rise despite titrating labetalol to 300mg TID.        PAST HISTORY  --------------------------------------------------------------------------------  No significant changes to PMH, PSH, FHx, SHx, unless otherwise noted    ALLERGIES & MEDICATIONS  --------------------------------------------------------------------------------  Allergies    No Known Allergies    Intolerances      Standing Inpatient Medications  aspirin  chewable 81 milliGRAM(s) Oral daily  doxercalciferol Injectable 2 MICROGram(s) IV Push <User Schedule>  epoetin leeann Injectable 8000 Unit(s) IV Push <User Schedule>  ferrous    sulfate 325 milliGRAM(s) Oral daily  heparin  Injectable 5000 Unit(s) SubCutaneous every 12 hours  labetalol 300 milliGRAM(s) Oral every 8 hours  oseltamivir 30 milliGRAM(s) Oral every other day  prenatal multivitamin 1 Tablet(s) Oral daily    PRN Inpatient Medications      REVIEW OF SYSTEMS  --------------------------------------------------------------------------------  Gen: No fevers/chills  Skin: No rashes  Head/Eyes/Ears/Mouth: No headache  Respiratory: No dyspnea  CV: No chest pain  GI: No abdominal pain  : No dysuria  MSK: No edema  Neuro: No dizziness/lightheadedness    VITALS/PHYSICAL EXAM  --------------------------------------------------------------------------------  T(C): 36.8 (02-02-18 @ 09:30), Max: 37.1 (02-01-18 @ 17:15)  HR: 92 (02-02-18 @ 09:30) (82 - 98)  BP: 166/98 (02-02-18 @ 09:30) (114/73 - 166/98)  RR: 18 (02-02-18 @ 09:30) (16 - 18)  SpO2: 96% (02-02-18 @ 09:30) (96% - 99%)  Wt(kg): --    Weight (kg): 66.5 (02-01-18 @ 17:15)      02-01-18 @ 07:01  -  02-02-18 @ 07:00  --------------------------------------------------------  IN: 0 mL / OUT: 100 mL / NET: -100 mL      Physical Exam:  	Gen: NAD, well-appearing  	HEENT: MMM  	Pulm: CTA B/L  	CV: RRR, S1S2; no rub  	Abd: +BS, soft, nontender/nondistended  	: No suprapubic tenderness  	UE:  no edema  	LE:  no pitting edema  	Neuro: No focal deficits  	Skin: Warm  	Vascular access:  LUE AVF + thrill and bruit    LABS/STUDIES  --------------------------------------------------------------------------------              8.9    12.8  >-----------<  163      [02-02-18 @ 10:21]              24.6     138  |  100  |  16  ----------------------------<  70      [02-02-18 @ 10:25]  4.1   |  27  |  2.74        Ca     8.9     [02-02-18 @ 10:25]    TPro  5.8  /  Alb  2.9  /  TBili  0.4  /  DBili  x   /  AST  17  /  ALT  15  /  AlkPhos  106  [02-02-18 @ 10:25]    PT/INR: PT 9.5  , INR 0.87       [02-02-18 @ 10:21]  PTT: 25.6       [02-02-18 @ 10:21]    Uric acid 3.4      [02-02-18 @ 10:25]        [02-02-18 @ 10:25]    Creatinine Trend:  SCr 2.74 [02-02 @ 10:25]  SCr 2.28 [01-31 @ 05:30]  SCr 1.37 [01-30 @ 20:47]  SCr 1.84 [01-30 @ 15:53]  SCr 4.19 [01-29 @ 09:51] [facility] DIVISION OF KIDNEY DISEASES AND HYPERTENSION -- FOLLOW UP NOTE  --------------------------------------------------------------------------------  Chief Complaint:  27 year old pregnant woman (~29 weeks, DELROY 4/16/2018) 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  Recently had fetal distress when UF was tried.  Titrating up labetalol as BP has been rising.  Saw and evaluated the patient on HD today.  Feeling well no CP no SOB no vomiting no diarrhea, no pain.    24 hour events/subjective:  BP continues to rise despite titrating labetalol to 300mg TID.        PAST HISTORY  --------------------------------------------------------------------------------  No significant changes to PMH, PSH, FHx, SHx, unless otherwise noted    ALLERGIES & MEDICATIONS  --------------------------------------------------------------------------------  Allergies    No Known Allergies    Intolerances      Standing Inpatient Medications  aspirin  chewable 81 milliGRAM(s) Oral daily  doxercalciferol Injectable 2 MICROGram(s) IV Push <User Schedule>  epoetin leeann Injectable 8000 Unit(s) IV Push <User Schedule>  ferrous    sulfate 325 milliGRAM(s) Oral daily  heparin  Injectable 5000 Unit(s) SubCutaneous every 12 hours  labetalol 300 milliGRAM(s) Oral every 8 hours  oseltamivir 30 milliGRAM(s) Oral every other day  prenatal multivitamin 1 Tablet(s) Oral daily    PRN Inpatient Medications      REVIEW OF SYSTEMS  --------------------------------------------------------------------------------  Gen: No fevers/chills  Skin: No rashes  Head/Eyes/Ears/Mouth: No headache  Respiratory: No dyspnea  CV: No chest pain  GI: No abdominal pain  : No dysuria  MSK: No edema  Neuro: No dizziness/lightheadedness    VITALS/PHYSICAL EXAM  --------------------------------------------------------------------------------  T(C): 36.8 (02-02-18 @ 09:30), Max: 37.1 (02-01-18 @ 17:15)  HR: 92 (02-02-18 @ 09:30) (82 - 98)  BP: 166/98 (02-02-18 @ 09:30) (114/73 - 166/98)  RR: 18 (02-02-18 @ 09:30) (16 - 18)  SpO2: 96% (02-02-18 @ 09:30) (96% - 99%)  Wt(kg): --    Weight (kg): 66.5 (02-01-18 @ 17:15)      02-01-18 @ 07:01  -  02-02-18 @ 07:00  --------------------------------------------------------  IN: 0 mL / OUT: 100 mL / NET: -100 mL      Physical Exam:  	Gen: NAD, well-appearing  	HEENT: MMM  	Pulm: CTA B/L  	CV: RRR, S1S2; no rub  	Abd: gravid uterus  	: No suprapubic tenderness  	UE:  no edema  	LE:  no pitting edema  	Neuro: No focal deficits  	Skin: Warm  	Vascular access:  LUE AVF + thrill and bruit    LABS/STUDIES  --------------------------------------------------------------------------------              8.9    12.8  >-----------<  163      [02-02-18 @ 10:21]              24.6     138  |  100  |  16  ----------------------------<  70      [02-02-18 @ 10:25]  4.1   |  27  |  2.74        Ca     8.9     [02-02-18 @ 10:25]    TPro  5.8  /  Alb  2.9  /  TBili  0.4  /  DBili  x   /  AST  17  /  ALT  15  /  AlkPhos  106  [02-02-18 @ 10:25]    PT/INR: PT 9.5  , INR 0.87       [02-02-18 @ 10:21]  PTT: 25.6       [02-02-18 @ 10:21]    Uric acid 3.4      [02-02-18 @ 10:25]        [02-02-18 @ 10:25]    Creatinine Trend:  SCr 2.74 [02-02 @ 10:25]  SCr 2.28 [01-31 @ 05:30]  SCr 1.37 [01-30 @ 20:47]  SCr 1.84 [01-30 @ 15:53]  SCr 4.19 [01-29 @ 09:51]

## 2018-02-02 NOTE — PROGRESS NOTE ADULT - PROBLEM SELECTOR PLAN 1
Pt with ESRD on HD x 6 days a week. seen on HD today.  Worsening blood pressure despite escalation of labetalol.  Plan for 500 cc UF today with dialysis.  Patient appears more lethargic, less verbal today.  HELLP labs reviewed, unremarkable at this time. Pt with ESRD on HD x 6 days a week. seen on HD today.  Worsening blood pressure despite escalation of labetalol.  Plan for 500 cc UF today with dialysis.  HELLP labs reviewed, unremarkable at this time.

## 2018-02-02 NOTE — PROGRESS NOTE ADULT - ASSESSMENT
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

## 2018-02-02 NOTE — DIETITIAN INITIAL EVALUATION ADULT. - NS AS NUTRI INTERV ED CONTENT
Purpose of the nutrition education/1) Pt encouraged to continue with good PO intake; 2) Pt made aware of increased protein needs and tips to increase protein intake. 1) Pt educated on nutritional needs to support pregnancy; 2) Pt made aware of increased protein needs and tips to increase protein intake; 3) Provide pt with food preferences at pt request; 4) Pt encouraged to eat before dialysis./Purpose of the nutrition education

## 2018-02-03 LAB
ALBUMIN SERPL ELPH-MCNC: 2.9 G/DL — LOW (ref 3.3–5)
ALP SERPL-CCNC: 107 U/L — SIGNIFICANT CHANGE UP (ref 40–120)
ALT FLD-CCNC: 16 U/L RC — SIGNIFICANT CHANGE UP (ref 10–45)
ANION GAP SERPL CALC-SCNC: 7 MMOL/L — SIGNIFICANT CHANGE UP (ref 5–17)
AST SERPL-CCNC: 16 U/L — SIGNIFICANT CHANGE UP (ref 10–40)
BASOPHILS # BLD AUTO: 0 K/UL — SIGNIFICANT CHANGE UP (ref 0–0.2)
BASOPHILS NFR BLD AUTO: 0.1 % — SIGNIFICANT CHANGE UP (ref 0–2)
BILIRUB SERPL-MCNC: 0.3 MG/DL — SIGNIFICANT CHANGE UP (ref 0.2–1.2)
BUN SERPL-MCNC: 6 MG/DL — LOW (ref 7–23)
CALCIUM SERPL-MCNC: 8.7 MG/DL — SIGNIFICANT CHANGE UP (ref 8.4–10.5)
CHLORIDE SERPL-SCNC: 100 MMOL/L — SIGNIFICANT CHANGE UP (ref 96–108)
CO2 SERPL-SCNC: 33 MMOL/L — HIGH (ref 22–31)
CREAT SERPL-MCNC: 1.53 MG/DL — HIGH (ref 0.5–1.3)
EOSINOPHIL # BLD AUTO: 0 K/UL — SIGNIFICANT CHANGE UP (ref 0–0.5)
EOSINOPHIL NFR BLD AUTO: 0.3 % — SIGNIFICANT CHANGE UP (ref 0–6)
GLUCOSE SERPL-MCNC: 78 MG/DL — SIGNIFICANT CHANGE UP (ref 70–99)
HBV CORE AB SER-ACNC: SIGNIFICANT CHANGE UP
HBV CORE IGM SER-ACNC: SIGNIFICANT CHANGE UP
HBV SURFACE AB SER-ACNC: <3 MIU/ML — LOW
HBV SURFACE AG SER-ACNC: SIGNIFICANT CHANGE UP
HCT VFR BLD CALC: 25.8 % — LOW (ref 34.5–45)
HCV AB S/CO SERPL IA: 0.06 S/CO — SIGNIFICANT CHANGE UP
HCV AB SERPL-IMP: SIGNIFICANT CHANGE UP
HGB BLD-MCNC: 9.1 G/DL — LOW (ref 11.5–15.5)
LYMPHOCYTES # BLD AUTO: 1.2 K/UL — SIGNIFICANT CHANGE UP (ref 1–3.3)
LYMPHOCYTES # BLD AUTO: 12.2 % — LOW (ref 13–44)
MCHC RBC-ENTMCNC: 32.9 PG — SIGNIFICANT CHANGE UP (ref 27–34)
MCHC RBC-ENTMCNC: 35.1 GM/DL — SIGNIFICANT CHANGE UP (ref 32–36)
MCV RBC AUTO: 93.5 FL — SIGNIFICANT CHANGE UP (ref 80–100)
MONOCYTES # BLD AUTO: 0.4 K/UL — SIGNIFICANT CHANGE UP (ref 0–0.9)
MONOCYTES NFR BLD AUTO: 3.9 % — SIGNIFICANT CHANGE UP (ref 2–14)
NEUTROPHILS # BLD AUTO: 8.3 K/UL — HIGH (ref 1.8–7.4)
NEUTROPHILS NFR BLD AUTO: 83.6 % — HIGH (ref 43–77)
PLATELET # BLD AUTO: 181 K/UL — SIGNIFICANT CHANGE UP (ref 150–400)
POTASSIUM SERPL-MCNC: 3.6 MMOL/L — SIGNIFICANT CHANGE UP (ref 3.5–5.3)
POTASSIUM SERPL-SCNC: 3.6 MMOL/L — SIGNIFICANT CHANGE UP (ref 3.5–5.3)
PROT SERPL-MCNC: 6 G/DL — SIGNIFICANT CHANGE UP (ref 6–8.3)
RBC # BLD: 2.76 M/UL — LOW (ref 3.8–5.2)
RBC # FLD: 15.3 % — HIGH (ref 10.3–14.5)
SODIUM SERPL-SCNC: 140 MMOL/L — SIGNIFICANT CHANGE UP (ref 135–145)
WBC # BLD: 9.9 K/UL — SIGNIFICANT CHANGE UP (ref 3.8–10.5)
WBC # FLD AUTO: 9.9 K/UL — SIGNIFICANT CHANGE UP (ref 3.8–10.5)

## 2018-02-03 PROCEDURE — 99232 SBSQ HOSP IP/OBS MODERATE 35: CPT

## 2018-02-03 PROCEDURE — 59025 FETAL NON-STRESS TEST: CPT | Mod: 26

## 2018-02-03 RX ADMIN — Medication 81 MILLIGRAM(S): at 16:48

## 2018-02-03 RX ADMIN — Medication 325 MILLIGRAM(S): at 16:47

## 2018-02-03 RX ADMIN — Medication 300 MILLIGRAM(S): at 16:48

## 2018-02-03 RX ADMIN — Medication 300 MILLIGRAM(S): at 06:33

## 2018-02-03 RX ADMIN — HEPARIN SODIUM 5000 UNIT(S): 5000 INJECTION INTRAVENOUS; SUBCUTANEOUS at 20:12

## 2018-02-03 RX ADMIN — HEPARIN SODIUM 5000 UNIT(S): 5000 INJECTION INTRAVENOUS; SUBCUTANEOUS at 08:04

## 2018-02-03 RX ADMIN — Medication 1 TABLET(S): at 16:47

## 2018-02-04 LAB
ALBUMIN SERPL ELPH-MCNC: 2.8 G/DL — LOW (ref 3.3–5)
ALP SERPL-CCNC: 105 U/L — SIGNIFICANT CHANGE UP (ref 40–120)
ALT FLD-CCNC: 16 U/L RC — SIGNIFICANT CHANGE UP (ref 10–45)
ANION GAP SERPL CALC-SCNC: 10 MMOL/L — SIGNIFICANT CHANGE UP (ref 5–17)
APTT BLD: 29.4 SEC — SIGNIFICANT CHANGE UP (ref 27.5–37.4)
AST SERPL-CCNC: 16 U/L — SIGNIFICANT CHANGE UP (ref 10–40)
BASOPHILS # BLD AUTO: 0 K/UL — SIGNIFICANT CHANGE UP (ref 0–0.2)
BASOPHILS NFR BLD AUTO: 0.3 % — SIGNIFICANT CHANGE UP (ref 0–2)
BILIRUB SERPL-MCNC: 0.3 MG/DL — SIGNIFICANT CHANGE UP (ref 0.2–1.2)
BUN SERPL-MCNC: 12 MG/DL — SIGNIFICANT CHANGE UP (ref 7–23)
CALCIUM SERPL-MCNC: 8.9 MG/DL — SIGNIFICANT CHANGE UP (ref 8.4–10.5)
CHLORIDE SERPL-SCNC: 100 MMOL/L — SIGNIFICANT CHANGE UP (ref 96–108)
CO2 SERPL-SCNC: 27 MMOL/L — SIGNIFICANT CHANGE UP (ref 22–31)
CREAT SERPL-MCNC: 2.66 MG/DL — HIGH (ref 0.5–1.3)
EOSINOPHIL # BLD AUTO: 0 K/UL — SIGNIFICANT CHANGE UP (ref 0–0.5)
EOSINOPHIL NFR BLD AUTO: 0.1 % — SIGNIFICANT CHANGE UP (ref 0–6)
FIBRINOGEN PPP-MCNC: 622 MG/DL — HIGH (ref 310–510)
GLUCOSE SERPL-MCNC: 74 MG/DL — SIGNIFICANT CHANGE UP (ref 70–99)
HCT VFR BLD CALC: 25.2 % — LOW (ref 34.5–45)
HGB BLD-MCNC: 8.8 G/DL — LOW (ref 11.5–15.5)
INR BLD: 0.91 RATIO — SIGNIFICANT CHANGE UP (ref 0.88–1.16)
LDH SERPL L TO P-CCNC: 167 U/L — SIGNIFICANT CHANGE UP (ref 50–242)
LYMPHOCYTES # BLD AUTO: 1.3 K/UL — SIGNIFICANT CHANGE UP (ref 1–3.3)
LYMPHOCYTES # BLD AUTO: 13.1 % — SIGNIFICANT CHANGE UP (ref 13–44)
MCHC RBC-ENTMCNC: 32.6 PG — SIGNIFICANT CHANGE UP (ref 27–34)
MCHC RBC-ENTMCNC: 34.9 GM/DL — SIGNIFICANT CHANGE UP (ref 32–36)
MCV RBC AUTO: 93.5 FL — SIGNIFICANT CHANGE UP (ref 80–100)
MONOCYTES # BLD AUTO: 0.8 K/UL — SIGNIFICANT CHANGE UP (ref 0–0.9)
MONOCYTES NFR BLD AUTO: 7.7 % — SIGNIFICANT CHANGE UP (ref 2–14)
NEUTROPHILS # BLD AUTO: 7.8 K/UL — HIGH (ref 1.8–7.4)
NEUTROPHILS NFR BLD AUTO: 78.8 % — HIGH (ref 43–77)
PLATELET # BLD AUTO: 175 K/UL — SIGNIFICANT CHANGE UP (ref 150–400)
POTASSIUM SERPL-MCNC: 3.9 MMOL/L — SIGNIFICANT CHANGE UP (ref 3.5–5.3)
POTASSIUM SERPL-SCNC: 3.9 MMOL/L — SIGNIFICANT CHANGE UP (ref 3.5–5.3)
PROT SERPL-MCNC: 5.6 G/DL — LOW (ref 6–8.3)
PROTHROM AB SERPL-ACNC: 9.8 SEC — SIGNIFICANT CHANGE UP (ref 9.8–12.7)
RBC # BLD: 2.69 M/UL — LOW (ref 3.8–5.2)
RBC # FLD: 15 % — HIGH (ref 10.3–14.5)
SODIUM SERPL-SCNC: 137 MMOL/L — SIGNIFICANT CHANGE UP (ref 135–145)
URATE SERPL-MCNC: 2.8 MG/DL — SIGNIFICANT CHANGE UP (ref 2.5–7)
WBC # BLD: 9.9 K/UL — SIGNIFICANT CHANGE UP (ref 3.8–10.5)
WBC # FLD AUTO: 9.9 K/UL — SIGNIFICANT CHANGE UP (ref 3.8–10.5)

## 2018-02-04 PROCEDURE — 99232 SBSQ HOSP IP/OBS MODERATE 35: CPT

## 2018-02-04 RX ADMIN — Medication 81 MILLIGRAM(S): at 18:14

## 2018-02-04 RX ADMIN — Medication 30 MILLIGRAM(S): at 13:53

## 2018-02-04 RX ADMIN — Medication 325 MILLIGRAM(S): at 13:53

## 2018-02-04 RX ADMIN — Medication 300 MILLIGRAM(S): at 10:02

## 2018-02-04 RX ADMIN — HEPARIN SODIUM 5000 UNIT(S): 5000 INJECTION INTRAVENOUS; SUBCUTANEOUS at 08:58

## 2018-02-04 RX ADMIN — Medication 1 TABLET(S): at 13:52

## 2018-02-04 RX ADMIN — Medication 300 MILLIGRAM(S): at 23:54

## 2018-02-04 RX ADMIN — HEPARIN SODIUM 5000 UNIT(S): 5000 INJECTION INTRAVENOUS; SUBCUTANEOUS at 21:48

## 2018-02-04 RX ADMIN — Medication 300 MILLIGRAM(S): at 17:58

## 2018-02-04 RX ADMIN — Medication 300 MILLIGRAM(S): at 00:06

## 2018-02-05 LAB
ALBUMIN SERPL ELPH-MCNC: 2.8 G/DL — LOW (ref 3.3–5)
ALP SERPL-CCNC: 110 U/L — SIGNIFICANT CHANGE UP (ref 40–120)
ALT FLD-CCNC: 16 U/L RC — SIGNIFICANT CHANGE UP (ref 10–45)
ANION GAP SERPL CALC-SCNC: 11 MMOL/L — SIGNIFICANT CHANGE UP (ref 5–17)
APTT BLD: 26.6 SEC — LOW (ref 27.5–37.4)
APTT BLD: 37.9 SEC — HIGH (ref 27.5–37.4)
AST SERPL-CCNC: 18 U/L — SIGNIFICANT CHANGE UP (ref 10–40)
BASOPHILS # BLD AUTO: 0 K/UL — SIGNIFICANT CHANGE UP (ref 0–0.2)
BASOPHILS NFR BLD AUTO: 0.3 % — SIGNIFICANT CHANGE UP (ref 0–2)
BILIRUB SERPL-MCNC: 0.2 MG/DL — SIGNIFICANT CHANGE UP (ref 0.2–1.2)
BUN SERPL-MCNC: 27 MG/DL — HIGH (ref 7–23)
CALCIUM SERPL-MCNC: 8.8 MG/DL — SIGNIFICANT CHANGE UP (ref 8.4–10.5)
CHLORIDE SERPL-SCNC: 100 MMOL/L — SIGNIFICANT CHANGE UP (ref 96–108)
CO2 SERPL-SCNC: 25 MMOL/L — SIGNIFICANT CHANGE UP (ref 22–31)
CREAT SERPL-MCNC: 4.06 MG/DL — HIGH (ref 0.5–1.3)
EOSINOPHIL # BLD AUTO: 0 K/UL — SIGNIFICANT CHANGE UP (ref 0–0.5)
EOSINOPHIL NFR BLD AUTO: 0.3 % — SIGNIFICANT CHANGE UP (ref 0–6)
FIBRINOGEN PPP-MCNC: 610 MG/DL — HIGH (ref 310–510)
GLUCOSE SERPL-MCNC: 68 MG/DL — LOW (ref 70–99)
HCT VFR BLD CALC: 23.7 % — LOW (ref 34.5–45)
HGB BLD-MCNC: 8.5 G/DL — LOW (ref 11.5–15.5)
INR BLD: 0.84 RATIO — LOW (ref 0.88–1.16)
INR BLD: 1.01 RATIO — SIGNIFICANT CHANGE UP (ref 0.88–1.16)
LDH SERPL L TO P-CCNC: 184 U/L — SIGNIFICANT CHANGE UP (ref 50–242)
LYMPHOCYTES # BLD AUTO: 1.2 K/UL — SIGNIFICANT CHANGE UP (ref 1–3.3)
LYMPHOCYTES # BLD AUTO: 13 % — SIGNIFICANT CHANGE UP (ref 13–44)
MCHC RBC-ENTMCNC: 33.4 PG — SIGNIFICANT CHANGE UP (ref 27–34)
MCHC RBC-ENTMCNC: 35.7 GM/DL — SIGNIFICANT CHANGE UP (ref 32–36)
MCV RBC AUTO: 93.4 FL — SIGNIFICANT CHANGE UP (ref 80–100)
MONOCYTES # BLD AUTO: 0.8 K/UL — SIGNIFICANT CHANGE UP (ref 0–0.9)
MONOCYTES NFR BLD AUTO: 8.4 % — SIGNIFICANT CHANGE UP (ref 2–14)
NEUTROPHILS # BLD AUTO: 7.1 K/UL — SIGNIFICANT CHANGE UP (ref 1.8–7.4)
NEUTROPHILS NFR BLD AUTO: 78.1 % — HIGH (ref 43–77)
PLATELET # BLD AUTO: 181 K/UL — SIGNIFICANT CHANGE UP (ref 150–400)
POTASSIUM SERPL-MCNC: 4.6 MMOL/L — SIGNIFICANT CHANGE UP (ref 3.5–5.3)
POTASSIUM SERPL-SCNC: 4.6 MMOL/L — SIGNIFICANT CHANGE UP (ref 3.5–5.3)
PROT SERPL-MCNC: 5.7 G/DL — LOW (ref 6–8.3)
PROTHROM AB SERPL-ACNC: 10.9 SEC — SIGNIFICANT CHANGE UP (ref 9.8–12.7)
PROTHROM AB SERPL-ACNC: 9.1 SEC — LOW (ref 9.8–12.7)
RBC # BLD: 2.54 M/UL — LOW (ref 3.8–5.2)
RBC # FLD: 15.2 % — HIGH (ref 10.3–14.5)
SODIUM SERPL-SCNC: 136 MMOL/L — SIGNIFICANT CHANGE UP (ref 135–145)
URATE SERPL-MCNC: 5.4 MG/DL — SIGNIFICANT CHANGE UP (ref 2.5–7)
WBC # BLD: 9.1 K/UL — SIGNIFICANT CHANGE UP (ref 3.8–10.5)
WBC # FLD AUTO: 9.1 K/UL — SIGNIFICANT CHANGE UP (ref 3.8–10.5)

## 2018-02-05 PROCEDURE — 59025 FETAL NON-STRESS TEST: CPT | Mod: 26

## 2018-02-05 PROCEDURE — 99232 SBSQ HOSP IP/OBS MODERATE 35: CPT | Mod: 25

## 2018-02-05 PROCEDURE — 99232 SBSQ HOSP IP/OBS MODERATE 35: CPT

## 2018-02-05 RX ORDER — LABETALOL HCL 100 MG
400 TABLET ORAL EVERY 8 HOURS
Qty: 0 | Refills: 0 | Status: DISCONTINUED | OUTPATIENT
Start: 2018-02-05 | End: 2018-02-13

## 2018-02-05 RX ADMIN — DOXERCALCIFEROL 2 MICROGRAM(S): 2.5 CAPSULE ORAL at 12:09

## 2018-02-05 RX ADMIN — HEPARIN SODIUM 5000 UNIT(S): 5000 INJECTION INTRAVENOUS; SUBCUTANEOUS at 08:26

## 2018-02-05 RX ADMIN — Medication 325 MILLIGRAM(S): at 18:10

## 2018-02-05 RX ADMIN — Medication 300 MILLIGRAM(S): at 08:26

## 2018-02-05 RX ADMIN — Medication 81 MILLIGRAM(S): at 18:11

## 2018-02-05 RX ADMIN — Medication 400 MILLIGRAM(S): at 16:45

## 2018-02-05 RX ADMIN — ERYTHROPOIETIN 8000 UNIT(S): 10000 INJECTION, SOLUTION INTRAVENOUS; SUBCUTANEOUS at 12:08

## 2018-02-05 RX ADMIN — HEPARIN SODIUM 5000 UNIT(S): 5000 INJECTION INTRAVENOUS; SUBCUTANEOUS at 20:33

## 2018-02-05 RX ADMIN — Medication 1 TABLET(S): at 18:10

## 2018-02-05 NOTE — PROGRESS NOTE ADULT - PROBLEM SELECTOR PLAN 1
Pt with ESRD on HD x 6 days a week. seen on HD today.  Rales on exam today.  Plan for 500 cc UF today with dialysis.  HELLP/pre-eclampsia labs reviewed, unremarkable at this time.

## 2018-02-05 NOTE — PROGRESS NOTE ADULT - SUBJECTIVE AND OBJECTIVE BOX
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  Recently had fetal distress when UF was tried.  Titrating up labetalol as BP has been rising.  Saw and evaluated the patient before HD today.  Feeling well no CP no SOB no vomiting no diarrhea, no pain.      PAST HISTORY  --------------------------------------------------------------------------------  No significant changes to PMH, PSH, FHx, SHx, unless otherwise noted    ALLERGIES & MEDICATIONS  --------------------------------------------------------------------------------  Allergies    No Known Allergies    Intolerances      Standing Inpatient Medications  aspirin  chewable 81 milliGRAM(s) Oral daily  doxercalciferol Injectable 2 MICROGram(s) IV Push <User Schedule>  epoetin leeann Injectable 8000 Unit(s) IV Push <User Schedule>  ferrous    sulfate 325 milliGRAM(s) Oral daily  heparin  Injectable 5000 Unit(s) SubCutaneous every 12 hours  labetalol 300 milliGRAM(s) Oral every 8 hours  oseltamivir 30 milliGRAM(s) Oral every other day  prenatal multivitamin 1 Tablet(s) Oral daily    PRN Inpatient Medications      REVIEW OF SYSTEMS  --------------------------------------------------------------------------------  as noted above    VITALS/PHYSICAL EXAM  --------------------------------------------------------------------------------  T(C): 37 (02-05-18 @ 08:24), Max: 37 (02-04-18 @ 17:51)  HR: 90 (02-05-18 @ 08:24) (76 - 104)  BP: 143/78 (02-05-18 @ 08:24) (122/74 - 148/85)  RR: 18 (02-05-18 @ 08:24) (18 - 18)  SpO2: 96% (02-05-18 @ 08:24) (95% - 98%)  Wt(kg): --        Physical Exam:  	Gen: NAD  	HEENT: anicteric  	Pulm: rales at right lung base  	CV: S1S2  	Abd: gravid uterus  	Psych: Normal affect and mood  	Skin: Warm  	Vascular access: GALO KANG    LABS/STUDIES  --------------------------------------------------------------------------------              8.5    9.1   >-----------<  181      [02-05-18 @ 07:25]              23.7     136  |  100  |  27  ----------------------------<  68      [02-05-18 @ 07:25]  4.6   |  25  |  4.06        Ca     8.8     [02-05-18 @ 07:25]    TPro  5.7  /  Alb  2.8  /  TBili  0.2  /  DBili  x   /  AST  18  /  ALT  16  /  AlkPhos  110  [02-05-18 @ 07:25]    PT/INR: PT 9.1  , INR 0.84       [02-05-18 @ 07:25]  PTT: 26.6       [02-05-18 @ 07:25]    Uric acid 5.4      [02-05-18 @ 07:25]        [02-05-18 @ 07:25]    PTH -- (Ca 8.4)      [01-29-18 @ 13:57]   225    HBsAb <3.0      [02-03-18 @ 14:48]  HBsAg Nonreact      [02-03-18 @ 14:48]  HBcAb Nonreact      [02-03-18 @ 14:48]  HCV 0.06, Nonreact      [02-03-18 @ 14:48]

## 2018-02-06 LAB
ALBUMIN SERPL ELPH-MCNC: 3 G/DL — LOW (ref 3.3–5)
ALP SERPL-CCNC: 108 U/L — SIGNIFICANT CHANGE UP (ref 40–120)
ALT FLD-CCNC: 17 U/L RC — SIGNIFICANT CHANGE UP (ref 10–45)
ANION GAP SERPL CALC-SCNC: 9 MMOL/L — SIGNIFICANT CHANGE UP (ref 5–17)
APTT BLD: 27.3 SEC — LOW (ref 27.5–37.4)
AST SERPL-CCNC: 18 U/L — SIGNIFICANT CHANGE UP (ref 10–40)
BASOPHILS # BLD AUTO: 0 K/UL — SIGNIFICANT CHANGE UP (ref 0–0.2)
BASOPHILS NFR BLD AUTO: 0.4 % — SIGNIFICANT CHANGE UP (ref 0–2)
BILIRUB SERPL-MCNC: 0.2 MG/DL — SIGNIFICANT CHANGE UP (ref 0.2–1.2)
BLD GP AB SCN SERPL QL: NEGATIVE — SIGNIFICANT CHANGE UP
BUN SERPL-MCNC: 12 MG/DL — SIGNIFICANT CHANGE UP (ref 7–23)
CALCIUM SERPL-MCNC: 8.9 MG/DL — SIGNIFICANT CHANGE UP (ref 8.4–10.5)
CHLORIDE SERPL-SCNC: 99 MMOL/L — SIGNIFICANT CHANGE UP (ref 96–108)
CO2 SERPL-SCNC: 27 MMOL/L — SIGNIFICANT CHANGE UP (ref 22–31)
CREAT SERPL-MCNC: 3.04 MG/DL — HIGH (ref 0.5–1.3)
EOSINOPHIL # BLD AUTO: 0.1 K/UL — SIGNIFICANT CHANGE UP (ref 0–0.5)
EOSINOPHIL NFR BLD AUTO: 0.6 % — SIGNIFICANT CHANGE UP (ref 0–6)
FIBRINOGEN PPP-MCNC: 646 MG/DL — HIGH (ref 310–510)
GLUCOSE SERPL-MCNC: 73 MG/DL — SIGNIFICANT CHANGE UP (ref 70–99)
HCT VFR BLD CALC: 25 % — LOW (ref 34.5–45)
HGB BLD-MCNC: 8.8 G/DL — LOW (ref 11.5–15.5)
INR BLD: 0.87 RATIO — LOW (ref 0.88–1.16)
LDH SERPL L TO P-CCNC: 184 U/L — SIGNIFICANT CHANGE UP (ref 50–242)
LYMPHOCYTES # BLD AUTO: 1.4 K/UL — SIGNIFICANT CHANGE UP (ref 1–3.3)
LYMPHOCYTES # BLD AUTO: 16.7 % — SIGNIFICANT CHANGE UP (ref 13–44)
MCHC RBC-ENTMCNC: 32.8 PG — SIGNIFICANT CHANGE UP (ref 27–34)
MCHC RBC-ENTMCNC: 35.2 GM/DL — SIGNIFICANT CHANGE UP (ref 32–36)
MCV RBC AUTO: 93.2 FL — SIGNIFICANT CHANGE UP (ref 80–100)
MONOCYTES # BLD AUTO: 0.6 K/UL — SIGNIFICANT CHANGE UP (ref 0–0.9)
MONOCYTES NFR BLD AUTO: 7 % — SIGNIFICANT CHANGE UP (ref 2–14)
NEUTROPHILS # BLD AUTO: 6.2 K/UL — SIGNIFICANT CHANGE UP (ref 1.8–7.4)
NEUTROPHILS NFR BLD AUTO: 75.2 % — SIGNIFICANT CHANGE UP (ref 43–77)
PLATELET # BLD AUTO: 176 K/UL — SIGNIFICANT CHANGE UP (ref 150–400)
POTASSIUM SERPL-MCNC: 4.2 MMOL/L — SIGNIFICANT CHANGE UP (ref 3.5–5.3)
POTASSIUM SERPL-SCNC: 4.2 MMOL/L — SIGNIFICANT CHANGE UP (ref 3.5–5.3)
PROT SERPL-MCNC: 5.9 G/DL — LOW (ref 6–8.3)
PROTHROM AB SERPL-ACNC: 9.4 SEC — LOW (ref 9.8–12.7)
RBC # BLD: 2.68 M/UL — LOW (ref 3.8–5.2)
RBC # FLD: 15.6 % — HIGH (ref 10.3–14.5)
RH IG SCN BLD-IMP: POSITIVE — SIGNIFICANT CHANGE UP
SODIUM SERPL-SCNC: 135 MMOL/L — SIGNIFICANT CHANGE UP (ref 135–145)
URATE SERPL-MCNC: 3.3 MG/DL — SIGNIFICANT CHANGE UP (ref 2.5–7)
WBC # BLD: 8.3 K/UL — SIGNIFICANT CHANGE UP (ref 3.8–10.5)
WBC # FLD AUTO: 8.3 K/UL — SIGNIFICANT CHANGE UP (ref 3.8–10.5)

## 2018-02-06 PROCEDURE — 90935 HEMODIALYSIS ONE EVALUATION: CPT | Mod: GC

## 2018-02-06 PROCEDURE — 99232 SBSQ HOSP IP/OBS MODERATE 35: CPT

## 2018-02-06 RX ORDER — DIPHENHYDRAMINE HCL 50 MG
25 CAPSULE ORAL AT BEDTIME
Qty: 0 | Refills: 0 | Status: DISCONTINUED | OUTPATIENT
Start: 2018-02-06 | End: 2018-02-13

## 2018-02-06 RX ADMIN — Medication 81 MILLIGRAM(S): at 16:50

## 2018-02-06 RX ADMIN — Medication 400 MILLIGRAM(S): at 07:35

## 2018-02-06 RX ADMIN — Medication 400 MILLIGRAM(S): at 00:58

## 2018-02-06 RX ADMIN — HEPARIN SODIUM 5000 UNIT(S): 5000 INJECTION INTRAVENOUS; SUBCUTANEOUS at 20:34

## 2018-02-06 RX ADMIN — Medication 30 MILLIGRAM(S): at 16:48

## 2018-02-06 RX ADMIN — Medication 325 MILLIGRAM(S): at 16:48

## 2018-02-06 RX ADMIN — Medication 1 TABLET(S): at 16:48

## 2018-02-06 RX ADMIN — HEPARIN SODIUM 5000 UNIT(S): 5000 INJECTION INTRAVENOUS; SUBCUTANEOUS at 07:35

## 2018-02-06 RX ADMIN — Medication 400 MILLIGRAM(S): at 16:48

## 2018-02-06 NOTE — PROGRESS NOTE ADULT - SUBJECTIVE AND OBJECTIVE BOX
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  Recently had fetal distress when UF was tried.  Titrating up labetalol as BP has been rising.  Saw and evaluated the patient before HD today.  Feeling well no CP no SOB no vomiting no diarrhea, no pain.    PAST HISTORY  --------------------------------------------------------------------------------  No significant changes to PMH, PSH, FHx, SHx, unless otherwise noted    ALLERGIES & MEDICATIONS  --------------------------------------------------------------------------------  Allergies    No Known Allergies    Intolerances      Standing Inpatient Medications  aspirin  chewable 81 milliGRAM(s) Oral daily  diphenhydrAMINE   Capsule 25 milliGRAM(s) Oral at bedtime  doxercalciferol Injectable 2 MICROGram(s) IV Push <User Schedule>  epoetin leeann Injectable 8000 Unit(s) IV Push <User Schedule>  ferrous    sulfate 325 milliGRAM(s) Oral daily  heparin  Injectable 5000 Unit(s) SubCutaneous every 12 hours  labetalol 400 milliGRAM(s) Oral every 8 hours  oseltamivir 30 milliGRAM(s) Oral every other day  prenatal multivitamin 1 Tablet(s) Oral daily    PRN Inpatient Medications      REVIEW OF SYSTEMS  --------------------------------------------------------------------------------  Gen: No  fevers/chills  Head/Eyes/Ears/Mouth: No headache  Respiratory: No dyspnea  CV: No chest pain  GI: No abdominal pain  MSK: no edema  Neuro: No dizziness/lightheadedness      All other systems were reviewed and are negative, except as noted.    VITALS/PHYSICAL EXAM  --------------------------------------------------------------------------------  T(C): 37.1 (02-06-18 @ 07:32), Max: 37.1 (02-05-18 @ 18:14)  HR: 89 (02-06-18 @ 07:32) (81 - 96)  BP: 150/95 (02-06-18 @ 07:32) (134/87 - 150/95)  RR: 18 (02-06-18 @ 07:32) (16 - 18)  SpO2: 97% (02-06-18 @ 05:50) (96% - 100%)  Wt(kg): --    Weight (kg): 62.8 (02-06-18 @ 07:32)      02-05-18 @ 07:01  -  02-06-18 @ 07:00  --------------------------------------------------------  IN: 0 mL / OUT: 600 mL / NET: -600 mL    Physical Exam:  	Gen: NAD  	HEENT: anicteric  	Pulm: rales at right lung base  	CV: S1S2  	Abd: gravid uterus  	Psych: Normal affect and mood  	Skin: Warm  	Vascular access: GALO KANG    LABS/STUDIES  --------------------------------------------------------------------------------              8.8    8.3   >-----------<  176      [02-06-18 @ 06:54]              25.0     135  |  99  |  12  ----------------------------<  73      [02-06-18 @ 06:54]  4.2   |  27  |  3.04        Ca     8.9     [02-06-18 @ 06:54]    TPro  5.9  /  Alb  3.0  /  TBili  0.2  /  DBili  x   /  AST  18  /  ALT  17  /  AlkPhos  108  [02-06-18 @ 06:54]    PT/INR: PT 9.4  , INR 0.87       [02-06-18 @ 06:54]  PTT: 27.3       [02-06-18 @ 06:54]    Uric acid 3.3      [02-06-18 @ 06:54]        [02-06-18 @ 06:54]    Creatinine Trend:  SCr 3.04 [02-06 @ 06:54]  SCr 4.06 [02-05 @ 07:25]  SCr 2.66 [02-04 @ 07:01]  SCr 1.53 [02-03 @ 19:20]  SCr 2.74 [02-02 @ 10:25]    Urinalysis - [01-31-18 @ 07:07]      Color PL Yellow / Appearance Clear / SG 1.008 / pH 8.5      Gluc Negative / Ketone Negative  / Bili Negative / Urobili Negative       Blood Negative / Protein 500 / Leuk Est Negative / Nitrite Negative      RBC 5-10 / WBC 2-5 / Hyaline  / Gran  / Sq Epi  / Non Sq Epi Few / Bacteria         HBsAb <3.0      [02-03-18 @ 14:48]  HBsAg Nonreact      [02-03-18 @ 14:48]  HBcAb Nonreact      [02-03-18 @ 14:48]  HCV 0.06, Nonreact      [02-03-18 @ 14:48]    Syphilis Screen (Treponema Pallidum Ab) Negative      [01-31-18 @ 05:51] [facility] Division of Kidney Diseases & Hypertension  FOLLOW UP NOTE  [phone]--------------------------------------------------------------------------------    27 year old pregnant woman (~29 weeks, DELROY 4/16/2018) 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  Recently had fetal distress when UF was tried.  Titrating up labetalol as BP has been rising.  Saw and evaluated the patient during HD today.  Feeling well no CP no SOB no vomiting no diarrhea, no pain.    PAST HISTORY  --------------------------------------------------------------------------------  No significant changes to PMH, PSH, FHx, SHx, unless otherwise noted    ALLERGIES & MEDICATIONS  --------------------------------------------------------------------------------  Allergies    No Known Allergies    Intolerances      Standing Inpatient Medications  aspirin  chewable 81 milliGRAM(s) Oral daily  diphenhydrAMINE   Capsule 25 milliGRAM(s) Oral at bedtime  doxercalciferol Injectable 2 MICROGram(s) IV Push <User Schedule>  epoetin leeann Injectable 8000 Unit(s) IV Push <User Schedule>  ferrous    sulfate 325 milliGRAM(s) Oral daily  heparin  Injectable 5000 Unit(s) SubCutaneous every 12 hours  labetalol 400 milliGRAM(s) Oral every 8 hours  oseltamivir 30 milliGRAM(s) Oral every other day  prenatal multivitamin 1 Tablet(s) Oral daily    PRN Inpatient Medications      REVIEW OF SYSTEMS  --------------------------------------------------------------------------------  Gen: No  fevers/chills  Head/Eyes/Ears/Mouth: No headache  Respiratory: No dyspnea  CV: No chest pain  GI: No abdominal pain  MSK: no edema  Neuro: No dizziness/lightheadedness      All other systems were reviewed and are negative, except as noted.    VITALS/PHYSICAL EXAM  --------------------------------------------------------------------------------  T(C): 37.1 (02-06-18 @ 07:32), Max: 37.1 (02-05-18 @ 18:14)  HR: 89 (02-06-18 @ 07:32) (81 - 96)  BP: 150/95 (02-06-18 @ 07:32) (134/87 - 150/95)  RR: 18 (02-06-18 @ 07:32) (16 - 18)  SpO2: 97% (02-06-18 @ 05:50) (96% - 100%)  Wt(kg): --    Weight (kg): 62.8 (02-06-18 @ 07:32)      02-05-18 @ 07:01  -  02-06-18 @ 07:00  --------------------------------------------------------  IN: 0 mL / OUT: 600 mL / NET: -600 mL    Physical Exam:  	Gen: NAD  	HEENT: anicteric  	Pulm: rales at right lung base  	CV: S1S2  	Abd: gravid uterus  	Psych: Normal affect and mood  	Skin: Warm  	Vascular access: GALO KANG    LABS/STUDIES  --------------------------------------------------------------------------------              8.8    8.3   >-----------<  176      [02-06-18 @ 06:54]              25.0     135  |  99  |  12  ----------------------------<  73      [02-06-18 @ 06:54]  4.2   |  27  |  3.04        Ca     8.9     [02-06-18 @ 06:54]    TPro  5.9  /  Alb  3.0  /  TBili  0.2  /  DBili  x   /  AST  18  /  ALT  17  /  AlkPhos  108  [02-06-18 @ 06:54]    PT/INR: PT 9.4  , INR 0.87       [02-06-18 @ 06:54]  PTT: 27.3       [02-06-18 @ 06:54]    Uric acid 3.3      [02-06-18 @ 06:54]        [02-06-18 @ 06:54]    Creatinine Trend:  SCr 3.04 [02-06 @ 06:54]  SCr 4.06 [02-05 @ 07:25]  SCr 2.66 [02-04 @ 07:01]  SCr 1.53 [02-03 @ 19:20]  SCr 2.74 [02-02 @ 10:25]    Urinalysis - [01-31-18 @ 07:07]      Color PL Yellow / Appearance Clear / SG 1.008 / pH 8.5      Gluc Negative / Ketone Negative  / Bili Negative / Urobili Negative       Blood Negative / Protein 500 / Leuk Est Negative / Nitrite Negative      RBC 5-10 / WBC 2-5 / Hyaline  / Gran  / Sq Epi  / Non Sq Epi Few / Bacteria         HBsAb <3.0      [02-03-18 @ 14:48]  HBsAg Nonreact      [02-03-18 @ 14:48]  HBcAb Nonreact      [02-03-18 @ 14:48]  HCV 0.06, Nonreact      [02-03-18 @ 14:48]    Syphilis Screen (Treponema Pallidum Ab) Negative      [01-31-18 @ 05:51] 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  Recently had fetal distress when UF was tried.  Saw and evaluated the patient during HD today.  Feeling well no CP no SOB no vomiting no diarrhea, no pain.    PAST HISTORY  --------------------------------------------------------------------------------  No significant changes to PMH, PSH, FHx, SHx, unless otherwise noted    ALLERGIES & MEDICATIONS  --------------------------------------------------------------------------------  Allergies    No Known Allergies    Intolerances      Standing Inpatient Medications  aspirin  chewable 81 milliGRAM(s) Oral daily  diphenhydrAMINE   Capsule 25 milliGRAM(s) Oral at bedtime  doxercalciferol Injectable 2 MICROGram(s) IV Push <User Schedule>  epoetin leeann Injectable 8000 Unit(s) IV Push <User Schedule>  ferrous    sulfate 325 milliGRAM(s) Oral daily  heparin  Injectable 5000 Unit(s) SubCutaneous every 12 hours  labetalol 400 milliGRAM(s) Oral every 8 hours  oseltamivir 30 milliGRAM(s) Oral every other day  prenatal multivitamin 1 Tablet(s) Oral daily    PRN Inpatient Medications      REVIEW OF SYSTEMS  --------------------------------------------------------------------------------  Gen: No  fevers/chills  Head/Eyes/Ears/Mouth: No headache  Respiratory: No dyspnea  CV: No chest pain  GI: No abdominal pain  MSK: no edema  Neuro: No dizziness/lightheadedness      All other systems were reviewed and are negative, except as noted.    VITALS/PHYSICAL EXAM  --------------------------------------------------------------------------------  T(C): 37.1 (02-06-18 @ 07:32), Max: 37.1 (02-05-18 @ 18:14)  HR: 89 (02-06-18 @ 07:32) (81 - 96)  BP: 150/95 (02-06-18 @ 07:32) (134/87 - 150/95)  RR: 18 (02-06-18 @ 07:32) (16 - 18)  SpO2: 97% (02-06-18 @ 05:50) (96% - 100%)  Wt(kg): --    Weight (kg): 62.8 (02-06-18 @ 07:32)      02-05-18 @ 07:01  -  02-06-18 @ 07:00  --------------------------------------------------------  IN: 0 mL / OUT: 600 mL / NET: -600 mL    Physical Exam:  	Gen: NAD  	HEENT: anicteric  	Pulm: rales at right lung base  	CV: S1S2  	Abd: gravid uterus  	Psych: Normal affect and mood  	Skin: Warm  	Vascular access: GALO KANG    LABS/STUDIES  --------------------------------------------------------------------------------              8.8    8.3   >-----------<  176      [02-06-18 @ 06:54]              25.0     135  |  99  |  12  ----------------------------<  73      [02-06-18 @ 06:54]  4.2   |  27  |  3.04        Ca     8.9     [02-06-18 @ 06:54]    TPro  5.9  /  Alb  3.0  /  TBili  0.2  /  DBili  x   /  AST  18  /  ALT  17  /  AlkPhos  108  [02-06-18 @ 06:54]    PT/INR: PT 9.4  , INR 0.87       [02-06-18 @ 06:54]  PTT: 27.3       [02-06-18 @ 06:54]    Uric acid 3.3      [02-06-18 @ 06:54]        [02-06-18 @ 06:54]    Creatinine Trend:  SCr 3.04 [02-06 @ 06:54]  SCr 4.06 [02-05 @ 07:25]  SCr 2.66 [02-04 @ 07:01]  SCr 1.53 [02-03 @ 19:20]  SCr 2.74 [02-02 @ 10:25]    Urinalysis - [01-31-18 @ 07:07]      Color PL Yellow / Appearance Clear / SG 1.008 / pH 8.5      Gluc Negative / Ketone Negative  / Bili Negative / Urobili Negative       Blood Negative / Protein 500 / Leuk Est Negative / Nitrite Negative      RBC 5-10 / WBC 2-5 / Hyaline  / Gran  / Sq Epi  / Non Sq Epi Few / Bacteria         HBsAb <3.0      [02-03-18 @ 14:48]  HBsAg Nonreact      [02-03-18 @ 14:48]  HBcAb Nonreact      [02-03-18 @ 14:48]  HCV 0.06, Nonreact      [02-03-18 @ 14:48]    Syphilis Screen (Treponema Pallidum Ab) Negative      [01-31-18 @ 05:51]

## 2018-02-06 NOTE — PROGRESS NOTE ADULT - PROBLEM SELECTOR PLAN 1
Pt with ESRD on HD x 6 days a week. seen on HD today.   Plan for 500 cc UF today with dialysis.  HELLP/pre-eclampsia labs reviewed, unremarkable at this time. Pt with ESRD on HD x 6 days a week. seen on HD today. AVF working well with good blood flow. Plan for 500 cc UF today with dialysis.  HELLP/pre-eclampsia labs reviewed, unremarkable at this time. Pt with ESRD on HD x 6 days a week. seen on HD today. AVF working well with good blood flow. Plan for 500 cc UF today with dialysis.

## 2018-02-07 LAB
ALBUMIN SERPL ELPH-MCNC: 3 G/DL — LOW (ref 3.3–5)
ALP SERPL-CCNC: 103 U/L — SIGNIFICANT CHANGE UP (ref 40–120)
ALT FLD-CCNC: 18 U/L RC — SIGNIFICANT CHANGE UP (ref 10–45)
ANION GAP SERPL CALC-SCNC: 8 MMOL/L — SIGNIFICANT CHANGE UP (ref 5–17)
APTT BLD: 25.9 SEC — LOW (ref 27.5–37.4)
APTT BLD: 28.7 SEC — SIGNIFICANT CHANGE UP (ref 27.5–37.4)
AST SERPL-CCNC: 17 U/L — SIGNIFICANT CHANGE UP (ref 10–40)
BASOPHILS # BLD AUTO: 0 K/UL — SIGNIFICANT CHANGE UP (ref 0–0.2)
BASOPHILS NFR BLD AUTO: 0.5 % — SIGNIFICANT CHANGE UP (ref 0–2)
BILIRUB SERPL-MCNC: 0.2 MG/DL — SIGNIFICANT CHANGE UP (ref 0.2–1.2)
BUN SERPL-MCNC: 15 MG/DL — SIGNIFICANT CHANGE UP (ref 7–23)
CALCIUM SERPL-MCNC: 8.9 MG/DL — SIGNIFICANT CHANGE UP (ref 8.4–10.5)
CHLORIDE SERPL-SCNC: 100 MMOL/L — SIGNIFICANT CHANGE UP (ref 96–108)
CO2 SERPL-SCNC: 29 MMOL/L — SIGNIFICANT CHANGE UP (ref 22–31)
CREAT SERPL-MCNC: 3.08 MG/DL — HIGH (ref 0.5–1.3)
EOSINOPHIL # BLD AUTO: 0.1 K/UL — SIGNIFICANT CHANGE UP (ref 0–0.5)
EOSINOPHIL NFR BLD AUTO: 0.7 % — SIGNIFICANT CHANGE UP (ref 0–6)
FIBRINOGEN PPP-MCNC: 600 MG/DL — HIGH (ref 310–510)
GLUCOSE SERPL-MCNC: 76 MG/DL — SIGNIFICANT CHANGE UP (ref 70–99)
HCT VFR BLD CALC: 24.3 % — LOW (ref 34.5–45)
HGB BLD-MCNC: 8.5 G/DL — LOW (ref 11.5–15.5)
INR BLD: 0.86 RATIO — LOW (ref 0.88–1.16)
INR BLD: 0.9 RATIO — SIGNIFICANT CHANGE UP (ref 0.88–1.16)
LDH SERPL L TO P-CCNC: 163 U/L — SIGNIFICANT CHANGE UP (ref 50–242)
LYMPHOCYTES # BLD AUTO: 1.3 K/UL — SIGNIFICANT CHANGE UP (ref 1–3.3)
LYMPHOCYTES # BLD AUTO: 14.4 % — SIGNIFICANT CHANGE UP (ref 13–44)
MCHC RBC-ENTMCNC: 32.7 PG — SIGNIFICANT CHANGE UP (ref 27–34)
MCHC RBC-ENTMCNC: 35 GM/DL — SIGNIFICANT CHANGE UP (ref 32–36)
MCV RBC AUTO: 93.4 FL — SIGNIFICANT CHANGE UP (ref 80–100)
MONOCYTES # BLD AUTO: 0.7 K/UL — SIGNIFICANT CHANGE UP (ref 0–0.9)
MONOCYTES NFR BLD AUTO: 7.5 % — SIGNIFICANT CHANGE UP (ref 2–14)
NEUTROPHILS # BLD AUTO: 6.9 K/UL — SIGNIFICANT CHANGE UP (ref 1.8–7.4)
NEUTROPHILS NFR BLD AUTO: 76.9 % — SIGNIFICANT CHANGE UP (ref 43–77)
PLATELET # BLD AUTO: 172 K/UL — SIGNIFICANT CHANGE UP (ref 150–400)
POTASSIUM SERPL-MCNC: 4.4 MMOL/L — SIGNIFICANT CHANGE UP (ref 3.5–5.3)
POTASSIUM SERPL-SCNC: 4.4 MMOL/L — SIGNIFICANT CHANGE UP (ref 3.5–5.3)
PROT SERPL-MCNC: 5.8 G/DL — LOW (ref 6–8.3)
PROTHROM AB SERPL-ACNC: 9.3 SEC — LOW (ref 9.8–12.7)
PROTHROM AB SERPL-ACNC: 9.7 SEC — LOW (ref 9.8–12.7)
RBC # BLD: 2.6 M/UL — LOW (ref 3.8–5.2)
RBC # FLD: 15.4 % — HIGH (ref 10.3–14.5)
SODIUM SERPL-SCNC: 137 MMOL/L — SIGNIFICANT CHANGE UP (ref 135–145)
URATE SERPL-MCNC: 3.3 MG/DL — SIGNIFICANT CHANGE UP (ref 2.5–7)
WBC # BLD: 9 K/UL — SIGNIFICANT CHANGE UP (ref 3.8–10.5)
WBC # FLD AUTO: 9 K/UL — SIGNIFICANT CHANGE UP (ref 3.8–10.5)

## 2018-02-07 PROCEDURE — 99232 SBSQ HOSP IP/OBS MODERATE 35: CPT

## 2018-02-07 PROCEDURE — 90935 HEMODIALYSIS ONE EVALUATION: CPT | Mod: GC

## 2018-02-07 RX ORDER — LABETALOL HCL 100 MG
40 TABLET ORAL ONCE
Qty: 0 | Refills: 0 | Status: COMPLETED | OUTPATIENT
Start: 2018-02-07 | End: 2018-02-07

## 2018-02-07 RX ORDER — LABETALOL HCL 100 MG
20 TABLET ORAL ONCE
Qty: 0 | Refills: 0 | Status: COMPLETED | OUTPATIENT
Start: 2018-02-07 | End: 2018-02-07

## 2018-02-07 RX ORDER — ACETAMINOPHEN 500 MG
975 TABLET ORAL ONCE
Qty: 0 | Refills: 0 | Status: COMPLETED | OUTPATIENT
Start: 2018-02-07 | End: 2018-02-07

## 2018-02-07 RX ADMIN — Medication 20 MILLIGRAM(S): at 21:58

## 2018-02-07 RX ADMIN — ERYTHROPOIETIN 8000 UNIT(S): 10000 INJECTION, SOLUTION INTRAVENOUS; SUBCUTANEOUS at 10:56

## 2018-02-07 RX ADMIN — Medication 12 MILLIGRAM(S): at 06:51

## 2018-02-07 RX ADMIN — DOXERCALCIFEROL 2 MICROGRAM(S): 2.5 CAPSULE ORAL at 11:01

## 2018-02-07 RX ADMIN — Medication 25 MILLIGRAM(S): at 22:55

## 2018-02-07 RX ADMIN — Medication 25 MILLIGRAM(S): at 00:14

## 2018-02-07 RX ADMIN — Medication 325 MILLIGRAM(S): at 22:56

## 2018-02-07 RX ADMIN — Medication 81 MILLIGRAM(S): at 22:56

## 2018-02-07 RX ADMIN — HEPARIN SODIUM 5000 UNIT(S): 5000 INJECTION INTRAVENOUS; SUBCUTANEOUS at 07:49

## 2018-02-07 RX ADMIN — Medication 400 MILLIGRAM(S): at 00:14

## 2018-02-07 RX ADMIN — Medication 40 MILLIGRAM(S): at 23:43

## 2018-02-07 RX ADMIN — Medication 400 MILLIGRAM(S): at 19:53

## 2018-02-07 RX ADMIN — Medication 1 TABLET(S): at 22:56

## 2018-02-07 RX ADMIN — HEPARIN SODIUM 5000 UNIT(S): 5000 INJECTION INTRAVENOUS; SUBCUTANEOUS at 20:17

## 2018-02-07 RX ADMIN — Medication 400 MILLIGRAM(S): at 07:44

## 2018-02-07 RX ADMIN — Medication 975 MILLIGRAM(S): at 20:56

## 2018-02-07 NOTE — PROGRESS NOTE ADULT - SUBJECTIVE AND OBJECTIVE BOX
[facility] DIVISION OF KIDNEY DISEASES AND HYPERTENSION -- HEMODIALYSIS NOTE  --------------------------------------------------------------------------------  Chief Complaint: ESRD/Ongoing hemodialysis requirement    24 hour events/subjective:  tolerating HD well      PAST HISTORY  --------------------------------------------------------------------------------  No significant changes to PMH, PSH, FHx, SHx, unless otherwise noted    ALLERGIES & MEDICATIONS  --------------------------------------------------------------------------------  Allergies    No Known Allergies    Intolerances      Standing Inpatient Medications  aspirin  chewable 81 milliGRAM(s) Oral daily  betamethasone Injectable 12 milliGRAM(s) IntraMuscular every 24 hours  diphenhydrAMINE   Capsule 25 milliGRAM(s) Oral at bedtime  doxercalciferol Injectable 2 MICROGram(s) IV Push <User Schedule>  epoetin leeann Injectable 8000 Unit(s) IV Push <User Schedule>  ferrous    sulfate 325 milliGRAM(s) Oral daily  heparin  Injectable 5000 Unit(s) SubCutaneous every 12 hours  labetalol 400 milliGRAM(s) Oral every 8 hours  oseltamivir 30 milliGRAM(s) Oral every other day  prenatal multivitamin 1 Tablet(s) Oral daily    PRN Inpatient Medications        VITALS/PHYSICAL EXAM  --------------------------------------------------------------------------------  T(C): 36.7 (02-07-18 @ 10:15), Max: 37 (02-07-18 @ 06:00)  HR: 87 (02-07-18 @ 10:15) (80 - 95)  BP: 142/90 (02-07-18 @ 10:15) (130/83 - 146/88)  RR: 18 (02-07-18 @ 10:15) (18 - 18)  SpO2: 98% (02-07-18 @ 10:15) (96% - 98%)  Wt(kg): --    Weight (kg): 62.8 (02-06-18 @ 07:32)      02-06-18 @ 07:01  -  02-07-18 @ 07:00  --------------------------------------------------------  IN: 0 mL / OUT: 500 mL / NET: -500 mL      Physical Exam:  	Gen: NAD, well-appearing  	HEENT: PERRL, supple neck, clear oropharynx  	Pulm: CTA B/L  	CV: RRR, S1S2; no rub  	Abd: +BS, soft, nontender/nondistended   	      LABS/STUDIES  --------------------------------------------------------------------------------              8.5    9.0   >-----------<  172      [02-07-18 @ 06:54]              24.3     137  |  100  |  15  ----------------------------<  76      [02-07-18 @ 06:54]  4.4   |  29  |  3.08        Ca     8.9     [02-07-18 @ 06:54]    TPro  5.8  /  Alb  3.0  /  TBili  0.2  /  DBili  x   /  AST  17  /  ALT  18  /  AlkPhos  103  [02-07-18 @ 06:54]    PT/INR: PT 9.3  , INR 0.86       [02-07-18 @ 06:54]  PTT: 28.7       [02-07-18 @ 06:54]    Uric acid 3.3      [02-07-18 @ 06:54]        [02-07-18 @ 06:54]    PTH -- (Ca 8.4)      [01-29-18 @ 13:57]   225  HbA1c 5.7      [07-15-16 @ 08:42]  TSH 0.01      [10-02-17 @ 23:50]    HBsAb <3.0      [02-03-18 @ 14:48]  HBsAg Nonreact      [02-03-18 @ 14:48]  HBcAb Nonreact      [02-03-18 @ 14:48]  HCV 0.06, Nonreact      [02-03-18 @ 14:48]

## 2018-02-07 NOTE — PROGRESS NOTE ADULT - PROBLEM SELECTOR PLAN 1
Pt with ESRD on HD x 6 days a week. seen on HD today. AVF working well with good blood flow. Plan for 500 cc UF today with dialysis.

## 2018-02-08 ENCOUNTER — RESULT REVIEW (OUTPATIENT)
Age: 28
End: 2018-02-08

## 2018-02-08 ENCOUNTER — TRANSCRIPTION ENCOUNTER (OUTPATIENT)
Age: 28
End: 2018-02-08

## 2018-02-08 LAB
ALBUMIN SERPL ELPH-MCNC: 3.1 G/DL — LOW (ref 3.3–5)
ALP SERPL-CCNC: 119 U/L — SIGNIFICANT CHANGE UP (ref 40–120)
ALT FLD-CCNC: 23 U/L RC — SIGNIFICANT CHANGE UP (ref 10–45)
ANION GAP SERPL CALC-SCNC: 10 MMOL/L — SIGNIFICANT CHANGE UP (ref 5–17)
APPEARANCE UR: ABNORMAL
APTT BLD: 30.9 SEC — SIGNIFICANT CHANGE UP (ref 27.5–37.4)
AST SERPL-CCNC: 24 U/L — SIGNIFICANT CHANGE UP (ref 10–40)
BASOPHILS # BLD AUTO: 0 K/UL — SIGNIFICANT CHANGE UP (ref 0–0.2)
BASOPHILS NFR BLD AUTO: 0.3 % — SIGNIFICANT CHANGE UP (ref 0–2)
BILIRUB SERPL-MCNC: 0.3 MG/DL — SIGNIFICANT CHANGE UP (ref 0.2–1.2)
BILIRUB UR-MCNC: NEGATIVE — SIGNIFICANT CHANGE UP
BUN SERPL-MCNC: 12 MG/DL — SIGNIFICANT CHANGE UP (ref 7–23)
CALCIUM SERPL-MCNC: 9.4 MG/DL — SIGNIFICANT CHANGE UP (ref 8.4–10.5)
CHLORIDE SERPL-SCNC: 97 MMOL/L — SIGNIFICANT CHANGE UP (ref 96–108)
CO2 SERPL-SCNC: 28 MMOL/L — SIGNIFICANT CHANGE UP (ref 22–31)
COLOR SPEC: YELLOW — SIGNIFICANT CHANGE UP
CREAT SERPL-MCNC: 2.33 MG/DL — HIGH (ref 0.5–1.3)
DIFF PNL FLD: NEGATIVE — SIGNIFICANT CHANGE UP
EOSINOPHIL # BLD AUTO: 0 K/UL — SIGNIFICANT CHANGE UP (ref 0–0.5)
EOSINOPHIL NFR BLD AUTO: 0.1 % — SIGNIFICANT CHANGE UP (ref 0–6)
FIBRINOGEN PPP-MCNC: 699 MG/DL — HIGH (ref 310–510)
GLUCOSE SERPL-MCNC: 90 MG/DL — SIGNIFICANT CHANGE UP (ref 70–99)
GLUCOSE UR QL: NEGATIVE — SIGNIFICANT CHANGE UP
HCT VFR BLD CALC: 26.9 % — LOW (ref 34.5–45)
HGB BLD-MCNC: 9.3 G/DL — LOW (ref 11.5–15.5)
INR BLD: 0.91 RATIO — SIGNIFICANT CHANGE UP (ref 0.88–1.16)
KETONES UR-MCNC: NEGATIVE — SIGNIFICANT CHANGE UP
LDH SERPL L TO P-CCNC: 189 U/L — SIGNIFICANT CHANGE UP (ref 50–242)
LEUKOCYTE ESTERASE UR-ACNC: NEGATIVE — SIGNIFICANT CHANGE UP
LYMPHOCYTES # BLD AUTO: 1.2 K/UL — SIGNIFICANT CHANGE UP (ref 1–3.3)
LYMPHOCYTES # BLD AUTO: 8.8 % — LOW (ref 13–44)
MAGNESIUM SERPL-MCNC: 2.8 MG/DL — HIGH (ref 1.6–2.6)
MCHC RBC-ENTMCNC: 32.1 PG — SIGNIFICANT CHANGE UP (ref 27–34)
MCHC RBC-ENTMCNC: 34.5 GM/DL — SIGNIFICANT CHANGE UP (ref 32–36)
MCV RBC AUTO: 93.1 FL — SIGNIFICANT CHANGE UP (ref 80–100)
MONOCYTES # BLD AUTO: 0.8 K/UL — SIGNIFICANT CHANGE UP (ref 0–0.9)
MONOCYTES NFR BLD AUTO: 6 % — SIGNIFICANT CHANGE UP (ref 2–14)
NEUTROPHILS # BLD AUTO: 11.7 K/UL — HIGH (ref 1.8–7.4)
NEUTROPHILS NFR BLD AUTO: 84.8 % — HIGH (ref 43–77)
NITRITE UR-MCNC: NEGATIVE — SIGNIFICANT CHANGE UP
PH UR: 8.5 — HIGH (ref 5–8)
PLATELET # BLD AUTO: 207 K/UL — SIGNIFICANT CHANGE UP (ref 150–400)
POTASSIUM SERPL-MCNC: 4.2 MMOL/L — SIGNIFICANT CHANGE UP (ref 3.5–5.3)
POTASSIUM SERPL-SCNC: 4.2 MMOL/L — SIGNIFICANT CHANGE UP (ref 3.5–5.3)
PROT SERPL-MCNC: 6.3 G/DL — SIGNIFICANT CHANGE UP (ref 6–8.3)
PROT UR-MCNC: >600 MG/DL
PROTHROM AB SERPL-ACNC: 9.8 SEC — SIGNIFICANT CHANGE UP (ref 9.8–12.7)
RBC # BLD: 2.89 M/UL — LOW (ref 3.8–5.2)
RBC # FLD: 15.6 % — HIGH (ref 10.3–14.5)
SODIUM SERPL-SCNC: 135 MMOL/L — SIGNIFICANT CHANGE UP (ref 135–145)
SP GR SPEC: 1.01 — LOW (ref 1.01–1.02)
URATE SERPL-MCNC: 2.7 MG/DL — SIGNIFICANT CHANGE UP (ref 2.5–7)
UROBILINOGEN FLD QL: NEGATIVE — SIGNIFICANT CHANGE UP
WBC # BLD: 13.7 K/UL — HIGH (ref 3.8–10.5)
WBC # FLD AUTO: 13.7 K/UL — HIGH (ref 3.8–10.5)

## 2018-02-08 PROCEDURE — 59514 CESAREAN DELIVERY ONLY: CPT | Mod: U9

## 2018-02-08 PROCEDURE — 99233 SBSQ HOSP IP/OBS HIGH 50: CPT | Mod: GC

## 2018-02-08 PROCEDURE — 58700 REMOVAL OF FALLOPIAN TUBE: CPT | Mod: GC

## 2018-02-08 RX ORDER — OXYTOCIN 10 UNIT/ML
41.67 VIAL (ML) INJECTION
Qty: 20 | Refills: 0 | Status: DISCONTINUED | OUTPATIENT
Start: 2018-02-08 | End: 2018-02-08

## 2018-02-08 RX ORDER — FERROUS SULFATE 325(65) MG
325 TABLET ORAL DAILY
Qty: 0 | Refills: 0 | Status: DISCONTINUED | OUTPATIENT
Start: 2018-02-09 | End: 2018-02-11

## 2018-02-08 RX ORDER — GLYCERIN ADULT
1 SUPPOSITORY, RECTAL RECTAL AT BEDTIME
Qty: 0 | Refills: 0 | Status: DISCONTINUED | OUTPATIENT
Start: 2018-02-09 | End: 2018-02-13

## 2018-02-08 RX ORDER — SODIUM CHLORIDE 9 MG/ML
1000 INJECTION, SOLUTION INTRAVENOUS
Qty: 0 | Refills: 0 | Status: DISCONTINUED | OUTPATIENT
Start: 2018-02-08 | End: 2018-02-08

## 2018-02-08 RX ORDER — ACETAMINOPHEN 500 MG
1000 TABLET ORAL ONCE
Qty: 0 | Refills: 0 | Status: COMPLETED | OUTPATIENT
Start: 2018-02-08 | End: 2018-02-08

## 2018-02-08 RX ORDER — ONDANSETRON 8 MG/1
4 TABLET, FILM COATED ORAL EVERY 6 HOURS
Qty: 0 | Refills: 0 | Status: DISCONTINUED | OUTPATIENT
Start: 2018-02-08 | End: 2018-02-10

## 2018-02-08 RX ORDER — LANOLIN
1 OINTMENT (GRAM) TOPICAL
Qty: 0 | Refills: 0 | Status: DISCONTINUED | OUTPATIENT
Start: 2018-02-09 | End: 2018-02-13

## 2018-02-08 RX ORDER — MAGNESIUM SULFATE 500 MG/ML
2 VIAL (ML) INJECTION ONCE
Qty: 0 | Refills: 0 | Status: COMPLETED | OUTPATIENT
Start: 2018-02-08 | End: 2018-02-08

## 2018-02-08 RX ORDER — TETANUS TOXOID, REDUCED DIPHTHERIA TOXOID AND ACELLULAR PERTUSSIS VACCINE, ADSORBED 5; 2.5; 8; 8; 2.5 [IU]/.5ML; [IU]/.5ML; UG/.5ML; UG/.5ML; UG/.5ML
0.5 SUSPENSION INTRAMUSCULAR ONCE
Qty: 0 | Refills: 0 | Status: COMPLETED | OUTPATIENT
Start: 2018-02-09

## 2018-02-08 RX ORDER — LEVETIRACETAM 250 MG/1
500 TABLET, FILM COATED ORAL
Qty: 0 | Refills: 0 | Status: DISCONTINUED | OUTPATIENT
Start: 2018-02-07 | End: 2018-02-09

## 2018-02-08 RX ORDER — DOCUSATE SODIUM 100 MG
100 CAPSULE ORAL
Qty: 0 | Refills: 0 | Status: DISCONTINUED | OUTPATIENT
Start: 2018-02-09 | End: 2018-02-13

## 2018-02-08 RX ORDER — SIMETHICONE 80 MG/1
80 TABLET, CHEWABLE ORAL EVERY 4 HOURS
Qty: 0 | Refills: 0 | Status: DISCONTINUED | OUTPATIENT
Start: 2018-02-09 | End: 2018-02-13

## 2018-02-08 RX ORDER — HYDRALAZINE HCL 50 MG
5 TABLET ORAL ONCE
Qty: 0 | Refills: 0 | Status: COMPLETED | OUTPATIENT
Start: 2018-02-08 | End: 2018-02-08

## 2018-02-08 RX ORDER — NALOXONE HYDROCHLORIDE 4 MG/.1ML
0.1 SPRAY NASAL
Qty: 0 | Refills: 0 | Status: DISCONTINUED | OUTPATIENT
Start: 2018-02-08 | End: 2018-02-10

## 2018-02-08 RX ORDER — DIPHENHYDRAMINE HCL 50 MG
25 CAPSULE ORAL EVERY 6 HOURS
Qty: 0 | Refills: 0 | Status: DISCONTINUED | OUTPATIENT
Start: 2018-02-09 | End: 2018-02-13

## 2018-02-08 RX ORDER — OXYTOCIN 10 UNIT/ML
333.33 VIAL (ML) INJECTION
Qty: 20 | Refills: 0 | Status: DISCONTINUED | OUTPATIENT
Start: 2018-02-08 | End: 2018-02-13

## 2018-02-08 RX ORDER — OXYTOCIN 10 UNIT/ML
20 VIAL (ML) INJECTION
Qty: 20 | Refills: 0 | Status: DISCONTINUED | OUTPATIENT
Start: 2018-02-08 | End: 2018-02-13

## 2018-02-08 RX ORDER — MAGNESIUM SULFATE 500 MG/ML
2 VIAL (ML) INJECTION ONCE
Qty: 0 | Refills: 0 | Status: DISCONTINUED | OUTPATIENT
Start: 2018-02-08 | End: 2018-02-08

## 2018-02-08 RX ORDER — HYDROMORPHONE HYDROCHLORIDE 2 MG/ML
0.5 INJECTION INTRAMUSCULAR; INTRAVENOUS; SUBCUTANEOUS
Qty: 0 | Refills: 0 | Status: DISCONTINUED | OUTPATIENT
Start: 2018-02-08 | End: 2018-02-10

## 2018-02-08 RX ORDER — OXYCODONE HYDROCHLORIDE 5 MG/1
5 TABLET ORAL
Qty: 0 | Refills: 0 | Status: COMPLETED | OUTPATIENT
Start: 2018-02-10 | End: 2018-02-17

## 2018-02-08 RX ORDER — ACETAMINOPHEN 500 MG
975 TABLET ORAL EVERY 6 HOURS
Qty: 0 | Refills: 0 | Status: DISCONTINUED | OUTPATIENT
Start: 2018-02-10 | End: 2018-02-13

## 2018-02-08 RX ORDER — HEPARIN SODIUM 5000 [USP'U]/ML
5000 INJECTION INTRAVENOUS; SUBCUTANEOUS EVERY 12 HOURS
Qty: 0 | Refills: 0 | Status: DISCONTINUED | OUTPATIENT
Start: 2018-02-09 | End: 2018-02-13

## 2018-02-08 RX ORDER — SODIUM CHLORIDE 9 MG/ML
1000 INJECTION, SOLUTION INTRAVENOUS
Qty: 0 | Refills: 0 | Status: DISCONTINUED | OUTPATIENT
Start: 2018-02-09 | End: 2018-02-11

## 2018-02-08 RX ORDER — HYDROMORPHONE HYDROCHLORIDE 2 MG/ML
30 INJECTION INTRAMUSCULAR; INTRAVENOUS; SUBCUTANEOUS
Qty: 0 | Refills: 0 | Status: DISCONTINUED | OUTPATIENT
Start: 2018-02-08 | End: 2018-02-10

## 2018-02-08 RX ADMIN — LEVETIRACETAM 500 MILLIGRAM(S): 250 TABLET, FILM COATED ORAL at 19:39

## 2018-02-08 RX ADMIN — Medication 400 MILLIGRAM(S): at 10:14

## 2018-02-08 RX ADMIN — Medication 400 MILLIGRAM(S): at 20:40

## 2018-02-08 RX ADMIN — Medication 400 MILLIGRAM(S): at 02:32

## 2018-02-08 RX ADMIN — HYDROMORPHONE HYDROCHLORIDE 30 MILLILITER(S): 2 INJECTION INTRAMUSCULAR; INTRAVENOUS; SUBCUTANEOUS at 22:04

## 2018-02-08 RX ADMIN — Medication 300 GRAM(S): at 16:20

## 2018-02-08 RX ADMIN — Medication 5 MILLIGRAM(S): at 07:24

## 2018-02-08 RX ADMIN — Medication 1000 MILLIGRAM(S): at 21:17

## 2018-02-08 RX ADMIN — Medication 400 MILLIGRAM(S): at 19:25

## 2018-02-08 RX ADMIN — LEVETIRACETAM 500 MILLIGRAM(S): 250 TABLET, FILM COATED ORAL at 00:22

## 2018-02-08 RX ADMIN — SIMETHICONE 80 MILLIGRAM(S): 80 TABLET, CHEWABLE ORAL at 23:45

## 2018-02-08 RX ADMIN — HYDROMORPHONE HYDROCHLORIDE 30 MILLILITER(S): 2 INJECTION INTRAMUSCULAR; INTRAVENOUS; SUBCUTANEOUS at 19:52

## 2018-02-08 RX ADMIN — Medication 12 MILLIGRAM(S): at 06:40

## 2018-02-08 NOTE — PROGRESS NOTE ADULT - PROBLEM SELECTOR PLAN 1
Pt with ESRD on HD x 6 days a week. seen on HD today. AVF working well with good blood flow. Plan for 500 cc UF today with dialysis.  Spoke with OB team and patient.  Given rising blood pressure and concern for worsening pre-eclampsia patient will be delivered today.  If magnesium is being used recommend no maintenance infusion bolus only with regular q6 hour blood work.

## 2018-02-08 NOTE — PROGRESS NOTE ADULT - SUBJECTIVE AND OBJECTIVE BOX
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  Recently had fetal distress when UF was tried.  Saw and evaluated the patient during HD today.  Patient seen at bedside with OB resident Sample and pacific  #[id number].  Patient not in pain, not sob.  Overnight blood pressure herber required IV pushes of medication.      PAST HISTORY  --------------------------------------------------------------------------------  No significant changes to PMH, PSH, FHx, SHx, unless otherwise noted    ALLERGIES & MEDICATIONS  --------------------------------------------------------------------------------  Allergies    No Known Allergies    Intolerances      Standing Inpatient Medications  aspirin  chewable 81 milliGRAM(s) Oral daily  diphenhydrAMINE   Capsule 25 milliGRAM(s) Oral at bedtime  doxercalciferol Injectable 2 MICROGram(s) IV Push <User Schedule>  epoetin leeann Injectable 8000 Unit(s) IV Push <User Schedule>  ferrous    sulfate 325 milliGRAM(s) Oral daily  labetalol 400 milliGRAM(s) Oral every 8 hours  levETIRAcetam 500 milliGRAM(s) Oral two times a day  oseltamivir 30 milliGRAM(s) Oral every other day  prenatal multivitamin 1 Tablet(s) Oral daily    PRN Inpatient Medications      REVIEW OF SYSTEMS  --------------------------------------------------------------------------------  Gen: No fever  CV: no CP  Pulm: no dyspnea  Neuro: no headace    VITALS/PHYSICAL EXAM  --------------------------------------------------------------------------------  T(C): 36.9 (02-08-18 @ 11:52), Max: 37 (02-07-18 @ 14:15)  HR: 94 (02-08-18 @ 11:52) (84 - 94)  BP: 123/73 (02-08-18 @ 11:52) (123/73 - 150/88)  RR: 18 (02-08-18 @ 11:52) (18 - 18)  SpO2: 100% (02-08-18 @ 11:52) (98% - 100%)  Wt(kg): --        02-07-18 @ 07:01  -  02-08-18 @ 07:00  --------------------------------------------------------  IN: 0 mL / OUT: 600 mL / NET: -600 mL    02-08-18 @ 07:01  -  02-08-18 @ 14:00  --------------------------------------------------------  IN: 0 mL / OUT: 150 mL / NET: -150 mL      Physical Exam:  	Gen: NAD  	HEENT: anicteric  	Pulm: no rales today  	CV: S1S2  	Abd: gravid uterus  	Psych: Normal affect and mood  	Skin: Warm  	Vascular access: GALO KANG    LABS/STUDIES  --------------------------------------------------------------------------------              9.3    13.7  >-----------<  207      [02-08-18 @ 00:19]              26.9     135  |  97  |  12  ----------------------------<  90      [02-08-18 @ 00:19]  4.2   |  28  |  2.33        Ca     9.4     [02-08-18 @ 00:19]    TPro  6.3  /  Alb  3.1  /  TBili  0.3  /  DBili  x   /  AST  24  /  ALT  23  /  AlkPhos  119  [02-08-18 @ 00:19]    PT/INR: PT 9.8  , INR 0.91       [02-08-18 @ 00:19]  PTT: 30.9       [02-08-18 @ 00:19]    Uric acid 2.7      [02-08-18 @ 00:19]        [02-08-18 @ 00:19]    Creatinine Trend:  SCr 2.33 [02-08 @ 00:19]  SCr 3.08 [02-07 @ 06:54]  SCr 3.04 [02-06 @ 06:54]  SCr 4.06 [02-05 @ 07:25]  SCr 2.66 [02-04 @ 07:01]    Urinalysis - [02-08-18 @ 00:48]      Color Yellow / Appearance SL Turbid / SG 1.009 / pH 8.5      Gluc Negative / Ketone Negative  / Bili Negative / Urobili Negative       Blood Negative / Protein >600 / Leuk Est Negative / Nitrite Negative      RBC 0-2 / WBC 2-5 / Hyaline 5-10 / Gran  / Sq Epi  / Non Sq Epi Moderate / Bacteria         HBsAb <3.0      [02-03-18 @ 14:48]  HBsAg Nonreact      [02-03-18 @ 14:48]  HBcAb Nonreact      [02-03-18 @ 14:48]  HCV 0.06, Nonreact      [02-03-18 @ 14:48]

## 2018-02-09 ENCOUNTER — TRANSCRIPTION ENCOUNTER (OUTPATIENT)
Age: 28
End: 2018-02-09

## 2018-02-09 DIAGNOSIS — N18.5 CHRONIC KIDNEY DISEASE, STAGE 5: ICD-10-CM

## 2018-02-09 LAB
ALBUMIN SERPL ELPH-MCNC: 3.1 G/DL — LOW (ref 3.3–5)
ALP SERPL-CCNC: 98 U/L — SIGNIFICANT CHANGE UP (ref 40–120)
ALT FLD-CCNC: 24 U/L RC — SIGNIFICANT CHANGE UP (ref 10–45)
ANION GAP SERPL CALC-SCNC: 8 MMOL/L — SIGNIFICANT CHANGE UP (ref 5–17)
APTT BLD: 21.9 SEC — LOW (ref 27.5–37.4)
AST SERPL-CCNC: 28 U/L — SIGNIFICANT CHANGE UP (ref 10–40)
BASOPHILS # BLD AUTO: 0 K/UL — SIGNIFICANT CHANGE UP (ref 0–0.2)
BASOPHILS NFR BLD AUTO: 0.2 % — SIGNIFICANT CHANGE UP (ref 0–2)
BILIRUB SERPL-MCNC: 0.2 MG/DL — SIGNIFICANT CHANGE UP (ref 0.2–1.2)
BUN SERPL-MCNC: 12 MG/DL — SIGNIFICANT CHANGE UP (ref 7–23)
CALCIUM SERPL-MCNC: 9.2 MG/DL — SIGNIFICANT CHANGE UP (ref 8.4–10.5)
CHLORIDE SERPL-SCNC: 98 MMOL/L — SIGNIFICANT CHANGE UP (ref 96–108)
CO2 SERPL-SCNC: 30 MMOL/L — SIGNIFICANT CHANGE UP (ref 22–31)
CREAT SERPL-MCNC: 2.37 MG/DL — HIGH (ref 0.5–1.3)
EOSINOPHIL # BLD AUTO: 0 K/UL — SIGNIFICANT CHANGE UP (ref 0–0.5)
EOSINOPHIL NFR BLD AUTO: 0 % — SIGNIFICANT CHANGE UP (ref 0–6)
FIBRINOGEN PPP-MCNC: 524 MG/DL — HIGH (ref 310–510)
GLUCOSE SERPL-MCNC: 110 MG/DL — HIGH (ref 70–99)
HCT VFR BLD CALC: 25.2 % — LOW (ref 34.5–45)
HGB BLD-MCNC: 8.6 G/DL — LOW (ref 11.5–15.5)
INR BLD: 0.86 RATIO — LOW (ref 0.88–1.16)
LDH SERPL L TO P-CCNC: 224 U/L — SIGNIFICANT CHANGE UP (ref 50–242)
LYMPHOCYTES # BLD AUTO: 1 K/UL — SIGNIFICANT CHANGE UP (ref 1–3.3)
LYMPHOCYTES # BLD AUTO: 4.9 % — LOW (ref 13–44)
MAGNESIUM SERPL-MCNC: 2.8 MG/DL — HIGH (ref 1.6–2.6)
MAGNESIUM SERPL-MCNC: 2.9 MG/DL — HIGH (ref 1.6–2.6)
MCHC RBC-ENTMCNC: 32 PG — SIGNIFICANT CHANGE UP (ref 27–34)
MCHC RBC-ENTMCNC: 34.1 GM/DL — SIGNIFICANT CHANGE UP (ref 32–36)
MCV RBC AUTO: 93.9 FL — SIGNIFICANT CHANGE UP (ref 80–100)
MONOCYTES # BLD AUTO: 1.2 K/UL — HIGH (ref 0–0.9)
MONOCYTES NFR BLD AUTO: 5.9 % — SIGNIFICANT CHANGE UP (ref 2–14)
NEUTROPHILS # BLD AUTO: 17.4 K/UL — HIGH (ref 1.8–7.4)
NEUTROPHILS NFR BLD AUTO: 89 % — HIGH (ref 43–77)
PLATELET # BLD AUTO: 226 K/UL — SIGNIFICANT CHANGE UP (ref 150–400)
POTASSIUM SERPL-MCNC: 4.8 MMOL/L — SIGNIFICANT CHANGE UP (ref 3.5–5.3)
POTASSIUM SERPL-SCNC: 4.8 MMOL/L — SIGNIFICANT CHANGE UP (ref 3.5–5.3)
PROT SERPL-MCNC: 6.2 G/DL — SIGNIFICANT CHANGE UP (ref 6–8.3)
PROTHROM AB SERPL-ACNC: 9.3 SEC — LOW (ref 9.8–12.7)
RBC # BLD: 2.68 M/UL — LOW (ref 3.8–5.2)
RBC # FLD: 15.9 % — HIGH (ref 10.3–14.5)
SODIUM SERPL-SCNC: 136 MMOL/L — SIGNIFICANT CHANGE UP (ref 135–145)
URATE SERPL-MCNC: 3.1 MG/DL — SIGNIFICANT CHANGE UP (ref 2.5–7)
URATE SERPL-MCNC: 3.1 MG/DL — SIGNIFICANT CHANGE UP (ref 2.5–7)
WBC # BLD: 19.5 K/UL — HIGH (ref 3.8–10.5)
WBC # FLD AUTO: 19.5 K/UL — HIGH (ref 3.8–10.5)

## 2018-02-09 PROCEDURE — 99232 SBSQ HOSP IP/OBS MODERATE 35: CPT | Mod: 25,GC

## 2018-02-09 PROCEDURE — 99232 SBSQ HOSP IP/OBS MODERATE 35: CPT

## 2018-02-09 RX ADMIN — DOXERCALCIFEROL 2 MICROGRAM(S): 2.5 CAPSULE ORAL at 13:44

## 2018-02-09 RX ADMIN — Medication 400 MILLIGRAM(S): at 16:40

## 2018-02-09 RX ADMIN — Medication 81 MILLIGRAM(S): at 18:24

## 2018-02-09 RX ADMIN — Medication 400 MILLIGRAM(S): at 06:04

## 2018-02-09 RX ADMIN — Medication 25 MILLIGRAM(S): at 01:38

## 2018-02-09 RX ADMIN — ONDANSETRON 4 MILLIGRAM(S): 8 TABLET, FILM COATED ORAL at 00:39

## 2018-02-09 RX ADMIN — Medication 400 MILLIGRAM(S): at 22:08

## 2018-02-09 RX ADMIN — Medication 25 MILLIGRAM(S): at 06:05

## 2018-02-09 RX ADMIN — LEVETIRACETAM 500 MILLIGRAM(S): 250 TABLET, FILM COATED ORAL at 10:05

## 2018-02-09 RX ADMIN — Medication 1 TABLET(S): at 18:25

## 2018-02-09 RX ADMIN — HEPARIN SODIUM 5000 UNIT(S): 5000 INJECTION INTRAVENOUS; SUBCUTANEOUS at 18:24

## 2018-02-09 RX ADMIN — LEVETIRACETAM 500 MILLIGRAM(S): 250 TABLET, FILM COATED ORAL at 19:04

## 2018-02-09 RX ADMIN — Medication 25 MILLIGRAM(S): at 22:08

## 2018-02-09 RX ADMIN — HYDROMORPHONE HYDROCHLORIDE 30 MILLILITER(S): 2 INJECTION INTRAMUSCULAR; INTRAVENOUS; SUBCUTANEOUS at 06:57

## 2018-02-09 RX ADMIN — Medication 325 MILLIGRAM(S): at 18:25

## 2018-02-09 RX ADMIN — ERYTHROPOIETIN 8000 UNIT(S): 10000 INJECTION, SOLUTION INTRAVENOUS; SUBCUTANEOUS at 13:45

## 2018-02-09 RX ADMIN — HEPARIN SODIUM 5000 UNIT(S): 5000 INJECTION INTRAVENOUS; SUBCUTANEOUS at 06:05

## 2018-02-09 NOTE — PROGRESS NOTE ADULT - PROBLEM SELECTOR PLAN 1
Patient has now been delivered.  No further need for 6 days per week HD.  Had HD today.  HD again monday.  Monitor magnesium level.

## 2018-02-09 NOTE — PROGRESS NOTE ADULT - ASSESSMENT
27y   w/ si PEC, stage 5 CKD on dialysis, now POD #1 s/p rLTCS and BTL for NRFHT and severe range BP's. Patient without complaints in stable condition.     CV: Continue Labetalol 400 TID for BP control. F/u AM CBC  Resp: Saturating well on RA.  GI: Advance diet as tolerated. Renal restricted diet ordered.  : Montes in place. Dialysis today as scheduled.   Neuro: Continue Keppra for seizure ppx  x 24 hours. Magnesium level q6h s/p magnesium bolus prior to delivery. PCEA for pain control. Avoid NSAIDs due to CKD  Heme: HSQ for DVT ppx  ID: Tamiflu for influenza exposure.     Elana Gillespie MD

## 2018-02-09 NOTE — CHART NOTE - NSCHARTNOTEFT_GEN_A_CORE
Source: Patient [ ]    Family [ ]     other [ ]    Diet :       Patient reports [ ] nausea  [ ] vomiting [ ] diarrhea [ ] constipation  [ ]chewing problems [ ] swallowing issues  [ ] other:      PO intake:  < 50% [ ] 50-75% [ ]   % [ ]  other :     Source for PO intake [ ] Patient [ ] family [ ] chart [ ] staff [ ] other     Enteral /Parenteral Nutrition:       Current Weight: Weight (kg): 62.8 (02-06 @ 07:32)  % Weight Change    Pertinent Medications: MEDICATIONS  (STANDING):  aspirin  chewable 81 milliGRAM(s) Oral daily  diphenhydrAMINE   Capsule 25 milliGRAM(s) Oral at bedtime  diphtheria/tetanus/pertussis (acellular) Vaccine (ADAcel) 0.5 milliLiter(s) IntraMuscular once  doxercalciferol Injectable 2 MICROGram(s) IV Push <User Schedule>  epoetin leeann Injectable 8000 Unit(s) IV Push <User Schedule>  ferrous    sulfate 325 milliGRAM(s) Oral daily  ferrous    sulfate 325 milliGRAM(s) Oral daily  heparin  Injectable 5000 Unit(s) SubCutaneous every 12 hours  HYDROmorphone PCA (1 mG/mL) 30 milliLiter(s) PCA Continuous PCA Continuous  labetalol 400 milliGRAM(s) Oral every 8 hours  lactated ringers. 1000 milliLiter(s) (60 mL/Hr) IV Continuous <Continuous>  levETIRAcetam 500 milliGRAM(s) Oral two times a day  oseltamivir 30 milliGRAM(s) Oral every other day  oxytocin Infusion 333.333 milliUNIT(s)/Min (1000 mL/Hr) IV Continuous <Continuous>  oxytocin Infusion 20 milliUNIT(s)/Min (60 mL/Hr) IV Continuous <Continuous>  prenatal multivitamin 1 Tablet(s) Oral daily  prenatal multivitamin 1 Tablet(s) Oral daily    MEDICATIONS  (PRN):  diphenhydrAMINE   Capsule 25 milliGRAM(s) Oral every 6 hours PRN Itching  docusate sodium 100 milliGRAM(s) Oral two times a day PRN Stool Softening  glycerin Suppository - Adult 1 Suppository(s) Rectal at bedtime PRN Constipation  HYDROmorphone PCA (1 mG/mL) Rescue Clinician Bolus 0.5 milliGRAM(s) IV Push every 15 minutes PRN for Pain Scale GREATER THAN 6  lanolin Ointment 1 Application(s) Topical every 3 hours PRN Sore Nipples  naloxone Injectable 0.1 milliGRAM(s) IV Push every 3 minutes PRN For ANY of the following changes in patient status:  A. RR LESS THAN 10 breaths per minute, B. Oxygen saturation LESS THAN 90%, C. Sedation score of 6  ondansetron Injectable 4 milliGRAM(s) IV Push every 6 hours PRN Nausea  simethicone 80 milliGRAM(s) Chew every 4 hours PRN Gas    Pertinent Labs:  02-09 Na136 mmol/L Glu 110 mg/dL<H> K+ 4.8 mmol/L Cr  2.37 mg/dL<H> BUN 12 mg/dL Phos n/a   Alb 3.1 g/dL<L> PAB n/a         Skin:     Estimated Needs:   [ ] no change since previous assessment  [ ] recalculated:       Previous Nutrition Diagnosis:   [X] Increased Nutrient Needs       Nutrition Diagnosis is [X] ongoing  [ ] resolved [ ] not applicable     New Nutrition Diagnosis: [X] not applicable    Recommend    [ ] Change Diet To:    [ ] Nutrition Supplement    [ ] Nutrition Support    [X] Other: 1) Recommend continue renal diet 2) Encourage PO intake     Monitoring and Evaluation:     [X] PO intake [X] Tolerance to diet prescription [X] follow up per protocol [X] wts with dialysis Pt seen for follow up- s/p  POD#1, ESRD on HD. Pt is . Per RN, pt is to begin HD x3 days/wk today. Pt is Bengali-speaking;  phone used #[phone], Luis Felipe.    Source: Patient [X]    Family [ ]     other [X]: medical chart reviewed    Diet: Renal    Patient reports [X] nausea; reports she is on medication to reduce nausea [X] vomiting x1 this morning [X] other: Pt reports good appetite and intake in house; Yesterday only consumed 1 sandwich in preparation for . Observed patient eating breakfast this morning; encouraged PO intake. Reinforced HD nutrition education; pt verbalized intention to restart renal diet restrictions; pt did not have any nutrition related questions at this time, Pt denies constipation or diarrhea. NKFA. Pt made aware RD is to remain available.    PO intake: [X] %     Source for PO intake [X] Patient    Prepregnancy unggkt=858 pounds    Pertinent Medications: MEDICATIONS  (STANDING):  aspirin  chewable 81 milliGRAM(s) Oral daily  diphenhydrAMINE   Capsule 25 milliGRAM(s) Oral at bedtime  diphtheria/tetanus/pertussis (acellular) Vaccine (ADAcel) 0.5 milliLiter(s) IntraMuscular once  doxercalciferol Injectable 2 MICROGram(s) IV Push <User Schedule>  epoetin leeann Injectable 8000 Unit(s) IV Push <User Schedule>  ferrous    sulfate 325 milliGRAM(s) Oral daily  ferrous    sulfate 325 milliGRAM(s) Oral daily  heparin  Injectable 5000 Unit(s) SubCutaneous every 12 hours  HYDROmorphone PCA (1 mG/mL) 30 milliLiter(s) PCA Continuous PCA Continuous  labetalol 400 milliGRAM(s) Oral every 8 hours  lactated ringers. 1000 milliLiter(s) (60 mL/Hr) IV Continuous <Continuous>  levETIRAcetam 500 milliGRAM(s) Oral two times a day  oseltamivir 30 milliGRAM(s) Oral every other day  oxytocin Infusion 333.333 milliUNIT(s)/Min (1000 mL/Hr) IV Continuous <Continuous>  oxytocin Infusion 20 milliUNIT(s)/Min (60 mL/Hr) IV Continuous <Continuous>  prenatal multivitamin 1 Tablet(s) Oral daily  prenatal multivitamin 1 Tablet(s) Oral daily    MEDICATIONS  (PRN):  diphenhydrAMINE   Capsule 25 milliGRAM(s) Oral every 6 hours PRN Itching  docusate sodium 100 milliGRAM(s) Oral two times a day PRN Stool Softening  glycerin Suppository - Adult 1 Suppository(s) Rectal at bedtime PRN Constipation  HYDROmorphone PCA (1 mG/mL) Rescue Clinician Bolus 0.5 milliGRAM(s) IV Push every 15 minutes PRN for Pain Scale GREATER THAN 6  lanolin Ointment 1 Application(s) Topical every 3 hours PRN Sore Nipples  naloxone Injectable 0.1 milliGRAM(s) IV Push every 3 minutes PRN For ANY of the following changes in patient status:  A. RR LESS THAN 10 breaths per minute, B. Oxygen saturation LESS THAN 90%, C. Sedation score of 6  ondansetron Injectable 4 milliGRAM(s) IV Push every 6 hours PRN Nausea  simethicone 80 milliGRAM(s) Chew every 4 hours PRN Gas    Pertinent Labs:   Na136 mmol/L Glu 110 mg/dL<H> K+ 4.8 mmol/L Cr  2.37 mg/dL<H> BUN 12 mg/dL Phos n/a   Alb 3.1 g/dL<L>    Skin: intact; no edema noted.    Estimated Needs:   [X] no change since previous assessment  [ ] recalculated:     Previous Nutrition Diagnosis:   [X] Increased Nutrient Needs       Nutrition Diagnosis is [X] ongoing  [ ] resolved [ ] not applicable     New Nutrition Diagnosis: [X] not applicable    Recommend    [X] Other: 1) Recommend continue renal diet 2) Encourage PO intake     Monitoring and Evaluation:     [X] PO intake [X] Tolerance to diet prescription [X] follow up per protocol [X] wts with dialysis Pt seen for follow up- s/p  POD#1, ESRD on HD. Pt is . Per RN, pt is to begin HD x3 days/wk today. Pt is [place]-speaking;  phone used #[phone], Luis Felipe. Pt with follow up question on renal diet;  phone used #[phone], Shantell.    Source: Patient [X]    Family [ ]     other [X]: medical chart reviewed    Diet: Renal    Patient reports [X] nausea; reports she is on medication to reduce nausea [X] vomiting x1 this morning [X] other: Pt reports good appetite and intake in house; Yesterday only consumed 1 sandwich in preparation for . Observed patient eating breakfast this morning; encouraged PO intake. Reinforced HD nutrition education; pt verbalized intention to restart renal diet restrictions. Pt able to verbalize foods high in potassium and phosphorus. Pt did not have any nutrition related questions at this time. Pt denies constipation or diarrhea. NKFA. Pt made aware RD is to remain available.    PO intake: [X] %     Source for PO intake [X] Patient    Prepregnancy hfamsw=099 pounds    Pertinent Medications: MEDICATIONS  (STANDING):  aspirin  chewable 81 milliGRAM(s) Oral daily  diphenhydrAMINE   Capsule 25 milliGRAM(s) Oral at bedtime  diphtheria/tetanus/pertussis (acellular) Vaccine (ADAcel) 0.5 milliLiter(s) IntraMuscular once  doxercalciferol Injectable 2 MICROGram(s) IV Push <User Schedule>  epoetin leeann Injectable 8000 Unit(s) IV Push <User Schedule>  ferrous    sulfate 325 milliGRAM(s) Oral daily  ferrous    sulfate 325 milliGRAM(s) Oral daily  heparin  Injectable 5000 Unit(s) SubCutaneous every 12 hours  HYDROmorphone PCA (1 mG/mL) 30 milliLiter(s) PCA Continuous PCA Continuous  labetalol 400 milliGRAM(s) Oral every 8 hours  lactated ringers. 1000 milliLiter(s) (60 mL/Hr) IV Continuous <Continuous>  levETIRAcetam 500 milliGRAM(s) Oral two times a day  oseltamivir 30 milliGRAM(s) Oral every other day  oxytocin Infusion 333.333 milliUNIT(s)/Min (1000 mL/Hr) IV Continuous <Continuous>  oxytocin Infusion 20 milliUNIT(s)/Min (60 mL/Hr) IV Continuous <Continuous>  prenatal multivitamin 1 Tablet(s) Oral daily  prenatal multivitamin 1 Tablet(s) Oral daily    MEDICATIONS  (PRN):  diphenhydrAMINE   Capsule 25 milliGRAM(s) Oral every 6 hours PRN Itching  docusate sodium 100 milliGRAM(s) Oral two times a day PRN Stool Softening  glycerin Suppository - Adult 1 Suppository(s) Rectal at bedtime PRN Constipation  HYDROmorphone PCA (1 mG/mL) Rescue Clinician Bolus 0.5 milliGRAM(s) IV Push every 15 minutes PRN for Pain Scale GREATER THAN 6  lanolin Ointment 1 Application(s) Topical every 3 hours PRN Sore Nipples  naloxone Injectable 0.1 milliGRAM(s) IV Push every 3 minutes PRN For ANY of the following changes in patient status:  A. RR LESS THAN 10 breaths per minute, B. Oxygen saturation LESS THAN 90%, C. Sedation score of 6  ondansetron Injectable 4 milliGRAM(s) IV Push every 6 hours PRN Nausea  simethicone 80 milliGRAM(s) Chew every 4 hours PRN Gas    Pertinent Labs:   Na136 mmol/L Glu 110 mg/dL<H> K+ 4.8 mmol/L Cr  2.37 mg/dL<H> BUN 12 mg/dL Phos n/a   Alb 3.1 g/dL<L>    Skin: intact; no edema noted.    Estimated Needs:   [X] no change since previous assessment  [ ] recalculated:     Previous Nutrition Diagnosis:   [X] Increased Nutrient Needs       Nutrition Diagnosis is [X] ongoing  [ ] resolved [ ] not applicable     New Nutrition Diagnosis: [X] not applicable    Recommend    [X] Other: 1) Recommend continue renal diet 2) Encourage PO intake     Monitoring and Evaluation:     [X] PO intake [X] Tolerance to diet prescription [X] follow up per protocol [X] wts with dialysis [x] teach back 3 points Pt seen for follow up- s/p  POD#1, ESRD on HD. Pt is . Per RN, pt is to begin HD x3 days/wk today. Pt is [place]-speaking;  phone used #[phone], Luis Felipe. Pt with follow up question on renal diet after Initial visit;  phone used #[phone], Shantell.    Source: Patient [X]    Family [ ]     other [X]: medical chart reviewed    Diet: Renal    Patient reports [X] nausea; most likely secondary to anasthesia/magnesium sulfate; on medication to reduce nausea [X] vomiting x1 this morning [X] other: Pt reports good appetite and intake in house; Yesterday only consumed 1 sandwich in preparation for . Observed patient eating breakfast this morning; encouraged PO intake. Pt unsure if able to breastfeed. Reinforced HD nutrition education; pt verbalized intention to restart renal diet restrictions. Pt able to verbalize foods high in potassium and phosphorus. Pt denies constipation or diarrhea. NKFA. Pt made aware RD is to remain available.    PO intake: [X] %     Source for PO intake [X] Patient    Prepregnancy quobkc=568 pounds    Pertinent Medications: MEDICATIONS  (STANDING):  aspirin  chewable 81 milliGRAM(s) Oral daily  diphenhydrAMINE   Capsule 25 milliGRAM(s) Oral at bedtime  diphtheria/tetanus/pertussis (acellular) Vaccine (ADAcel) 0.5 milliLiter(s) IntraMuscular once  doxercalciferol Injectable 2 MICROGram(s) IV Push <User Schedule>  epoetin leeann Injectable 8000 Unit(s) IV Push <User Schedule>  ferrous    sulfate 325 milliGRAM(s) Oral daily  ferrous    sulfate 325 milliGRAM(s) Oral daily  heparin  Injectable 5000 Unit(s) SubCutaneous every 12 hours  HYDROmorphone PCA (1 mG/mL) 30 milliLiter(s) PCA Continuous PCA Continuous  labetalol 400 milliGRAM(s) Oral every 8 hours  lactated ringers. 1000 milliLiter(s) (60 mL/Hr) IV Continuous <Continuous>  levETIRAcetam 500 milliGRAM(s) Oral two times a day  oseltamivir 30 milliGRAM(s) Oral every other day  oxytocin Infusion 333.333 milliUNIT(s)/Min (1000 mL/Hr) IV Continuous <Continuous>  oxytocin Infusion 20 milliUNIT(s)/Min (60 mL/Hr) IV Continuous <Continuous>  prenatal multivitamin 1 Tablet(s) Oral daily  prenatal multivitamin 1 Tablet(s) Oral daily    MEDICATIONS  (PRN):  diphenhydrAMINE   Capsule 25 milliGRAM(s) Oral every 6 hours PRN Itching  docusate sodium 100 milliGRAM(s) Oral two times a day PRN Stool Softening  glycerin Suppository - Adult 1 Suppository(s) Rectal at bedtime PRN Constipation  HYDROmorphone PCA (1 mG/mL) Rescue Clinician Bolus 0.5 milliGRAM(s) IV Push every 15 minutes PRN for Pain Scale GREATER THAN 6  lanolin Ointment 1 Application(s) Topical every 3 hours PRN Sore Nipples  naloxone Injectable 0.1 milliGRAM(s) IV Push every 3 minutes PRN For ANY of the following changes in patient status:  A. RR LESS THAN 10 breaths per minute, B. Oxygen saturation LESS THAN 90%, C. Sedation score of 6  ondansetron Injectable 4 milliGRAM(s) IV Push every 6 hours PRN Nausea  simethicone 80 milliGRAM(s) Chew every 4 hours PRN Gas    Pertinent Labs:   Na136 mmol/L Glu 110 mg/dL<H> K+ 4.8 mmol/L Cr  2.37 mg/dL<H> BUN 12 mg/dL Phos n/a   Alb 3.1 g/dL<L>    Skin: intact; no edema noted.    Estimated Needs:   [X] no change since previous assessment  [ ] recalculated:     Previous Nutrition Diagnosis:   [X] Increased Nutrient Needs       Nutrition Diagnosis is [X] ongoing  [ ] resolved [ ] not applicable     New Nutrition Diagnosis: [X] not applicable    Recommend    [X] Other: 1) Recommend continue renal diet 2) Encourage PO intake 3) If patient does not breastfeed, recommend discontinuing prenatal vitamin and adding Nephrovite.     Monitoring and Evaluation:     [X] PO intake [X] Tolerance to diet prescription [X] follow up per protocol [X] wts with dialysis [x] teach back 3 points

## 2018-02-09 NOTE — DISCHARGE NOTE OB - PATIENT PORTAL LINK FT
You can access the [facility] Patient Portal, offered by [facility], by registering with the following website: [email or web address]

## 2018-02-09 NOTE — DISCHARGE NOTE OB - CARE PLAN
Principal Discharge DX:	Chronic kidney disease (CKD) stage G5/A1, glomerular filtration rate (GFR) less than or equal to 15 mL/min/1.73 square meter and albuminuria creatinine ratio less than 30 mg/g  Goal:	euvolemia  Assessment and plan of treatment:	continue dialysis outpatient  Secondary Diagnosis:	History of   Goal:	post op care

## 2018-02-09 NOTE — DISCHARGE NOTE OB - MEDICATION SUMMARY - MEDICATIONS TO TAKE
I will START or STAY ON the medications listed below when I get home from the hospital:    acetaminophen 325 mg oral tablet  -- 3 tab(s) by mouth every 6 hours  -- Indication: For pain    oseltamivir 30 mg oral capsule  -- 1 cap(s) by mouth every other day  -- Indication: For flu exposure    labetalol 200 mg oral tablet  -- 2 tab(s) by mouth every 8 hours   -- It is very important that you take or use this exactly as directed.  Do not skip doses or discontinue unless directed by your doctor.  May cause drowsiness.  Alcohol may intensify this effect.  Use care when operating dangerous machinery.  Some non-prescription drugs may aggravate your condition.  Read all labels carefully.  If a warning appears, check with your doctor before taking.    -- Indication: For Hypertension

## 2018-02-09 NOTE — DISCHARGE NOTE OB - MEDICATION SUMMARY - MEDICATIONS TO STOP TAKING
I will STOP taking the medications listed below when I get home from the hospital:    labetalol 100 mg oral tablet  -- 1 tab(s) by mouth 2 times a day MDD:2  -- It is very important that you take or use this exactly as directed.  Do not skip doses or discontinue unless directed by your doctor.  May cause drowsiness.  Alcohol may intensify this effect.  Use care when operating dangerous machinery.  Some non-prescription drugs may aggravate your condition.  Read all labels carefully.  If a warning appears, check with your doctor before taking.

## 2018-02-09 NOTE — PROGRESS NOTE ADULT - SUBJECTIVE AND OBJECTIVE BOX
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  Patient seen this morning before dialysis.  She felt well and denied complaints was delivered yesterday.        PAST HISTORY  --------------------------------------------------------------------------------  No significant changes to PMH, PSH, FHx, SHx, unless otherwise noted    ALLERGIES & MEDICATIONS  --------------------------------------------------------------------------------  Allergies    No Known Allergies    Intolerances      Standing Inpatient Medications  aspirin  chewable 81 milliGRAM(s) Oral daily  diphenhydrAMINE   Capsule 25 milliGRAM(s) Oral at bedtime  diphtheria/tetanus/pertussis (acellular) Vaccine (ADAcel) 0.5 milliLiter(s) IntraMuscular once  doxercalciferol Injectable 2 MICROGram(s) IV Push <User Schedule>  epoetin leeann Injectable 8000 Unit(s) IV Push <User Schedule>  ferrous    sulfate 325 milliGRAM(s) Oral daily  ferrous    sulfate 325 milliGRAM(s) Oral daily  heparin  Injectable 5000 Unit(s) SubCutaneous every 12 hours  HYDROmorphone PCA (1 mG/mL) 30 milliLiter(s) PCA Continuous PCA Continuous  labetalol 400 milliGRAM(s) Oral every 8 hours  lactated ringers. 1000 milliLiter(s) IV Continuous <Continuous>  levETIRAcetam 500 milliGRAM(s) Oral two times a day  oseltamivir 30 milliGRAM(s) Oral every other day  oxytocin Infusion 333.333 milliUNIT(s)/Min IV Continuous <Continuous>  oxytocin Infusion 20 milliUNIT(s)/Min IV Continuous <Continuous>  prenatal multivitamin 1 Tablet(s) Oral daily  prenatal multivitamin 1 Tablet(s) Oral daily    PRN Inpatient Medications  diphenhydrAMINE   Capsule 25 milliGRAM(s) Oral every 6 hours PRN  docusate sodium 100 milliGRAM(s) Oral two times a day PRN  glycerin Suppository - Adult 1 Suppository(s) Rectal at bedtime PRN  HYDROmorphone PCA (1 mG/mL) Rescue Clinician Bolus 0.5 milliGRAM(s) IV Push every 15 minutes PRN  lanolin Ointment 1 Application(s) Topical every 3 hours PRN  naloxone Injectable 0.1 milliGRAM(s) IV Push every 3 minutes PRN  ondansetron Injectable 4 milliGRAM(s) IV Push every 6 hours PRN  simethicone 80 milliGRAM(s) Chew every 4 hours PRN      REVIEW OF SYSTEMS  --------------------------------------------------------------------------------  Gen: No fever  CV: no cp  Pulm: no dyspnea  AB: no nausesa    VITALS/PHYSICAL EXAM  --------------------------------------------------------------------------------  T(C): 36.4 (02-09-18 @ 12:45), Max: 36.8 (02-08-18 @ 18:45)  HR: 74 (02-09-18 @ 12:45) (44 - 88)  BP: 149/112 (02-09-18 @ 12:45) (116/73 - 156/86)  RR: 18 (02-09-18 @ 12:45) (18 - 32)  SpO2: 96% (02-09-18 @ 12:45) (96% - 98%)  Wt(kg): --        02-08-18 @ 07:01  -  02-09-18 @ 07:00  --------------------------------------------------------  IN: 414 mL / OUT: 1635 mL / NET: -1221 mL    02-09-18 @ 07:01  -  02-09-18 @ 16:57  --------------------------------------------------------  IN: 0 mL / OUT: 100 mL / NET: -100 mL      Physical Exam:  	Gen: NAD  	HEENT: anicteric  	Pulm: no rales today  	CV: S1S2  	Abd: mildly tender to palpation  	Psych: Normal affect and mood  	Skin: Warm  	Vascular access: GALO KANG    LABS/STUDIES  --------------------------------------------------------------------------------              8.6    19.5  >-----------<  226      [02-09-18 @ 02:38]              25.2     136  |  98  |  12  ----------------------------<  110      [02-09-18 @ 02:38]  4.8   |  30  |  2.37        Ca     9.2     [02-09-18 @ 02:38]      Mg     2.9     [02-09-18 @ 09:20]    TPro  6.2  /  Alb  3.1  /  TBili  0.2  /  DBili  x   /  AST  28  /  ALT  24  /  AlkPhos  98  [02-09-18 @ 02:38]    PT/INR: PT 9.3  , INR 0.86       [02-09-18 @ 02:38]  PTT: 21.9       [02-09-18 @ 02:38]    Uric acid 3.1      [02-09-18 @ 02:38]        [02-09-18 @ 02:38]    Creatinine Trend:  SCr 2.37 [02-09 @ 02:38]  SCr 2.33 [02-08 @ 00:19]  SCr 3.08 [02-07 @ 06:54]  SCr 3.04 [02-06 @ 06:54]  SCr 4.06 [02-05 @ 07:25]    Urinalysis - [02-08-18 @ 00:48]      Color Yellow / Appearance SL Turbid / SG 1.009 / pH 8.5      Gluc Negative / Ketone Negative  / Bili Negative / Urobili Negative       Blood Negative / Protein >600 / Leuk Est Negative / Nitrite Negative      RBC 0-2 / WBC 2-5 / Hyaline 5-10 / Gran  / Sq Epi  / Non Sq Epi Moderate / Bacteria         HBsAb <3.0      [02-03-18 @ 14:48]  HBsAg Nonreact      [02-03-18 @ 14:48]  HBcAb Nonreact      [02-03-18 @ 14:48]  HCV 0.06, Nonreact      [02-03-18 @ 14:48]

## 2018-02-09 NOTE — PROGRESS NOTE ADULT - ASSESSMENT
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

## 2018-02-09 NOTE — DISCHARGE NOTE OB - HOSPITAL COURSE
Patient admitted to antepartum service with super imposed PEC. Patient underwent daily dialysis as an inpatient. Patient underwent uncomplicated rLTCS+BTL at [id number] for worsening PEC. Post operatively patient continued dialysis with close labs and BP monitoring. On day of discharge patient BP well controlled and plan to continue dialysis outpatient. Patient will have close interval follow up with [facility] and renal clinic. Patient admitted to antepartum service with super imposed PEC. Patient underwent daily dialysis as an inpatient. Patient underwent uncomplicated rLTCS+BTL at [id number] for worsening PEC. Post operatively patient continued dialysis with close labs and BP monitoring. On day of discharge patient BP well controlled and plan to continue dialysis outpatient Tues/Thurs/Sat. Patient will have close interval follow up with [facility] and renal clinic.

## 2018-02-09 NOTE — LACTATION INITIAL EVALUATION - NS LACT CON REASON FOR REQ
general questions without assessment/premature/compromised infant/pump request/multiparous mom/30 weeks in  NICU

## 2018-02-09 NOTE — DISCHARGE NOTE OB - ADDITIONAL INSTRUCTIONS
Tu tiene christy nenita en la clinica _________________  Miguel Ángel rausch pression dos veces cada rosalba. Llamar a la clinica para pression mas prashant de 160/110, dolor de ja o cambios visuales  Continuar dialysis Tu tiene christy nenita en la clinica [place] 2/16 a las ocho(8am) en la [place]   Tiene christy nenita en la clinica en la clinica 2/26 a las abiel (10am) en la [place]  Miguel Ángel rausch pression dos veces cada rosalba. Llamar a la clinica para pression mas prashant de 160/110, dolor de ja o cambios visuales  Continuar dialysis Abdelrahman/Jueves/Sabado

## 2018-02-09 NOTE — PROGRESS NOTE ADULT - SUBJECTIVE AND OBJECTIVE BOX
Subjective:  Pt seen at bedside. No acute events overnight. Denies HA, changes in vision and RUQ pain. No CP/SOB. She is tolerating regular diet. She denies nausea and vomiting. Montes in place. Her pain is controlled. She reports normal postpartum bleeding.    Objective:    Vital Signs Last 24 Hrs  T(C): 36.4 (09 Feb 2018 01:04), Max: 36.9 (08 Feb 2018 11:52)  T(F): 97.5 (09 Feb 2018 01:04), Max: 98.5 (08 Feb 2018 11:52)  HR: 80 (09 Feb 2018 01:04) (44 - 94)  BP: 116/73 (09 Feb 2018 01:04) (116/73 - 156/86)  BP(mean): 115 (08 Feb 2018 21:45) (101 - 115)  RR: 18 (09 Feb 2018 01:04) (18 - 32)  SpO2: 98% (09 Feb 2018 01:04) (96% - 100%)      02-07 @ 07:01  -  02-08 @ 07:00  --------------------------------------------------------  IN: 0 mL / OUT: 600 mL / NET: -600 mL    02-08 @ 07:01  -  02-09 @ 04:49  --------------------------------------------------------  IN: 0 mL / OUT: 1460 mL / NET: -1460 mL        Physical exam:  Gen: NAD  Abdomen: Soft, nontender, no distension. Firm uterine fundus at umbilicus.  Incision: Clean, dry, and intact   Pelvic: Deferred  Ext: No calf tenderness or edema bilaterally    LABS:                        8.6    19.5  )-----------( 226      ( 09 Feb 2018 02:38 )             25.2                         9.3    13.7  )-----------( 207      ( 08 Feb 2018 00:19 )             26.9                         8.5    9.0   )-----------( 172      ( 07 Feb 2018 06:54 )             24.3     02-09    136  |  98  |  12  ----------------------------<  110<H>  4.8   |  30  |  2.37<H>    Ca    9.2      09 Feb 2018 02:38  Mg     2.8     02-09    TPro  6.2  /  Alb  3.1<L>  /  TBili  0.2  /  DBili  x   /  AST  28  /  ALT  24  /  AlkPhos  98  02-09    LIVER FUNCTIONS - ( 09 Feb 2018 02:38 )  Alb: 3.1 g/dL / Pro: 6.2 g/dL / ALK PHOS: 98 U/L / ALT: 24 U/L RC / AST: 28 U/L / GGT: x                       MEDICATIONS  (STANDING):  aspirin  chewable 81 milliGRAM(s) Oral daily  diphenhydrAMINE   Capsule 25 milliGRAM(s) Oral at bedtime  diphtheria/tetanus/pertussis (acellular) Vaccine (ADAcel) 0.5 milliLiter(s) IntraMuscular once  doxercalciferol Injectable 2 MICROGram(s) IV Push <User Schedule>  epoetin leeann Injectable 8000 Unit(s) IV Push <User Schedule>  ferrous    sulfate 325 milliGRAM(s) Oral daily  ferrous    sulfate 325 milliGRAM(s) Oral daily  heparin  Injectable 5000 Unit(s) SubCutaneous every 12 hours  HYDROmorphone PCA (1 mG/mL) 30 milliLiter(s) PCA Continuous PCA Continuous  labetalol 400 milliGRAM(s) Oral every 8 hours  lactated ringers. 1000 milliLiter(s) (60 mL/Hr) IV Continuous <Continuous>  levETIRAcetam 500 milliGRAM(s) Oral two times a day  oseltamivir 30 milliGRAM(s) Oral every other day  oxytocin Infusion 333.333 milliUNIT(s)/Min (1000 mL/Hr) IV Continuous <Continuous>  oxytocin Infusion 20 milliUNIT(s)/Min (60 mL/Hr) IV Continuous <Continuous>  prenatal multivitamin 1 Tablet(s) Oral daily  prenatal multivitamin 1 Tablet(s) Oral daily    MEDICATIONS  (PRN):  diphenhydrAMINE   Capsule 25 milliGRAM(s) Oral every 6 hours PRN Itching  docusate sodium 100 milliGRAM(s) Oral two times a day PRN Stool Softening  glycerin Suppository - Adult 1 Suppository(s) Rectal at bedtime PRN Constipation  HYDROmorphone PCA (1 mG/mL) Rescue Clinician Bolus 0.5 milliGRAM(s) IV Push every 15 minutes PRN for Pain Scale GREATER THAN 6  lanolin Ointment 1 Application(s) Topical every 3 hours PRN Sore Nipples  naloxone Injectable 0.1 milliGRAM(s) IV Push every 3 minutes PRN For ANY of the following changes in patient status:  A. RR LESS THAN 10 breaths per minute, B. Oxygen saturation LESS THAN 90%, C. Sedation score of 6  ondansetron Injectable 4 milliGRAM(s) IV Push every 6 hours PRN Nausea  simethicone 80 milliGRAM(s) Chew every 4 hours PRN Gas

## 2018-02-09 NOTE — LACTATION INITIAL EVALUATION - LACTATION INTERVENTIONS
PI# [id number], minerva Sepulveda. Encouraged mother to pump as frequently as she is able, discussed the use of donor human milk if her supply is low.Mother declined pump observation at this time as she is too tired./initiate dual electric pump routine/initiate hand expression routine/initiate skin to skin

## 2018-02-09 NOTE — DISCHARGE NOTE OB - CARE PROVIDER_API CALL
[facility],   [address] [place] [address]  [address] Floor  [place], [place]  Phone: [phone]  Fax: (   )    -

## 2018-02-09 NOTE — PROGRESS NOTE ADULT - SUBJECTIVE AND OBJECTIVE BOX
Day _1_ of Anesthesia Pain Management Service    SUBJECTIVE: Patient is doing well with IV PCA    Pain Scale Score:	[X] Refer to charted pain scores    THERAPY:    [ ] IV PCA Morphine		[ ] 5 mg/mL	[ ] 1 mg/mL  [X] IV PCA Hydromorphone	[ ] 5 mg/mL	[X] 1 mg/mL  [ ] IV PCA Fentanyl		[ ] 50 micrograms/mL    Demand dose: 0.2 mg     Lockout: 6 minutes   Continuous Rate: 0 mg/hr  4 Hour Limit: 4 mg    MEDICATIONS  (STANDING):  aspirin  chewable 81 milliGRAM(s) Oral daily  diphenhydrAMINE   Capsule 25 milliGRAM(s) Oral at bedtime  diphtheria/tetanus/pertussis (acellular) Vaccine (ADAcel) 0.5 milliLiter(s) IntraMuscular once  doxercalciferol Injectable 2 MICROGram(s) IV Push <User Schedule>  epoetin leeann Injectable 8000 Unit(s) IV Push <User Schedule>  ferrous    sulfate 325 milliGRAM(s) Oral daily  ferrous    sulfate 325 milliGRAM(s) Oral daily  heparin  Injectable 5000 Unit(s) SubCutaneous every 12 hours  HYDROmorphone PCA (1 mG/mL) 30 milliLiter(s) PCA Continuous PCA Continuous  labetalol 400 milliGRAM(s) Oral every 8 hours  lactated ringers. 1000 milliLiter(s) (60 mL/Hr) IV Continuous <Continuous>  levETIRAcetam 500 milliGRAM(s) Oral two times a day  oseltamivir 30 milliGRAM(s) Oral every other day  oxytocin Infusion 333.333 milliUNIT(s)/Min (1000 mL/Hr) IV Continuous <Continuous>  oxytocin Infusion 20 milliUNIT(s)/Min (60 mL/Hr) IV Continuous <Continuous>  prenatal multivitamin 1 Tablet(s) Oral daily  prenatal multivitamin 1 Tablet(s) Oral daily    MEDICATIONS  (PRN):  diphenhydrAMINE   Capsule 25 milliGRAM(s) Oral every 6 hours PRN Itching  docusate sodium 100 milliGRAM(s) Oral two times a day PRN Stool Softening  glycerin Suppository - Adult 1 Suppository(s) Rectal at bedtime PRN Constipation  HYDROmorphone PCA (1 mG/mL) Rescue Clinician Bolus 0.5 milliGRAM(s) IV Push every 15 minutes PRN for Pain Scale GREATER THAN 6  lanolin Ointment 1 Application(s) Topical every 3 hours PRN Sore Nipples  naloxone Injectable 0.1 milliGRAM(s) IV Push every 3 minutes PRN For ANY of the following changes in patient status:  A. RR LESS THAN 10 breaths per minute, B. Oxygen saturation LESS THAN 90%, C. Sedation score of 6  ondansetron Injectable 4 milliGRAM(s) IV Push every 6 hours PRN Nausea  simethicone 80 milliGRAM(s) Chew every 4 hours PRN Gas      OBJECTIVE:    Sedation Score:	[ X] Alert	[ ] Drowsy 	[ ] Arousable	[ ] Asleep	[ ] Unresponsive    Side Effects:	[ ] None	[ X] Nausea	[X ] Vomiting	[X ] Pruritus  		[ ] Other:    Vital Signs Last 24 Hrs  T(C): 36.6 (09 Feb 2018 08:45), Max: 36.9 (08 Feb 2018 11:52)  T(F): 97.9 (09 Feb 2018 08:45), Max: 98.5 (08 Feb 2018 11:52)  HR: 88 (09 Feb 2018 08:45) (44 - 94)  BP: 135/83 (09 Feb 2018 08:45) (116/73 - 156/86)  BP(mean): 115 (08 Feb 2018 21:45) (101 - 115)  RR: 18 (09 Feb 2018 08:45) (18 - 32)  SpO2: 96% (09 Feb 2018 06:00) (96% - 100%)    ASSESSMENT/ PLAN    Therapy to  be:               [X] Continued   [ ] Discontinued   [ ] Changed to PRN Analgesics    Documentation and Verification of current medications:   [X] Done	[ ] Not done, not eligible    Comments:

## 2018-02-09 NOTE — PROGRESS NOTE ADULT - SUBJECTIVE AND OBJECTIVE BOX
Pain Management Attending Addendum    SUBJECTIVE: Patient doing well with IV PCA    Therapy:    [X] IV PCA         [ ] PRN Analgesics    OBJECTIVE:   [X] Pain appropriately controlled    [ ] Other:    Side Effects:  [X] None	             [ ] Nausea              [ ] Pruritis                	[ ] Other:    ASSESSMENT/PLAN: Continue current therapy    Comments: c/o nausea, resolved now. No other changes made.

## 2018-02-09 NOTE — DISCHARGE NOTE OB - PROVIDER TOKENS
FREE:[LAST:[[facility]],PHONE:[phone]],FAX:[(   )    -],ADDRESS:[[address] [place] [address]]]

## 2018-02-09 NOTE — DISCHARGE NOTE OB - COMMUNITY RESOURCES
[facility]-[phone]. [employer] [facility]-[phone].

## 2018-02-09 NOTE — LACTATION INITIAL EVALUATION - PRO FEEDING PLAN INFANT OB
breast and formula feeding/MOther would like to  or provide EHM but concerned about being able to pump or breastfeed due to her medical condition and frequency of her appointments due to her illness

## 2018-02-09 NOTE — DISCHARGE NOTE OB - INSTRUCTIONS
Follow up with MD as directed call if any increased pain, headache, dizziness, blurry vision, epigastric pain. Heavy vaginal bleeding, chills, pain on urination.

## 2018-02-10 PROCEDURE — 99232 SBSQ HOSP IP/OBS MODERATE 35: CPT

## 2018-02-10 RX ORDER — OXYCODONE HYDROCHLORIDE 5 MG/1
5 TABLET ORAL
Qty: 0 | Refills: 0 | Status: DISCONTINUED | OUTPATIENT
Start: 2018-02-10 | End: 2018-02-13

## 2018-02-10 RX ADMIN — Medication 975 MILLIGRAM(S): at 08:48

## 2018-02-10 RX ADMIN — Medication 81 MILLIGRAM(S): at 11:52

## 2018-02-10 RX ADMIN — Medication 25 MILLIGRAM(S): at 23:52

## 2018-02-10 RX ADMIN — Medication 400 MILLIGRAM(S): at 15:35

## 2018-02-10 RX ADMIN — Medication 1 TABLET(S): at 11:53

## 2018-02-10 RX ADMIN — OXYCODONE HYDROCHLORIDE 5 MILLIGRAM(S): 5 TABLET ORAL at 21:38

## 2018-02-10 RX ADMIN — Medication 975 MILLIGRAM(S): at 22:15

## 2018-02-10 RX ADMIN — OXYCODONE HYDROCHLORIDE 5 MILLIGRAM(S): 5 TABLET ORAL at 20:12

## 2018-02-10 RX ADMIN — Medication 975 MILLIGRAM(S): at 21:38

## 2018-02-10 RX ADMIN — Medication 975 MILLIGRAM(S): at 16:06

## 2018-02-10 RX ADMIN — HEPARIN SODIUM 5000 UNIT(S): 5000 INJECTION INTRAVENOUS; SUBCUTANEOUS at 17:42

## 2018-02-10 RX ADMIN — Medication 975 MILLIGRAM(S): at 08:18

## 2018-02-10 RX ADMIN — Medication 30 MILLIGRAM(S): at 11:52

## 2018-02-10 RX ADMIN — Medication 400 MILLIGRAM(S): at 05:22

## 2018-02-10 RX ADMIN — OXYCODONE HYDROCHLORIDE 5 MILLIGRAM(S): 5 TABLET ORAL at 23:52

## 2018-02-10 RX ADMIN — Medication 400 MILLIGRAM(S): at 21:38

## 2018-02-10 RX ADMIN — Medication 975 MILLIGRAM(S): at 15:36

## 2018-02-10 RX ADMIN — HEPARIN SODIUM 5000 UNIT(S): 5000 INJECTION INTRAVENOUS; SUBCUTANEOUS at 05:22

## 2018-02-10 RX ADMIN — Medication 325 MILLIGRAM(S): at 11:52

## 2018-02-10 NOTE — PROGRESS NOTE ADULT - SUBJECTIVE AND OBJECTIVE BOX
Day _2__ of Anesthesia Pain Management Service    SUBJECTIVE:    Pain Scale Score	At rest: ___ 	With Activity: ___ 	[x ] Refer to charted pain scores    THERAPY:    [ ] IV PCA Morphine		[ ] 5 mg/mL	[ ] 1 mg/mL  [x ] IV PCA Hydromorphone	[ ] 5 mg/mL	[x ] 1 mg/mL  [ ] IV PCA Fentanyl		[ ] 50 micrograms/mL    Demand dose 0.2    lockout 6   (minutes) Continuous Rate 0      MEDICATIONS  (STANDING):  aspirin  chewable 81 milliGRAM(s) Oral daily  diphenhydrAMINE   Capsule 25 milliGRAM(s) Oral at bedtime  diphtheria/tetanus/pertussis (acellular) Vaccine (ADAcel) 0.5 milliLiter(s) IntraMuscular once  doxercalciferol Injectable 2 MICROGram(s) IV Push <User Schedule>  epoetin leeann Injectable 8000 Unit(s) IV Push <User Schedule>  ferrous    sulfate 325 milliGRAM(s) Oral daily  ferrous    sulfate 325 milliGRAM(s) Oral daily  heparin  Injectable 5000 Unit(s) SubCutaneous every 12 hours  HYDROmorphone PCA (1 mG/mL) 30 milliLiter(s) PCA Continuous PCA Continuous  labetalol 400 milliGRAM(s) Oral every 8 hours  lactated ringers. 1000 milliLiter(s) (60 mL/Hr) IV Continuous <Continuous>  oseltamivir 30 milliGRAM(s) Oral every other day  oxytocin Infusion 333.333 milliUNIT(s)/Min (1000 mL/Hr) IV Continuous <Continuous>  oxytocin Infusion 20 milliUNIT(s)/Min (60 mL/Hr) IV Continuous <Continuous>  prenatal multivitamin 1 Tablet(s) Oral daily  prenatal multivitamin 1 Tablet(s) Oral daily    MEDICATIONS  (PRN):  diphenhydrAMINE   Capsule 25 milliGRAM(s) Oral every 6 hours PRN Itching  docusate sodium 100 milliGRAM(s) Oral two times a day PRN Stool Softening  glycerin Suppository - Adult 1 Suppository(s) Rectal at bedtime PRN Constipation  HYDROmorphone PCA (1 mG/mL) Rescue Clinician Bolus 0.5 milliGRAM(s) IV Push every 15 minutes PRN for Pain Scale GREATER THAN 6  lanolin Ointment 1 Application(s) Topical every 3 hours PRN Sore Nipples  naloxone Injectable 0.1 milliGRAM(s) IV Push every 3 minutes PRN For ANY of the following changes in patient status:  A. RR LESS THAN 10 breaths per minute, B. Oxygen saturation LESS THAN 90%, C. Sedation score of 6  ondansetron Injectable 4 milliGRAM(s) IV Push every 6 hours PRN Nausea  simethicone 80 milliGRAM(s) Chew every 4 hours PRN Gas      OBJECTIVE:    Sedation Score:	[x ] Alert	[ ] Drowsy 	[ ] Arousable	[ ] Asleep	[ ] Unresponsive    Side Effects:	[x ] None	[ ] Nausea	[ ] Vomiting	[ ] Pruritus  		[ ] Other:    Vital Signs Last 24 Hrs  T(C): 36.6 (10 Feb 2018 06:09), Max: 36.7 (10 Feb 2018 01:11)  T(F): 97.8 (10 Feb 2018 06:09), Max: 98.1 (10 Feb 2018 01:11)  HR: 93 (10 Feb 2018 06:09) (74 - 93)  BP: 119/74 (10 Feb 2018 06:09) (104/66 - 149/112)  BP(mean): --  RR: 18 (10 Feb 2018 06:09) (18 - 18)  SpO2: 97% (09 Feb 2018 17:40) (96% - 97%)    ASSESSMENT/ PLAN    Therapy to  be:	[ ] Continue   [x ] Discontinued   [ x] Change to prn Analgesics    Documentation and Verification of current medications:   [X] Done	[ ] Not done, not eligible    Comments: Day _2__ of Anesthesia Pain Management Service    SUBJECTIVE:    Pain Scale Score	At rest: ___ 	With Activity: ___ 	[x ] Refer to charted pain scores    THERAPY:    [ ] IV PCA Morphine		[ ] 5 mg/mL	[ ] 1 mg/mL  [x ] IV PCA Hydromorphone	[ ] 5 mg/mL	[x ] 1 mg/mL  [ ] IV PCA Fentanyl		[ ] 50 micrograms/mL    Demand dose 0..15    lockout 6   (minutes) Continuous Rate 0      MEDICATIONS  (STANDING):  aspirin  chewable 81 milliGRAM(s) Oral daily  diphenhydrAMINE   Capsule 25 milliGRAM(s) Oral at bedtime  diphtheria/tetanus/pertussis (acellular) Vaccine (ADAcel) 0.5 milliLiter(s) IntraMuscular once  doxercalciferol Injectable 2 MICROGram(s) IV Push <User Schedule>  epoetin leeann Injectable 8000 Unit(s) IV Push <User Schedule>  ferrous    sulfate 325 milliGRAM(s) Oral daily  ferrous    sulfate 325 milliGRAM(s) Oral daily  heparin  Injectable 5000 Unit(s) SubCutaneous every 12 hours  HYDROmorphone PCA (1 mG/mL) 30 milliLiter(s) PCA Continuous PCA Continuous  labetalol 400 milliGRAM(s) Oral every 8 hours  lactated ringers. 1000 milliLiter(s) (60 mL/Hr) IV Continuous <Continuous>  oseltamivir 30 milliGRAM(s) Oral every other day  oxytocin Infusion 333.333 milliUNIT(s)/Min (1000 mL/Hr) IV Continuous <Continuous>  oxytocin Infusion 20 milliUNIT(s)/Min (60 mL/Hr) IV Continuous <Continuous>  prenatal multivitamin 1 Tablet(s) Oral daily  prenatal multivitamin 1 Tablet(s) Oral daily    MEDICATIONS  (PRN):  diphenhydrAMINE   Capsule 25 milliGRAM(s) Oral every 6 hours PRN Itching  docusate sodium 100 milliGRAM(s) Oral two times a day PRN Stool Softening  glycerin Suppository - Adult 1 Suppository(s) Rectal at bedtime PRN Constipation  HYDROmorphone PCA (1 mG/mL) Rescue Clinician Bolus 0.5 milliGRAM(s) IV Push every 15 minutes PRN for Pain Scale GREATER THAN 6  lanolin Ointment 1 Application(s) Topical every 3 hours PRN Sore Nipples  naloxone Injectable 0.1 milliGRAM(s) IV Push every 3 minutes PRN For ANY of the following changes in patient status:  A. RR LESS THAN 10 breaths per minute, B. Oxygen saturation LESS THAN 90%, C. Sedation score of 6  ondansetron Injectable 4 milliGRAM(s) IV Push every 6 hours PRN Nausea  simethicone 80 milliGRAM(s) Chew every 4 hours PRN Gas      OBJECTIVE:    Sedation Score:	[x ] Alert	[ ] Drowsy 	[ ] Arousable	[ ] Asleep	[ ] Unresponsive    Side Effects:	[x ] None	[ ] Nausea	[ ] Vomiting	[ ] Pruritus  		[ ] Other:    Vital Signs Last 24 Hrs  T(C): 36.6 (10 Feb 2018 06:09), Max: 36.7 (10 Feb 2018 01:11)  T(F): 97.8 (10 Feb 2018 06:09), Max: 98.1 (10 Feb 2018 01:11)  HR: 93 (10 Feb 2018 06:09) (74 - 93)  BP: 119/74 (10 Feb 2018 06:09) (104/66 - 149/112)  BP(mean): --  RR: 18 (10 Feb 2018 06:09) (18 - 18)  SpO2: 97% (09 Feb 2018 17:40) (96% - 97%)    ASSESSMENT/ PLAN    Therapy to  be:	[ ] Continue   [x ] Discontinued   [ x] Change to prn Analgesics    Documentation and Verification of current medications:   [X] Done	[ ] Not done, not eligible    Comments:

## 2018-02-10 NOTE — PROGRESS NOTE ADULT - SUBJECTIVE AND OBJECTIVE BOX
Subjective:  Pt seen at bedside. No acute events overnight. She is ambulating.  She is tolerating regular diet. She denies nausea and vomiting. She is voiding. Her pain is controlled. She reports normal postpartum bleeding.     Objective:    Vital Signs Last 24 Hrs  T(C): 36.7 (10 Feb 2018 01:11), Max: 36.7 (10 Feb 2018 01:11)  T(F): 98.1 (10 Feb 2018 01:11), Max: 98.1 (10 Feb 2018 01:11)  HR: 92 (10 Feb 2018 01:11) (74 - 92)  BP: 104/66 (10 Feb 2018 01:11) (104/66 - 149/112)  BP(mean): --  RR: 18 (10 Feb 2018 01:11) (18 - 18)  SpO2: 97% (09 Feb 2018 17:40) (96% - 97%)      02-08 @ 07:01  -  02-09 @ 07:00  --------------------------------------------------------  IN: 414 mL / OUT: 1635 mL / NET: -1221 mL    02-09 @ 07:01  -  02-10 @ 04:36  --------------------------------------------------------  IN: 60 mL / OUT: 1500 mL / NET: -1440 mL        Physical exam:  Gen: NAD  Abdomen: Soft, nontender, no distension. Firm uterine fundus at umbilicus.  Incision: Clean, dry, and intact   Pelvic: Deferred  Ext: No calf tenderness or edema bilaterally    LABS:                        8.6    19.5  )-----------( 226      ( 09 Feb 2018 02:38 )             25.2     02-09    136  |  98  |  12  ----------------------------<  110<H>  4.8   |  30  |  2.37<H>    Ca    9.2      09 Feb 2018 02:38  Mg     2.9     02-09    TPro  6.2  /  Alb  3.1<L>  /  TBili  0.2  /  DBili  x   /  AST  28  /  ALT  24  /  AlkPhos  98  02-09    LIVER FUNCTIONS - ( 09 Feb 2018 02:38 )  Alb: 3.1 g/dL / Pro: 6.2 g/dL / ALK PHOS: 98 U/L / ALT: 24 U/L RC / AST: 28 U/L / GGT: x                       MEDICATIONS  (STANDING):  aspirin  chewable 81 milliGRAM(s) Oral daily  diphenhydrAMINE   Capsule 25 milliGRAM(s) Oral at bedtime  diphtheria/tetanus/pertussis (acellular) Vaccine (ADAcel) 0.5 milliLiter(s) IntraMuscular once  doxercalciferol Injectable 2 MICROGram(s) IV Push <User Schedule>  epoetin leeann Injectable 8000 Unit(s) IV Push <User Schedule>  ferrous    sulfate 325 milliGRAM(s) Oral daily  ferrous    sulfate 325 milliGRAM(s) Oral daily  heparin  Injectable 5000 Unit(s) SubCutaneous every 12 hours  HYDROmorphone PCA (1 mG/mL) 30 milliLiter(s) PCA Continuous PCA Continuous  labetalol 400 milliGRAM(s) Oral every 8 hours  lactated ringers. 1000 milliLiter(s) (60 mL/Hr) IV Continuous <Continuous>  oseltamivir 30 milliGRAM(s) Oral every other day  oxytocin Infusion 333.333 milliUNIT(s)/Min (1000 mL/Hr) IV Continuous <Continuous>  oxytocin Infusion 20 milliUNIT(s)/Min (60 mL/Hr) IV Continuous <Continuous>  prenatal multivitamin 1 Tablet(s) Oral daily  prenatal multivitamin 1 Tablet(s) Oral daily    MEDICATIONS  (PRN):  diphenhydrAMINE   Capsule 25 milliGRAM(s) Oral every 6 hours PRN Itching  docusate sodium 100 milliGRAM(s) Oral two times a day PRN Stool Softening  glycerin Suppository - Adult 1 Suppository(s) Rectal at bedtime PRN Constipation  HYDROmorphone PCA (1 mG/mL) Rescue Clinician Bolus 0.5 milliGRAM(s) IV Push every 15 minutes PRN for Pain Scale GREATER THAN 6  lanolin Ointment 1 Application(s) Topical every 3 hours PRN Sore Nipples  naloxone Injectable 0.1 milliGRAM(s) IV Push every 3 minutes PRN For ANY of the following changes in patient status:  A. RR LESS THAN 10 breaths per minute, B. Oxygen saturation LESS THAN 90%, C. Sedation score of 6  ondansetron Injectable 4 milliGRAM(s) IV Push every 6 hours PRN Nausea  simethicone 80 milliGRAM(s) Chew every 4 hours PRN Gas

## 2018-02-10 NOTE — PROGRESS NOTE ADULT - ASSESSMENT
27y   w/ si PEC, stage 5 CKD on dialysis, now POD #2 s/p rLTCS and BTL for NRFHT and severe range BP's. Patient without complaints in stable condition.     CV: Continue Labetalol 400 TID for BP control. Will titrate down as needed. F/u AM CBC  Resp: Saturating well on RA.  GI: Advance diet as tolerated. Renal restricted diet ordered.  : Dialysis M/W/F  Neuro: S/p Keppra for seizure prophylaxis.   Heme: OOB for seizure ppx. HSQ during dialysis.   ID: Tamiflu for influenza exposure.     Elana Gillespie MD 27y   w/ si PEC, stage 5 CKD on dialysis, now POD #2 s/p rLTCS and BTL for NRFHT and severe range BP's. Patient without complaints in stable condition.     CV: Continue Labetalol 400 TID for BP control. Will titrate down as needed. F/u AM CBC  Resp: Saturating well on RA.  GI: Advance diet as tolerated. Renal restricted diet ordered.  : Dialysis M/W/F  Neuro: S/p Keppra for seizure prophylaxis.   Heme: HSQ and OOB for seizure ppx.    ID: Tamiflu for influenza exposure.     Elana Gillespie MD

## 2018-02-11 LAB
ALBUMIN SERPL ELPH-MCNC: 2.8 G/DL — LOW (ref 3.3–5)
ALP SERPL-CCNC: 99 U/L — SIGNIFICANT CHANGE UP (ref 40–120)
ALT FLD-CCNC: 23 U/L RC — SIGNIFICANT CHANGE UP (ref 10–45)
ANION GAP SERPL CALC-SCNC: 14 MMOL/L — SIGNIFICANT CHANGE UP (ref 5–17)
APTT BLD: 23.6 SEC — LOW (ref 27.5–37.4)
AST SERPL-CCNC: 25 U/L — SIGNIFICANT CHANGE UP (ref 10–40)
BILIRUB SERPL-MCNC: 0.2 MG/DL — SIGNIFICANT CHANGE UP (ref 0.2–1.2)
BUN SERPL-MCNC: 35 MG/DL — HIGH (ref 7–23)
CALCIUM SERPL-MCNC: 8.6 MG/DL — SIGNIFICANT CHANGE UP (ref 8.4–10.5)
CHLORIDE SERPL-SCNC: 100 MMOL/L — SIGNIFICANT CHANGE UP (ref 96–108)
CO2 SERPL-SCNC: 23 MMOL/L — SIGNIFICANT CHANGE UP (ref 22–31)
CREAT SERPL-MCNC: 4.17 MG/DL — HIGH (ref 0.5–1.3)
FIBRINOGEN PPP-MCNC: 540 MG/DL — HIGH (ref 310–510)
GLUCOSE SERPL-MCNC: 87 MG/DL — SIGNIFICANT CHANGE UP (ref 70–99)
HCT VFR BLD CALC: 22.4 % — LOW (ref 34.5–45)
HGB BLD-MCNC: 7.7 G/DL — LOW (ref 11.5–15.5)
INR BLD: 0.83 RATIO — LOW (ref 0.88–1.16)
LDH SERPL L TO P-CCNC: 226 U/L — SIGNIFICANT CHANGE UP (ref 50–242)
MCHC RBC-ENTMCNC: 32.7 PG — SIGNIFICANT CHANGE UP (ref 27–34)
MCHC RBC-ENTMCNC: 34.4 GM/DL — SIGNIFICANT CHANGE UP (ref 32–36)
MCV RBC AUTO: 95.1 FL — SIGNIFICANT CHANGE UP (ref 80–100)
PLATELET # BLD AUTO: 241 K/UL — SIGNIFICANT CHANGE UP (ref 150–400)
POTASSIUM SERPL-MCNC: 4.1 MMOL/L — SIGNIFICANT CHANGE UP (ref 3.5–5.3)
POTASSIUM SERPL-SCNC: 4.1 MMOL/L — SIGNIFICANT CHANGE UP (ref 3.5–5.3)
PROT SERPL-MCNC: 5.5 G/DL — LOW (ref 6–8.3)
PROTHROM AB SERPL-ACNC: 9 SEC — LOW (ref 9.8–12.7)
RBC # BLD: 2.36 M/UL — LOW (ref 3.8–5.2)
RBC # FLD: 16.1 % — HIGH (ref 10.3–14.5)
SODIUM SERPL-SCNC: 137 MMOL/L — SIGNIFICANT CHANGE UP (ref 135–145)
URATE SERPL-MCNC: 5.9 MG/DL — SIGNIFICANT CHANGE UP (ref 2.5–7)
WBC # BLD: 11.3 K/UL — HIGH (ref 3.8–10.5)
WBC # FLD AUTO: 11.3 K/UL — HIGH (ref 3.8–10.5)

## 2018-02-11 RX ORDER — TETANUS TOXOID, REDUCED DIPHTHERIA TOXOID AND ACELLULAR PERTUSSIS VACCINE, ADSORBED 5; 2.5; 8; 8; 2.5 [IU]/.5ML; [IU]/.5ML; UG/.5ML; UG/.5ML; UG/.5ML
0.5 SUSPENSION INTRAMUSCULAR ONCE
Qty: 0 | Refills: 0 | Status: COMPLETED | OUTPATIENT
Start: 2018-02-11 | End: 2018-02-11

## 2018-02-11 RX ORDER — FERROUS SULFATE 325(65) MG
325 TABLET ORAL
Qty: 0 | Refills: 0 | Status: DISCONTINUED | OUTPATIENT
Start: 2018-02-11 | End: 2018-02-13

## 2018-02-11 RX ADMIN — Medication 975 MILLIGRAM(S): at 18:30

## 2018-02-11 RX ADMIN — Medication 975 MILLIGRAM(S): at 12:24

## 2018-02-11 RX ADMIN — HEPARIN SODIUM 5000 UNIT(S): 5000 INJECTION INTRAVENOUS; SUBCUTANEOUS at 06:24

## 2018-02-11 RX ADMIN — OXYCODONE HYDROCHLORIDE 5 MILLIGRAM(S): 5 TABLET ORAL at 20:18

## 2018-02-11 RX ADMIN — Medication 400 MILLIGRAM(S): at 22:35

## 2018-02-11 RX ADMIN — Medication 975 MILLIGRAM(S): at 06:24

## 2018-02-11 RX ADMIN — Medication 975 MILLIGRAM(S): at 11:54

## 2018-02-11 RX ADMIN — Medication 400 MILLIGRAM(S): at 06:24

## 2018-02-11 RX ADMIN — HEPARIN SODIUM 5000 UNIT(S): 5000 INJECTION INTRAVENOUS; SUBCUTANEOUS at 18:30

## 2018-02-11 RX ADMIN — OXYCODONE HYDROCHLORIDE 5 MILLIGRAM(S): 5 TABLET ORAL at 15:12

## 2018-02-11 RX ADMIN — Medication 1 TABLET(S): at 11:53

## 2018-02-11 RX ADMIN — TETANUS TOXOID, REDUCED DIPHTHERIA TOXOID AND ACELLULAR PERTUSSIS VACCINE, ADSORBED 0.5 MILLILITER(S): 5; 2.5; 8; 8; 2.5 SUSPENSION INTRAMUSCULAR at 23:20

## 2018-02-11 RX ADMIN — OXYCODONE HYDROCHLORIDE 5 MILLIGRAM(S): 5 TABLET ORAL at 20:55

## 2018-02-11 RX ADMIN — OXYCODONE HYDROCHLORIDE 5 MILLIGRAM(S): 5 TABLET ORAL at 15:42

## 2018-02-11 RX ADMIN — Medication 400 MILLIGRAM(S): at 15:11

## 2018-02-11 RX ADMIN — OXYCODONE HYDROCHLORIDE 5 MILLIGRAM(S): 5 TABLET ORAL at 00:40

## 2018-02-11 RX ADMIN — Medication 325 MILLIGRAM(S): at 18:30

## 2018-02-11 RX ADMIN — Medication 975 MILLIGRAM(S): at 19:15

## 2018-02-11 NOTE — PROGRESS NOTE ADULT - ASSESSMENT
A/P: 26 y/o P3 with end stage renal disease POD #3 s/p rLTCS +BTL  -HELLP labs daily  -Labetalol 400 TID for BP control  -s/p Keppra x24 hours for seizure prophylaxis  -continue to monitor BP  -Renal following. Dialysis 3x weekly M/W/F  -continue Tamiflu x14 days for flu exposure (2/2-)  -routine Post op care    N Sample, PGY3

## 2018-02-11 NOTE — PROGRESS NOTE ADULT - ATTENDING COMMENTS
I saw and examined Ms. Boles.   She is feeling well today, meeting all postoperative milestones.   /78, range 124-142/78-88.   WBC decreased today from 19.5 to 11.3, Hct this AM is 24.4.  Cr 4.17, next dialysis scheduled for tomorrow.     Continue close monitoring, routine postpartum care.   Pumping for NICU.    Freya Tobar MD I saw and examined Ms. Boles.   She is feeling well today, meeting all postoperative milestones.   /78, range 124-142/78-88.   WBC decreased today from 19.5 to 11.3, Hct this AM is 24.4.  Cr 4.17, next dialysis scheduled for tomorrow.     Continue close monitoring, routine postpartum care.   Pumping for NICU.    Freya Tobar MD    M attending.  Patient seen and examined. Agree with above.

## 2018-02-11 NOTE — PROGRESS NOTE ADULT - SUBJECTIVE AND OBJECTIVE BOX
R3 OB Note    Patient seen and examined at bedside. No acute events overnight. Patient feeling well. Pain well controlled with PO meds. Patient tolerating PO, no N/V. +OOB and ambulating. Denies subjective fevers and chills. Denies chest pain and SOB. Denies headaches and visual changes.    Vital Signs Last 24 Hrs  T(C): 37 (11 Feb 2018 05:48), Max: 37.1 (10 Feb 2018 21:11)  T(F): 98.6 (11 Feb 2018 05:48), Max: 98.8 (10 Feb 2018 21:11)  HR: 85 (11 Feb 2018 05:48) (80 - 93)  BP: 131/78 (11 Feb 2018 05:48) (113/71 - 142/88)  RR: 18 (11 Feb 2018 05:48) (18 - 18)  SpO2: 98% (11 Feb 2018 05:48) (96% - 99%)    I&O's Summary    09 Feb 2018 07:01  -  10 Feb 2018 07:00  --------------------------------------------------------  IN: 60 mL / OUT: 2500 mL / NET: -2440 mL      Gen: NAD  CV: RRR  Lungs: CTA b/l  Abd: soft, non tender  Inc: c/d/i with steri strips  Ext: NTTP b/l, no edema      LABS:    AM HELLP labs pending    MEDICATIONS  (STANDING):  acetaminophen   Tablet. 975 milliGRAM(s) Oral every 6 hours  diphenhydrAMINE   Capsule 25 milliGRAM(s) Oral at bedtime  diphtheria/tetanus/pertussis (acellular) Vaccine (ADAcel) 0.5 milliLiter(s) IntraMuscular once  doxercalciferol Injectable 2 MICROGram(s) IV Push <User Schedule>  epoetin leeann Injectable 8000 Unit(s) IV Push <User Schedule>  ferrous    sulfate 325 milliGRAM(s) Oral daily  ferrous    sulfate 325 milliGRAM(s) Oral daily  heparin  Injectable 5000 Unit(s) SubCutaneous every 12 hours  labetalol 400 milliGRAM(s) Oral every 8 hours  lactated ringers. 1000 milliLiter(s) (60 mL/Hr) IV Continuous <Continuous>  oseltamivir 30 milliGRAM(s) Oral every other day  oxyCODONE    IR 5 milliGRAM(s) Oral every 3 hours  oxytocin Infusion 333.333 milliUNIT(s)/Min (1000 mL/Hr) IV Continuous <Continuous>  oxytocin Infusion 20 milliUNIT(s)/Min (60 mL/Hr) IV Continuous <Continuous>  prenatal multivitamin 1 Tablet(s) Oral daily  prenatal multivitamin 1 Tablet(s) Oral daily    MEDICATIONS  (PRN):  diphenhydrAMINE   Capsule 25 milliGRAM(s) Oral every 6 hours PRN Itching  docusate sodium 100 milliGRAM(s) Oral two times a day PRN Stool Softening  glycerin Suppository - Adult 1 Suppository(s) Rectal at bedtime PRN Constipation  lanolin Ointment 1 Application(s) Topical every 3 hours PRN Sore Nipples  simethicone 80 milliGRAM(s) Chew every 4 hours PRN Gas

## 2018-02-12 LAB
ALBUMIN SERPL ELPH-MCNC: 3.1 G/DL — LOW (ref 3.3–5)
ALP SERPL-CCNC: 97 U/L — SIGNIFICANT CHANGE UP (ref 40–120)
ALT FLD-CCNC: 16 U/L RC — SIGNIFICANT CHANGE UP (ref 10–45)
ANION GAP SERPL CALC-SCNC: 17 MMOL/L — SIGNIFICANT CHANGE UP (ref 5–17)
APTT BLD: 28.3 SEC — SIGNIFICANT CHANGE UP (ref 27.5–37.4)
AST SERPL-CCNC: 15 U/L — SIGNIFICANT CHANGE UP (ref 10–40)
BASOPHILS # BLD AUTO: 0 K/UL — SIGNIFICANT CHANGE UP (ref 0–0.2)
BASOPHILS NFR BLD AUTO: 0.4 % — SIGNIFICANT CHANGE UP (ref 0–2)
BILIRUB SERPL-MCNC: 0.2 MG/DL — SIGNIFICANT CHANGE UP (ref 0.2–1.2)
BUN SERPL-MCNC: 46 MG/DL — HIGH (ref 7–23)
CALCIUM SERPL-MCNC: 8.7 MG/DL — SIGNIFICANT CHANGE UP (ref 8.4–10.5)
CHLORIDE SERPL-SCNC: 102 MMOL/L — SIGNIFICANT CHANGE UP (ref 96–108)
CO2 SERPL-SCNC: 18 MMOL/L — LOW (ref 22–31)
CREAT SERPL-MCNC: 5.29 MG/DL — HIGH (ref 0.5–1.3)
EOSINOPHIL # BLD AUTO: 0.1 K/UL — SIGNIFICANT CHANGE UP (ref 0–0.5)
EOSINOPHIL NFR BLD AUTO: 0.8 % — SIGNIFICANT CHANGE UP (ref 0–6)
FIBRINOGEN PPP-MCNC: 543 MG/DL — HIGH (ref 310–510)
GLUCOSE SERPL-MCNC: 76 MG/DL — SIGNIFICANT CHANGE UP (ref 70–99)
HCT VFR BLD CALC: 22 % — LOW (ref 34.5–45)
HGB BLD-MCNC: 7.3 G/DL — LOW (ref 11.5–15.5)
INR BLD: 0.82 RATIO — LOW (ref 0.88–1.16)
LDH SERPL L TO P-CCNC: 206 U/L — SIGNIFICANT CHANGE UP (ref 50–242)
LYMPHOCYTES # BLD AUTO: 1.2 K/UL — SIGNIFICANT CHANGE UP (ref 1–3.3)
LYMPHOCYTES # BLD AUTO: 11 % — LOW (ref 13–44)
MCHC RBC-ENTMCNC: 31.8 PG — SIGNIFICANT CHANGE UP (ref 27–34)
MCHC RBC-ENTMCNC: 33.3 GM/DL — SIGNIFICANT CHANGE UP (ref 32–36)
MCV RBC AUTO: 95.6 FL — SIGNIFICANT CHANGE UP (ref 80–100)
MONOCYTES # BLD AUTO: 0.6 K/UL — SIGNIFICANT CHANGE UP (ref 0–0.9)
MONOCYTES NFR BLD AUTO: 5.5 % — SIGNIFICANT CHANGE UP (ref 2–14)
NEUTROPHILS # BLD AUTO: 8.6 K/UL — HIGH (ref 1.8–7.4)
NEUTROPHILS NFR BLD AUTO: 82.4 % — HIGH (ref 43–77)
PLATELET # BLD AUTO: 247 K/UL — SIGNIFICANT CHANGE UP (ref 150–400)
POTASSIUM SERPL-MCNC: 4 MMOL/L — SIGNIFICANT CHANGE UP (ref 3.5–5.3)
POTASSIUM SERPL-SCNC: 4 MMOL/L — SIGNIFICANT CHANGE UP (ref 3.5–5.3)
PROT SERPL-MCNC: 5.8 G/DL — LOW (ref 6–8.3)
PROTHROM AB SERPL-ACNC: 8.9 SEC — LOW (ref 9.8–12.7)
RBC # BLD: 2.3 M/UL — LOW (ref 3.8–5.2)
RBC # FLD: 16.5 % — HIGH (ref 10.3–14.5)
SODIUM SERPL-SCNC: 137 MMOL/L — SIGNIFICANT CHANGE UP (ref 135–145)
URATE SERPL-MCNC: 7.8 MG/DL — HIGH (ref 2.5–7)
WBC # BLD: 10.5 K/UL — SIGNIFICANT CHANGE UP (ref 3.8–10.5)
WBC # FLD AUTO: 10.5 K/UL — SIGNIFICANT CHANGE UP (ref 3.8–10.5)

## 2018-02-12 PROCEDURE — 90935 HEMODIALYSIS ONE EVALUATION: CPT | Mod: GC

## 2018-02-12 RX ORDER — ACETAMINOPHEN 500 MG
3 TABLET ORAL
Qty: 0 | Refills: 0 | DISCHARGE
Start: 2018-02-12

## 2018-02-12 RX ORDER — LABETALOL HCL 100 MG
2 TABLET ORAL
Qty: 180 | Refills: 0
Start: 2018-02-12 | End: 2018-03-13

## 2018-02-12 RX ADMIN — Medication 975 MILLIGRAM(S): at 10:33

## 2018-02-12 RX ADMIN — Medication 975 MILLIGRAM(S): at 09:35

## 2018-02-12 RX ADMIN — Medication 1 TABLET(S): at 11:43

## 2018-02-12 RX ADMIN — DOXERCALCIFEROL 2 MICROGRAM(S): 2.5 CAPSULE ORAL at 14:10

## 2018-02-12 RX ADMIN — Medication 325 MILLIGRAM(S): at 09:35

## 2018-02-12 RX ADMIN — Medication 325 MILLIGRAM(S): at 11:43

## 2018-02-12 RX ADMIN — Medication 400 MILLIGRAM(S): at 06:02

## 2018-02-12 RX ADMIN — Medication 975 MILLIGRAM(S): at 21:19

## 2018-02-12 RX ADMIN — Medication 975 MILLIGRAM(S): at 15:52

## 2018-02-12 RX ADMIN — ERYTHROPOIETIN 8000 UNIT(S): 10000 INJECTION, SOLUTION INTRAVENOUS; SUBCUTANEOUS at 14:09

## 2018-02-12 RX ADMIN — Medication 975 MILLIGRAM(S): at 00:59

## 2018-02-12 RX ADMIN — HEPARIN SODIUM 5000 UNIT(S): 5000 INJECTION INTRAVENOUS; SUBCUTANEOUS at 06:02

## 2018-02-12 RX ADMIN — Medication 25 MILLIGRAM(S): at 21:19

## 2018-02-12 RX ADMIN — Medication 975 MILLIGRAM(S): at 17:22

## 2018-02-12 RX ADMIN — OXYCODONE HYDROCHLORIDE 5 MILLIGRAM(S): 5 TABLET ORAL at 15:52

## 2018-02-12 RX ADMIN — Medication 975 MILLIGRAM(S): at 01:40

## 2018-02-12 RX ADMIN — Medication 25 MILLIGRAM(S): at 01:09

## 2018-02-12 RX ADMIN — Medication 975 MILLIGRAM(S): at 21:49

## 2018-02-12 RX ADMIN — HEPARIN SODIUM 5000 UNIT(S): 5000 INJECTION INTRAVENOUS; SUBCUTANEOUS at 17:18

## 2018-02-12 RX ADMIN — OXYCODONE HYDROCHLORIDE 5 MILLIGRAM(S): 5 TABLET ORAL at 00:59

## 2018-02-12 RX ADMIN — Medication 400 MILLIGRAM(S): at 21:45

## 2018-02-12 RX ADMIN — Medication 325 MILLIGRAM(S): at 17:22

## 2018-02-12 RX ADMIN — Medication 30 MILLIGRAM(S): at 11:43

## 2018-02-12 RX ADMIN — OXYCODONE HYDROCHLORIDE 5 MILLIGRAM(S): 5 TABLET ORAL at 01:40

## 2018-02-12 RX ADMIN — OXYCODONE HYDROCHLORIDE 5 MILLIGRAM(S): 5 TABLET ORAL at 15:58

## 2018-02-12 RX ADMIN — Medication 400 MILLIGRAM(S): at 15:52

## 2018-02-12 RX ADMIN — OXYCODONE HYDROCHLORIDE 5 MILLIGRAM(S): 5 TABLET ORAL at 11:49

## 2018-02-12 NOTE — PROVIDER CONTACT NOTE (OTHER) - ASSESSMENT
pt denies headache, blurry vision, epigastric pain
/98, P 91, patient denies any s/s of SOB, blurred vision, or headache.
patient is asymptomatic

## 2018-02-12 NOTE — PROGRESS NOTE ADULT - SUBJECTIVE AND OBJECTIVE BOX
Subjective:  Pt seen at bedside. No acute events overnight. Denies chest pain and SOB. No HA, changes in vision and RUQ pain. She is ambulating.  She is tolerating regular diet. She denies nausea and vomiting. Her pain is controlled. She reports normal postpartum bleeding.     Objective:    Vital Signs Last 24 Hrs  T(C): 36.9 (12 Feb 2018 01:54), Max: 37 (11 Feb 2018 05:48)  T(F): 98.4 (12 Feb 2018 01:54), Max: 98.6 (11 Feb 2018 05:48)  HR: 91 (12 Feb 2018 01:54) (84 - 91)  BP: 133/80 (12 Feb 2018 01:54) (131/78 - 144/85)  BP(mean): --  RR: 18 (12 Feb 2018 01:54) (18 - 18)  SpO2: 98% (12 Feb 2018 01:54) (98% - 98%)        Physical exam:  Gen: NAD  Abdomen: Soft, nontender, no distension. Firm uterine fundus at umbilicus.  Incision: Clean, dry, and intact   Pelvic: Deferred  Ext: No calf tenderness or edema bilaterally    LABS:                        7.7    11.3  )-----------( 241      ( 11 Feb 2018 07:27 )             22.4     02-11    137  |  100  |  35<H>  ----------------------------<  87  4.1   |  23  |  4.17<H>    Ca    8.6      11 Feb 2018 07:27    TPro  5.5<L>  /  Alb  2.8<L>  /  TBili  0.2  /  DBili  x   /  AST  25  /  ALT  23  /  AlkPhos  99  02-11    LIVER FUNCTIONS - ( 11 Feb 2018 07:27 )  Alb: 2.8 g/dL / Pro: 5.5 g/dL / ALK PHOS: 99 U/L / ALT: 23 U/L RC / AST: 25 U/L / GGT: x                 MEDICATIONS  (STANDING):  acetaminophen   Tablet. 975 milliGRAM(s) Oral every 6 hours  diphenhydrAMINE   Capsule 25 milliGRAM(s) Oral at bedtime  doxercalciferol Injectable 2 MICROGram(s) IV Push <User Schedule>  epoetin leeann Injectable 8000 Unit(s) IV Push <User Schedule>  ferrous    sulfate 325 milliGRAM(s) Oral three times a day with meals  heparin  Injectable 5000 Unit(s) SubCutaneous every 12 hours  labetalol 400 milliGRAM(s) Oral every 8 hours  oseltamivir 30 milliGRAM(s) Oral every other day  oxyCODONE    IR 5 milliGRAM(s) Oral every 3 hours  oxytocin Infusion 333.333 milliUNIT(s)/Min (1000 mL/Hr) IV Continuous <Continuous>  oxytocin Infusion 20 milliUNIT(s)/Min (60 mL/Hr) IV Continuous <Continuous>  prenatal multivitamin 1 Tablet(s) Oral daily  prenatal multivitamin 1 Tablet(s) Oral daily    MEDICATIONS  (PRN):  diphenhydrAMINE   Capsule 25 milliGRAM(s) Oral every 6 hours PRN Itching  docusate sodium 100 milliGRAM(s) Oral two times a day PRN Stool Softening  glycerin Suppository - Adult 1 Suppository(s) Rectal at bedtime PRN Constipation  lanolin Ointment 1 Application(s) Topical every 3 hours PRN Sore Nipples  simethicone 80 milliGRAM(s) Chew every 4 hours PRN Gas

## 2018-02-12 NOTE — PROGRESS NOTE ADULT - SUBJECTIVE AND OBJECTIVE BOX
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 Patient had  done on 18. Patient seen and examined during HD. She denies complains of SOB, CP, or abdominal pain.       PAST HISTORY  --------------------------------------------------------------------------------  No significant changes to PMH, PSH, FHx, SHx, unless otherwise noted    ALLERGIES & MEDICATIONS  --------------------------------------------------------------------------------  Allergies    No Known Allergies    Intolerances      Standing Inpatient Medications  acetaminophen   Tablet. 975 milliGRAM(s) Oral every 6 hours  diphenhydrAMINE   Capsule 25 milliGRAM(s) Oral at bedtime  doxercalciferol Injectable 2 MICROGram(s) IV Push <User Schedule>  epoetin leeann Injectable 8000 Unit(s) IV Push <User Schedule>  ferrous    sulfate 325 milliGRAM(s) Oral three times a day with meals  heparin  Injectable 5000 Unit(s) SubCutaneous every 12 hours  labetalol 400 milliGRAM(s) Oral every 8 hours  oseltamivir 30 milliGRAM(s) Oral every other day  oxyCODONE    IR 5 milliGRAM(s) Oral every 3 hours  oxytocin Infusion 333.333 milliUNIT(s)/Min IV Continuous <Continuous>  oxytocin Infusion 20 milliUNIT(s)/Min IV Continuous <Continuous>  prenatal multivitamin 1 Tablet(s) Oral daily  prenatal multivitamin 1 Tablet(s) Oral daily    PRN Inpatient Medications  diphenhydrAMINE   Capsule 25 milliGRAM(s) Oral every 6 hours PRN  docusate sodium 100 milliGRAM(s) Oral two times a day PRN  glycerin Suppository - Adult 1 Suppository(s) Rectal at bedtime PRN  lanolin Ointment 1 Application(s) Topical every 3 hours PRN  simethicone 80 milliGRAM(s) Chew every 4 hours PRN      REVIEW OF SYSTEMS  --------------------------------------------------------------------------------  Respiratory: No dyspnea  CV: No chest pain  GI: No abdominal pain  MSK: no edema      All other systems were reviewed and are negative, except as noted.    VITALS/PHYSICAL EXAM  --------------------------------------------------------------------------------  T(C): 36.5 (18 @ 12:45), Max: 36.9 (18 @ 01:54)  HR: 88 (18 @ 12:45) (76 - 91)  BP: 142/88 (18 @ 12:45) (129/82 - 144/85)  RR: 18 (18 @ 12:45) (18 - 18)  SpO2: 100% (18 @ 12:45) (98% - 100%)  Wt(kg): --        Physical Exam:  	Gen: NAD  	HEENT: anicteric  	Pulm: no rales today  	CV: S1S2  	Abd: mildly tender to palpation  	Psych: Normal affect and mood  	Skin: Warm  	Vascular access: GALO KANG    LABS/STUDIES  --------------------------------------------------------------------------------              7.3    10.5  >-----------<  247      [18 13:36]              22.0     137  |  102  |  46  ----------------------------<  76      [18 13:36]  4.0   |  18  |  5.29        Ca     8.7     [18 13:36]    TPro  5.8  /  Alb  3.1  /  TBili  0.2  /  DBili  x   /  AST  15  /  ALT  16  /  AlkPhos  97  [18 13:36]    PT/INR: PT 8.9  , INR 0.82       [18 13:36]  PTT: 28.3       [18 13:36]    Uric acid 7.8      [18 13:36]        [18 13:36]    Creatinine Trend:  SCr 5.29 [ 13:36]  SCr 4.17 [ 07:27]  SCr 2.37 [ 02:38]  SCr 2.33 [ 00:19]  SCr 3.08 [ 06:54]    Urinalysis - [18 @ 00:48]      Color Yellow / Appearance SL Turbid / SG 1.009 / pH 8.5      Gluc Negative / Ketone Negative  / Bili Negative / Urobili Negative       Blood Negative / Protein >600 / Leuk Est Negative / Nitrite Negative      RBC 0-2 / WBC 2-5 / Hyaline 5-10 / Gran  / Sq Epi  / Non Sq Epi Moderate / Bacteria

## 2018-02-12 NOTE — PROGRESS NOTE ADULT - ASSESSMENT
A/P: 28 y/o P3 with end stage renal disease and siPEC, now POD #5 s/p rLTCS +BTL. Stable overnight without acute events.     -dialysis today as scheduled  -HELLP labs daily  -continue to monitor BP  -Labetalol 400 TID for BP control  -s/p Keppra x24 hours for seizure prophylaxis  -Renal following. Dialysis 3x weekly M/W/F  -continue Tamiflu x14 days for flu exposure (2/2-)  -d/c today if remains stable    JFleahg PGY3

## 2018-02-12 NOTE — PROGRESS NOTE ADULT - PROBLEM SELECTOR PLAN 1
Patient seen during HD. BP noted to elevated during treatment. Will increase UF goal during next treatment. AVF working well with good BF achieved. Plan for HD tomorrow as well to switch to TTS schedule. Patient seen during HD. BP noted to be elevated during treatment. Will increase UF goal during next treatment. AVF working well with good BF achieved. Plan for HD tomorrow as well to switch to TTS schedule.

## 2018-02-12 NOTE — PROGRESS NOTE ADULT - ASSESSMENT
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

## 2018-02-13 VITALS
TEMPERATURE: 98 F | RESPIRATION RATE: 18 BRPM | SYSTOLIC BLOOD PRESSURE: 143 MMHG | OXYGEN SATURATION: 99 % | DIASTOLIC BLOOD PRESSURE: 94 MMHG | HEART RATE: 81 BPM

## 2018-02-13 LAB
BILIRUB UR QL STRIP: NEGATIVE
COLLECTION METHOD: NORMAL
GLUCOSE UR-MCNC: NEGATIVE
HCG UR QL: 0.2 EU/DL
HGB UR QL STRIP.AUTO: NORMAL
KETONES UR-MCNC: NEGATIVE
LEUKOCYTE ESTERASE UR QL STRIP: NORMAL
M TB TUBERC IFN-G BLD QL: 0 IU/ML — SIGNIFICANT CHANGE UP
M TB TUBERC IFN-G BLD QL: 0.03 IU/ML — SIGNIFICANT CHANGE UP
M TB TUBERC IFN-G BLD QL: NEGATIVE — SIGNIFICANT CHANGE UP
MITOGEN IGNF BCKGRD COR BLD-ACNC: >10 IU/ML — SIGNIFICANT CHANGE UP
NITRITE UR QL STRIP: NEGATIVE
PH UR STRIP: >=9
PROT UR STRIP-MCNC: NORMAL
SP GR UR STRIP: 1.01

## 2018-02-13 PROCEDURE — 71046 X-RAY EXAM CHEST 2 VIEWS: CPT | Mod: 26

## 2018-02-13 PROCEDURE — 99232 SBSQ HOSP IP/OBS MODERATE 35: CPT | Mod: GC

## 2018-02-13 RX ADMIN — Medication 975 MILLIGRAM(S): at 05:46

## 2018-02-13 RX ADMIN — Medication 975 MILLIGRAM(S): at 13:00

## 2018-02-13 RX ADMIN — Medication 975 MILLIGRAM(S): at 17:00

## 2018-02-13 RX ADMIN — Medication 30 MILLIGRAM(S): at 17:01

## 2018-02-13 RX ADMIN — OXYCODONE HYDROCHLORIDE 5 MILLIGRAM(S): 5 TABLET ORAL at 10:11

## 2018-02-13 RX ADMIN — Medication 325 MILLIGRAM(S): at 17:04

## 2018-02-13 RX ADMIN — Medication 975 MILLIGRAM(S): at 17:53

## 2018-02-13 RX ADMIN — OXYCODONE HYDROCHLORIDE 5 MILLIGRAM(S): 5 TABLET ORAL at 17:01

## 2018-02-13 RX ADMIN — HEPARIN SODIUM 5000 UNIT(S): 5000 INJECTION INTRAVENOUS; SUBCUTANEOUS at 17:00

## 2018-02-13 RX ADMIN — Medication 975 MILLIGRAM(S): at 11:55

## 2018-02-13 RX ADMIN — Medication 400 MILLIGRAM(S): at 17:01

## 2018-02-13 RX ADMIN — Medication 1 TABLET(S): at 11:55

## 2018-02-13 RX ADMIN — OXYCODONE HYDROCHLORIDE 5 MILLIGRAM(S): 5 TABLET ORAL at 10:30

## 2018-02-13 RX ADMIN — Medication 400 MILLIGRAM(S): at 05:46

## 2018-02-13 RX ADMIN — HEPARIN SODIUM 5000 UNIT(S): 5000 INJECTION INTRAVENOUS; SUBCUTANEOUS at 05:46

## 2018-02-13 RX ADMIN — Medication 975 MILLIGRAM(S): at 06:13

## 2018-02-13 RX ADMIN — OXYCODONE HYDROCHLORIDE 5 MILLIGRAM(S): 5 TABLET ORAL at 17:53

## 2018-02-13 RX ADMIN — Medication 325 MILLIGRAM(S): at 10:10

## 2018-02-13 NOTE — PROGRESS NOTE ADULT - ASSESSMENT
A/P: 26 y/o P3 with end stage renal disease and siPEC, now POD #5 s/p rLTCS +BTL. Stable overnight without acute events.     -f/u Quant Gold  -d/c today if remains stable  -dialysis today as scheduled  -HELLP labs daily  -continue to monitor BP  -Labetalol 400 TID for BP control  -Renal following. Dialysis 3x weekly T/Th/Sat  -continue Tamiflu x14 days for flu exposure (2/2-)    Nivia PGY3

## 2018-02-13 NOTE — PROGRESS NOTE ADULT - PROBLEM SELECTOR PROBLEM 3
Essential hypertension
Renal osteodystrophy
Essential hypertension

## 2018-02-13 NOTE — PROGRESS NOTE ADULT - PROVIDER SPECIALTY LIST ADULT
Anesthesia
Nephrology
OB
Nephrology
OB
Nephrology

## 2018-02-13 NOTE — PROGRESS NOTE ADULT - PROBLEM SELECTOR PLAN 2
In setting of renal failure.  Hemoglobin stable.  Monitor H/H.  Cont epogen TIW w/ dialysis.
In setting of renal failure.    -Hb improving  -monitor H/H   -c/w epogen
In setting of renal failure.    -Hb improving  -monitor H/H   -continue 6000 units epogen TIW with HD MWF, will consider increasing to 10,000 during pregnancy
In setting of renal failure.    -Hb improving  -monitor H/H   -will increase epogen to 8000 units TIW with HD MWF
In setting of renal failure.  Hemoglobin stable.  Monitor H/H.  Cont epogen TIW w/ dialysis.

## 2018-02-13 NOTE — PROGRESS NOTE ADULT - SUBJECTIVE AND OBJECTIVE BOX
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 Patient had  done on 18. She denies complains of SOB, CP, or abdominal pain.     PAST HISTORY  --------------------------------------------------------------------------------  No significant changes to PMH, PSH, FHx, SHx, unless otherwise noted    ALLERGIES & MEDICATIONS  --------------------------------------------------------------------------------  Allergies    No Known Allergies    Intolerances      Standing Inpatient Medications  acetaminophen   Tablet. 975 milliGRAM(s) Oral every 6 hours  diphenhydrAMINE   Capsule 25 milliGRAM(s) Oral at bedtime  doxercalciferol Injectable 2 MICROGram(s) IV Push <User Schedule>  epoetin leeann Injectable 8000 Unit(s) IV Push <User Schedule>  ferrous    sulfate 325 milliGRAM(s) Oral three times a day with meals  heparin  Injectable 5000 Unit(s) SubCutaneous every 12 hours  labetalol 400 milliGRAM(s) Oral every 8 hours  oseltamivir 30 milliGRAM(s) Oral every other day  oxyCODONE    IR 5 milliGRAM(s) Oral every 3 hours  oxytocin Infusion 333.333 milliUNIT(s)/Min IV Continuous <Continuous>  oxytocin Infusion 20 milliUNIT(s)/Min IV Continuous <Continuous>  prenatal multivitamin 1 Tablet(s) Oral daily  prenatal multivitamin 1 Tablet(s) Oral daily    PRN Inpatient Medications  diphenhydrAMINE   Capsule 25 milliGRAM(s) Oral every 6 hours PRN  docusate sodium 100 milliGRAM(s) Oral two times a day PRN  glycerin Suppository - Adult 1 Suppository(s) Rectal at bedtime PRN  lanolin Ointment 1 Application(s) Topical every 3 hours PRN  simethicone 80 milliGRAM(s) Chew every 4 hours PRN      REVIEW OF SYSTEMS  --------------------------------------------------------------------------------  Respiratory: No dyspnea  CV: No chest pain  GI: No abdominal pain  MSK: no edema    VITALS/PHYSICAL EXAM  --------------------------------------------------------------------------------  T(C): 37.1 (18 @ 12:50), Max: 37.1 (18 @ 12:50)  HR: 82 (18 @ 12:50) (80 - 89)  BP: 144/93 (18 @ 12:50) (123/77 - 156/96)  RR: 18 (18 @ 12:50) (18 - 18)  SpO2: 99% (18 @ 12:50) (98% - 100%)  Wt(kg): --        18 @ 07:01  -  18 @ 07:00  --------------------------------------------------------  IN: 0 mL / OUT: 1000 mL / NET: -1000 mL      Physical Exam:  	             Gen: NAD  	HEENT: anicteric  	Pulm: no rales today  	CV: S1S2  	Abd: mildly tender to palpation  	Psych: Normal affect and mood  	Skin: Warm  	Vascular access: GALO ATKINS    LABS/STUDIES  --------------------------------------------------------------------------------              7.3    10.5  >-----------<  247      [18 13:36]              22.0     137  |  102  |  46  ----------------------------<  76      [18 @ 13:36]  4.0   |  18  |  5.29        Ca     8.7     [18 13:36]    TPro  5.8  /  Alb  3.1  /  TBili  0.2  /  DBili  x   /  AST  15  /  ALT  16  /  AlkPhos  97  [18 13:36]    PT/INR: PT 8.9  , INR 0.82       [18 13:36]  PTT: 28.3       [18 13:36]    Uric acid 7.8      [18 13:36]        [18 13:36]    Creatinine Trend:  SCr 5.29 [ 13:36]  SCr 4.17 [ 07:27]  SCr 2.37 [ 02:38]  SCr 2.33 [ 00:19]  SCr 3.08 [ 06:54]    Urinalysis - [18 @ 00:48]      Color Yellow / Appearance SL Turbid / SG 1.009 / pH 8.5      Gluc Negative / Ketone Negative  / Bili Negative / Urobili Negative       Blood Negative / Protein >600 / Leuk Est Negative / Nitrite Negative      RBC 0-2 / WBC 2-5 / Hyaline 5-10 / Gran  / Sq Epi  / Non Sq Epi Moderate / Bacteria

## 2018-02-13 NOTE — PROGRESS NOTE ADULT - SUBJECTIVE AND OBJECTIVE BOX
Subjective:  Pt seen at bedside. No acute events overnight. She is ambulating.  She is tolerating regular diet. She denies nausea and vomiting. She is voiding. Her pain is controlled. She reports normal postpartum bleeding.     Objective:    Vital Signs Last 24 Hrs  T(C): 36.6 (13 Feb 2018 00:56), Max: 36.9 (12 Feb 2018 14:45)  T(F): 97.8 (13 Feb 2018 00:56), Max: 98.5 (12 Feb 2018 15:45)  HR: 80 (13 Feb 2018 00:56) (76 - 89)  BP: 137/89 (13 Feb 2018 00:56) (123/77 - 156/96)  BP(mean): --  RR: 18 (13 Feb 2018 00:56) (18 - 18)  SpO2: 100% (13 Feb 2018 00:56) (98% - 100%)      02-12 @ 07:01  -  02-13 @ 04:48  --------------------------------------------------------  IN: 0 mL / OUT: 1000 mL / NET: -1000 mL        Physical exam:  Gen: NAD  Abdomen: Soft, nontender, no distension. Firm uterine fundus at umbilicus.  Incision: Clean, dry, and intact   Pelvic: Deferred  Ext: No calf tenderness or edema bilaterally    LABS:                        7.3    10.5  )-----------( 247      ( 12 Feb 2018 13:36 )             22.0                         7.7    11.3  )-----------( 241      ( 11 Feb 2018 07:27 )             22.4     02-12    137  |  102  |  46<H>  ----------------------------<  76  4.0   |  18<L>  |  5.29<H>    Ca    8.7      12 Feb 2018 13:36    TPro  5.8<L>  /  Alb  3.1<L>  /  TBili  0.2  /  DBili  x   /  AST  15  /  ALT  16  /  AlkPhos  97  02-12    LIVER FUNCTIONS - ( 12 Feb 2018 13:36 )  Alb: 3.1 g/dL / Pro: 5.8 g/dL / ALK PHOS: 97 U/L / ALT: 16 U/L RC / AST: 15 U/L / GGT: x                       MEDICATIONS  (STANDING):  acetaminophen   Tablet. 975 milliGRAM(s) Oral every 6 hours  diphenhydrAMINE   Capsule 25 milliGRAM(s) Oral at bedtime  doxercalciferol Injectable 2 MICROGram(s) IV Push <User Schedule>  epoetin leeann Injectable 8000 Unit(s) IV Push <User Schedule>  ferrous    sulfate 325 milliGRAM(s) Oral three times a day with meals  heparin  Injectable 5000 Unit(s) SubCutaneous every 12 hours  labetalol 400 milliGRAM(s) Oral every 8 hours  oseltamivir 30 milliGRAM(s) Oral every other day  oxyCODONE    IR 5 milliGRAM(s) Oral every 3 hours  oxytocin Infusion 333.333 milliUNIT(s)/Min (1000 mL/Hr) IV Continuous <Continuous>  oxytocin Infusion 20 milliUNIT(s)/Min (60 mL/Hr) IV Continuous <Continuous>  prenatal multivitamin 1 Tablet(s) Oral daily  prenatal multivitamin 1 Tablet(s) Oral daily    MEDICATIONS  (PRN):  diphenhydrAMINE   Capsule 25 milliGRAM(s) Oral every 6 hours PRN Itching  docusate sodium 100 milliGRAM(s) Oral two times a day PRN Stool Softening  glycerin Suppository - Adult 1 Suppository(s) Rectal at bedtime PRN Constipation  lanolin Ointment 1 Application(s) Topical every 3 hours PRN Sore Nipples  simethicone 80 milliGRAM(s) Chew every 4 hours PRN Gas

## 2018-02-13 NOTE — PROGRESS NOTE ADULT - PROBLEM SELECTOR PLAN 1
Patient with h/o ESRD secondary to crescentric IgA nephropathy. Last HD was done yesterday. Patient to received HD today and will be on TTS schedule. Monitor BMP daily.

## 2018-02-13 NOTE — PROGRESS NOTE ADULT - PROBLEM SELECTOR PROBLEM 1
Chronic kidney disease (CKD) stage G5/A1, glomerular filtration rate (GFR) less than or equal to 15 mL/min/1.73 square meter and albuminuria creatinine ratio less than 30 mg/g
ESRD (end stage renal disease)

## 2018-02-15 NOTE — DISCHARGE NOTE ANTEPARTUM - DISCHARGE DATE
25-Jan-2018 O-T Advancement Flap Text: The defect edges were debeveled with a #15 scalpel blade.  Given the location of the defect, shape of the defect and the proximity to free margins an O-T advancement flap was deemed most appropriate.  Using a sterile surgical marker, an appropriate advancement flap was drawn incorporating the defect and placing the expected incisions within the relaxed skin tension lines where possible.    The area thus outlined was incised deep to adipose tissue with a #15 scalpel blade.  The skin margins were undermined to an appropriate distance in all directions utilizing iris scissors.

## 2018-02-16 ENCOUNTER — OUTPATIENT (OUTPATIENT)
Dept: OUTPATIENT SERVICES | Facility: HOSPITAL | Age: 28
LOS: 1 days | End: 2018-02-16
Payer: MEDICAID

## 2018-02-16 ENCOUNTER — NON-APPOINTMENT (OUTPATIENT)
Age: 28
End: 2018-02-16

## 2018-02-16 ENCOUNTER — APPOINTMENT (OUTPATIENT)
Dept: MATERNAL FETAL MEDICINE | Facility: CLINIC | Age: 28
End: 2018-02-16
Payer: MEDICAID

## 2018-02-16 VITALS — TEMPERATURE: 98.6 F | SYSTOLIC BLOOD PRESSURE: 128 MMHG | DIASTOLIC BLOOD PRESSURE: 80 MMHG

## 2018-02-16 DIAGNOSIS — N18.5 CHRONIC KIDNEY DISEASE, STAGE 5: ICD-10-CM

## 2018-02-16 DIAGNOSIS — I77.0 ARTERIOVENOUS FISTULA, ACQUIRED: Chronic | ICD-10-CM

## 2018-02-16 DIAGNOSIS — Z98.89 OTHER SPECIFIED POSTPROCEDURAL STATES: Chronic | ICD-10-CM

## 2018-02-16 DIAGNOSIS — D64.9 ANEMIA, UNSPECIFIED: ICD-10-CM

## 2018-02-16 DIAGNOSIS — O28.9 UNSPECIFIED ABNORMAL FINDINGS ON ANTENATAL SCREENING OF MOTHER: ICD-10-CM

## 2018-02-16 DIAGNOSIS — R51 OTHER COMPLICATIONS OF THE PUERPERIUM, NOT ELSEWHERE CLASSIFIED: ICD-10-CM

## 2018-02-16 PROCEDURE — G0463: CPT

## 2018-02-16 PROCEDURE — 99213 OFFICE O/P EST LOW 20 MIN: CPT

## 2018-02-16 RX ORDER — DIPHENHYDRAMINE HCL 25 MG/1
25 CAPSULE ORAL
Qty: 30 | Refills: 0 | Status: ACTIVE | COMMUNITY
Start: 2018-02-16 | End: 1900-01-01

## 2018-02-16 RX ORDER — IBUPROFEN 600 MG/1
600 TABLET, FILM COATED ORAL
Qty: 60 | Refills: 0 | Status: ACTIVE | COMMUNITY
Start: 2018-02-16 | End: 1900-01-01

## 2018-02-21 LAB — SURGICAL PATHOLOGY STUDY: SIGNIFICANT CHANGE UP

## 2018-02-26 ENCOUNTER — APPOINTMENT (OUTPATIENT)
Dept: MATERNAL FETAL MEDICINE | Facility: CLINIC | Age: 28
End: 2018-02-26
Payer: MEDICAID

## 2018-02-26 ENCOUNTER — OUTPATIENT (OUTPATIENT)
Dept: OUTPATIENT SERVICES | Facility: HOSPITAL | Age: 28
LOS: 1 days | End: 2018-02-26
Payer: MEDICAID

## 2018-02-26 VITALS
TEMPERATURE: 97.9 F | BODY MASS INDEX: 25.8 KG/M2 | DIASTOLIC BLOOD PRESSURE: 64 MMHG | HEIGHT: 59 IN | WEIGHT: 128 LBS | SYSTOLIC BLOOD PRESSURE: 110 MMHG

## 2018-02-26 DIAGNOSIS — I77.0 ARTERIOVENOUS FISTULA, ACQUIRED: Chronic | ICD-10-CM

## 2018-02-26 DIAGNOSIS — Z98.89 OTHER SPECIFIED POSTPROCEDURAL STATES: Chronic | ICD-10-CM

## 2018-02-26 PROCEDURE — G0463: CPT

## 2018-02-26 PROCEDURE — 99213 OFFICE O/P EST LOW 20 MIN: CPT

## 2018-03-09 DIAGNOSIS — N18.5 CHRONIC KIDNEY DISEASE, STAGE 5: ICD-10-CM

## 2018-03-18 NOTE — PATIENT PROFILE OB - VISION (WITH CORRECTIVE LENSES IF THE PATIENT USUALLY WEARS THEM):
Trauma Surgery Normal vision: sees adequately in most situations; can see medication labels, newsprint

## 2018-03-26 ENCOUNTER — APPOINTMENT (OUTPATIENT)
Dept: MATERNAL FETAL MEDICINE | Facility: CLINIC | Age: 28
End: 2018-03-26

## 2018-04-01 ENCOUNTER — OUTPATIENT (OUTPATIENT)
Dept: OUTPATIENT SERVICES | Facility: HOSPITAL | Age: 28
LOS: 1 days | End: 2018-04-01
Payer: MEDICAID

## 2018-04-01 DIAGNOSIS — Z98.89 OTHER SPECIFIED POSTPROCEDURAL STATES: Chronic | ICD-10-CM

## 2018-04-01 DIAGNOSIS — I77.0 ARTERIOVENOUS FISTULA, ACQUIRED: Chronic | ICD-10-CM

## 2018-04-01 PROCEDURE — G9001: CPT

## 2018-04-07 DIAGNOSIS — R69 ILLNESS, UNSPECIFIED: ICD-10-CM

## 2018-04-10 ENCOUNTER — RX RENEWAL (OUTPATIENT)
Age: 28
End: 2018-04-10

## 2018-04-17 ENCOUNTER — RX RENEWAL (OUTPATIENT)
Age: 28
End: 2018-04-17

## 2018-04-23 ENCOUNTER — RX RENEWAL (OUTPATIENT)
Age: 28
End: 2018-04-23

## 2018-05-23 PROCEDURE — G0378: CPT

## 2018-05-23 PROCEDURE — 85730 THROMBOPLASTIN TIME PARTIAL: CPT

## 2018-05-23 PROCEDURE — 59050 FETAL MONITOR W/REPORT: CPT

## 2018-05-23 PROCEDURE — 80069 RENAL FUNCTION PANEL: CPT

## 2018-05-23 PROCEDURE — 99261: CPT

## 2018-05-23 PROCEDURE — 59025 FETAL NON-STRESS TEST: CPT

## 2018-05-23 PROCEDURE — 83615 LACTATE (LD) (LDH) ENZYME: CPT

## 2018-05-23 PROCEDURE — 86901 BLOOD TYPING SEROLOGIC RH(D): CPT

## 2018-05-23 PROCEDURE — 85027 COMPLETE CBC AUTOMATED: CPT

## 2018-05-23 PROCEDURE — 80076 HEPATIC FUNCTION PANEL: CPT

## 2018-05-23 PROCEDURE — 90715 TDAP VACCINE 7 YRS/> IM: CPT

## 2018-05-23 PROCEDURE — 86704 HEP B CORE ANTIBODY TOTAL: CPT

## 2018-05-23 PROCEDURE — 85610 PROTHROMBIN TIME: CPT

## 2018-05-23 PROCEDURE — 85384 FIBRINOGEN ACTIVITY: CPT

## 2018-05-23 PROCEDURE — 81001 URINALYSIS AUTO W/SCOPE: CPT

## 2018-05-23 PROCEDURE — 84550 ASSAY OF BLOOD/URIC ACID: CPT

## 2018-05-23 PROCEDURE — 86705 HEP B CORE ANTIBODY IGM: CPT

## 2018-05-23 PROCEDURE — 80053 COMPREHEN METABOLIC PANEL: CPT

## 2018-05-23 PROCEDURE — 86706 HEP B SURFACE ANTIBODY: CPT

## 2018-05-23 PROCEDURE — 83735 ASSAY OF MAGNESIUM: CPT

## 2018-05-23 PROCEDURE — 86480 TB TEST CELL IMMUN MEASURE: CPT

## 2018-05-23 PROCEDURE — 86900 BLOOD TYPING SEROLOGIC ABO: CPT

## 2018-05-23 PROCEDURE — 84156 ASSAY OF PROTEIN URINE: CPT

## 2018-05-23 PROCEDURE — 86850 RBC ANTIBODY SCREEN: CPT

## 2018-05-23 PROCEDURE — 87340 HEPATITIS B SURFACE AG IA: CPT

## 2018-05-23 PROCEDURE — 86780 TREPONEMA PALLIDUM: CPT

## 2018-05-23 PROCEDURE — 86803 HEPATITIS C AB TEST: CPT

## 2018-05-23 PROCEDURE — 71046 X-RAY EXAM CHEST 2 VIEWS: CPT

## 2018-08-03 NOTE — DISCHARGE NOTE ANTEPARTUM - VISION (WITH CORRECTIVE LENSES IF THE PATIENT USUALLY WEARS THEM):
Normal vision: sees adequately in most situations; can see medication labels, newsprint
Out of season

## 2018-10-08 NOTE — DISCHARGE NOTE OB - CARE PROVIDERS DIRECT ADDRESSES
Pt aware Luciano Johnson will be calling her to schedule mamm. ,[email or web address]

## 2018-11-07 NOTE — DISCHARGE NOTE OB - REDNESS, SWELLING, YELLOW-GREEN OR BLOODY DISCHARGE FROM YOUR INCISION
Statement Selected Quality 111:Pneumonia Vaccination Status For Older Adults: Pneumococcal Vaccination Previously Received Quality 130: Documentation Of Current Medications In The Medical Record: Current Medications Documented Detail Level: Detailed Quality 265: Biopsy Follow-Up: Biopsy results reviewed, communicated, tracked, and documented Quality 110: Preventive Care And Screening: Influenza Immunization: Influenza Immunization previously received during influenza season

## 2018-11-08 ENCOUNTER — APPOINTMENT (OUTPATIENT)
Dept: VASCULAR SURGERY | Facility: CLINIC | Age: 28
End: 2018-11-08

## 2018-11-30 NOTE — PATIENT PROFILE OB - LIVING CHILDREN, OB PROFILE
[facility]    Brief Operative Note    Pre-operative diagnosis: Malignant Neoplasm of Rectum  Post-operative diagnosis * No post-op diagnosis entered *  Procedure: Procedure(s):  Examination Under Anesthesia, application of formalin to rectum, polypectomy  Flexible Sigmoidoscopy  Surgeon: Surgeon(s) and Role:     * Felicitas Radford MD - Primary  Anesthesia: Monitor Anesthesia Care   Estimated blood loss: Minimal  Drains: None  Specimens:   ID Type Source Tests Collected by Time Destination   A : rectal polyp Polyp Other SURGICAL PATHOLOGY EXAM Felicitas Radford MD 11/30/2018  7:53 AM      Findings:   No evidence of recurrence. Polypectomy done. .  Complications: None.  Implants: None.        
2

## 2018-12-06 NOTE — PATIENT PROFILE OB - LIVING CHILDREN, OB PROFILE
"Telephone Encounter by Samatna Cornell at 01/20/18 11:12 AM     Author:  Samanta Cornell Service:  (none) Author Type:  Patient      Filed:  01/20/18 11:13 AM Encounter Date:  1/20/2018 Status:  Signed     :  Samanta Cornell (Patient )              Kat Mariano    Patient Age: 56year old    ACCT STATUS: COPAY  MESSAGE:   To Triage RN  1. Before I transfer you to a nurse, can you briefly tell me what symptoms you are experiencing? [CV1.1T] Chest congestion/ stomach hurts from coughing/flem/ runny nose[CV1.1M]    2. Which physician would you like us to send a message to? [CV1.1T]Félix Bishop MD[CV1.1M]         Next and Last Visit with Provider and Department  Next visit with Nakia Nails is on No match found  Next visit with [address] is on No match found  Last visit with Nakia Nails was on 12/01/2017 at  4:20 PM in [address] [place] [address]  Last visit with [address] was on 12/01/2017 at  4:20 PM in [address] [place] [address]: As of 01/16/2018 weight is 225 lbs. (102.059 kg). BMI is 37.46 kg/(m^2) calculated from:     Height 5' 6"" (1.676 m) as of 11/24/17     Weight 232 lb (105.235 kg) as of 11/24/17      No Known Allergies  Current outpatient prescriptions       Medication  Sig Dispense Refill   â¢ azithromycin (ZITHROMAX) 250 MG tablet take two tabs day one and one tab daily x 4 days. 6 Tab 0   â¢ hydrocodone-acetaminophen (NORCO)  MG per tablet Take 1 Tab by mouth every 6 (six) hours as needed for Pain. 120 Tab 0   â¢ fluticasone (FLONASE) 50 MCG/ACT nasal spray Use 2 Sprays in each nostril daily. 1 Bottle 3   â¢ cyclobenzaprine (FLEXERIL) 10 MG tablet Take 0.5 Tabs by mouth 3 (three) times daily as needed for Muscle spasms. May take 1 tablet at night for additional pain relief. 30 Tab 3   â¢ busPIRone (BUSPAR) 15 MG tablet Take 1 Tab by mouth 2 (two) times daily.  180 Tab 0   â¢ lovastatin (MEVACOR) 20 MG " tablet Take 1 Tab by mouth nightly. 90 Tab 3   â¢ Bumetanide (BUMEX) 1 MG tablet Take 1 Tab by mouth daily. 30 Tab 0   â¢ guaifenesin (MUCINEX) 600 MG 12 hr tablet Take 1 Tab by mouth 2 (two) times daily. 30 Tab 0   â¢ allopurinol (ZYLOPRIM) 300 MG tablet Take 1 Tab by mouth daily. 90 Tab 3   â¢ ibuprofen (ADVIL,MOTRIN) 800 MG tablet Take 1 Tab by mouth 3 (three) times daily as needed for Pain. for pain 90 Tab 11   â¢ levocetirizine (XYZAL) 5 MG tablet Take 1 Tab by mouth every evening. 30 Tab 3   â¢ azelastine (ASTELIN) 0.1 % nasal spray 1 [place] by Nasal route 2 (two) times daily. Use in each nostril as directed 30 mL 2   â¢ alfuzosin (UROXATRAL) 10 MG 24 hr tablet Take 1 Tab by mouth daily. 30 Tab 11   â¢ omeprazole (PRILOSEC) 40 MG Cap Take 1 Cap by mouth daily. for stomach. 90 Cap 3   â¢ sucralfate (CARAFATE) 1 G tablet Take 1 Tab by mouth 4 (four) times daily. â¢ mometasone (NASONEX) 50 MCG/ACT nasal spray Use 2 Sprays in each nostril daily. 17 g 0   â¢ meloxicam (MOBIC) 15 MG tablet Take 1 Tab by mouth daily as needed. pain 90 Tab 3   â¢ polyethylene glycol (MIRALAX) powder Take 34 g by mouth daily. Mix in juice or water as directed. 1054 g 11      PHARMACY to use:           Pharmacy preference(s) on file:    [address] [facility] INFO:[CV1.1T] [facility] to leave response (including medical information) with family member or on answering machine[CV1.1M]  ROUTING:[CV1.1T] Patient's physician/staff[CV1.1M]        PCP: Megan Camejo MD         INS: Payor: Domingo Montez O FFS / Plan: N/A / Product Type: *No Product type* / Note: This is the primary coverage, but no account was found for this location or the patient's primary location.    [place]  [address] [facility] [place] [address][CV1.1T]         Revision History        User Key Date/Time User Provider Type Action    > CV1.1 01/20/18 11:13 AM Lorena Angel Patient  Sign    M - Manual, T - Template 2

## 2018-12-26 NOTE — PATIENT PROFILE OB - ABORTIONS, OB PROFILE
Esophagogastroduodenoscopy Procedure Note      Dariela Hickman  1954  [id number]    Indication:  Chest pain     Endoscopist: Edwige Copeland MD    Referring Provider:  Carri Rivera MD    Sedation:  MAC anesthesia Propofol    Procedure Details:  After infomed consent was obtained for the procedure, with all risks and benefits of procedure explained the patient was taken to the endoscopy suite and placed in the left lateral decubitus position. Following sequential administration of sedation as per above, the endoscope was inserted into the mouth and advanced under direct vision to second portion of the duodenum. A careful inspection was made as the gastroscope was withdrawn, including a retroflexed view of the proximal stomach; findings and interventions are described below. Findings:     Esophagus:   + Normal mucosa throughout the entire esophagus. The Z line was normal.  S/P  Bx of the distal esophagus and also the mid esophagus. + There was a 3 cm hiatal hernia with diaphragmatic indentation at 42 cm from the incisors. Stomach:   + Streaking erythema in the gastric antrum s/p Bx. Duodenum:   - The bulb and post bulbar mucosa is normal in appearance to the second portion. The duodenal folds appeared normal.  Cold forceps biopsies to r/o celiac. Therapies: as above    Specimen: Specimens were collected as described and send to the laboratory. Complications:   None were encountered during the procedure. EBL: < 10 ml.           Recommendations:   -f/u path  -continue acid suppression    Edwige Copeland MD  12/26/2018  11:34 AM
0

## 2019-08-13 ENCOUNTER — MEDICATION RENEWAL (OUTPATIENT)
Age: 29
End: 2019-08-13

## 2019-09-16 ENCOUNTER — APPOINTMENT (OUTPATIENT)
Dept: VASCULAR SURGERY | Facility: CLINIC | Age: 29
End: 2019-09-16
Payer: MEDICAID

## 2019-09-16 PROCEDURE — 93990 DOPPLER FLOW TESTING: CPT

## 2019-09-16 PROCEDURE — 99213 OFFICE O/P EST LOW 20 MIN: CPT

## 2019-09-18 NOTE — DISCUSSION/SUMMARY
[FreeTextEntry1] : 28 yo female with a history of esrd on hd presents for follow up of left upper extremity avf.\par duplex shows >75% stenosis of the left upper extremity avf at the cephalic subclavian junction \par given the severe stenosis will arrange for left upper extremity fistulagram

## 2019-09-18 NOTE — PHYSICAL EXAM
[Thrill] : thrill [Aneurysm] : aneurysm [Hand well perfused] : hand well perfused [2+] : left 2+ [Bleeding] : no bleeding [Pulsatile Thrill] : pulsatile thrill [Ulcer] : no ulcer [Normal] : normoactive bowel sounds, soft and nontender, no hepatosplenomegaly or masses appreciated [de-identified] : intact

## 2019-09-18 NOTE — HISTORY OF PRESENT ILLNESS
[] : left brachiocephalic fistula [FreeTextEntry1] : 28 yo female with a history of esrd on hd presents for follow up of left upper extremity avf.  pt states that the avf has increased in size over time

## 2019-10-15 NOTE — REASON FOR VISIT
[Other: ___] : [unfilled] [Other ___] : a [unfilled] visit for [Inadequate Flow within AVF] : inadequate flow within AVF [FreeTextEntry2] : stenosis on duplex

## 2019-10-16 ENCOUNTER — APPOINTMENT (OUTPATIENT)
Dept: ENDOVASCULAR SURGERY | Facility: CLINIC | Age: 29
End: 2019-10-16
Payer: MEDICAID

## 2019-10-20 ENCOUNTER — FORM ENCOUNTER (OUTPATIENT)
Age: 29
End: 2019-10-20

## 2019-10-21 ENCOUNTER — APPOINTMENT (OUTPATIENT)
Dept: ENDOVASCULAR SURGERY | Facility: CLINIC | Age: 29
End: 2019-10-21
Payer: MEDICAID

## 2019-10-21 VITALS
OXYGEN SATURATION: 100 % | WEIGHT: 128 LBS | SYSTOLIC BLOOD PRESSURE: 118 MMHG | HEIGHT: 59 IN | RESPIRATION RATE: 16 BRPM | BODY MASS INDEX: 25.8 KG/M2 | TEMPERATURE: 98.6 F | HEART RATE: 81 BPM | DIASTOLIC BLOOD PRESSURE: 74 MMHG

## 2019-10-21 PROCEDURE — 36901 INTRO CATH DIALYSIS CIRCUIT: CPT

## 2019-10-21 NOTE — HISTORY OF PRESENT ILLNESS
[FreeTextEntry4] : Martina 10/19/2019 [FreeTextEntry1] : alert and oriented x 3 \par accompanied by Adair Christy [phone]\par feels ok\par no meds today\par no reported falls \par HCG negative [FreeTextEntry5] : 11pm 10/20/2019 [FreeTextEntry6] : Dr. Frey

## 2019-12-30 ENCOUNTER — APPOINTMENT (OUTPATIENT)
Dept: VASCULAR SURGERY | Facility: CLINIC | Age: 29
End: 2019-12-30

## 2020-02-03 ENCOUNTER — APPOINTMENT (OUTPATIENT)
Dept: VASCULAR SURGERY | Facility: CLINIC | Age: 30
End: 2020-02-03
Payer: MEDICAID

## 2020-02-03 PROCEDURE — 99213 OFFICE O/P EST LOW 20 MIN: CPT

## 2020-02-03 PROCEDURE — 93990 DOPPLER FLOW TESTING: CPT

## 2020-02-03 NOTE — PHYSICAL EXAM
[Pulsatile Thrill] : pulsatile thrill [Aneurysm] : aneurysm [Bleeding] : no bleeding [Hand well perfused] : hand well perfused [2+] : left 2+ [Normal] : normoactive bowel sounds, soft and nontender, no hepatosplenomegaly or masses appreciated

## 2020-02-03 NOTE — ASSESSMENT
[FreeTextEntry1] : Patient with renal failure on hemodialysis with enlarging aneurysms. Plan for fistulogram and angioplasty. Severe stenosis noted on duplex.

## 2020-02-03 NOTE — REASON FOR VISIT
[Follow-Up Visit] : a follow-up visit for [Aneurysm] : aneurysm [FreeTextEntry2] : Stenosis of AV fistula Along with enlarging aneurysms

## 2020-02-21 PROBLEM — T82.898A INADEQUATE FLOW OF DIALYSIS ARTERIOVENOUS FISTULA: Status: ACTIVE | Noted: 2019-10-21

## 2020-02-21 PROBLEM — N18.6 ESRD ON HEMODIALYSIS: Status: ACTIVE | Noted: 2017-04-07

## 2020-02-24 ENCOUNTER — APPOINTMENT (OUTPATIENT)
Dept: ENDOVASCULAR SURGERY | Facility: CLINIC | Age: 30
End: 2020-02-24
Payer: MEDICAID

## 2020-02-24 VITALS
HEIGHT: 59 IN | WEIGHT: 128 LBS | BODY MASS INDEX: 25.8 KG/M2 | TEMPERATURE: 97.9 F | RESPIRATION RATE: 18 BRPM | OXYGEN SATURATION: 100 % | DIASTOLIC BLOOD PRESSURE: 77 MMHG | SYSTOLIC BLOOD PRESSURE: 115 MMHG | HEART RATE: 81 BPM

## 2020-02-24 DIAGNOSIS — T82.898A OTHER SPECIFIED COMPLICATION OF VASCULAR PROSTHETIC DEVICES, IMPLANTS AND GRAFTS, INITIAL ENCOUNTER: ICD-10-CM

## 2020-02-24 DIAGNOSIS — N18.6 END STAGE RENAL DISEASE: ICD-10-CM

## 2020-02-24 DIAGNOSIS — Z99.2 END STAGE RENAL DISEASE: ICD-10-CM

## 2020-02-24 PROCEDURE — 36902Z: CUSTOM

## 2020-02-24 NOTE — PAST MEDICAL HISTORY
[Increasing age ( >40 years old)] : Increasing age ( >40 years old) [No therapy indicated for cases scheduled for less than one hour] : No therapy indicated for cases scheduled for less than one hour. [FreeTextEntry1] : Malignant Hyperthermia Screening Tool and Risk of Bleeding Assessment \par \par Ms. ZULY HUBBARD denies family of unexpected death following Anesthesia or Exercise.\par Denies Family history of Malignant Hyperthermia, Muscle or Neuromuscular disorder and High Temperature  following exercise.\par \par Ms. ZULY HUBBARD denies history of Muscle Spasm, Dark or Chocolate- Colored and Unanticipated fever immediately following anaesthesia or serious exercise.\par Ms. ZULY HUBBARD also denies bleeding tendencies/Risks of Bleeding.\par

## 2020-02-24 NOTE — HISTORY OF PRESENT ILLNESS
[] : left radiocephalic fistula [FreeTextEntry1] : alert and oriented x 3 \par accompanied by Adair Christy [phone]\par feels ok\par no meds today\par no reported falls \par HCG negative [FreeTextEntry4] : Saturday [FreeTextEntry5] : yesterday 8 pm [FreeTextEntry6] : Dr. Frey

## 2020-03-03 ENCOUNTER — EMERGENCY (EMERGENCY)
Facility: HOSPITAL | Age: 30
LOS: 1 days | Discharge: HOME | End: 2020-03-03
Attending: EMERGENCY MEDICINE
Payer: MEDICAID

## 2020-03-03 VITALS
SYSTOLIC BLOOD PRESSURE: 111 MMHG | RESPIRATION RATE: 18 BRPM | OXYGEN SATURATION: 95 % | HEART RATE: 93 BPM | DIASTOLIC BLOOD PRESSURE: 74 MMHG | TEMPERATURE: 99 F

## 2020-03-03 VITALS
DIASTOLIC BLOOD PRESSURE: 78 MMHG | HEART RATE: 90 BPM | OXYGEN SATURATION: 97 % | RESPIRATION RATE: 18 BRPM | WEIGHT: 137.79 LBS | SYSTOLIC BLOOD PRESSURE: 117 MMHG | HEIGHT: 64 IN

## 2020-03-03 DIAGNOSIS — Z98.89 OTHER SPECIFIED POSTPROCEDURAL STATES: Chronic | ICD-10-CM

## 2020-03-03 DIAGNOSIS — I77.0 ARTERIOVENOUS FISTULA, ACQUIRED: Chronic | ICD-10-CM

## 2020-03-03 LAB
ALBUMIN SERPL ELPH-MCNC: 4.2 G/DL — SIGNIFICANT CHANGE UP (ref 3.3–5)
ALP SERPL-CCNC: 100 U/L — SIGNIFICANT CHANGE UP (ref 40–120)
ALT FLD-CCNC: 9 U/L — LOW (ref 10–45)
ANION GAP SERPL CALC-SCNC: 19 MMOL/L — HIGH (ref 5–17)
AST SERPL-CCNC: 10 U/L — SIGNIFICANT CHANGE UP (ref 10–40)
BASOPHILS # BLD AUTO: 0.05 K/UL — SIGNIFICANT CHANGE UP (ref 0–0.2)
BASOPHILS NFR BLD AUTO: 0.6 % — SIGNIFICANT CHANGE UP (ref 0–2)
BILIRUB SERPL-MCNC: 0.2 MG/DL — SIGNIFICANT CHANGE UP (ref 0.2–1.2)
BUN SERPL-MCNC: 18 MG/DL — SIGNIFICANT CHANGE UP (ref 7–23)
CALCIUM SERPL-MCNC: 9.1 MG/DL — SIGNIFICANT CHANGE UP (ref 8.4–10.5)
CHLORIDE SERPL-SCNC: 90 MMOL/L — LOW (ref 96–108)
CO2 SERPL-SCNC: 28 MMOL/L — SIGNIFICANT CHANGE UP (ref 22–31)
CREAT SERPL-MCNC: 4.86 MG/DL — HIGH (ref 0.5–1.3)
EOSINOPHIL # BLD AUTO: 0.11 K/UL — SIGNIFICANT CHANGE UP (ref 0–0.5)
EOSINOPHIL NFR BLD AUTO: 1.3 % — SIGNIFICANT CHANGE UP (ref 0–6)
GLUCOSE SERPL-MCNC: 82 MG/DL — SIGNIFICANT CHANGE UP (ref 70–99)
HCT VFR BLD CALC: 36.6 % — SIGNIFICANT CHANGE UP (ref 34.5–45)
HGB BLD-MCNC: 12.3 G/DL — SIGNIFICANT CHANGE UP (ref 11.5–15.5)
IMM GRANULOCYTES NFR BLD AUTO: 0.5 % — SIGNIFICANT CHANGE UP (ref 0–1.5)
LYMPHOCYTES # BLD AUTO: 1.38 K/UL — SIGNIFICANT CHANGE UP (ref 1–3.3)
LYMPHOCYTES # BLD AUTO: 16.5 % — SIGNIFICANT CHANGE UP (ref 13–44)
MCHC RBC-ENTMCNC: 29.9 PG — SIGNIFICANT CHANGE UP (ref 27–34)
MCHC RBC-ENTMCNC: 33.6 GM/DL — SIGNIFICANT CHANGE UP (ref 32–36)
MCV RBC AUTO: 88.8 FL — SIGNIFICANT CHANGE UP (ref 80–100)
MONOCYTES # BLD AUTO: 0.8 K/UL — SIGNIFICANT CHANGE UP (ref 0–0.9)
MONOCYTES NFR BLD AUTO: 9.5 % — SIGNIFICANT CHANGE UP (ref 2–14)
NEUTROPHILS # BLD AUTO: 6 K/UL — SIGNIFICANT CHANGE UP (ref 1.8–7.4)
NEUTROPHILS NFR BLD AUTO: 71.6 % — SIGNIFICANT CHANGE UP (ref 43–77)
NRBC # BLD: 0 /100 WBCS — SIGNIFICANT CHANGE UP (ref 0–0)
PLATELET # BLD AUTO: 277 K/UL — SIGNIFICANT CHANGE UP (ref 150–400)
POTASSIUM SERPL-MCNC: 3.3 MMOL/L — LOW (ref 3.5–5.3)
POTASSIUM SERPL-SCNC: 3.3 MMOL/L — LOW (ref 3.5–5.3)
PROT SERPL-MCNC: 8 G/DL — SIGNIFICANT CHANGE UP (ref 6–8.3)
RBC # BLD: 4.12 M/UL — SIGNIFICANT CHANGE UP (ref 3.8–5.2)
RBC # FLD: 13 % — SIGNIFICANT CHANGE UP (ref 10.3–14.5)
SODIUM SERPL-SCNC: 137 MMOL/L — SIGNIFICANT CHANGE UP (ref 135–145)
TROPONIN T, HIGH SENSITIVITY RESULT: 6 NG/L — SIGNIFICANT CHANGE UP (ref 0–51)
TROPONIN T, HIGH SENSITIVITY RESULT: 7 NG/L — SIGNIFICANT CHANGE UP (ref 0–51)
WBC # BLD: 8.38 K/UL — SIGNIFICANT CHANGE UP (ref 3.8–10.5)
WBC # FLD AUTO: 8.38 K/UL — SIGNIFICANT CHANGE UP (ref 3.8–10.5)

## 2020-03-03 PROCEDURE — 99285 EMERGENCY DEPT VISIT HI MDM: CPT

## 2020-03-03 PROCEDURE — 85027 COMPLETE CBC AUTOMATED: CPT

## 2020-03-03 PROCEDURE — 99283 EMERGENCY DEPT VISIT LOW MDM: CPT

## 2020-03-03 PROCEDURE — 71045 X-RAY EXAM CHEST 1 VIEW: CPT | Mod: 26

## 2020-03-03 PROCEDURE — 71045 X-RAY EXAM CHEST 1 VIEW: CPT

## 2020-03-03 PROCEDURE — 84484 ASSAY OF TROPONIN QUANT: CPT

## 2020-03-03 PROCEDURE — 93005 ELECTROCARDIOGRAM TRACING: CPT

## 2020-03-03 PROCEDURE — 80053 COMPREHEN METABOLIC PANEL: CPT

## 2020-03-03 PROCEDURE — 93010 ELECTROCARDIOGRAM REPORT: CPT

## 2020-03-03 RX ORDER — SODIUM CHLORIDE 9 MG/ML
250 INJECTION INTRAMUSCULAR; INTRAVENOUS; SUBCUTANEOUS ONCE
Refills: 0 | Status: COMPLETED | OUTPATIENT
Start: 2020-03-03 | End: 2020-03-03

## 2020-03-03 RX ORDER — ACETAMINOPHEN 500 MG
650 TABLET ORAL ONCE
Refills: 0 | Status: COMPLETED | OUTPATIENT
Start: 2020-03-03 | End: 2020-03-03

## 2020-03-03 RX ORDER — POTASSIUM CHLORIDE 20 MEQ
40 PACKET (EA) ORAL ONCE
Refills: 0 | Status: COMPLETED | OUTPATIENT
Start: 2020-03-03 | End: 2020-03-03

## 2020-03-03 RX ADMIN — Medication 40 MILLIEQUIVALENT(S): at 20:04

## 2020-03-03 RX ADMIN — SODIUM CHLORIDE 250 MILLILITER(S): 9 INJECTION INTRAMUSCULAR; INTRAVENOUS; SUBCUTANEOUS at 18:32

## 2020-03-03 RX ADMIN — Medication 650 MILLIGRAM(S): at 20:03

## 2020-03-03 NOTE — ED PROVIDER NOTE - PATIENT PORTAL LINK FT
You can access the FollowMyHealth Patient Portal offered by [facility] by registering at the following website: [email or web address]. By joining [facility]’s FollowMyHealth portal, you will also be able to view your health information using other applications (apps) compatible with our system.

## 2020-03-03 NOTE — ED ADULT NURSE REASSESSMENT NOTE - NS ED NURSE REASSESS COMMENT FT1
Report received from Gagan Dyson. AOx3, speaking in complete sentences. Unlabored, spontaneous respirations, NAD. Equal strength and sensation in all 4 extremities. Pt denies Cp, SOb, n/v/d, fever/chills. Pt reports h/a, MD aware.

## 2020-03-03 NOTE — ED ADULT NURSE NOTE - CAS DISCH TRANSFER METHOD
Transportation service/SW set up for private car service pt has to/from dialysis to pick patient up.

## 2020-03-03 NOTE — ED ADULT NURSE NOTE - NSIMPLEMENTINTERV_GEN_ALL_ED
Implemented All Fall Risk Interventions:  [place] to call system. Call bell, personal items and telephone within reach. Instruct patient to call for assistance. Room bathroom lighting operational. Non-slip footwear when patient is off stretcher. Physically safe environment: no spills, clutter or unnecessary equipment. Stretcher in lowest position, wheels locked, appropriate side rails in place. Provide visual cue, wrist band, yellow gown, etc. Monitor gait and stability. Monitor for mental status changes and reorient to person, place, and time. Review medications for side effects contributing to fall risk. Reinforce activity limits and safety measures with patient and family.

## 2020-03-03 NOTE — ED PROVIDER NOTE - NSFOLLOWUPINSTRUCTIONS_ED_ALL_ED_FT
1) You were seen in the ER for dizziness. The patient has been informed of all concerning signs and symptoms to return to Emergency Department, the necessity to follow up with PMD/Clinic/follow up provided within 2-3 days was explained, and the patient reports understanding of above with capacity and insight. You can look at the discharge papers for some examples of specific signs and symptoms to look out for.  2) Please follow up with Nephrologist.   3) Please take Tylenol 650mg every 4-6 hours as needed for pain.  4) Please return to ED for any new or worsening symptoms.

## 2020-03-03 NOTE — CHART NOTE - NSCHARTNOTEFT_GEN_A_CORE
Emergency Social Work Note:  LMSW received a referral for assistance with discharge planning. Per medical patient is medically cleared for discharge.  LMSW introduced herself to the patient and verbalized understanding her role.  Patient has  Medicaid insurance benefit.  ([phone]) Authorization number provided: [id number]. Transportation provider to be assigned. Patient was made aware transportation may take up to 4 hours to arrive to the hospital. Patient declined to identify a primary caregiver.   LMSW will continue to follow up as needed.

## 2020-03-03 NOTE — ED PROVIDER NOTE - CCCP TRG CHIEF CMPLNT
Patient Seen in: [facility] Emergency Department    History   Patient presents with: Eye Visual Problem (opthalmic)    Stated Complaint: eye redness/drainage    RANDA Peacock is a 1year-old who presents for evaluation of eye discharge and redness. and no evidence of meningismus. Chest: Good aeration bilaterally with no rales, no retractions or wheezing. Heart: Regular rate and rhythm. S1 and S2. No murmurs, no rubs or gallops. Good peripheral pulses.   Abdomen: Nice and soft with good bowel wale dizziness

## 2020-03-03 NOTE — ED PROVIDER NOTE - CARE PLAN
Principal Discharge DX:	Chronic kidney disease (CKD) stage G5/A1, glomerular filtration rate (GFR) less than or equal to 15 mL/min/1.73 square meter and albuminuria creatinine ratio less than 30 mg/g

## 2020-03-03 NOTE — ED PROVIDER NOTE - CLINICAL SUMMARY MEDICAL DECISION MAKING FREE TEXT BOX
29 y old f with CRF on dialyses send from dialysis for dizziness and feeling weak ,improved since came to ER ,ECG NSR ,with ST depression in 3/avf new from 2 y ago ,had a chest pain yesterday ,will obtain troponin ,orthostatic ,small amount of fluids and on reevaluation: ZR

## 2020-03-03 NOTE — ED ADULT NURSE NOTE - OBJECTIVE STATEMENT
28 y/o female brought in via EMS complaining of dizziness after dialysis. AOx4, ambulatory, PMH ESRD. 28 y/o female brought in via EMS complaining of dizziness after dialysis. AOx4, ambulatory, PMH ESRD. All information obtained via . Pt. states she went for her normal dialysis today. Normal amount was removed as per EMS. Pt. began to feel dizzy after dialysis, which she reports has never happened before. Pt. also currently reporting a headache that is normal for her after dialysis. Pt. did not lose consciousness. Reports she has been eating and drinking normally. Pt. still makes urine, reports it has been normal.  Denies chest pain, abdominal pain, fever, chills, SOB, N/V/D. 20G R hand. Bed locked and in lowest position. Pt. informed of plan of care.

## 2020-03-03 NOTE — ED ADULT NURSE NOTE - NS_ED_NURSE_TEACHING_TOPIC_ED_A_ED
Anatoly usazo, ID: [id number]- New/worsening symptoms return to ED. Follow-up with PCP and nephrologist.

## 2020-03-03 NOTE — ED PROVIDER NOTE - NS ED MD DISPO DISCHARGE CCDA
Pt here for OB F/V. Good fetal movement. Denies LOF, VB.  L+D 4/3 for light spotting and contractions  CO swollen vagina  Wants to discuss induction   Patient/Caregiver provided printed discharge information.

## 2020-03-03 NOTE — ED PROVIDER NOTE - OBJECTIVE STATEMENT
29 y old f Bruneian speaking history of CRF on  dialysis for 3 y for chronic pyelo ,sp hysterectomy send from dialysis for dizziness which is 2h of observation at  dialysis ,she feels better now ,yesterday had an episode of chest pain ,no fever ,chills, short of breath couth ,dialised Tuesday .Thursday and sat #[id number]    29 y old f Russian speaking history of CRF on  dialysis for 3 y for chronic pyelo ,sp hysterectomy send from dialysis for dizziness which is 2h of observation at  dialysis ,she feels better now ,yesterday had an episode of chest pain ,no fever ,chills, short of breath couth ,dialised Tuesday .Thursday and sat

## 2020-05-18 ENCOUNTER — APPOINTMENT (OUTPATIENT)
Dept: VASCULAR SURGERY | Facility: CLINIC | Age: 30
End: 2020-05-18

## 2020-06-09 ENCOUNTER — EMERGENCY (EMERGENCY)
Facility: HOSPITAL | Age: 30
LOS: 1 days | Discharge: HOME | End: 2020-06-09
Attending: EMERGENCY MEDICINE
Payer: MEDICAID

## 2020-06-09 VITALS
RESPIRATION RATE: 16 BRPM | SYSTOLIC BLOOD PRESSURE: 120 MMHG | TEMPERATURE: 98 F | HEIGHT: 62 IN | WEIGHT: 164.91 LBS | HEART RATE: 84 BPM | OXYGEN SATURATION: 98 % | DIASTOLIC BLOOD PRESSURE: 78 MMHG

## 2020-06-09 VITALS
DIASTOLIC BLOOD PRESSURE: 66 MMHG | SYSTOLIC BLOOD PRESSURE: 114 MMHG | HEART RATE: 82 BPM | RESPIRATION RATE: 18 BRPM | TEMPERATURE: 98 F | OXYGEN SATURATION: 99 %

## 2020-06-09 DIAGNOSIS — Z98.89 OTHER SPECIFIED POSTPROCEDURAL STATES: Chronic | ICD-10-CM

## 2020-06-09 DIAGNOSIS — I77.0 ARTERIOVENOUS FISTULA, ACQUIRED: Chronic | ICD-10-CM

## 2020-06-09 LAB
ALBUMIN SERPL ELPH-MCNC: 3.9 G/DL — SIGNIFICANT CHANGE UP (ref 3.3–5)
ALP SERPL-CCNC: 87 U/L — SIGNIFICANT CHANGE UP (ref 40–120)
ALT FLD-CCNC: 10 U/L — SIGNIFICANT CHANGE UP (ref 10–45)
ANION GAP SERPL CALC-SCNC: 21 MMOL/L — HIGH (ref 5–17)
APTT BLD: 28.4 SEC — SIGNIFICANT CHANGE UP (ref 27.5–36.3)
AST SERPL-CCNC: 11 U/L — SIGNIFICANT CHANGE UP (ref 10–40)
BASOPHILS # BLD AUTO: 0.02 K/UL — SIGNIFICANT CHANGE UP (ref 0–0.2)
BASOPHILS NFR BLD AUTO: 0.2 % — SIGNIFICANT CHANGE UP (ref 0–2)
BILIRUB SERPL-MCNC: 0.2 MG/DL — SIGNIFICANT CHANGE UP (ref 0.2–1.2)
BUN SERPL-MCNC: 72 MG/DL — HIGH (ref 7–23)
CALCIUM SERPL-MCNC: 8.9 MG/DL — SIGNIFICANT CHANGE UP (ref 8.4–10.5)
CHLORIDE SERPL-SCNC: 98 MMOL/L — SIGNIFICANT CHANGE UP (ref 96–108)
CO2 SERPL-SCNC: 21 MMOL/L — LOW (ref 22–31)
CREAT SERPL-MCNC: 11.6 MG/DL — HIGH (ref 0.5–1.3)
CRP SERPL-MCNC: 1.36 MG/DL — HIGH (ref 0–0.4)
EOSINOPHIL # BLD AUTO: 0.18 K/UL — SIGNIFICANT CHANGE UP (ref 0–0.5)
EOSINOPHIL NFR BLD AUTO: 2.1 % — SIGNIFICANT CHANGE UP (ref 0–6)
ERYTHROCYTE [SEDIMENTATION RATE] IN BLOOD: 37 MM/HR — HIGH (ref 0–15)
GLUCOSE SERPL-MCNC: 77 MG/DL — SIGNIFICANT CHANGE UP (ref 70–99)
HCG SERPL-ACNC: <2 MIU/ML — SIGNIFICANT CHANGE UP
HCT VFR BLD CALC: 28.3 % — LOW (ref 34.5–45)
HGB BLD-MCNC: 9.4 G/DL — LOW (ref 11.5–15.5)
IMM GRANULOCYTES NFR BLD AUTO: 0.3 % — SIGNIFICANT CHANGE UP (ref 0–1.5)
INR BLD: 0.91 RATIO — SIGNIFICANT CHANGE UP (ref 0.88–1.16)
LYMPHOCYTES # BLD AUTO: 1.7 K/UL — SIGNIFICANT CHANGE UP (ref 1–3.3)
LYMPHOCYTES # BLD AUTO: 19.5 % — SIGNIFICANT CHANGE UP (ref 13–44)
MAGNESIUM SERPL-MCNC: 3 MG/DL — HIGH (ref 1.6–2.6)
MCHC RBC-ENTMCNC: 31 PG — SIGNIFICANT CHANGE UP (ref 27–34)
MCHC RBC-ENTMCNC: 33.2 GM/DL — SIGNIFICANT CHANGE UP (ref 32–36)
MCV RBC AUTO: 93.4 FL — SIGNIFICANT CHANGE UP (ref 80–100)
MONOCYTES # BLD AUTO: 0.75 K/UL — SIGNIFICANT CHANGE UP (ref 0–0.9)
MONOCYTES NFR BLD AUTO: 8.6 % — SIGNIFICANT CHANGE UP (ref 2–14)
NEUTROPHILS # BLD AUTO: 6.06 K/UL — SIGNIFICANT CHANGE UP (ref 1.8–7.4)
NEUTROPHILS NFR BLD AUTO: 69.3 % — SIGNIFICANT CHANGE UP (ref 43–77)
NRBC # BLD: 0 /100 WBCS — SIGNIFICANT CHANGE UP (ref 0–0)
PHOSPHATE SERPL-MCNC: 6.3 MG/DL — HIGH (ref 2.5–4.5)
PLATELET # BLD AUTO: 237 K/UL — SIGNIFICANT CHANGE UP (ref 150–400)
POTASSIUM SERPL-MCNC: 4.6 MMOL/L — SIGNIFICANT CHANGE UP (ref 3.5–5.3)
POTASSIUM SERPL-SCNC: 4.6 MMOL/L — SIGNIFICANT CHANGE UP (ref 3.5–5.3)
PROCALCITONIN SERPL-MCNC: 0.59 NG/ML — HIGH (ref 0.02–0.1)
PROT SERPL-MCNC: 6.9 G/DL — SIGNIFICANT CHANGE UP (ref 6–8.3)
PROTHROM AB SERPL-ACNC: 10.3 SEC — SIGNIFICANT CHANGE UP (ref 10–12.9)
RBC # BLD: 3.03 M/UL — LOW (ref 3.8–5.2)
RBC # FLD: 13.3 % — SIGNIFICANT CHANGE UP (ref 10.3–14.5)
SARS-COV-2 RNA SPEC QL NAA+PROBE: SIGNIFICANT CHANGE UP
SODIUM SERPL-SCNC: 140 MMOL/L — SIGNIFICANT CHANGE UP (ref 135–145)
WBC # BLD: 8.74 K/UL — SIGNIFICANT CHANGE UP (ref 3.8–10.5)
WBC # FLD AUTO: 8.74 K/UL — SIGNIFICANT CHANGE UP (ref 3.8–10.5)

## 2020-06-09 PROCEDURE — 99284 EMERGENCY DEPT VISIT MOD MDM: CPT

## 2020-06-09 PROCEDURE — 93990 DOPPLER FLOW TESTING: CPT | Mod: 26

## 2020-06-09 PROCEDURE — 71045 X-RAY EXAM CHEST 1 VIEW: CPT | Mod: 26

## 2020-06-09 PROCEDURE — 83735 ASSAY OF MAGNESIUM: CPT

## 2020-06-09 PROCEDURE — 86140 C-REACTIVE PROTEIN: CPT

## 2020-06-09 PROCEDURE — 85610 PROTHROMBIN TIME: CPT

## 2020-06-09 PROCEDURE — 93005 ELECTROCARDIOGRAM TRACING: CPT

## 2020-06-09 PROCEDURE — 99285 EMERGENCY DEPT VISIT HI MDM: CPT

## 2020-06-09 PROCEDURE — 80053 COMPREHEN METABOLIC PANEL: CPT

## 2020-06-09 PROCEDURE — 85027 COMPLETE CBC AUTOMATED: CPT

## 2020-06-09 PROCEDURE — 85730 THROMBOPLASTIN TIME PARTIAL: CPT

## 2020-06-09 PROCEDURE — 93990 DOPPLER FLOW TESTING: CPT

## 2020-06-09 PROCEDURE — 93010 ELECTROCARDIOGRAM REPORT: CPT

## 2020-06-09 PROCEDURE — 84702 CHORIONIC GONADOTROPIN TEST: CPT

## 2020-06-09 PROCEDURE — 84145 PROCALCITONIN (PCT): CPT

## 2020-06-09 PROCEDURE — 85652 RBC SED RATE AUTOMATED: CPT

## 2020-06-09 PROCEDURE — 84100 ASSAY OF PHOSPHORUS: CPT

## 2020-06-09 PROCEDURE — 71045 X-RAY EXAM CHEST 1 VIEW: CPT

## 2020-06-09 NOTE — ED PROVIDER NOTE - PATIENT PORTAL LINK FT
You can access the FollowMyHealth Patient Portal offered by [facility] by registering at the following website: [email or web address]. By joining [employer]’s FollowMyHealth portal, you will also be able to view your health information using other applications (apps) compatible with our system.

## 2020-06-09 NOTE — ED ADULT NURSE NOTE - OBJECTIVE STATEMENT
Pt is a 28 y/o female sent in from dialysis center for possible infection of fistula. Normally gets dialysis Tu/Thurs/Sat, got it last saturday and did not receive it today. Fistula present on upper left arm, pink bracelet on wrist to identify. Ulceration present on fistula about 1cm diameter. States it appeared more red few days prior, redness has slightly resolved still appears pink. Denies any CP, SOB, N/V/D, numbness, tinlging, cough, fever, chills, dizziness, weakness, headache. A&Ox3. Breathing unlabored and spontaneous. Abdomen soft, nontender, nondistended. Full ROM and strength of extremities. Safety and comfort measures provided.

## 2020-06-09 NOTE — ED PROVIDER NOTE - PROGRESS NOTE DETAILS
jo-ann pgy3: patient cleared by vascular to receive dialysis today. spoke to shonda at hd clinic - will take patient back today for full session. well appearing and vss. will send home with instructions to use topical abx.

## 2020-06-09 NOTE — CONSULT NOTE ADULT - SUBJECTIVE AND OBJECTIVE BOX
CC: 29y old Female admitted with a chief complaint of , now    HPI:    PMHx: Chronic kidney disease (CKD) stage G5/A1, glomerular filtration rate (GFR) less than or equal to 15 mL/min/1.73 square meter and albuminuria creatinine ratio less than 30 mg/g  No pertinent past medical history    PSHx: A-V fistula  History of   H/O cervical biopsy  No significant past surgical history    Medications (inpatient):   Medications (PRN):  Allergies: No Known Allergies  (Intolerances: )  Social Hx:   Family Hx: No pertinent family history in first degree relatives: Pt denied knowledge of anyone with renal failure in the family      Physical Exam  T(C): 36.8  HR: 80 (80 - 84)  BP: 112/64 (112/64 - 120/78)  RR: 16 (16 - 16)  SpO2: 98% (98% - 98%)  Tmax: T(C): , Max: 36.9 (20 @ 09:52)    General: well developed, well nourished, NAD  Neuro: alert and oriented, no focal deficits, moves all extremities spontaneously  HEENT: NCAT, EOMI, anicteric, mucosa moist  Respiratory: airway patent, respirations unlabored  CVS: regular rate and rhythm  Abdomen: soft, nontender, nondistended  Extremities: no edema, sensation and movement grossly intact  Skin: warm, dry, appropriate color    Labs:                        9.4    8.74  )-----------( 237      ( 2020 10:27 )             28.3     PT/INR - ( 2020 10:27 )   PT: 10.3 sec;   INR: 0.91 ratio         PTT - ( 2020 10:27 )  PTT:28.4 sec                Imaging and other studies: CC: 29y old Female sent from hemodialysis for open wound in left AV fistula     ID: [id number]    HPI: 29 year old female with ESRD 2/2 IgA nephropathy on HD (T Th Sat; last dialyzed Sat ) presents with open wound in left AV fistula. Patient reports having open wound x 1 week and have been receiving HD. Was sent to from hemodialysis center for evaluation. Patient reports redness, mild tenderness in AVF. Last seen outpatient by vascular surgery Dr. Baumann in 2020 for L AVF stenosis and aneurysm, s/p L arm fistulogram/ angioplasty. Duplex (2019) showed >75% stenosis at cephalic subclavian junction, volume flow 1739 cc/min. Patient denies fever, chills, bleeding/ discharge from AVF.     PMHx: Chronic kidney disease (CKD) stage G5/A1, glomerular filtration rate (GFR) less than or equal to 15 mL/min/1.73 square meter and albuminuria creatinine ratio less than 30 mg/g  No pertinent past medical history    PSHx: A-V fistula  History of   H/O cervical biopsy  No significant past surgical history    Medications (inpatient):   Medications (PRN):  Allergies: No Known Allergies  (Intolerances: )  Social Hx:   Family Hx: No pertinent family history in first degree relatives: Pt denied knowledge of anyone with renal failure in the family      Physical Exam  T(C): 36.8  HR: 80 (80 - 84)  BP: 112/64 (112/64 - 120/78)  RR: 16 (16 - 16)  SpO2: 98% (98% - 98%)  Tmax: T(C): , Max: 36.9 (20 @ 09:52)    General: well developed, well nourished, NAD  Neuro: alert and oriented, no focal deficits, moves all extremities spontaneously  HEENT: NCAT, EOMI, anicteric, mucosa moist  Respiratory: airway patent, respirations unlabored  CVS: regular rate and rhythm  Abdomen: soft, nontender, nondistended  Extremities:   LUE: L brachiocephalic AVF with aneurysm with open wound ~0.5cm with granulation tissue; no crust/eschar; no discharge or bleeding; +thrill, pulsatile; - L radial or ulnar pulses; + radial signal; left hand warm, NV grossly intact  RUE: warm, NV grossly intact, palpable radial pulse  Skin: warm, dry, appropriate color    Labs:                        9.4    8.74  )-----------( 237      ( 2020 10:27 )             28.3     PT/INR - ( 2020 10:27 )   PT: 10.3 sec;   INR: 0.91 ratio         PTT - ( 2020 10:27 )  PTT:28.4 sec      Imaging and other studies:

## 2020-06-09 NOTE — ED PROVIDER NOTE - CARDIAC, MLM
Normal rate, regular rhythm.  Heart sounds S1, S2.  No murmurs, rubs or gallops. 2+ pulses in distal extremities b/l. thrill over LUE fistula.

## 2020-06-09 NOTE — CONSULT NOTE ADULT - ASSESSMENT
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     - Recommend HD via AVF at the site away from the wound   - PT may return to her HD center for HD  - For wound care: topical antibiotic cream with dry gauze   - F/u with Dr. Poe in 1 week or earlier as needed   - Dispo per ED      Patient seen and d/w Dr. Keys and Dr. Peterson on behalf of Dr. Poe 29F w/ chronically aneurysmal LAVF with an overlying wound with healthy granulation tissue comes in from HD center not being able to access because of the wound.     - Recommend HD via AVF at the site away from the wound   - PT may return to her HD center for HD  - Pt reports wound for the past week, no overlying scab, no active bleeding, no e/o of infection   - For wound care: topical antibiotic cream with dry gauze   - F/u with Dr. Poe in 1 week or earlier as needed   - Dispo per ED      Patient seen and d/w Dr. Keys and Dr. Peterson on behalf of Dr. Poe 29F w/ chronically aneurysmal LAVF with an overlying wound with healthy granulation tissue comes in from HD center not being able to access because of the wound.     Plan:  - Recommend HD via AVF at the site away from the wound   - PT may return to her HD center for HD  - Pt reports wound for the past week, no overlying scab, no active bleeding, no e/o of infection   - For wound care: topical antibiotic cream with dry gauze   - F/u with Dr. Poe in 1 week or earlier as needed   - Dispo per ED    Patient seen and d/w Dr. Keys and Dr. Peterson on behalf of Dr. Poe

## 2020-06-09 NOTE — ED PROVIDER NOTE - ATTENDING CONTRIBUTION TO CARE
------------ATTENDING NOTE------------  pt sent to ED from HD center for concerns of infection to L upper arm AVF, pt describes several days of redness and ulceration to warner, Tu Th Sat HD and sent today w/o tx, no fevers/systemic symptoms, +thrill and nvi w/ bcr distally, awaiting labs and Vasc Surgey consult -->  - Reji Rao MD   ---------------------------------------------

## 2020-06-09 NOTE — ED PROVIDER NOTE - NSFOLLOWUPINSTRUCTIONS_ED_ALL_ED_FT
Please return to dialysis clinic for a full session of dialysis today. Return to the ER if you develop worsening rash/skin changes over the fistula site, fevers, nausea/vomiting, weakness, chest pain, shortness of breath, or if you have any other concerns. Please apply neosporin to the skin near the fistula once every 6-8 hours and follow up with your primary care doctor this coming week.

## 2020-06-09 NOTE — ED PROVIDER NOTE - SKIN, MLM
Skin normal color for race, warm, dry and intact. No evidence of rash. small area of ulceration at fistula site. does not appear obviously infected.

## 2020-06-09 NOTE — ED PROVIDER NOTE - OBJECTIVE STATEMENT
29F hx ESRD 2/2 IGA nephropathy on HD T,Th,Sa p/w fistula problem. Patient was supposed to go to dialysis today but they noticed that she had skin changes over her fistula and sent her to the ER for evaluation. Patient states she has had a small ulcer over her fistula for 2d, was more red but now improved. Does not have any pain at the fistula, fevers, nausea/vomiting, weakness, no sob, no other complaints.

## 2020-06-25 RX ORDER — CALCIUM ACETATE 667 MG/1
667 CAPSULE ORAL 3 TIMES DAILY
Qty: 90 | Refills: 6 | Status: ACTIVE | COMMUNITY
Start: 2018-04-17 | End: 1900-01-01

## 2020-06-25 RX ORDER — FOLIC ACID/VIT B COMPLEX AND C 0.8 MG
TABLET ORAL
Qty: 90 | Refills: 3 | Status: ACTIVE | COMMUNITY
Start: 2017-09-15 | End: 1900-01-01

## 2020-09-17 RX ORDER — FOLIC ACID 1 MG/1
1 TABLET ORAL DAILY
Qty: 90 | Refills: 3 | Status: ACTIVE | COMMUNITY
Start: 2019-08-13 | End: 1900-01-01

## 2020-10-27 NOTE — DISCHARGE NOTE OB - FUNCTIONAL SCREEN CURRENT LEVEL: DRESSING, MLM
A 62 y old man with pmhx of ESRD awaiting renal transplant is here for EGD and colonoscopy
(0) independent

## 2020-12-11 ENCOUNTER — INPATIENT (INPATIENT)
Facility: HOSPITAL | Age: 30
LOS: 7 days | Discharge: HOME | DRG: 871 | End: 2020-12-19
Attending: INTERNAL MEDICINE | Admitting: HOSPITALIST
Payer: MEDICAID

## 2020-12-11 VITALS
OXYGEN SATURATION: 98 % | TEMPERATURE: 102 F | SYSTOLIC BLOOD PRESSURE: 136 MMHG | HEIGHT: 62 IN | RESPIRATION RATE: 18 BRPM | WEIGHT: 132.28 LBS | HEART RATE: 90 BPM | DIASTOLIC BLOOD PRESSURE: 87 MMHG

## 2020-12-11 DIAGNOSIS — Z98.89 OTHER SPECIFIED POSTPROCEDURAL STATES: Chronic | ICD-10-CM

## 2020-12-11 DIAGNOSIS — I77.0 ARTERIOVENOUS FISTULA, ACQUIRED: Chronic | ICD-10-CM

## 2020-12-11 DIAGNOSIS — N18.6 END STAGE RENAL DISEASE: ICD-10-CM

## 2020-12-11 LAB
ALBUMIN SERPL ELPH-MCNC: 3.8 G/DL — SIGNIFICANT CHANGE UP (ref 3.3–5.2)
ALP SERPL-CCNC: 47 U/L — SIGNIFICANT CHANGE UP (ref 40–120)
ALT FLD-CCNC: 14 U/L — SIGNIFICANT CHANGE UP
ANION GAP SERPL CALC-SCNC: 21 MMOL/L — HIGH (ref 5–17)
APTT BLD: 26 SEC — LOW (ref 27.5–35.5)
AST SERPL-CCNC: 27 U/L — SIGNIFICANT CHANGE UP
BASOPHILS # BLD AUTO: 0.01 K/UL — SIGNIFICANT CHANGE UP (ref 0–0.2)
BASOPHILS NFR BLD AUTO: 0.1 % — SIGNIFICANT CHANGE UP (ref 0–2)
BILIRUB SERPL-MCNC: 0.3 MG/DL — LOW (ref 0.4–2)
BUN SERPL-MCNC: 57 MG/DL — HIGH (ref 8–20)
CALCIUM SERPL-MCNC: 8.7 MG/DL — SIGNIFICANT CHANGE UP (ref 8.6–10.2)
CHLORIDE SERPL-SCNC: 93 MMOL/L — LOW (ref 98–107)
CO2 SERPL-SCNC: 20 MMOL/L — LOW (ref 22–29)
CREAT SERPL-MCNC: 13.82 MG/DL — HIGH (ref 0.5–1.3)
EOSINOPHIL # BLD AUTO: 0 K/UL — SIGNIFICANT CHANGE UP (ref 0–0.5)
EOSINOPHIL NFR BLD AUTO: 0 % — SIGNIFICANT CHANGE UP (ref 0–6)
FLUAV AG NPH QL: SIGNIFICANT CHANGE UP COUNTS
FLUBV AG NPH QL: SIGNIFICANT CHANGE UP COUNTS
GLUCOSE SERPL-MCNC: 87 MG/DL — SIGNIFICANT CHANGE UP (ref 70–99)
HCG SERPL-ACNC: <4 MIU/ML — SIGNIFICANT CHANGE UP
HCT VFR BLD CALC: 26.6 % — LOW (ref 34.5–45)
HGB BLD-MCNC: 8.7 G/DL — LOW (ref 11.5–15.5)
IMM GRANULOCYTES NFR BLD AUTO: 0.7 % — SIGNIFICANT CHANGE UP (ref 0–1.5)
INR BLD: 1.1 RATIO — SIGNIFICANT CHANGE UP (ref 0.88–1.16)
LACTATE BLDV-MCNC: 1.2 MMOL/L — SIGNIFICANT CHANGE UP (ref 0.5–2)
LYMPHOCYTES # BLD AUTO: 0.57 K/UL — LOW (ref 1–3.3)
LYMPHOCYTES # BLD AUTO: 5.9 % — LOW (ref 13–44)
MCHC RBC-ENTMCNC: 31 PG — SIGNIFICANT CHANGE UP (ref 27–34)
MCHC RBC-ENTMCNC: 32.7 GM/DL — SIGNIFICANT CHANGE UP (ref 32–36)
MCV RBC AUTO: 94.7 FL — SIGNIFICANT CHANGE UP (ref 80–100)
MONOCYTES # BLD AUTO: 0.42 K/UL — SIGNIFICANT CHANGE UP (ref 0–0.9)
MONOCYTES NFR BLD AUTO: 4.4 % — SIGNIFICANT CHANGE UP (ref 2–14)
NEUTROPHILS # BLD AUTO: 8.51 K/UL — HIGH (ref 1.8–7.4)
NEUTROPHILS NFR BLD AUTO: 88.9 % — HIGH (ref 43–77)
PLATELET # BLD AUTO: 195 K/UL — SIGNIFICANT CHANGE UP (ref 150–400)
POTASSIUM SERPL-MCNC: 4.8 MMOL/L — SIGNIFICANT CHANGE UP (ref 3.5–5.3)
POTASSIUM SERPL-SCNC: 4.8 MMOL/L — SIGNIFICANT CHANGE UP (ref 3.5–5.3)
PROT SERPL-MCNC: 8.3 G/DL — SIGNIFICANT CHANGE UP (ref 6.6–8.7)
PROTHROM AB SERPL-ACNC: 12.7 SEC — SIGNIFICANT CHANGE UP (ref 10.6–13.6)
RBC # BLD: 2.81 M/UL — LOW (ref 3.8–5.2)
RBC # FLD: 12.9 % — SIGNIFICANT CHANGE UP (ref 10.3–14.5)
RSV RNA NPH QL NAA+NON-PROBE: SIGNIFICANT CHANGE UP COUNTS
SARS-COV-2 RNA SPEC QL NAA+PROBE: SIGNIFICANT CHANGE UP COUNTS
SODIUM SERPL-SCNC: 134 MMOL/L — LOW (ref 135–145)
WBC # BLD: 9.58 K/UL — SIGNIFICANT CHANGE UP (ref 3.8–10.5)
WBC # FLD AUTO: 9.58 K/UL — SIGNIFICANT CHANGE UP (ref 3.8–10.5)

## 2020-12-11 PROCEDURE — 99285 EMERGENCY DEPT VISIT HI MDM: CPT

## 2020-12-11 PROCEDURE — 93010 ELECTROCARDIOGRAM REPORT: CPT

## 2020-12-11 PROCEDURE — 71045 X-RAY EXAM CHEST 1 VIEW: CPT | Mod: 26

## 2020-12-11 PROCEDURE — 99223 1ST HOSP IP/OBS HIGH 75: CPT

## 2020-12-11 RX ORDER — PIPERACILLIN AND TAZOBACTAM 4; .5 G/20ML; G/20ML
3.38 INJECTION, POWDER, LYOPHILIZED, FOR SOLUTION INTRAVENOUS EVERY 12 HOURS
Refills: 0 | Status: DISCONTINUED | OUTPATIENT
Start: 2020-12-12 | End: 2020-12-17

## 2020-12-11 RX ORDER — ACETAMINOPHEN 500 MG
650 TABLET ORAL ONCE
Refills: 0 | Status: COMPLETED | OUTPATIENT
Start: 2020-12-11 | End: 2020-12-11

## 2020-12-11 RX ORDER — PIPERACILLIN AND TAZOBACTAM 4; .5 G/20ML; G/20ML
3.38 INJECTION, POWDER, LYOPHILIZED, FOR SOLUTION INTRAVENOUS ONCE
Refills: 0 | Status: COMPLETED | OUTPATIENT
Start: 2020-12-11 | End: 2020-12-11

## 2020-12-11 RX ORDER — LABETALOL HCL 100 MG
200 TABLET ORAL EVERY 8 HOURS
Refills: 0 | Status: DISCONTINUED | OUTPATIENT
Start: 2020-12-11 | End: 2020-12-11

## 2020-12-11 RX ORDER — PROCHLORPERAZINE MALEATE 5 MG
10 TABLET ORAL ONCE
Refills: 0 | Status: COMPLETED | OUTPATIENT
Start: 2020-12-11 | End: 2020-12-11

## 2020-12-11 RX ORDER — AZITHROMYCIN 500 MG/1
500 TABLET, FILM COATED ORAL ONCE
Refills: 0 | Status: COMPLETED | OUTPATIENT
Start: 2020-12-11 | End: 2020-12-11

## 2020-12-11 RX ORDER — METOCLOPRAMIDE HCL 10 MG
10 TABLET ORAL ONCE
Refills: 0 | Status: COMPLETED | OUTPATIENT
Start: 2020-12-11 | End: 2020-12-11

## 2020-12-11 RX ORDER — VANCOMYCIN HCL 1 G
1000 VIAL (EA) INTRAVENOUS ONCE
Refills: 0 | Status: COMPLETED | OUTPATIENT
Start: 2020-12-11 | End: 2020-12-11

## 2020-12-11 RX ORDER — ACETAMINOPHEN 500 MG
650 TABLET ORAL EVERY 6 HOURS
Refills: 0 | Status: DISCONTINUED | OUTPATIENT
Start: 2020-12-11 | End: 2020-12-13

## 2020-12-11 RX ORDER — LABETALOL HCL 100 MG
100 TABLET ORAL
Refills: 0 | Status: DISCONTINUED | OUTPATIENT
Start: 2020-12-11 | End: 2020-12-12

## 2020-12-11 RX ORDER — CEFTRIAXONE 500 MG/1
1000 INJECTION, POWDER, FOR SOLUTION INTRAMUSCULAR; INTRAVENOUS ONCE
Refills: 0 | Status: COMPLETED | OUTPATIENT
Start: 2020-12-11 | End: 2020-12-11

## 2020-12-11 RX ADMIN — CEFTRIAXONE 100 MILLIGRAM(S): 500 INJECTION, POWDER, FOR SOLUTION INTRAMUSCULAR; INTRAVENOUS at 17:56

## 2020-12-11 RX ADMIN — AZITHROMYCIN 255 MILLIGRAM(S): 500 TABLET, FILM COATED ORAL at 18:15

## 2020-12-11 RX ADMIN — Medication 650 MILLIGRAM(S): at 22:20

## 2020-12-11 RX ADMIN — Medication 10 MILLIGRAM(S): at 18:39

## 2020-12-11 RX ADMIN — Medication 10 MILLIGRAM(S): at 17:05

## 2020-12-11 RX ADMIN — PIPERACILLIN AND TAZOBACTAM 200 GRAM(S): 4; .5 INJECTION, POWDER, LYOPHILIZED, FOR SOLUTION INTRAVENOUS at 23:54

## 2020-12-11 RX ADMIN — Medication 650 MILLIGRAM(S): at 22:50

## 2020-12-11 RX ADMIN — Medication 250 MILLIGRAM(S): at 21:22

## 2020-12-11 RX ADMIN — Medication 650 MILLIGRAM(S): at 17:05

## 2020-12-11 NOTE — H&P ADULT - ATTENDING COMMENTS
29 y/o female with PMH of ESRD (on HD M/W/F) 2/2 IGA nephropathy came to the ED complaining of fever x 4 days. She reported associated cough productive of white sputum, nausea and vomiting once today. She missed HD today because she was febrile at the center; sent to the ED for evaluation. In the ED, CXR showed multifocal pneumonia. Vancomycin, azithromycin and Rocephin once given. Blood culture sent; COVID and flu negative     Multifocal PNA  Continue antibiotic-Zosyn   Tylenol PRN for fever     ESRD   Renal on board   Scheduled to have HD in AM   Monitor renal function     HTN   Continue home medications    Supportive   DVT prophylaxis: ambulate as tolerated   Diet: renal

## 2020-12-11 NOTE — H&P ADULT - NSICDXFAMILYHX_GEN_ALL_CORE_FT
FAMILY HISTORY:  No pertinent family history in first degree relatives, Pt denied knowledge of anyone with renal failure in the family

## 2020-12-11 NOTE — ED PROVIDER NOTE - CLINICAL SUMMARY MEDICAL DECISION MAKING FREE TEXT BOX
Krys is a 30 year old Faroese speaking female with a history of ESRD (dialysis MWF at [facility]) 2/2 IGA nephropathy who presents with 4 days of fever/headache/flu like symptoms. Patient febrile to 102 in ED. K of 4.8, Creatinine of 13, EKG shows NSR. CXR shows multifocal pneumonia. Patient given acetaminophen, reglan, and compazine for her headache. She was started on abx and admitted for further care.

## 2020-12-11 NOTE — H&P ADULT - NSICDXPASTMEDICALHX_GEN_ALL_CORE_FT
PAST MEDICAL HISTORY:  Chronic kidney disease (CKD) stage G5/A1, glomerular filtration rate (GFR) less than or equal to 15 mL/min/1.73 square meter and albuminuria creatinine ratio less than 30 mg/g

## 2020-12-11 NOTE — H&P ADULT - HISTORY OF PRESENT ILLNESS
27 year old female with ESRD IgA nephropathy.- on HD (MWF) stage G5/A1,A-V fistula: GALO, 16,History of : ,  H/O cervical biopsy: in the context of ELISE II, presents wit worsening SOB and cough and fever and malaise , started Monday , patient missed her HD on Wednesday because she felt too sick and couldn't even get up to go for HD, complains of Fever as high as 102, chills and nausea and vomiting,    for the past 4 days has cough has been worsening , no one else at her house is sick and she has been worried about her 3 children( 14,8,3 years old ) getting sick.  no relieving factors ,moderate to severe SOB and Cough and nausea

## 2020-12-11 NOTE — ED PROVIDER NOTE - NS ED ROS FT
Constitutional:+fever, +chills, +malaise  Eyes: no redness   ENMT: no nasal congestion/drainage, no sore throat   CV: no chest pain, no edema  Resp: +cough, no dyspnea  GI: no abdominal pain, no nausea, no vomiting, no diarrhea  : no dysuria, no hematuria   Skin: no lesions, no rashes   Neuro: no LOC, +headache, no sensory deficits, no weakness

## 2020-12-11 NOTE — PROGRESS NOTE ADULT - SUBJECTIVE AND OBJECTIVE BOX
Vital Signs Last 24 Hrs,    T(C): 38.9 (11 Dec 2020 15:46), Max: 38.9 (11 Dec 2020 15:46)  T(F): 102.1 (11 Dec 2020 15:46), Max: 102.1 (11 Dec 2020 15:46)  HR: 90 (11 Dec 2020 15:46) (90 - 90)  BP: 136/87 (11 Dec 2020 15:46) (136/87 - 136/87)  RR: 18 (11 Dec 2020 15:46) (18 - 18)  SpO2: 98% (11 Dec 2020 15:46) (98% - 98%)    134<L>  |  93<L>  |  57.0<H>  ----------------------------<  87     Ca:8.7   (11 Dec 2020 16:51)  4.8     |  20.0<L>  |  13.82<H>    eGFR if Non : 3 <L>  eGFR if : 4 <L>    TPro  8.3    /  Alb  3.8    /  TBili  0.3<L>  /  DBili  x      /  AST  27     /  ALT  14     /  AlkPhos  47     11 Dec 2020 16:51                        8.7<L>  9.58  )-----------( 195      ( 11 Dec 2020 16:51 )             26.6<L>    Poorly Dialyzed,     Will Resume ,     Urgent indications for renal replacement therapy (RRT) in patients with acute kidney injury (YUNI) include volume overload refractory to diuretics, severe hyperkalemia, metabolic acidosis, uremia, and toxic overdose of a dialyzable drug. (See 'Urgent indications' above.)    ?It is preferable to initiate RRT electively prior to the development of an urgent indication. Absolute thresholds based on azotemia or other measures are not well defined; thus, when to initiate RRT is a matter of clinical judgment as well as circumstance. We electively initiate RRT prior to the development of severe electrolyte disturbances and volume overload. We favor electively initiating RRT in patients with K >6.0 mEq/L or severe metabolic acidosis (pH <7.20) despite optimal medical management who show no sign that kidney function or metabolic acidosis is improving. We electively initiate RRT in patients who are repeatedly in positive fluid balance despite aggressive attempts at diuresis, particularly if they have increasing oxygen requirements. We generally do not initiate RRT in the absence of these indications, particularly if the blood urea nitrogen (BUN) is <110 mg/dL. (See 'Timing of elective initiation' above.)    ?Data do not support the superiority of either continuous renal replacement therapy (CRRT) or intermittent hemodialysis (IHD). Thus, the selection of modality of RRT should be based upon local expertise and experience in combination with the needs of the individual patient. If CRRT is administered, we recommend the use of venovenous circuits rather than arteriovenous circuits (Grade 1B). (See 'Optimal modality' above.)    ?We suggest the following strategies for dosing of RRT:    •We recommend that IHD be provided on a three times per week schedule (alternate days), with monitoring of the delivered dose of dialysis to ensure delivery of a Kt/V of =1.2 per treatment (Grade 1B).    •We recommend that CRRT be provided with a delivered effluent flow rate (sum of hemofiltration rate and dialysate flow rate) of =20 mL/kg/hour (Grade 1B). In order to ensure delivery of this flow rate, we prescribe an effluent flow rate of approximately 25 mL/kg/hour.:

## 2020-12-11 NOTE — ED PROVIDER NOTE - PHYSICAL EXAMINATION
General: NAD   Head:  NC, AT  Eyes: EOMI, no scleral icterus  Cardiac: RRR, 4/6 RAMÍREZ   Respiratory: CTABL, equal chest wall expansions  Abdomen: soft, ND, NT, nonperitonitic  MSK/Vascular: full ROM, soft compartments, warm extremities  Neuro: AAOx3, sensation to light touch intact  Psych: calm, cooperative General: NAD   Head:  NC, AT  Eyes: EOMI, no scleral icterus  Cardiac: RRR, 4/6 RAMÍREZ   Respiratory: CTABL, equal chest wall expansions  Abdomen: soft, ND, NT, nonperitonitic  MSK/Vascular: full ROM, soft compartments, warm extremities  Skin: LUE fistula   Neuro: AAOx3, sensation to light touch intact  Psych: calm, cooperative

## 2020-12-11 NOTE — H&P ADULT - NSICDXPASTSURGICALHX_GEN_ALL_CORE_FT
PAST SURGICAL HISTORY:  A-V fistula LUE, 16    H/O cervical biopsy in the context of ELISE II    History of  ,

## 2020-12-11 NOTE — ED PROVIDER NOTE - ATTENDING CONTRIBUTION TO CARE
30 yof pmh ESRD (dialysis MWF at [facility]) 2/2 IGA nephropathy who presents with a fever. Last dialysis 4 days ago. has been feelign febrile and chills for last 8 days. Missed dialysis as she wasn't feeling well. Mild cough and headache.  AP - febrile here, otherwise nontoxic appearing. missed dialysis. febrile here. will get nephro c/s. labs, cultures. reassess

## 2020-12-11 NOTE — ED ADULT NURSE NOTE - OBJECTIVE STATEMENT
a&ox3 - c/o flu like symptoms x 3 days, hx ESRD last dialysis 12/7/20 - MWF; left arm fistula. unproductive cough, nausea/vomit x 2 episodes. denies cp / sob and further symptoms,

## 2020-12-11 NOTE — H&P ADULT - ASSESSMENT
<<-----Click on this checkbox to enter Post-Op Dx <<-----Click on this checkbox to enter Post-Op Dx 27 year old female with ESRD IgA nephropathy.- on HD (MWF-    Bilateral Pneumonia- pending COVID- got Rocephin Zithromax in ED , negative lactate , normal WBC with relative lymphopenia- most likely - COVID- please check COVID results       ESRD (end stage renal disease) on dialysis.   - continue maintenance HD t- already seen by Dr Del Real  - plan for next HD per renal  -  monitor BMP,       ·  Anemia due to chronic kidney disease.  Recommendation: - Monitor CBC with HD   - consider Epogen with HD       ·   HTN (hypertension).  Labetalol       <<-----Click on this checkbox to enter Post-Op Dx

## 2020-12-11 NOTE — ED ADULT TRIAGE NOTE - CHIEF COMPLAINT QUOTE
pt A&Ox 4 BIBA from dialysis center c/o fever, vomiting & cough since Saturday. Pt unknown covid exposure. Denies any chest pain or SOB. MWF dialysis but unable to receive today due to symptoms. Pt brought to isolation room

## 2020-12-11 NOTE — ED PROVIDER NOTE - OBJECTIVE STATEMENT
Krys is a 30 year old Amharic speaking female with a history of ESRD (dialysis MWF at [facility]) 2/2 IGA nephropathy who presents with a fever. For 4 days the patient has had fevers, chills, cough, and a headache. She last had dialysis on 12/7. Was supposed to go on 12/9 but was unable to because she did not feel well. She took tylenol this morning but when she went to dialysis today she was noted to be febrile so sent to ED. Still makes urine.

## 2020-12-12 LAB
APPEARANCE UR: CLEAR — SIGNIFICANT CHANGE UP
BILIRUB UR-MCNC: NEGATIVE — SIGNIFICANT CHANGE UP
BLD GP AB SCN SERPL QL: SIGNIFICANT CHANGE UP
COLOR SPEC: YELLOW — SIGNIFICANT CHANGE UP
DIFF PNL FLD: ABNORMAL
EPI CELLS # UR: SIGNIFICANT CHANGE UP
FERRITIN SERPL-MCNC: 1698 NG/ML — HIGH (ref 15–150)
GLUCOSE UR QL: 100 MG/DL
HCT VFR BLD CALC: 21.9 % — LOW (ref 34.5–45)
HCT VFR BLD CALC: 22 % — LOW (ref 34.5–45)
HCT VFR BLD CALC: 28.7 % — LOW (ref 34.5–45)
HGB BLD-MCNC: 7.2 G/DL — LOW (ref 11.5–15.5)
HGB BLD-MCNC: 7.3 G/DL — LOW (ref 11.5–15.5)
HGB BLD-MCNC: 9.8 G/DL — LOW (ref 11.5–15.5)
IRON SATN MFR SERPL: 12 % — LOW (ref 14–50)
IRON SATN MFR SERPL: 19 UG/DL — LOW (ref 37–145)
KETONES UR-MCNC: NEGATIVE — SIGNIFICANT CHANGE UP
LEUKOCYTE ESTERASE UR-ACNC: NEGATIVE — SIGNIFICANT CHANGE UP
MCHC RBC-ENTMCNC: 31.3 PG — SIGNIFICANT CHANGE UP (ref 27–34)
MCHC RBC-ENTMCNC: 32.7 GM/DL — SIGNIFICANT CHANGE UP (ref 32–36)
MCV RBC AUTO: 95.7 FL — SIGNIFICANT CHANGE UP (ref 80–100)
NITRITE UR-MCNC: NEGATIVE — SIGNIFICANT CHANGE UP
PH UR: 8 — SIGNIFICANT CHANGE UP (ref 5–8)
PLATELET # BLD AUTO: 191 K/UL — SIGNIFICANT CHANGE UP (ref 150–400)
PROT UR-MCNC: 100 MG/DL
RBC # BLD: 2.3 M/UL — LOW (ref 3.8–5.2)
RBC # FLD: 13 % — SIGNIFICANT CHANGE UP (ref 10.3–14.5)
RBC CASTS # UR COMP ASSIST: ABNORMAL /HPF (ref 0–4)
SP GR SPEC: 1.01 — SIGNIFICANT CHANGE UP (ref 1.01–1.02)
TIBC SERPL-MCNC: 153 UG/DL — LOW (ref 220–430)
TRANSFERRIN SERPL-MCNC: 107 MG/DL — LOW (ref 192–382)
UROBILINOGEN FLD QL: NEGATIVE MG/DL — SIGNIFICANT CHANGE UP
WBC # BLD: 9.1 K/UL — SIGNIFICANT CHANGE UP (ref 3.8–10.5)
WBC # FLD AUTO: 9.1 K/UL — SIGNIFICANT CHANGE UP (ref 3.8–10.5)
WBC UR QL: NEGATIVE — SIGNIFICANT CHANGE UP

## 2020-12-12 PROCEDURE — 99255 IP/OBS CONSLTJ NEW/EST HI 80: CPT | Mod: 25

## 2020-12-12 PROCEDURE — 99233 SBSQ HOSP IP/OBS HIGH 50: CPT

## 2020-12-12 PROCEDURE — 90937 HEMODIALYSIS REPEATED EVAL: CPT

## 2020-12-12 RX ORDER — ERYTHROPOIETIN 10000 [IU]/ML
9000 INJECTION, SOLUTION INTRAVENOUS; SUBCUTANEOUS
Refills: 0 | Status: DISCONTINUED | OUTPATIENT
Start: 2020-12-12 | End: 2020-12-19

## 2020-12-12 RX ORDER — ACETAMINOPHEN 500 MG
650 TABLET ORAL ONCE
Refills: 0 | Status: DISCONTINUED | OUTPATIENT
Start: 2020-12-12 | End: 2020-12-12

## 2020-12-12 RX ORDER — CARVEDILOL PHOSPHATE 80 MG/1
25 CAPSULE, EXTENDED RELEASE ORAL EVERY 12 HOURS
Refills: 0 | Status: DISCONTINUED | OUTPATIENT
Start: 2020-12-12 | End: 2020-12-19

## 2020-12-12 RX ORDER — IRON SUCROSE 20 MG/ML
200 INJECTION, SOLUTION INTRAVENOUS EVERY 24 HOURS
Refills: 0 | Status: COMPLETED | OUTPATIENT
Start: 2020-12-12 | End: 2020-12-15

## 2020-12-12 RX ADMIN — Medication 650 MILLIGRAM(S): at 20:15

## 2020-12-12 RX ADMIN — Medication 650 MILLIGRAM(S): at 13:57

## 2020-12-12 RX ADMIN — Medication 100 MILLIGRAM(S): at 05:18

## 2020-12-12 RX ADMIN — Medication 650 MILLIGRAM(S): at 15:33

## 2020-12-12 RX ADMIN — Medication 650 MILLIGRAM(S): at 21:00

## 2020-12-12 RX ADMIN — IRON SUCROSE 110 MILLIGRAM(S): 20 INJECTION, SOLUTION INTRAVENOUS at 17:40

## 2020-12-12 RX ADMIN — Medication 650 MILLIGRAM(S): at 05:18

## 2020-12-12 RX ADMIN — ERYTHROPOIETIN 9000 UNIT(S): 10000 INJECTION, SOLUTION INTRAVENOUS; SUBCUTANEOUS at 13:58

## 2020-12-12 RX ADMIN — PIPERACILLIN AND TAZOBACTAM 25 GRAM(S): 4; .5 INJECTION, POWDER, LYOPHILIZED, FOR SOLUTION INTRAVENOUS at 19:09

## 2020-12-12 RX ADMIN — Medication 650 MILLIGRAM(S): at 07:27

## 2020-12-12 NOTE — PROGRESS NOTE ADULT - ASSESSMENT
31 y/o female with PMH of ESRD (on HD M/W/F) 2/2 IGA nephropathy came to the ED complaining of fever x 4 days. She reported associated cough productive of white sputum, nausea and vomiting once today. She missed HD today because she was febrile at the center; sent to the ED for evaluation. In the ED, CXR showed multifocal pneumonia. Vancomycin, azithromycin and Rocephin once given. Blood culture sent; COVID and flu negative         Sepsis 2/2 multifocal PNA possibly gram negative vs atypical  - Overnight Tmax 102.3.   - CXR significant for multifocal PNA  - Continue w/ Zosyn and Tylenol PRN for fever   - f/u cultures, legionella urine AG and strep urine AG  - If does not show signs of improvement will consult ID for further recs      ESRD 2/2 IgA Nephropathy  - c/w HD as per neprhology  - Monitor renal function daily   - Renally dose medications as needed      Normocytic anemia   - Likely of chronic dx in the setting of ESRD   - No signs of active bleeding and pt is hemodynamically stable   - Given trend down in H/H will order stool occult   - PRBC transfusion ordered.  f/u post transfusion H/H  - Transfusion if Hb < 7  - Iron panel ordered      HTN   - Continue home medications      DVT ppx:  - SCDs        Dispo: No plans for discharge at this time as pt remains acute, however anticipate 3-4 days.

## 2020-12-12 NOTE — PROGRESS NOTE ADULT - SUBJECTIVE AND OBJECTIVE BOX
[facility] DIVISION OF KIDNEY DISEASES AND HYPERTENSION -- HEMODIALYSIS NOTE  --------------------------------------------------------------------------------  Chief Complaint: ESRD/Ongoing hemodialysis requirement    24 hour events/subjective:        PAST HISTORY  --------------------------------------------------------------------------------  No significant changes to PMH, PSH, FHx, SHx, unless otherwise noted    ALLERGIES & MEDICATIONS  --------------------------------------------------------------------------------  Allergies    No Known Allergies    Intolerances      Standing Inpatient Medications  carvedilol 25 milliGRAM(s) Oral every 12 hours  epoetin leeann-epbx (RETACRIT) Injectable 9000 Unit(s) IV Push <User Schedule>  iron sucrose IVPB 200 milliGRAM(s) IV Intermittent every 24 hours  piperacillin/tazobactam IVPB.. 3.375 Gram(s) IV Intermittent every 12 hours    PRN Inpatient Medications  acetaminophen   Tablet .. 650 milliGRAM(s) Oral every 6 hours PRN      REVIEW OF SYSTEMS  --------------------------------------------------------------------------------  Gen: No weight changes, fatigue, fevers/chills, weakness  Skin: No rashes  Head/Eyes/Ears/Mouth: No headache; Normal hearing; Normal vision w/o blurriness; No sinus pain/discomfort, sore throat  Respiratory: No dyspnea, cough, wheezing, hemoptysis  CV: No chest pain, PND, orthopnea  GI: No abdominal pain, diarrhea, constipation, nausea, vomiting, melena, hematochezia  : No increased frequency, dysuria, hematuria, nocturia  MSK: No joint pain/swelling; no back pain; no edema  Neuro: No dizziness/lightheadedness, weakness, seizures, numbness, tingling  Heme: No easy bruising or bleeding  Endo: No heat/cold intolerance  Psych: No significant nervousness, anxiety, stress, depression    All other systems were reviewed and are negative, except as noted.    VITALS/PHYSICAL EXAM  --------------------------------------------------------------------------------  T(C): 37.4 (12-13-20 @ 04:24), Max: 38 (12-12-20 @ 05:04)  HR: 88 (12-13-20 @ 04:24) (64 - 92)  BP: 105/66 (12-13-20 @ 04:24) (105/65 - 129/82)  RR: 18 (12-13-20 @ 04:24) (18 - 19)  SpO2: 95% (12-13-20 @ 04:24) (94% - 98%)  Wt(kg): --  Height (cm): 157.5 (12-11-20 @ 15:46)  Weight (kg): 60 (12-11-20 @ 15:46)  BMI (kg/m2): 24.2 (12-11-20 @ 15:46)  BSA (m2): 1.6 (12-11-20 @ 15:46)      12-12-20 @ 07:01  -  12-13-20 @ 04:48  --------------------------------------------------------  IN: 0 mL / OUT: 2000 mL / NET: -2000 mL      Physical Exam:  	Gen: NAD, well-appearing  	HEENT: PERRL, supple neck, clear oropharynx  	Pulm: CTA B/L  	CV: RRR, S1S2; no rub  	Back: No spinal or CVA tenderness; no sacral edema  	Abd: +BS, soft, nontender/nondistended  	: No suprapubic tenderness  	UE: Warm, FROM, no clubbing, intact strength; no edema; no asterixis  	LE: Warm, FROM, no clubbing, intact strength; no edema  	Neuro: No focal deficits, intact gait  	Psych: Normal affect and mood  	Skin: Warm, without rashes  	Vascular access:    LABS/STUDIES  --------------------------------------------------------------------------------              9.8    x     >-----------<  x        [12-12-20 @ 23:11]              28.7     134  |  93  |  57.0  ----------------------------<  87      [12-11-20 @ 16:51]  4.8   |  20.0  |  13.82        Ca     8.7     [12-11-20 @ 16:51]    TPro  8.3  /  Alb  3.8  /  TBili  0.3  /  DBili  x   /  AST  27  /  ALT  14  /  AlkPhos  47  [12-11-20 @ 16:51]    PT/INR: PT 12.7 , INR 1.10       [12-11-20 @ 16:51]  PTT: 26.0       [12-11-20 @ 16:51]      Iron 19, TIBC 153, %sat 12      [12-12-20 @ 11:47]  Ferritin 1698      [12-12-20 @ 11:47]  HbA1c 5.7      [07-15-16 @ 08:42]

## 2020-12-12 NOTE — CONSULT NOTE ADULT - SUBJECTIVE AND OBJECTIVE BOX
Patient is a 30y old  Female who presents with a chief complaint of fever chills pneumonia bilateral (11 Dec 2020 19:22)    HPI:  27 year old female with ESRD IgA nephropathy.- on HD (MWF) stage G5/A1,A-V fistula: GALO, 16,History of : ,  H/O cervical biopsy: in the context of ELISE II, presents wit worsening SOB and cough and fever and malaise , started Monday , patient missed her HD on Wednesday because she felt too sick and couldn't even get up to go for HD, complains of Fever as high as 102, chills and nausea and vomiting,    for the past 4 days has cough has been worsening , no one else at her house is sick and she has been worried about her 3 children( 14,8,3 years old ) getting sick.  no relieving factors ,moderate to severe SOB and Cough and nausea  (11 Dec 2020 19:22)    PAST MEDICAL & SURGICAL HISTORY:  Chronic kidney disease (CKD) stage G5/A1, glomerular filtration rate (GFR) less than or equal to 15 mL/min/1.73 square meter and albuminuria creatinine ratio less than 30 mg/g    A-V fistula  GALO, 16    History of   ,     H/O cervical biopsy  in the context of ELISE II    FAMILY HISTORY:  No pertinent family history in first degree relatives  Pt denied knowledge of anyone with renal failure in the family    Social History: Non Smoker,     MEDICATIONS  (STANDING):  carvedilol 25 milliGRAM(s) Oral every 12 hours  piperacillin/tazobactam IVPB.. 3.375 Gram(s) IV Intermittent every 12 hours    MEDICATIONS  (PRN):  acetaminophen   Tablet .. 650 milliGRAM(s) Oral every 6 hours PRN Temp greater or equal to 38C (100.4F), Mild Pain (1 - 3)    Allergies    No Known Allergies    REVIEW OF SYSTEMS:    CONSTITUTIONAL: + fever, weight loss, or fatigue  EYES: No eye pain, visual disturbances, or discharge  ENMT:  No difficulty hearing, tinnitus, vertigo; No sinus or throat pain  NECK: No pain or stiffness  BREASTS: No pain, masses, or nipple discharge  RESPIRATORY: + cough,  No wheezing,  + chills , No hemoptysis; No shortness of breath  CARDIOVASCULAR: No chest pain, palpitations, dizziness, or leg swelling  GASTROINTESTINAL: No abdominal or epigastric pain. No nausea, vomiting, or hematemesis; No diarrhea or constipation. No melena or hematochezia.  GENITOURINARY: No dysuria, frequency, hematuria, or incontinence  NEUROLOGICAL: No headaches, memory loss, loss of strength, numbness, or tremors  SKIN: No itching, burning, rashes, or lesions   LYMPH NODES: No enlarged glands  ENDOCRINE: No heat or cold intolerance; No hair loss  MUSCULOSKELETAL: No joint pain or swelling; No muscle, back, or extremity pain  PSYCHIATRIC: + depression, anxiety, mood swings, No difficulty sleeping  HEME/LYMPH: No easy bruising, or bleeding gums  ALLERGY AND IMMUNOLOGIC: No hives or eczema    Vital Signs Last 24 Hrs  T(C): 38 (12 Dec 2020 05:04), Max: 38.9 (11 Dec 2020 15:46)  T(F): 100.4 (12 Dec 2020 05:04), Max: 102.1 (11 Dec 2020 15:46)  HR: 88 (12 Dec 2020 05:04) (86 - 90)  BP: 129/82 (12 Dec 2020 05:04) (123/79 - 136/87)  RR: 18 (12 Dec 2020 05:04) (18 - 18)  SpO2: 94% (12 Dec 2020 05:04) (94% - 98%)    PHYSICAL EXAM:    GENERAL: NAD, Frail, Thin, Pale,   HEAD:  Atraumatic, Normocephalic  EYES: EOMI, PERRLA, conjunctiva and sclera clear  ENMT: Dry  mucous membranes,  NECK: Supple, No JVD,   NERVOUS SYSTEM:  Alert & Oriented X3, Good concentration;   CHEST/LUNG: Clear to percussion bilaterally; + rales, rhonchi, wheezing, No  rubs  HEART: Regular rate and rhythm; + Systolic  murmur, No rubs, or gallops  ABDOMEN: Soft, Nontender, Nondistended; Bowel sounds present  EXTREMITIES:  2+ Peripheral Pulses, No clubbing, cyanosis, or edema  LYMPH: No lymphadenopathy noted  SKIN: No rashes or lesions    LABS:                        7.2    9.10  )-----------( 191      ( 12 Dec 2020 07:20 )             22.0     12-11    134<L>  |  93<L>  |  57.0<H>  ----------------------------<  87  4.8   |  20.0<L>  |  13.82<H>    Ca    8.7      11 Dec 2020 16:51    TPro  8.3  /  Alb  3.8  /  TBili  0.3<L>  /  DBili  x   /  AST  27  /  ALT  14  /  AlkPhos  47  12-11    PT/INR - ( 11 Dec 2020 16:51 )   PT: 12.7 sec;   INR: 1.10 ratio         PTT - ( 11 Dec 2020 16:51 )  PTT:26.0 sec      RADIOLOGY & ADDITIONAL TESTS:    Xray Chest 1 View- PORTABLE-Urgent (Xray Chest 1 View- PORTABLE-Urgent .) (20 @ 17:04) >    EXAM:  XR CHEST PORTABLE URGENT 1V                          PROCEDURE DATE:  2020      INTERPRETATION:  Portable chest radiograph    CLINICAL INFORMATION: Febrile. Cough    TECHNIQUE:  Portable  AP view of the chest was obtained.    COMPARISON: 2020 chest available for review.    FINDINGS:  The lungs show bilateral LEFT greater than RIGHT scattered and diffuse multifocal ill-defined airspace consolidations    The heart and mediastinum are within normal limits.    Visualized osseous structures are intact.    IMPRESSION:   LEFT greater than RIGHT scattered and diffuse multifocal ill-defined airspace consolidations.    MIHIR CHAPARRO MD; Attending Radiologist  This document has been electronically signed. Dec 11 2020  5:14PM        Bilateral Pneumonia- Received Rocephin , Zithromax in ED , negative lactate , normal WBC with relative lymphopenia,      ESRD - on dialysis.         ·  Anemia due to chronic kidney disease.          ·   HTN (hypertension)- Change to Coreg,          29 y/o female with PMH of ESRD (HD M/W/F) 2/2 IGA nephropathy came to the ED complaining of fever x 4 days. She reported associated cough productive of white sputum, nausea and vomiting once today. She missed HD today because she was febrile at the center; sent to the     ED for evaluation. In the ED,     CXR showed multifocal pneumonia. Vancomycin, azithromycin and Rocephin once given.     Blood culture sent;     COVID and flu negative

## 2020-12-12 NOTE — PROGRESS NOTE ADULT - SUBJECTIVE AND OBJECTIVE BOX
Chief Complaint:  b/l PNA    SUBJECTIVE / OVERNIGHT EVENTS: Febrile overnight (tmax 102.3).  Pt c/o fever, chills, myalgias and gen weakness but improving since admission.  Continues to have nonproductive cough and sob.  All remainder ROS negative.       I&O's Summary        PHYSICAL EXAM:  Vital Signs Last 24 Hrs  T(C): 37.5 (12 Dec 2020 08:34), Max: 38.9 (11 Dec 2020 15:46)  T(F): 99.5 (12 Dec 2020 08:34), Max: 102.1 (11 Dec 2020 15:46)  HR: 92 (12 Dec 2020 08:34) (86 - 92)  BP: 115/70 (12 Dec 2020 08:34) (115/70 - 136/87)  BP(mean): --  RR: 18 (12 Dec 2020 08:34) (18 - 18)  SpO2: 95% (12 Dec 2020 08:34) (94% - 98%)      GENERAL: pt examined bedside, appears uncomfortable but in NAD.  She is on RA and able to speak full sentences and no use of accessory.   HEENT: NC/AT, dry oral mucosa, clear conjunctiva, sclera nonicteric, EOMI  RESPIRATORY:  bibasilar rhonchi, no wheezing or rales  CARDIOVASCULAR: RRR, normal S1 and S2, no murmur/rub/gallop  ABDOMEN: soft, NT/ND, normoactive bowel sounds, no rebound/guarding, no HSM  MUSCLOSKELETAL:  no joint swelling or tenderness to palpation  EXTREMITIES: No cynaosis, no clubbing, no lower extremity edema; Peripheral pulses are 2+ bilaterally  PSYCH: affect appropriate and cooperative  NEUROLOGY: A+O to person, place, and time, no focal neurologic deficits appreciated   SKIN: No rashes or no palpable lesions        LABS:                        7.2    9.10  )-----------( 191      ( 12 Dec 2020 07:20 )             22.0     12-11    134<L>  |  93<L>  |  57.0<H>  ----------------------------<  87  4.8   |  20.0<L>  |  13.82<H>    Ca    8.7      11 Dec 2020 16:51    TPro  8.3  /  Alb  3.8  /  TBili  0.3<L>  /  DBili  x   /  AST  27  /  ALT  14  /  AlkPhos  47  12-11    PT/INR - ( 11 Dec 2020 16:51 )   PT: 12.7 sec;   INR: 1.10 ratio         PTT - ( 11 Dec 2020 16:51 )  PTT:26.0 sec          CAPILLARY BLOOD GLUCOSE            RADIOLOGY & ADDITIONAL TESTS:          MEDICATIONS  (STANDING):  carvedilol 25 milliGRAM(s) Oral every 12 hours  epoetin leeann-epbx (RETACRIT) Injectable 9000 Unit(s) IV Push <User Schedule>  piperacillin/tazobactam IVPB.. 3.375 Gram(s) IV Intermittent every 12 hours    MEDICATIONS  (PRN):  acetaminophen   Tablet .. 650 milliGRAM(s) Oral every 6 hours PRN Temp greater or equal to 38C (100.4F), Mild Pain (1 - 3)

## 2020-12-13 LAB
ANION GAP SERPL CALC-SCNC: 18 MMOL/L — HIGH (ref 5–17)
BASOPHILS # BLD AUTO: 0 K/UL — SIGNIFICANT CHANGE UP (ref 0–0.2)
BASOPHILS NFR BLD AUTO: 0 % — SIGNIFICANT CHANGE UP (ref 0–2)
BUN SERPL-MCNC: 29 MG/DL — HIGH (ref 8–20)
CALCIUM SERPL-MCNC: 8.4 MG/DL — LOW (ref 8.6–10.2)
CHLORIDE SERPL-SCNC: 90 MMOL/L — LOW (ref 98–107)
CO2 SERPL-SCNC: 25 MMOL/L — SIGNIFICANT CHANGE UP (ref 22–29)
CREAT SERPL-MCNC: 9.27 MG/DL — HIGH (ref 0.5–1.3)
CULTURE RESULTS: NO GROWTH — SIGNIFICANT CHANGE UP
EOSINOPHIL # BLD AUTO: 0 K/UL — SIGNIFICANT CHANGE UP (ref 0–0.5)
EOSINOPHIL NFR BLD AUTO: 0 % — SIGNIFICANT CHANGE UP (ref 0–6)
GLUCOSE SERPL-MCNC: 90 MG/DL — SIGNIFICANT CHANGE UP (ref 70–99)
HCT VFR BLD CALC: 28.3 % — LOW (ref 34.5–45)
HGB BLD-MCNC: 9.4 G/DL — LOW (ref 11.5–15.5)
LEGIONELLA AG UR QL: NEGATIVE — SIGNIFICANT CHANGE UP
LYMPHOCYTES # BLD AUTO: 0.56 K/UL — LOW (ref 1–3.3)
LYMPHOCYTES # BLD AUTO: 6.2 % — LOW (ref 13–44)
MANUAL SMEAR VERIFICATION: SIGNIFICANT CHANGE UP
MCHC RBC-ENTMCNC: 30.3 PG — SIGNIFICANT CHANGE UP (ref 27–34)
MCHC RBC-ENTMCNC: 33.2 GM/DL — SIGNIFICANT CHANGE UP (ref 32–36)
MCV RBC AUTO: 91.3 FL — SIGNIFICANT CHANGE UP (ref 80–100)
METAMYELOCYTES # FLD: 0.9 % — HIGH (ref 0–0)
MONOCYTES # BLD AUTO: 0.56 K/UL — SIGNIFICANT CHANGE UP (ref 0–0.9)
MONOCYTES NFR BLD AUTO: 6.2 % — SIGNIFICANT CHANGE UP (ref 2–14)
NEUTROPHILS # BLD AUTO: 7.8 K/UL — HIGH (ref 1.8–7.4)
NEUTROPHILS NFR BLD AUTO: 86.7 % — HIGH (ref 43–77)
PLAT MORPH BLD: NORMAL — SIGNIFICANT CHANGE UP
PLATELET # BLD AUTO: 202 K/UL — SIGNIFICANT CHANGE UP (ref 150–400)
POTASSIUM SERPL-MCNC: 3.4 MMOL/L — LOW (ref 3.5–5.3)
POTASSIUM SERPL-SCNC: 3.4 MMOL/L — LOW (ref 3.5–5.3)
RBC # BLD: 3.1 M/UL — LOW (ref 3.8–5.2)
RBC # FLD: 14.7 % — HIGH (ref 10.3–14.5)
RBC BLD AUTO: NORMAL — SIGNIFICANT CHANGE UP
SARS-COV-2 IGG SERPL QL IA: NEGATIVE — SIGNIFICANT CHANGE UP
SARS-COV-2 IGM SERPL IA-ACNC: 0.52 INDEX — SIGNIFICANT CHANGE UP
SODIUM SERPL-SCNC: 133 MMOL/L — LOW (ref 135–145)
SPECIMEN SOURCE: SIGNIFICANT CHANGE UP
WBC # BLD: 9 K/UL — SIGNIFICANT CHANGE UP (ref 3.8–10.5)
WBC # FLD AUTO: 9 K/UL — SIGNIFICANT CHANGE UP (ref 3.8–10.5)

## 2020-12-13 PROCEDURE — 99254 IP/OBS CNSLTJ NEW/EST MOD 60: CPT

## 2020-12-13 PROCEDURE — 99233 SBSQ HOSP IP/OBS HIGH 50: CPT

## 2020-12-13 RX ORDER — PANTOPRAZOLE SODIUM 20 MG/1
40 TABLET, DELAYED RELEASE ORAL EVERY 12 HOURS
Refills: 0 | Status: DISCONTINUED | OUTPATIENT
Start: 2020-12-13 | End: 2020-12-19

## 2020-12-13 RX ORDER — AZITHROMYCIN 500 MG/1
500 TABLET, FILM COATED ORAL EVERY 24 HOURS
Refills: 0 | Status: DISCONTINUED | OUTPATIENT
Start: 2020-12-13 | End: 2020-12-17

## 2020-12-13 RX ORDER — ACETAMINOPHEN 500 MG
1000 TABLET ORAL ONCE
Refills: 0 | Status: COMPLETED | OUTPATIENT
Start: 2020-12-13 | End: 2020-12-13

## 2020-12-13 RX ADMIN — PIPERACILLIN AND TAZOBACTAM 25 GRAM(S): 4; .5 INJECTION, POWDER, LYOPHILIZED, FOR SOLUTION INTRAVENOUS at 17:39

## 2020-12-13 RX ADMIN — PANTOPRAZOLE SODIUM 40 MILLIGRAM(S): 20 TABLET, DELAYED RELEASE ORAL at 17:39

## 2020-12-13 RX ADMIN — Medication 650 MILLIGRAM(S): at 02:31

## 2020-12-13 RX ADMIN — Medication 650 MILLIGRAM(S): at 03:30

## 2020-12-13 RX ADMIN — Medication 1000 MILLIGRAM(S): at 11:52

## 2020-12-13 RX ADMIN — Medication 400 MILLIGRAM(S): at 11:41

## 2020-12-13 RX ADMIN — PIPERACILLIN AND TAZOBACTAM 25 GRAM(S): 4; .5 INJECTION, POWDER, LYOPHILIZED, FOR SOLUTION INTRAVENOUS at 06:25

## 2020-12-13 RX ADMIN — IRON SUCROSE 110 MILLIGRAM(S): 20 INJECTION, SOLUTION INTRAVENOUS at 17:39

## 2020-12-13 RX ADMIN — AZITHROMYCIN 255 MILLIGRAM(S): 500 TABLET, FILM COATED ORAL at 17:39

## 2020-12-13 NOTE — PROGRESS NOTE ADULT - ASSESSMENT
29 y/o female with PMH of ESRD (on HD M/W/F) 2/2 IGA nephropathy came to the ED complaining of fever x 4 days. She reported associated cough productive of white sputum, nausea and vomiting once today. She missed HD today because she was febrile at the center; sent to the ED for evaluation. In the ED, CXR showed multifocal pneumonia. Vancomycin, azithromycin and Rocephin once given. Blood culture sent; COVID and flu negative         Sepsis 2/2 multifocal PNA possibly gram negative vs atypical  - Afebrile and no leukocytosis  - CXR significant for multifocal PNA  - Continue w/ Zosyn and Tylenol PRN for fever   - f/u cultures, legionella urine AG and strep urine AG      ESRD 2/2 IgA Nephropathy  - c/w HD as per neprhology  - Monitor renal function daily   - Renally dose medications as needed      Acute on chronic anemia   - Concern for acute blood loss given drop in H/H requiring transfusion and report of black stools since yesterday evening   - Underlying anemia of chronic dx in the setting of ESRD   - Hemodynamically stable and H/H improved s/p 1u PRBC  - Stool occult pending    - Monitor H/H closely and transfusion if Hb < 7  - Iron panel noted and placed on Venofer  - NPO and placed on protonix  - GI consulted for further recs      HTN   - Continue home medications      DVT ppx:  - SCDs        Dispo: No plans for discharge at this time as pt remains acute, however anticipate 3-4 days.

## 2020-12-13 NOTE — CONSULT NOTE ADULT - SUBJECTIVE AND OBJECTIVE BOX
HPI:  27 year old female with ESRD IgA nephropathy.- on HD (MWF) stage G5/A1,A-V fistula: GALO, 16,History of : ,  H/O cervical biopsy: in the context of ELISE II, presented with worsening dyspnea and cough and fever and malaise , started Monday , patient missed her HD on Wednesday because she felt too sick and couldn't even get up to go for HD, complains of Fever as high as 102, chills and nausea and vomiting. For the past 4 days has cough has been worsening , no one else at her house is sick and she has been worried about her 3 children( 14,8,3 years old ) getting sick. No relieving factors ,moderate to severe dyspnea and Cough and nausea. She was also complaining of dark stools. She was also noted to have multifocal pneumonia. No previous EGD or colonoscopy.       PAST MEDICAL & SURGICAL HISTORY:  Chronic kidney disease (CKD) stage G5/A1, glomerular filtration rate (GFR) less than or equal to 15 mL/min/1.73 square meter and albuminuria creatinine ratio less than 30 mg/g    A-V fistula  GALO, 16    History of   ,     H/O cervical biopsy  in the context of ELISE II        ROS:  No Heartburn, regurgitation, dysphagia, odynophagia.  No dyspepsia  No abdominal pain.    No Nausea, vomiting.  No Bleeding.  No hematemesis.   No diarrhea.    No hematochezia  No weight loss, anorexia.  No edema.      MEDICATIONS  (STANDING):  carvedilol 25 milliGRAM(s) Oral every 12 hours  epoetin leeann-epbx (RETACRIT) Injectable 9000 Unit(s) IV Push <User Schedule>  iron sucrose IVPB 200 milliGRAM(s) IV Intermittent every 24 hours  pantoprazole  Injectable 40 milliGRAM(s) IV Push every 12 hours  piperacillin/tazobactam IVPB.. 3.375 Gram(s) IV Intermittent every 12 hours    MEDICATIONS  (PRN):      Allergies    No Known Allergies    Intolerances        SOCIAL HISTORY:NC    ENDOSCOPIC/GI HISTORY:NC    FAMILY HISTORY:  No pertinent family history in first degree relatives  Pt denied knowledge of anyone with renal failure in the family        Vital Signs Last 24 Hrs  T(C): 37.2 (13 Dec 2020 13:49), Max: 37.9 (13 Dec 2020 10:58)  T(F): 98.9 (13 Dec 2020 13:49), Max: 100.2 (13 Dec 2020 10:58)  HR: 90 (13 Dec 2020 10:58) (64 - 90)  BP: 108/68 (13 Dec 2020 10:58) (105/65 - 111/71)  BP(mean): --  RR: 18 (13 Dec 2020 10:58) (18 - 19)  SpO2: 90% (13 Dec 2020 10:58) (90% - 97%)    PHYSICAL EXAM:    GENERAL: NAD, well-groomed, well-developed  HEAD:  Atraumatic, Normocephalic  EYES: EOMI, PERRLA, conjunctiva and sclera clear  ENMT: No tonsillar erythema, exudates, or enlargement; Moist mucous membranes, Good dentition, No lesions  NECK: Supple, No JVD, Normal thyroid  CHEST/LUNG: Clear to percussion bilaterally; No rales, rhonchi, wheezing, or rubs  HEART: Regular rate and rhythm; No murmurs, rubs, or gallops  ABDOMEN: Soft, Nontender, Nondistended; Bowel sounds present  RECTUM: Trace brown stools  EXTREMITIES:  2+ Peripheral Pulses, No clubbing, cyanosis, or edema. Left arm AV fistula.  LYMPH: No lymphadenopathy noted  SKIN: No rashes or lesions      LABS:                        9.4    9.00  )-----------( 202      ( 13 Dec 2020 07:33 )             28.3     12-13    133<L>  |  90<L>  |  29.0<H>  ----------------------------<  90  3.4<L>   |  25.0  |  9.27<H>    Ca    8.4<L>      13 Dec 2020 07:33    TPro  8.3  /  Alb  3.8  /  TBili  0.3<L>  /  DBili  x   /  AST  27  /  ALT  14  /  AlkPhos  47  12-11    PT/INR - ( 11 Dec 2020 16:51 )   PT: 12.7 sec;   INR: 1.10 ratio         PTT - ( 11 Dec 2020 16:51 )  PTT:26.0 sec   Urinalysis Basic - ( 12 Dec 2020 20:43 )    Color: Yellow / Appearance: Clear / S.010 / pH: x  Gluc: x / Ketone: Negative  / Bili: Negative / Urobili: Negative mg/dL   Blood: x / Protein: 100 mg/dL / Nitrite: Negative   Leuk Esterase: Negative / RBC: 6-10 /HPF / WBC Negative   Sq Epi: x / Non Sq Epi: Few / Bacteria: x        LIVER FUNCTIONS - ( 11 Dec 2020 16:51 )  Alb: 3.8 g/dL / Pro: 8.3 g/dL / ALK PHOS: 47 U/L / ALT: 14 U/L / AST: 27 U/L / GGT: x               RADIOLOGY & ADDITIONAL STUDIES:< from: Xray Chest 1 View- PORTABLE-Urgent (Xray Chest 1 View- PORTABLE-Urgent .) (20 @ 17:04) >     EXAM:  XR CHEST PORTABLE URGENT 1V                          PROCEDURE DATE:  2020          INTERPRETATION:  Portable chest radiograph    CLINICAL INFORMATION: Febrile. Cough    TECHNIQUE:  Portable  AP view of the chest was obtained.    COMPARISON: 2020 chest available for review.    FINDINGS:  The lungs show bilateral LEFT greater than RIGHT scattered and diffuse multifocal ill-defined airspace consolidations    The heart and mediastinum are within normal limits.    Visualized osseous structures are intact.        IMPRESSION:   LEFT greater than RIGHT scattered and diffuse multifocal ill-defined airspace consolidations.                MIHIR CHAPARRO MD; Attending Radiologist  This document has been electronically signed. Dec 11 2020  5:14PM    < end of copied text >

## 2020-12-13 NOTE — PROGRESS NOTE ADULT - ASSESSMENT
C/O Cough,    Weak,     Lethargic,    Low K+ ( Nutritional )    HD again in AM,  BRUNO FITCH ( Dr. Carlos- Hospitalist )

## 2020-12-13 NOTE — PROGRESS NOTE ADULT - SUBJECTIVE AND OBJECTIVE BOX
Chief Complaint:  b/l PNA    SUBJECTIVE / OVERNIGHT EVENTS:  Overnight Tmax 99.8.  Pt reports having black stool and feels weak.  She continues to have dry cough but improved SOB.      Patient denies chest pain, abd pain, N/V, fever, chills, dysuria or any other complaints. All remainder ROS negative.       I&O's Summary    12 Dec 2020 07:01  -  13 Dec 2020 07:00  --------------------------------------------------------  IN: 0 mL / OUT: 2000 mL / NET: -2000 mL          PHYSICAL EXAM:  Vital Signs Last 24 Hrs  T(C): 37.1 (13 Dec 2020 07:27), Max: 37.7 (12 Dec 2020 22:38)  T(F): 98.8 (13 Dec 2020 07:27), Max: 99.8 (12 Dec 2020 22:38)  HR: 87 (13 Dec 2020 07:27) (64 - 91)  BP: 109/69 (13 Dec 2020 07:27) (105/65 - 118/70)  BP(mean): --  RR: 18 (13 Dec 2020 07:27) (18 - 19)  SpO2: 93% (13 Dec 2020 07:27) (93% - 98%)      GENERAL: pt examined bedside, appears uncomfortable but in NAD.  She is on RA and able to speak full sentences and no use of accessory.   HEENT: NC/AT, dry oral mucosa, pale conjunctiva, sclera nonicteric, EOMI  RESPIRATORY:  bibasilar rhonchi, no wheezing or rales  CARDIOVASCULAR: RRR, normal S1 and S2, no murmur/rub/gallop  ABDOMEN: soft, NT/ND, normoactive bowel sounds, no rebound/guarding, no HSM  MUSCLOSKELETAL:  no joint swelling or tenderness to palpation  EXTREMITIES: No cynaosis, no clubbing, no lower extremity edema; Peripheral pulses are 2+ bilaterally  PSYCH: affect appropriate and cooperative  NEUROLOGY: A+O to person, place, and time, no focal neurologic deficits appreciated   SKIN: No rashes or no palpable lesions        LABS:                        9.4    9.00  )-----------( 202      ( 13 Dec 2020 07:33 )             28.3     12-    133<L>  |  90<L>  |  29.0<H>  ----------------------------<  90  3.4<L>   |  25.0  |  9.27<H>    Ca    8.4<L>      13 Dec 2020 07:33    TPro  8.3  /  Alb  3.8  /  TBili  0.3<L>  /  DBili  x   /  AST  27  /  ALT  14  /  AlkPhos  47  12-11    PT/INR - ( 11 Dec 2020 16:51 )   PT: 12.7 sec;   INR: 1.10 ratio         PTT - ( 11 Dec 2020 16:51 )  PTT:26.0 sec      Urinalysis Basic - ( 12 Dec 2020 20:43 )    Color: Yellow / Appearance: Clear / S.010 / pH: x  Gluc: x / Ketone: Negative  / Bili: Negative / Urobili: Negative mg/dL   Blood: x / Protein: 100 mg/dL / Nitrite: Negative   Leuk Esterase: Negative / RBC: 6-10 /HPF / WBC Negative   Sq Epi: x / Non Sq Epi: Few / Bacteria: x        CAPILLARY BLOOD GLUCOSE            RADIOLOGY & ADDITIONAL TESTS:          MEDICATIONS  (STANDING):  carvedilol 25 milliGRAM(s) Oral every 12 hours  epoetin leeann-epbx (RETACRIT) Injectable 9000 Unit(s) IV Push <User Schedule>  iron sucrose IVPB 200 milliGRAM(s) IV Intermittent every 24 hours  pantoprazole  Injectable 40 milliGRAM(s) IV Push every 12 hours  piperacillin/tazobactam IVPB.. 3.375 Gram(s) IV Intermittent every 12 hours    MEDICATIONS  (PRN):  acetaminophen   Tablet .. 650 milliGRAM(s) Oral every 6 hours PRN Temp greater or equal to 38C (100.4F), Mild Pain (1 - 3)

## 2020-12-13 NOTE — PROGRESS NOTE ADULT - SUBJECTIVE AND OBJECTIVE BOX
Chief Complaint:  b/l PNA, ESRD,     SUBJECTIVE / OVERNIGHT EVENTS:  Overnight Tmax 99.8 F.  Pt reports having black stool and feels weak.  She continues to have dry cough but improved Orthopnea,       Patient denies chest pain, abd pain, N/V, fever, chills, dysuria or any other complaints.     All remainder ROS negative.     I&O's Summary    12 Dec 2020 07:01  -  13 Dec 2020 07:00  --------------------------------------------------------  IN: 0 mL / OUT: 2000 mL / NET: -2000 mL    PHYSICAL EXAM:    Vital Signs Last 24 Hrs,    T(C): 37.1 (13 Dec 2020 07:27), Max: 37.7 (12 Dec 2020 22:38)  T(F): 98.8 (13 Dec 2020 07:27), Max: 99.8 (12 Dec 2020 22:38)  HR: 87 (13 Dec 2020 07:27) (64 - 91)  BP: 109/69 (13 Dec 2020 07:27) (105/65 - 118/70)  RR: 18 (13 Dec 2020 07:27) (18 - 19)  SpO2: 93% (13 Dec 2020 07:27) (93% - 98%)    GENERAL: pt examined bedside, appears uncomfortable but in NAD.  She is on RA and able to speak full sentences and no use of accessory muscles. Pale,   HEENT: NC/AT, dry oral mucosa, pale conjunctiva, sclera nonicteric, EOMI  RESPIRATORY:  bibasilar rhonchi, no wheezing or rales  CARDIOVASCULAR: RRR, normal S1 and S2, no murmur/rub/gallop  ABDOMEN: soft, NT/ND, normoactive bowel sounds, no rebound/guarding, no HSM  MUSCULOSKELETAL :  no joint swelling or tenderness to palpation  EXTREMITIES: No cyanosis, no clubbing, no lower extremity edema; Peripheral pulses are 2+ bilaterally  PSYCH: affect appropriate and cooperative  NEUROLOGY: A+O to person, place, and time, no focal neurologic deficits appreciated   SKIN: No rashes or no palpable lesions,  AVF +    LABS:                        9.4    9.00  )-----------( 202      ( 13 Dec 2020 07:33 )             28.3     12    133<L>  |  90<L>  |  29.0<H>  ----------------------------<  90  3.4<L>   |  25.0  |  9.27<H>    Ca    8.4<L>      13 Dec 2020 07:33    TPro  8.3  /  Alb  3.8  /  TBili  0.3<L>  /  DBili  x   /  AST  27  /  ALT  14  /  AlkPhos  47  12-11    PT/INR - ( 11 Dec 2020 16:51 )   PT: 12.7 sec;   INR: 1.10 ratio         PTT - ( 11 Dec 2020 16:51 )  PTT:26.0 sec    Urinalysis Basic - ( 12 Dec 2020 20:43 )    Color: Yellow / Appearance: Clear / S.010 / pH: x  Gluc: x / Ketone: Negative  / Bili: Negative / Urobili: Negative mg/dL   Blood: x / Protein: 100 mg/dL / Nitrite: Negative   Leuk Esterase: Negative / RBC: 6-10 /HPF / WBC Negative   Sq Epi: x / Non Sq Epi: Few / Bacteria: x    MEDICATIONS  (STANDING):  carvedilol 25 milliGRAM(s) Oral every 12 hours  epoetin leeann-epbx (RETACRIT) Injectable 9000 Unit(s) IV Push <User Schedule>  iron sucrose IVPB 200 milliGRAM(s) IV Intermittent every 24 hours  pantoprazole  Injectable 40 milliGRAM(s) IV Push every 12 hours  piperacillin/tazobactam IVPB.. 3.375 Gram(s) IV Intermittent every 12 hours    MEDICATIONS  (PRN):  acetaminophen   Tablet .. 650 milliGRAM(s) Oral every 6 hours PRN Temp greater or equal to 38C (100.4F), Mild Pain (1 - 3)               31 y/o Sp. female with PMH of ESRD (on HD M/W/F) 2/2 IGA nephropathy came to the ED complaining of fever x 4 days.   She reported associated cough productive of white sputum, nausea and vomiting once today.   She missed HD today because she was febrile at the center; sent to the ED for evaluation.   In the ED, CXR showed multifocal pneumonia.   Vancomycin, azithromycin and Rocephin once given.   Blood culture sent; COVID and flu negative     Sepsis 2/2 multifocal PNA possibly gram negative vs atypical - On Zosyn ,     ESRD 2/2 IgA Nephropathy    Acute on chronic anemia ;    - Hemodynamically stable and H/H improved s/p 1u PRBC    - Stool occult Blood  pending  , On JORDEN,     HTN - Euvolemic,

## 2020-12-14 DIAGNOSIS — D64.9 ANEMIA, UNSPECIFIED: ICD-10-CM

## 2020-12-14 LAB
ALBUMIN SERPL ELPH-MCNC: 3.4 G/DL — SIGNIFICANT CHANGE UP (ref 3.3–5.2)
ALP SERPL-CCNC: 47 U/L — SIGNIFICANT CHANGE UP (ref 40–120)
ALT FLD-CCNC: 15 U/L — SIGNIFICANT CHANGE UP
ANION GAP SERPL CALC-SCNC: 17 MMOL/L — SIGNIFICANT CHANGE UP (ref 5–17)
AST SERPL-CCNC: 21 U/L — SIGNIFICANT CHANGE UP
BASOPHILS # BLD AUTO: 0.04 K/UL — SIGNIFICANT CHANGE UP (ref 0–0.2)
BASOPHILS NFR BLD AUTO: 0.6 % — SIGNIFICANT CHANGE UP (ref 0–2)
BILIRUB SERPL-MCNC: 0.4 MG/DL — SIGNIFICANT CHANGE UP (ref 0.4–2)
BUN SERPL-MCNC: 45 MG/DL — HIGH (ref 8–20)
CALCIUM SERPL-MCNC: 9.1 MG/DL — SIGNIFICANT CHANGE UP (ref 8.6–10.2)
CHLORIDE SERPL-SCNC: 89 MMOL/L — LOW (ref 98–107)
CO2 SERPL-SCNC: 23 MMOL/L — SIGNIFICANT CHANGE UP (ref 22–29)
CREAT SERPL-MCNC: 12.81 MG/DL — HIGH (ref 0.5–1.3)
EOSINOPHIL # BLD AUTO: 0.04 K/UL — SIGNIFICANT CHANGE UP (ref 0–0.5)
EOSINOPHIL NFR BLD AUTO: 0.6 % — SIGNIFICANT CHANGE UP (ref 0–6)
GLUCOSE SERPL-MCNC: 77 MG/DL — SIGNIFICANT CHANGE UP (ref 70–99)
HCT VFR BLD CALC: 31.7 % — LOW (ref 34.5–45)
HGB BLD-MCNC: 10.3 G/DL — LOW (ref 11.5–15.5)
IMM GRANULOCYTES NFR BLD AUTO: 4.2 % — HIGH (ref 0–1.5)
LYMPHOCYTES # BLD AUTO: 1.54 K/UL — SIGNIFICANT CHANGE UP (ref 1–3.3)
LYMPHOCYTES # BLD AUTO: 22.4 % — SIGNIFICANT CHANGE UP (ref 13–44)
MCHC RBC-ENTMCNC: 30 PG — SIGNIFICANT CHANGE UP (ref 27–34)
MCHC RBC-ENTMCNC: 32.5 GM/DL — SIGNIFICANT CHANGE UP (ref 32–36)
MCV RBC AUTO: 92.4 FL — SIGNIFICANT CHANGE UP (ref 80–100)
MONOCYTES # BLD AUTO: 0.7 K/UL — SIGNIFICANT CHANGE UP (ref 0–0.9)
MONOCYTES NFR BLD AUTO: 10.2 % — SIGNIFICANT CHANGE UP (ref 2–14)
NEUTROPHILS # BLD AUTO: 4.28 K/UL — SIGNIFICANT CHANGE UP (ref 1.8–7.4)
NEUTROPHILS NFR BLD AUTO: 62 % — SIGNIFICANT CHANGE UP (ref 43–77)
PLATELET # BLD AUTO: 245 K/UL — SIGNIFICANT CHANGE UP (ref 150–400)
POTASSIUM SERPL-MCNC: 3.4 MMOL/L — LOW (ref 3.5–5.3)
POTASSIUM SERPL-SCNC: 3.4 MMOL/L — LOW (ref 3.5–5.3)
PROT SERPL-MCNC: 7.7 G/DL — SIGNIFICANT CHANGE UP (ref 6.6–8.7)
RBC # BLD: 3.43 M/UL — LOW (ref 3.8–5.2)
RBC # FLD: 14.3 % — SIGNIFICANT CHANGE UP (ref 10.3–14.5)
SODIUM SERPL-SCNC: 129 MMOL/L — LOW (ref 135–145)
WBC # BLD: 6.89 K/UL — SIGNIFICANT CHANGE UP (ref 3.8–10.5)
WBC # FLD AUTO: 6.89 K/UL — SIGNIFICANT CHANGE UP (ref 3.8–10.5)

## 2020-12-14 PROCEDURE — 99233 SBSQ HOSP IP/OBS HIGH 50: CPT

## 2020-12-14 RX ORDER — POTASSIUM CHLORIDE 20 MEQ
40 PACKET (EA) ORAL ONCE
Refills: 0 | Status: COMPLETED | OUTPATIENT
Start: 2020-12-14 | End: 2020-12-14

## 2020-12-14 RX ORDER — ACETAMINOPHEN 500 MG
650 TABLET ORAL ONCE
Refills: 0 | Status: COMPLETED | OUTPATIENT
Start: 2020-12-14 | End: 2020-12-14

## 2020-12-14 RX ADMIN — Medication 600 MILLIGRAM(S): at 17:04

## 2020-12-14 RX ADMIN — PIPERACILLIN AND TAZOBACTAM 25 GRAM(S): 4; .5 INJECTION, POWDER, LYOPHILIZED, FOR SOLUTION INTRAVENOUS at 17:04

## 2020-12-14 RX ADMIN — CARVEDILOL PHOSPHATE 25 MILLIGRAM(S): 80 CAPSULE, EXTENDED RELEASE ORAL at 17:04

## 2020-12-14 RX ADMIN — Medication 40 MILLIEQUIVALENT(S): at 16:10

## 2020-12-14 RX ADMIN — Medication 650 MILLIGRAM(S): at 02:50

## 2020-12-14 RX ADMIN — PANTOPRAZOLE SODIUM 40 MILLIGRAM(S): 20 TABLET, DELAYED RELEASE ORAL at 17:04

## 2020-12-14 RX ADMIN — AZITHROMYCIN 255 MILLIGRAM(S): 500 TABLET, FILM COATED ORAL at 16:10

## 2020-12-14 RX ADMIN — IRON SUCROSE 110 MILLIGRAM(S): 20 INJECTION, SOLUTION INTRAVENOUS at 16:11

## 2020-12-14 RX ADMIN — Medication 650 MILLIGRAM(S): at 01:51

## 2020-12-14 RX ADMIN — PANTOPRAZOLE SODIUM 40 MILLIGRAM(S): 20 TABLET, DELAYED RELEASE ORAL at 05:29

## 2020-12-14 RX ADMIN — PIPERACILLIN AND TAZOBACTAM 25 GRAM(S): 4; .5 INJECTION, POWDER, LYOPHILIZED, FOR SOLUTION INTRAVENOUS at 05:28

## 2020-12-14 NOTE — PROGRESS NOTE ADULT - SUBJECTIVE AND OBJECTIVE BOX
Chief Complaint: This is a 30y old woman patient being seen in follow-up consultation for dark stools.    HPI / 24H events:  Patient seeing and evaluated at bedside, reporting no complains, no overnight events, tolerating oral intake, had 1 dark-browm BM this am. Denies nausea, vomiting, abdominal pain, chest pain, shortness of breath, hematemesis, hematochezia.  ROS: A 14-point review of systems was reviewed and was otherwise negative save what was reported in the HPI.    PAST MEDICAL/SURGICAL HISTORY:  Chronic kidney disease (CKD) stage G5/A1, glomerular filtration rate (GFR) less than or equal to 15 mL/min/1.73 square meter and albuminuria creatinine ratio less than 30 mg/g    A-V fistula  GALO, 16    History of   ,     H/O cervical biopsy  in the context of ELISE II      MEDICATIONS  (STANDING):  azithromycin  IVPB 500 milliGRAM(s) IV Intermittent every 24 hours  carvedilol 25 milliGRAM(s) Oral every 12 hours  epoetin leeann-epbx (RETACRIT) Injectable 9000 Unit(s) IV Push <User Schedule>  iron sucrose IVPB 200 milliGRAM(s) IV Intermittent every 24 hours  pantoprazole  Injectable 40 milliGRAM(s) IV Push every 12 hours  piperacillin/tazobactam IVPB.. 3.375 Gram(s) IV Intermittent every 12 hours    MEDICATIONS  (PRN):    No Known Allergies    T(C): 36.7 (20 @ 07:20), Max: 37.9 (20 @ 10:58)  HR: 79 (20 @ 07:20) (78 - 92)  BP: 117/73 (20 @ 07:20) (100/64 - 117/73)  RR: 18 (20 @ 07:20) (18 - 20)  SpO2: 94% (20 @ 07:20) (90% - 95%)    I&O's Summary    PHYSICAL EXAM:  Constitutional: Well-developed, afebrile,  woman, in no apparent distress  Eyes: Sclerae anicteric, conjunctivae normal  ENMT: Mucus membranes moist, no oropharyngeal thrush noted  Respiratory: Breathing nonlabored; clear to auscultation  Cardiovascular: Regular rate and rhythm  Gastrointestinal: Soft, nontender, nondistended, normoactive bowel sounds.  Extremities: No clubbing, cyanosis or edema  Neurological: Alert and oriented to person, place and time; no asterixis  Skin: No jaundice  Musculoskeletal: No significant peripheral atrophy  Psychiatric: Affect and mood appropriate               9.4    9.00  )-----------( 202      (  @ 07:33 )             28.3                9.8    x     )-----------( x        (  @ 23:11 )             28.7                7.3    x     )-----------( x        (  @ 11:47 )             21.9                7.2    9.10  )-----------( 191      (  @ 07:20 )             22.0                8.7    9.58  )-----------( 195      ( 11 @ 16:51 )             26.6           133<L>  |  90<L>  |  29.0<H>  ----------------------------<  90  3.4<L>   |  25.0  |  9.27<H>    Ca    8.4<L>      13 Dec 2020 07:33      Culture - Urine (collected 13 Dec 2020 01:26)  Source: .Urine Clean Catch (Midstream)  Final Report (13 Dec 2020 20:23):    No growth    Culture - Blood (collected 11 Dec 2020 16:53)  Source: .Blood Blood-Peripheral  Preliminary Report (13 Dec 2020 17:00):    No growth at 48 hours    Culture - Blood (collected 11 Dec 2020 16:52)  Source: .Blood Blood-Peripheral  Preliminary Report (13 Dec 2020 17:00):    No growth at 48 hours      IMAGING: I personally reviewed the Chest X-ray, and I agree with the radiologist's interpretation as described below:  FINDINGS:  The lungs show bilateral LEFT greater than RIGHT scattered and diffuse multifocal ill-defined airspace consolidations    The heart and mediastinum are within normal limits.    Visualized osseous structures are intact.   Chief Complaint: This is a 30y old woman patient being seen in follow-up consultation for dark stools.    HPI / 24H events:  Patient seeing and evaluated at bedside, reporting no complains, no overnight events, tolerating oral intake, had 1 dark brown BM this am. Denies nausea, vomiting, abdominal pain, chest pain, shortness of breath, hematemesis, hematochezia.    ROS: A 14-point review of systems was reviewed and was otherwise negative save what was reported in the HPI.    PAST MEDICAL/SURGICAL HISTORY:  Chronic kidney disease (CKD) stage G5/A1, glomerular filtration rate (GFR) less than or equal to 15 mL/min/1.73 square meter and albuminuria creatinine ratio less than 30 mg/g  A-V fistula  LUALISSA, 16  History of   ,   H/O cervical biopsy  in the context of ELISE II      MEDICATIONS  (STANDING):  azithromycin  IVPB 500 milliGRAM(s) IV Intermittent every 24 hours  carvedilol 25 milliGRAM(s) Oral every 12 hours  epoetin leeann-epbx (RETACRIT) Injectable 9000 Unit(s) IV Push <User Schedule>  iron sucrose IVPB 200 milliGRAM(s) IV Intermittent every 24 hours  pantoprazole  Injectable 40 milliGRAM(s) IV Push every 12 hours  piperacillin/tazobactam IVPB.. 3.375 Gram(s) IV Intermittent every 12 hours    MEDICATIONS  (PRN):    No Known Allergies    T(C): 36.7 (20 @ 07:20), Max: 37.9 (20 @ 10:58)  HR: 79 (20 @ 07:20) (78 - 92)  BP: 117/73 (20 @ 07:20) (100/64 - 117/73)  RR: 18 (20 @ 07:20) (18 - 20)  SpO2: 94% (20 @ 07:20) (90% - 95%)    I&O's Summary    PHYSICAL EXAM:  Constitutional: Well-developed,  woman in no apparent distress  Eyes: Sclerae anicteric, conjunctivae normal  ENMT: Mucus membranes moist, no oropharyngeal thrush noted  Respiratory: Breathing nonlabored; clear to auscultation  Cardiovascular: Regular rate and rhythm  Gastrointestinal: Soft, nontender, nondistended, normoactive bowel sounds.  Extremities: No clubbing, cyanosis or edema  Neurological: Alert and oriented to person, place and time; no asterixis  Skin: No jaundice  Musculoskeletal: No significant peripheral atrophy  Psychiatric: Affect and mood appropriate               9.4    9.00  )-----------( 202      (  @ 07:33 )             28.3                9.8    x     )-----------( x        (  @ 23:11 )             28.7                7.3    x     )-----------( x        (  @ 11:47 )             21.9                7.2    9.10  )-----------( 191      (  @ 07:20 )             22.0                8.7    9.58  )-----------( 195      ( 11 @ 16:51 )             26.6       12    133<L>  |  90<L>  |  29.0<H>  ----------------------------<  90  3.4<L>   |  25.0  |  9.27<H>    Ca    8.4<L>      13 Dec 2020 07:33      Culture - Urine (collected 13 Dec 2020 01:26)  Source: .Urine Clean Catch (Midstream)  Final Report (13 Dec 2020 20:23):    No growth    Culture - Blood (collected 11 Dec 2020 16:53)  Source: .Blood Blood-Peripheral  Preliminary Report (13 Dec 2020 17:00):    No growth at 48 hours    Culture - Blood (collected 11 Dec 2020 16:52)  Source: .Blood Blood-Peripheral  Preliminary Report (13 Dec 2020 17:00):    No growth at 48 hours      IMAGING: I personally reviewed the Chest X-ray, and I agree with the radiologist's interpretation as described below:  FINDINGS:  The lungs show bilateral LEFT greater than RIGHT scattered and diffuse multifocal ill-defined airspace consolidations    The heart and mediastinum are within normal limits.    Visualized osseous structures are intact.

## 2020-12-14 NOTE — PROGRESS NOTE ADULT - SUBJECTIVE AND OBJECTIVE BOX
[facility] DIVISION OF KIDNEY DISEASES AND HYPERTENSION -- FOLLOW UP NOTE  --------------------------------------------------------------------------------  Chief Complaint:    24 hour events/subjective:        PAST HISTORY  --------------------------------------------------------------------------------  No significant changes to PMH, PSH, FHx, SHx, unless otherwise noted    ALLERGIES & MEDICATIONS  --------------------------------------------------------------------------------  Allergies    No Known Allergies    Intolerances      Standing Inpatient Medications  azithromycin  IVPB 500 milliGRAM(s) IV Intermittent every 24 hours  carvedilol 25 milliGRAM(s) Oral every 12 hours  epoetin leeann-epbx (RETACRIT) Injectable 9000 Unit(s) IV Push <User Schedule>  iron sucrose IVPB 200 milliGRAM(s) IV Intermittent every 24 hours  pantoprazole  Injectable 40 milliGRAM(s) IV Push every 12 hours  piperacillin/tazobactam IVPB.. 3.375 Gram(s) IV Intermittent every 12 hours    PRN Inpatient Medications      REVIEW OF SYSTEMS  --------------------------------------------------------------------------------  Gen: No weight changes, fatigue, fevers/chills, weakness  Skin: No rashes  Head/Eyes/Ears/Mouth: No headache; Normal hearing; Normal vision w/o blurriness; No sinus pain/discomfort, sore throat  Respiratory: No dyspnea, cough, wheezing, hemoptysis  CV: No chest pain, PND, orthopnea  GI: No abdominal pain, diarrhea, constipation, nausea, vomiting, melena, hematochezia  : No increased frequency, dysuria, hematuria, nocturia  MSK: No joint pain/swelling; no back pain; no edema  Neuro: No dizziness/lightheadedness, weakness, seizures, numbness, tingling  Heme: No easy bruising or bleeding  Endo: No heat/cold intolerance  Psych: No significant nervousness, anxiety, stress, depression    All other systems were reviewed and are negative, except as noted.    VITALS/PHYSICAL EXAM  --------------------------------------------------------------------------------  T(C): 36.7 (12-14-20 @ 07:20), Max: 37.8 (12-14-20 @ 01:04)  HR: 79 (12-14-20 @ 07:20) (78 - 92)  BP: 117/73 (12-14-20 @ 07:20) (100/64 - 117/73)  RR: 18 (12-14-20 @ 07:20) (18 - 20)  SpO2: 94% (12-14-20 @ 07:20) (94% - 95%)  Wt(kg): --        Physical Exam:  	Gen: NAD, well-appearing  	HEENT: PERRL, supple neck, clear oropharynx  	Pulm: CTA B/L  	CV: RRR, S1S2; no rub  	Back: No spinal or CVA tenderness; no sacral edema  	Abd: +BS, soft, nontender/nondistended  	: No suprapubic tenderness  	UE: Warm, FROM, no clubbing, intact strength; no edema; no asterixis  	LE: Warm, FROM, no clubbing, intact strength; no edema  	Neuro: No focal deficits, intact gait  	Psych: Normal affect and mood  	Skin: Warm, without rashes  	Vascular access:    LABS/STUDIES  --------------------------------------------------------------------------------              10.3   6.89  >-----------<  245      [12-14-20 @ 11:44]              31.7     133  |  90  |  29.0  ----------------------------<  90      [12-13-20 @ 07:33]  3.4   |  25.0  |  9.27        Ca     8.4     [12-13-20 @ 07:33]            Creatinine Trend:  SCr 9.27 [12-13 @ 07:33]  SCr 13.82 [12-11 @ 16:51]    Urinalysis - [12-12-20 @ 20:43]      Color Yellow / Appearance Clear / SG 1.010 / pH 8.0      Gluc 100 / Ketone Negative  / Bili Negative / Urobili Negative       Blood Moderate / Protein 100 / Leuk Est Negative / Nitrite Negative      RBC 6-10 / WBC Negative / Hyaline  / Gran  / Sq Epi  / Non Sq Epi Few / Bacteria       Iron 19, TIBC 153, %sat 12      [12-12-20 @ 11:47]  Ferritin 1698      [12-12-20 @ 11:47]  HbA1c 5.7      [07-15-16 @ 08:42]       [facility] DIVISION OF KIDNEY DISEASES AND HYPERTENSION -- FOLLOW UP NOTE  --------------------------------------------------------------------------------  Chief Complaint: ESRD on HD    24 hour events/subjective:  No acute complaint        PAST HISTORY  --------------------------------------------------------------------------------  No significant changes to PMH, PSH, FHx, SHx, unless otherwise noted    ALLERGIES & MEDICATIONS  --------------------------------------------------------------------------------  Allergies    No Known Allergies    Intolerances      Standing Inpatient Medications  azithromycin  IVPB 500 milliGRAM(s) IV Intermittent every 24 hours  carvedilol 25 milliGRAM(s) Oral every 12 hours  epoetin leeann-epbx (RETACRIT) Injectable 9000 Unit(s) IV Push <User Schedule>  iron sucrose IVPB 200 milliGRAM(s) IV Intermittent every 24 hours  pantoprazole  Injectable 40 milliGRAM(s) IV Push every 12 hours  piperacillin/tazobactam IVPB.. 3.375 Gram(s) IV Intermittent every 12 hours    PRN Inpatient Medications      REVIEW OF SYSTEMS  --------------------------------------------------------------------------------  Gen: No weight changes, fatigue, fevers/chills, weakness  Skin: No rashes  Head/Eyes/Ears/Mouth: No headache; Normal hearing; Normal vision w/o blurriness; No sinus pain/discomfort, sore throat  Respiratory: No dyspnea, cough, wheezing, hemoptysis  CV: No chest pain, PND, orthopnea  GI: No abdominal pain, diarrhea, constipation, nausea, vomiting, melena, hematochezia  : No increased frequency, dysuria, hematuria, nocturia  MSK: No joint pain/swelling; no back pain; no edema  Neuro: No dizziness/lightheadedness, weakness, seizures, numbness, tingling  Heme: No easy bruising or bleeding  Endo: No heat/cold intolerance  Psych: No significant nervousness, anxiety, stress, depression    All other systems were reviewed and are negative, except as noted.    VITALS/PHYSICAL EXAM  --------------------------------------------------------------------------------  T(C): 36.7 (12-14-20 @ 07:20), Max: 37.8 (12-14-20 @ 01:04)  HR: 79 (12-14-20 @ 07:20) (78 - 92)  BP: 117/73 (12-14-20 @ 07:20) (100/64 - 117/73)  RR: 18 (12-14-20 @ 07:20) (18 - 20)  SpO2: 94% (12-14-20 @ 07:20) (94% - 95%)  Wt(kg): --        Physical Exam:  	Gen: NAD, well-appearing  	HEENT: PERRL, supple neck, clear oropharynx  	Pulm: CTA B/L  	CV: RRR, S1S2; no rub  	Back: No spinal or CVA tenderness; no sacral edema  	Abd: +BS, soft, nontender/nondistended  	: No suprapubic tenderness  	UE: Warm, no edema; no asterixis  	LE: Warm, ; no edema  	Neuro: No focal deficits, intact gait  	Psych: Normal affect and mood  	Skin: Warm, without rashes  	Vascular access: GALO KANG    LABS/STUDIES  --------------------------------------------------------------------------------              10.3   6.89  >-----------<  245      [12-14-20 @ 11:44]              31.7     133  |  90  |  29.0  ----------------------------<  90      [12-13-20 @ 07:33]  3.4   |  25.0  |  9.27        Ca     8.4     [12-13-20 @ 07:33]            Creatinine Trend:  SCr 9.27 [12-13 @ 07:33]  SCr 13.82 [12-11 @ 16:51]    Urinalysis - [12-12-20 @ 20:43]      Color Yellow / Appearance Clear / SG 1.010 / pH 8.0      Gluc 100 / Ketone Negative  / Bili Negative / Urobili Negative       Blood Moderate / Protein 100 / Leuk Est Negative / Nitrite Negative      RBC 6-10 / WBC Negative / Hyaline  / Gran  / Sq Epi  / Non Sq Epi Few / Bacteria       Iron 19, TIBC 153, %sat 12      [12-12-20 @ 11:47]  Ferritin 1698      [12-12-20 @ 11:47]  HbA1c 5.7      [07-15-16 @ 08:42]

## 2020-12-14 NOTE — PROGRESS NOTE ADULT - ASSESSMENT
ESRD on HD  Sepsis/ PNA  Anemia of CKD with superimposed iron def       ESRD on HD  Sepsis/ PNA  Anemia of CKD with superimposed iron def  HTN      Plan for HD tomorrow  continue antibiotics  Hb at goal-JORDEN + Iv iron with HD  Bp stable- continue current meds

## 2020-12-14 NOTE — PROGRESS NOTE ADULT - SUBJECTIVE AND OBJECTIVE BOX
Chief Complaint:  b/l pna    SUBJECTIVE / OVERNIGHT EVENTS: reports black BM this morning.  She still has dry cough but remains afebrile.      Patient denies chest pain, SOB, abd pain, N/V, fever, chills, dysuria or any other complaints. All remainder ROS negative.       I&O's Summary        PHYSICAL EXAM:  Vital Signs Last 24 Hrs  T(C): 36.7 (14 Dec 2020 07:20), Max: 37.8 (14 Dec 2020 01:04)  T(F): 98.1 (14 Dec 2020 07:20), Max: 100 (14 Dec 2020 01:04)  HR: 79 (14 Dec 2020 07:20) (78 - 92)  BP: 117/73 (14 Dec 2020 07:20) (100/64 - 117/73)  BP(mean): --  RR: 18 (14 Dec 2020 07:20) (18 - 20)  SpO2: 94% (14 Dec 2020 07:20) (94% - 95%)      CONSTITUTIONAL: pt examined bedside, laying comfortably in bed in NAD  HEENT: NC/AT, dry oral mucosa, clear conjunctiva, sclera nonicteric, EOMI  RESPIRATORY: bibasilar rhonchi, no wheezing or rales  CARDIOVASCULAR: RRR, normal S1 and S2, no murmur/rub/gallop  ABDOMEN: soft, NT/ND, normoactive bowel sounds, no rebound/guarding, no HSM  MUSCLOSKELETAL:  no joint swelling or tenderness to palpation  EXTREMITIES: No cynaosis, no clubbing, no lower extremity edema, +AVF LUE, peripheral pulses are 2+ bilaterally  PSYCH: affect appropriate and cooperative  NEUROLOGY: A+O to person, place, and time, no focal neurologic deficits appreciated   SKIN: No rashes or no palpable lesions        LABS:                        10.3   6.89  )-----------( 245      ( 14 Dec 2020 11:44 )             31.7     12-14    129<L>  |  89<L>  |  45.0<H>  ----------------------------<  77  3.4<L>   |  23.0  |  12.81<H>    Ca    9.1      14 Dec 2020 11:44    TPro  7.7  /  Alb  3.4  /  TBili  0.4  /  DBili  x   /  AST  21  /  ALT  15  /  AlkPhos  47  12-14          Urinalysis Basic - ( 12 Dec 2020 20:43 )    Color: Yellow / Appearance: Clear / S.010 / pH: x  Gluc: x / Ketone: Negative  / Bili: Negative / Urobili: Negative mg/dL   Blood: x / Protein: 100 mg/dL / Nitrite: Negative   Leuk Esterase: Negative / RBC: 6-10 /HPF / WBC Negative   Sq Epi: x / Non Sq Epi: Few / Bacteria: x        Culture - Urine (collected 13 Dec 2020 01:26)  Source: .Urine Clean Catch (Midstream)  Final Report (13 Dec 2020 20:23):    No growth    Culture - Blood (collected 11 Dec 2020 16:53)  Source: .Blood Blood-Peripheral  Preliminary Report (13 Dec 2020 17:00):    No growth at 48 hours    Culture - Blood (collected 11 Dec 2020 16:52)  Source: .Blood Blood-Peripheral  Preliminary Report (13 Dec 2020 17:00):    No growth at 48 hours      CAPILLARY BLOOD GLUCOSE            RADIOLOGY & ADDITIONAL TESTS:          MEDICATIONS  (STANDING):  azithromycin  IVPB 500 milliGRAM(s) IV Intermittent every 24 hours  carvedilol 25 milliGRAM(s) Oral every 12 hours  epoetin leeann-epbx (RETACRIT) Injectable 9000 Unit(s) IV Push <User Schedule>  iron sucrose IVPB 200 milliGRAM(s) IV Intermittent every 24 hours  pantoprazole  Injectable 40 milliGRAM(s) IV Push every 12 hours  piperacillin/tazobactam IVPB.. 3.375 Gram(s) IV Intermittent every 12 hours  potassium chloride    Tablet ER 40 milliEquivalent(s) Oral once    MEDICATIONS  (PRN):

## 2020-12-14 NOTE — PROGRESS NOTE ADULT - PROBLEM SELECTOR PLAN 1
Most likely multifactorial with components of  underlying anemia of chronic disease in the setting of ESRD and now black stools.  Tolerating clear liquid diet.  Continue Pantoprazole for mucosal cytoprotection.  Continue to monitor CBC  Will consider EGD once Pneumoniae improves.  Continue Venofer Most likely multifactorial with components of underlying anemia of chronic disease in the setting of ESRD and now black stools.  Tolerating clear liquid diet.  Continue pantoprazole for mucosal cytoprotection.  Continue to monitor CBC  Will consider EGD once pneumonia improves  Continue Venofer

## 2020-12-14 NOTE — PROGRESS NOTE ADULT - ASSESSMENT
29 y/o female with PMH of ESRD (on HD M/W/F) 2/2 IGA nephropathy came to the ED complaining of fever x 4 days. She reported associated cough productive of white sputum, nausea and vomiting once today. She missed HD today because she was febrile at the center; sent to the ED for evaluation. In the ED, CXR showed multifocal pneumonia. Vancomycin, azithromycin and Rocephin once given. Blood culture sent; COVID and flu negative         Sepsis 2/2 multifocal PNA possibly gram negative vs atypical  - Afebrile and no leukocytosis  - CXR significant for multifocal PNA  - Cultures NTD, legionella urine AG (-)   - Strep urine AG pending  - Continue w/ Zosyn and Azithromycin   - Tylenol PRN for fever   - If pt becomes febrile will repeat cxs, CXR and consult ID      ESRD 2/2 IgA Nephropathy  - c/w HD as per neprhology  - Monitor renal function daily   - Renally dose medications as needed      Acute on chronic anemia   - Concern for acute blood loss given drop in H/H requiring transfusion and black stools   - GI consulted and will w/u pt for GIB upon further improvement of PNA   - Underlying anemia of chronic dx in the setting of ESRD   - Hemodynamically stable and H/H improved s/p 1u PRBC  - Monitor H/H closely and transfusion if Hb < 7  - Iron panel noted and placed on Venofer  - c/w protonix   - On clears per GI recs      HTN   - Continue home medications      DVT ppx:  - SCDs        Dispo: No plans for discharge at this time as pt remains acute.  Anticipate d/c in 2-3 days pending GI w/u for possible acute GIB.

## 2020-12-15 ENCOUNTER — TRANSCRIPTION ENCOUNTER (OUTPATIENT)
Age: 30
End: 2020-12-15

## 2020-12-15 LAB
ALBUMIN SERPL ELPH-MCNC: 3.1 G/DL — LOW (ref 3.3–5.2)
ALP SERPL-CCNC: 41 U/L — SIGNIFICANT CHANGE UP (ref 40–120)
ALT FLD-CCNC: 13 U/L — SIGNIFICANT CHANGE UP
ANION GAP SERPL CALC-SCNC: 19 MMOL/L — HIGH (ref 5–17)
AST SERPL-CCNC: 17 U/L — SIGNIFICANT CHANGE UP
BASOPHILS # BLD AUTO: 0.02 K/UL — SIGNIFICANT CHANGE UP (ref 0–0.2)
BASOPHILS NFR BLD AUTO: 0.3 % — SIGNIFICANT CHANGE UP (ref 0–2)
BILIRUB SERPL-MCNC: 0.3 MG/DL — LOW (ref 0.4–2)
BUN SERPL-MCNC: 53 MG/DL — HIGH (ref 8–20)
CALCIUM SERPL-MCNC: 8.2 MG/DL — LOW (ref 8.6–10.2)
CHLORIDE SERPL-SCNC: 91 MMOL/L — LOW (ref 98–107)
CO2 SERPL-SCNC: 22 MMOL/L — SIGNIFICANT CHANGE UP (ref 22–29)
CREAT SERPL-MCNC: 13.69 MG/DL — HIGH (ref 0.5–1.3)
EOSINOPHIL # BLD AUTO: 0.11 K/UL — SIGNIFICANT CHANGE UP (ref 0–0.5)
EOSINOPHIL NFR BLD AUTO: 1.8 % — SIGNIFICANT CHANGE UP (ref 0–6)
GLUCOSE SERPL-MCNC: 108 MG/DL — HIGH (ref 70–99)
HCT VFR BLD CALC: 26.9 % — LOW (ref 34.5–45)
HGB BLD-MCNC: 8.7 G/DL — LOW (ref 11.5–15.5)
IMM GRANULOCYTES NFR BLD AUTO: 3.8 % — HIGH (ref 0–1.5)
LYMPHOCYTES # BLD AUTO: 0.89 K/UL — LOW (ref 1–3.3)
LYMPHOCYTES # BLD AUTO: 14.8 % — SIGNIFICANT CHANGE UP (ref 13–44)
MCHC RBC-ENTMCNC: 29.3 PG — SIGNIFICANT CHANGE UP (ref 27–34)
MCHC RBC-ENTMCNC: 32.3 GM/DL — SIGNIFICANT CHANGE UP (ref 32–36)
MCV RBC AUTO: 90.6 FL — SIGNIFICANT CHANGE UP (ref 80–100)
MONOCYTES # BLD AUTO: 0.61 K/UL — SIGNIFICANT CHANGE UP (ref 0–0.9)
MONOCYTES NFR BLD AUTO: 10.1 % — SIGNIFICANT CHANGE UP (ref 2–14)
NEUTROPHILS # BLD AUTO: 4.15 K/UL — SIGNIFICANT CHANGE UP (ref 1.8–7.4)
NEUTROPHILS NFR BLD AUTO: 69.2 % — SIGNIFICANT CHANGE UP (ref 43–77)
PLATELET # BLD AUTO: 232 K/UL — SIGNIFICANT CHANGE UP (ref 150–400)
POTASSIUM SERPL-MCNC: 3.9 MMOL/L — SIGNIFICANT CHANGE UP (ref 3.5–5.3)
POTASSIUM SERPL-SCNC: 3.9 MMOL/L — SIGNIFICANT CHANGE UP (ref 3.5–5.3)
PROT SERPL-MCNC: 6.8 G/DL — SIGNIFICANT CHANGE UP (ref 6.6–8.7)
RBC # BLD: 2.97 M/UL — LOW (ref 3.8–5.2)
RBC # FLD: 13.9 % — SIGNIFICANT CHANGE UP (ref 10.3–14.5)
SARS-COV-2 RNA SPEC QL NAA+PROBE: SIGNIFICANT CHANGE UP
SODIUM SERPL-SCNC: 132 MMOL/L — LOW (ref 135–145)
WBC # BLD: 6.01 K/UL — SIGNIFICANT CHANGE UP (ref 3.8–10.5)
WBC # FLD AUTO: 6.01 K/UL — SIGNIFICANT CHANGE UP (ref 3.8–10.5)

## 2020-12-15 PROCEDURE — 90937 HEMODIALYSIS REPEATED EVAL: CPT

## 2020-12-15 PROCEDURE — 99233 SBSQ HOSP IP/OBS HIGH 50: CPT

## 2020-12-15 PROCEDURE — 99232 SBSQ HOSP IP/OBS MODERATE 35: CPT

## 2020-12-15 RX ORDER — CHLORHEXIDINE GLUCONATE 213 G/1000ML
1 SOLUTION TOPICAL
Refills: 0 | Status: DISCONTINUED | OUTPATIENT
Start: 2020-12-15 | End: 2020-12-19

## 2020-12-15 RX ADMIN — PIPERACILLIN AND TAZOBACTAM 25 GRAM(S): 4; .5 INJECTION, POWDER, LYOPHILIZED, FOR SOLUTION INTRAVENOUS at 17:16

## 2020-12-15 RX ADMIN — Medication 600 MILLIGRAM(S): at 06:02

## 2020-12-15 RX ADMIN — PANTOPRAZOLE SODIUM 40 MILLIGRAM(S): 20 TABLET, DELAYED RELEASE ORAL at 17:15

## 2020-12-15 RX ADMIN — CARVEDILOL PHOSPHATE 25 MILLIGRAM(S): 80 CAPSULE, EXTENDED RELEASE ORAL at 06:02

## 2020-12-15 RX ADMIN — AZITHROMYCIN 255 MILLIGRAM(S): 500 TABLET, FILM COATED ORAL at 17:16

## 2020-12-15 RX ADMIN — PIPERACILLIN AND TAZOBACTAM 25 GRAM(S): 4; .5 INJECTION, POWDER, LYOPHILIZED, FOR SOLUTION INTRAVENOUS at 06:02

## 2020-12-15 RX ADMIN — Medication 600 MILLIGRAM(S): at 17:16

## 2020-12-15 RX ADMIN — PANTOPRAZOLE SODIUM 40 MILLIGRAM(S): 20 TABLET, DELAYED RELEASE ORAL at 06:02

## 2020-12-15 RX ADMIN — ERYTHROPOIETIN 9000 UNIT(S): 10000 INJECTION, SOLUTION INTRAVENOUS; SUBCUTANEOUS at 12:04

## 2020-12-15 RX ADMIN — CHLORHEXIDINE GLUCONATE 1 APPLICATION(S): 213 SOLUTION TOPICAL at 14:04

## 2020-12-15 RX ADMIN — IRON SUCROSE 110 MILLIGRAM(S): 20 INJECTION, SOLUTION INTRAVENOUS at 10:21

## 2020-12-15 NOTE — PROGRESS NOTE ADULT - SUBJECTIVE AND OBJECTIVE BOX
Chief Complaint: This is a 30y old woman patient being seen in follow-up consultation for dark stools.    HPI / 24H events:  Patient seeing and evaluated at bedside, reporting no complains, no overnight events, tolerating oral intake, no BM this am. Awaiting HD today. Denies nausea, vomiting, abdominal pain, chest pain, shortness of breath, hematemesis, hematochezia.    ROS: A 14-point review of systems was reviewed and was otherwise negative save what was reported in the HPI.    PAST MEDICAL/SURGICAL HISTORY:  Chronic kidney disease (CKD) stage G5/A1, glomerular filtration rate (GFR) less than or equal to 15 mL/min/1.73 square meter and albuminuria creatinine ratio less than 30 mg/g    A-V fistula  LUALISSA, 14    History of   ,     H/O cervical biopsy  in the context of ELISE II      MEDICATIONS  (STANDING):  azithromycin  IVPB 500 milliGRAM(s) IV Intermittent every 24 hours  carvedilol 25 milliGRAM(s) Oral every 12 hours  chlorhexidine 2% Cloths 1 Application(s) Topical <User Schedule>  epoetin leeann-epbx (RETACRIT) Injectable 9000 Unit(s) IV Push <User Schedule>  guaiFENesin  milliGRAM(s) Oral every 12 hours  pantoprazole  Injectable 40 milliGRAM(s) IV Push every 12 hours  piperacillin/tazobactam IVPB.. 3.375 Gram(s) IV Intermittent every 12 hours    MEDICATIONS  (PRN):    No Known Allergies    T(C): 37.2 (12-15-20 @ 09:52), Max: 37.2 (20 @ 15:34)  HR: 73 (12-15-20 @ 09:52) (73 - 86)  BP: 100/65 (12-15-20 @ 09:52) (100/65 - 131/72)  RR: 18 (12-15-20 @ 09:52) (18 - 20)  SpO2: 96% (12-15-20 @ 09:52) (91% - 96%)    I&O's Summary    PHYSICAL EXAM:  Constitutional: Well-developed,  woman, in no apparent distress  Eyes: Sclerae anicteric, conjunctivae normal  ENMT: Mucus membranes moist, no oropharyngeal thrush noted  Respiratory: Breathing nonlabored; clear to auscultation  Cardiovascular: Regular rate and rhythm  Gastrointestinal: Soft, nontender, nondistended, normoactive bowel sounds.  Rectal: Perianal exam within normal limits; normal sphincter tone; brown stool on glove  Extremities: No clubbing, cyanosis or edema  Neurological: Alert and oriented to person, place and time; no asterixis  Skin: No jaundice  Musculoskeletal: No significant peripheral atrophy  Psychiatric: Affect and mood appropriate                   8.7    6.01  )-----------( 232      ( 15 @ 10:07 )             26.9                10.3   6.89  )-----------( 245      ( 14 @ 11:44 )             31.7                9.4    9.00  )-----------( 202      ( 13 @ 07:33 )             28.3                9.8    x     )-----------( x        (  @ 23:11 )             28.7                7.3    x     )-----------( x        ( 12 @ 11:47 )             21.9       12-15    132<L>  |  91<L>  |  53.0<H>  ----------------------------<  108<H>  3.9   |  22.0  |  13.69<H>    Ca    8.2<L>      15 Dec 2020 10:07    TPro  6.8  /  Alb  3.1<L>  /  TBili  0.3<L>  /  DBili  x   /  AST  17  /  ALT  13  /  AlkPhos  41  12-15    LIVER FUNCTIONS - ( 15 Dec 2020 10:07 )  Alb: 3.1 g/dL / Pro: 6.8 g/dL / ALK PHOS: 41 U/L / ALT: 13 U/L / AST: 17 U/L / GGT: x           Culture - Urine (collected 13 Dec 2020 01:26)  Source: .Urine Clean Catch (Midstream)  Final Report (13 Dec 2020 20:23):    No growth      IMAGING: I personally reviewed the X-Ray of chest, and I agree with the radiologist's interpretation as described below:  FINDINGS:  The lungs show bilateral LEFT greater than RIGHT scattered and diffuse multifocal ill-defined airspace consolidations    The heart and mediastinum are within normal limits.    Visualized osseous structures are intact.        IMPRESSION:   LEFT greater than RIGHT scattered and diffuse multifocal ill-defined airspace consolidations.

## 2020-12-15 NOTE — PROGRESS NOTE ADULT - ATTENDING COMMENTS
GENERAL: pt examined bedside, laying comfortably in bed in NAD  HEENT: NC/AT, dry oral mucosa, clear conjunctiva, sclera nonicteric, EOMI  RESPIRATORY: bibasilar rhonchi, no wheezing or rales  CARDIOVASCULAR: RRR, normal S1 and S2, no murmur/rub/gallop  ABDOMEN: soft, NT/ND, normoactive bowel sounds, no rebound/guarding, no HSM  MUSCLOSKELETAL:  no joint swelling or tenderness to palpation  EXTREMITIES: No cynaosis, no clubbing, no lower extremity edema, +AVF LUE, peripheral pulses are 2+ bilaterally  PSYCH: affect appropriate and cooperative  NEUROLOGY: A+O to person, place, and time, no focal neurologic deficits appreciated   SKIN: No rashes or no palpable lesions

## 2020-12-15 NOTE — PROGRESS NOTE ADULT - PROBLEM SELECTOR PLAN 1
Concern for acute blood loss given drop in Hemoglobin from 10 to 8, although no signs of overt bleeding.   Underlying anemia of chronic dx in the setting of ESRD   Tolerating clear liquid diet.  Continue pantoprazole for mucosal cytoprotection.  Continue to monitor hemoglobin and transfuse when <8  Will consider EGD once pneumonia improves  Continue Venofer Concern for acute blood loss given drop in Hemoglobin from 10 to 8, although no signs of overt bleeding.   Underlying anemia of chronic dx in the setting of ESRD   Tolerating clear liquid diet.  Continue pantoprazole for mucosal cytoprotection.  Continue to monitor hemoglobin and transfuse when <8  Will consider EGD once pneumonia improves. Possibly tomorrow  Continue Venofer

## 2020-12-15 NOTE — PROGRESS NOTE ADULT - SUBJECTIVE AND OBJECTIVE BOX
CC: pneumonia bilateral/anemia    INTERVAL HPI/OVERNIGHT EVENTS: Patient seen and examined. Reports improvement in cough. Denies chest pain, SOB, MARR. No nausea, vomiting, fever, chills. Encouraged to sit in chair, ambulate in room. Does not require supplemental oxygen. Reports "brown" colored stool.     Vital Signs Last 24 Hrs  T(C): 37.2 (15 Dec 2020 09:52), Max: 37.2 (14 Dec 2020 15:34)  T(F): 98.9 (15 Dec 2020 09:52), Max: 98.9 (14 Dec 2020 15:34)  HR: 73 (15 Dec 2020 09:52) (73 - 86)  BP: 100/65 (15 Dec 2020 09:52) (100/65 - 131/72)  BP(mean): --  RR: 18 (15 Dec 2020 09:52) (18 - 20)  SpO2: 96% (15 Dec 2020 09:52) (91% - 96%)        PE:  CONSTITUTIONAL: NAD  HEENT: NC/AT, dry oral mucosa, clear conjunctiva, sclera nonicteric, EOMI  RESPIRATORY: CTA B/L  CARDIOVASCULAR: RRR, normal S1 and S2, no murmur/rub/gallop  ABDOMEN: soft, NT/ND, normoactive bowel sounds, no rebound/guarding, no HSM  MUSCLOSKELETAL:  no joint swelling or tenderness to palpation  EXTREMITIES: No C/C/E, +AVF LUE, peripheral pulses are 2+ bilaterally  PSYCH: affect appropriate and cooperative  NEUROLOGY: A+O to person, place, and time, no focal neurologic deficits appreciated   SKIN: No rashes or no palpable lesions  I&O's Detail                                8.7    6.01  )-----------( 232      ( 15 Dec 2020 10:07 )             26.9     15 Dec 2020 10:07    132    |  91     |  53.0   ----------------------------<  108    3.9     |  22.0   |  13.69    Ca    8.2        15 Dec 2020 10:07    TPro  6.8    /  Alb  3.1    /  TBili  0.3    /  DBili  x      /  AST  17     /  ALT  13     /  AlkPhos  41     15 Dec 2020 10:07      CAPILLARY BLOOD GLUCOSE        LIVER FUNCTIONS - ( 15 Dec 2020 10:07 )  Alb: 3.1 g/dL / Pro: 6.8 g/dL / ALK PHOS: 41 U/L / ALT: 13 U/L / AST: 17 U/L / GGT: x               MEDICATIONS  (STANDING):  azithromycin  IVPB 500 milliGRAM(s) IV Intermittent every 24 hours  carvedilol 25 milliGRAM(s) Oral every 12 hours  chlorhexidine 2% Cloths 1 Application(s) Topical <User Schedule>  epoetin leeann-epbx (RETACRIT) Injectable 9000 Unit(s) IV Push <User Schedule>  guaiFENesin  milliGRAM(s) Oral every 12 hours  pantoprazole  Injectable 40 milliGRAM(s) IV Push every 12 hours  piperacillin/tazobactam IVPB.. 3.375 Gram(s) IV Intermittent every 12 hours    MEDICATIONS  (PRN):      RADIOLOGY & ADDITIONAL TESTS:

## 2020-12-15 NOTE — DISCHARGE NOTE NURSING/CASE MANAGEMENT/SOCIAL WORK - NSDCCRNAME_GEN_ALL_CORE_FT
**  you have an appointment at [facility] on Friday ; December 18,2020 at 9am*** **  you have an appointment at [facility] on Wednesday December 30 at 12pm**

## 2020-12-15 NOTE — PROGRESS NOTE ADULT - ASSESSMENT
29 y/o female with PMH of ESRD (on HD M/W/F) 2/2 IGA nephropathy came to the ED complaining of fever x 4 days. She reported associated cough productive of white sputum, nausea and vomiting once today. She missed HD today because she was febrile at the center; sent to the ED for evaluation. In the ED, CXR showed multifocal pneumonia. Vancomycin, azithromycin and Rocephin once given. Blood culture negative,  COVID and flu negative         Sepsis 2/2 multifocal PNA possibly gram negative vs atypical  - Afebrile and no leukocytosis  - CXR significant for multifocal PNA  - Cultures NTD, legionella urine AG (-)   - Continue w/ Zosyn and Azithromycin   - Tylenol PRN for fever   -Incentive spirometer, encourage OOB to chair.         ESRD 2/2 IgA Nephropathy  - c/w HD as per Renal   - Monitor renal function daily   - Renally dose medications as needed      Acute on chronic anemia   - Concern for acute blood loss given drop in H/H requiring transfusion and black stools   - GI consulted , plan for EGD in am  - Underlying anemia of chronic dx in the setting of ESRD   - Hemodynamically stable and H/H improved s/p 1u PRBC  - Monitor H/H closely and transfusion if Hb < 7  - Iron panel noted and placed on Venofer  - c/w protonix         HTN   - Continue home medications      DVT ppx:  - SCDs        Dispo: EGD in am

## 2020-12-15 NOTE — PROGRESS NOTE ADULT - SUBJECTIVE AND OBJECTIVE BOX
Patient was seen and evaluated on dialysis.   No c/o CP SOB NV  no F/C  no swelling    T(C): 36.3 (12-15-20 @ 13:34), Max: 37.2 (12-14-20 @ 15:34)  HR: 72 (12-15-20 @ 13:34) (72 - 86)  BP: 106/62 (12-15-20 @ 13:34) (100/65 - 131/72)  Wt(kg): -- NA,   PE ;  NAD  lungs - Rales Bilaterally - Bases,   CV gr 1 murmer,  No gallop or rub  Abd : soft, NT BS +, No masses  Ext- No edema  Neuro : Grossly intact, moving extremities                         8.7    6.01  )-----------( 232      ( 15 Dec 2020 10:07 )             26.9      12-15    132<L>  |  91<L>  |  53.0<H>  ----------------------------<  108<H>  3.9   |  22.0  |  13.69<H>    Ca    8.2<L>      15 Dec 2020 10:07    TPro  6.8  /  Alb  3.1<L>  /  TBili  0.3<L>  /  DBili  x   /  AST  17  /  ALT  13  /  AlkPhos  41  12-15    MEDICATIONS  (STANDING):  azithromycin  IVPB  carvedilol  chlorhexidine 2% Cloths  epoetin leeann-epbx (RETACRIT) Injectable  guaiFENesin ER  pantoprazole  Injectable  piperacillin/tazobactam IVPB..    Patient stable    Tolerated UF 1 L .,     Intra Dialytic Hypotension  X  1,    Continue TIW HD,

## 2020-12-15 NOTE — DISCHARGE NOTE NURSING/CASE MANAGEMENT/SOCIAL WORK - NSDCFUADDAPPT_GEN_ALL_CORE_FT
**  you have an appointment  with Dr Araceli Sherman  at [facility] on Friday ; December 18,2020 at 9am***  [facility] [id number]    **  you have an appointment  with Bambi Sultana NP  at [facility] on Wednesday December 30, 2020 at 12pm  [address] [address]   [phone]   **  you have an appointment  with Bambi Sultana NP  at [facility] on Wednesday December 30, 2020 at 12pm  [address] [facility] [id number] [phone]    Your next appointment for dialysis is Tuesday, December 22, 2020 at 10:15am.   You will be picked up at 9am for your next dialysis appointment.   Address is [address] [place], [place] [id number] [phone]  [facility]    **  you have an appointment  with Bambi Sultana NP  at [facility] on Wednesday December 30, 2020 at 12pm  [address] [facility] [address]       Your next appointment for dialysis is Tuesday, December 22, 2020 at 10:15am.   You will be picked up at 9am for your next dialysis appointment.   Address is [address] [place] [address]  [phone]  [facility]   Transportation confirmation# [id number], [phone]

## 2020-12-15 NOTE — PROGRESS NOTE ADULT - ASSESSMENT
Patient tolerated hemodialysis well, no s/s or c/o pain observed at this time. Patient maintains strong thrill and bruit to left AVF.     Report given to primary nurse, patient safety and comfort maintained.

## 2020-12-15 NOTE — DISCHARGE NOTE NURSING/CASE MANAGEMENT/SOCIAL WORK - PATIENT PORTAL LINK FT
You can access the FollowMyHealth Patient Portal offered by [facility] by registering at the following website: [email or web address]. By joining [employer]’s FollowMyHealth portal, you will also be able to view your health information using other applications (apps) compatible with our system.

## 2020-12-15 NOTE — PROGRESS NOTE ADULT - SUBJECTIVE AND OBJECTIVE BOX
Chief Complaint: ESRD on HD    24 hour events/subjective:  No acute complaint    PAST HISTORY  --------------------------------------------------------------------------------  No significant changes to PMH, PSH, FHx, SHx, unless otherwise noted    ALLERGIES & MEDICATIONS  --------------------------------------------------------------------------------  Allergies    No Known Allergies    Standing Inpatient Medications  azithromycin  IVPB 500 milliGRAM(s) IV Intermittent every 24 hours  carvedilol 25 milliGRAM(s) Oral every 12 hours  epoetin leeann-epbx (RETACRIT) Injectable 9000 Unit(s) IV Push <User Schedule>  iron sucrose IVPB 200 milliGRAM(s) IV Intermittent every 24 hours  pantoprazole  Injectable 40 milliGRAM(s) IV Push every 12 hours  piperacillin/tazobactam IVPB.. 3.375 Gram(s) IV Intermittent every 12 hours    REVIEW OF SYSTEMS  --------------------------------------------------------------------------------  Gen: No weight changes, fatigue, fevers/chills, weakness  Skin: No rashes  Head/Eyes/Ears/Mouth: No headache; Normal hearing; Normal vision w/o blurriness; No sinus pain/discomfort, sore throat  Respiratory: No dyspnea, cough, wheezing, hemoptysis  CV: No chest pain, PND, orthopnea  GI: No abdominal pain, diarrhea, constipation, nausea, vomiting, melena, hematochezia  : No increased frequency, dysuria, hematuria, nocturia  MSK: No joint pain/swelling; no back pain; no edema  Neuro: No dizziness/lightheadedness, weakness, seizures, numbness, tingling  Heme: No easy bruising or bleeding  Endo: No heat/cold intolerance  Psych: No significant nervousness, anxiety, stress, depression    All other systems were reviewed and are negative, except as noted.    VITALS/PHYSICAL EXAM  --------------------------------------------------------------------------------  ICU Vital Signs Last 24 Hrs  T(C): 37.1 (15 Dec 2020 09:18), Max: 37.2 (14 Dec 2020 15:34)  T(F): 98.8 (15 Dec 2020 09:18), Max: 98.9 (14 Dec 2020 15:34)  HR: 78 (15 Dec 2020 09:18) (78 - 86)  BP: 106/67 (15 Dec 2020 09:18) (106/67 - 131/72)  RR: 20 (15 Dec 2020 09:18) (18 - 20)  SpO2: 94% (15 Dec 2020 09:18) (91% - 96%)    Physical Exam:  	Gen: NAD, well-appearing  	HEENT: PERRL, supple neck, clear oropharynx  	Pulm: CTA B/L  	CV: RRR, S1S2; no rub  	Back: No spinal or CVA tenderness; no sacral edema  	Abd: +BS, soft, nontender/nondistended  	: No suprapubic tenderness  	UE: Warm, no edema; no asterixis  	LE: Warm, ; no edema  	Neuro: No focal deficits, intact gait  	Psych: Normal affect and mood  	Skin: Warm, without rashes  	Vascular access: LUE AVF    LABS/STUDIES:    129<L>  |  89<L>  |  45.0<H>  ----------------------------<  77     Ca:9.1   (14 Dec 2020 11:44)  3.4<L>   |  23.0   |  12.81<H>    eGFR if Non : 3 <L>  eGFR if : 4 <L>    TPro  7.7    /  Alb  3.4    /  TBili  0.4    /  DBili  x      /  AST  21     /  ALT  15     /  AlkPhos  47     14 Dec 2020 11:44                        10.3<L>  6.89  )-----------( 245      ( 14 Dec 2020 11:44 )             31.7<L>    Ferritin:1698 ng/mL<H> Iron:19 ug/dL<L> TIBC:153 ug/dL<L> Tsat:12 %<L>   ( @ 11:47)    Urinalysis Basic - ( 12 Dec 2020 20:43 )  Color: Yellow / Appearance: Clear / S.010 / pH: x  Gluc: x / Ketone: Negative  / Bili: Negative / Urobili: Negative mg/dL   Blood: x / Protein: 100 mg/dL<!> / Nitrite: Negative   Leuk Esterase: Negative / RBC: 6-10 /HPF<!> / WBC Negative   Sq Epi: x / Non Sq Epi: Few / Bacteria: x  --------------------------------------------------------------------------------              10.3   6.89  >-----------<  245      [20 @ 11:44]              31.7     133  |  90  |  29.0  ----------------------------<  90      [20 @ 07:33]  3.4   |  25.0  |  9.27        Ca     8.4     [20 @ 07:33]            Creatinine Trend:  SCr 9.27 [ 07:33]  SCr 13.82 [ @ 16:51]    Urinalysis - [20 @ 20:43]      Color Yellow / Appearance Clear / SG 1.010 / pH 8.0      Gluc 100 / Ketone Negative  / Bili Negative / Urobili Negative       Blood Moderate / Protein 100 / Leuk Est Negative / Nitrite Negative      RBC 6-10 / WBC Negative / Hyaline  / Gran  / Sq Epi  / Non Sq Epi Few / Bacteria       Iron 19, TIBC 153, %sat 12      [20 @ 11:47]  Ferritin 1698      [20 @ 11:47]  HbA1c 5.7      [07-15-16 @ 08:42]    ESRD on HD  Sepsis/ PNA  Anemia of CKD with superimposed iron deficiency,   HTN    Continue antibiotics  Hgb.,  at goal - JORDEN + IV iron with HD  Bp stable- continue current meds

## 2020-12-16 LAB
ANION GAP SERPL CALC-SCNC: 16 MMOL/L — SIGNIFICANT CHANGE UP (ref 5–17)
BUN SERPL-MCNC: 24 MG/DL — HIGH (ref 8–20)
CALCIUM SERPL-MCNC: 8.4 MG/DL — LOW (ref 8.6–10.2)
CHLORIDE SERPL-SCNC: 91 MMOL/L — LOW (ref 98–107)
CO2 SERPL-SCNC: 25 MMOL/L — SIGNIFICANT CHANGE UP (ref 22–29)
CREAT SERPL-MCNC: 7.86 MG/DL — HIGH (ref 0.5–1.3)
CULTURE RESULTS: SIGNIFICANT CHANGE UP
CULTURE RESULTS: SIGNIFICANT CHANGE UP
GLUCOSE SERPL-MCNC: 76 MG/DL — SIGNIFICANT CHANGE UP (ref 70–99)
HCT VFR BLD CALC: 29.6 % — LOW (ref 34.5–45)
HGB BLD-MCNC: 9.7 G/DL — LOW (ref 11.5–15.5)
MCHC RBC-ENTMCNC: 30 PG — SIGNIFICANT CHANGE UP (ref 27–34)
MCHC RBC-ENTMCNC: 32.8 GM/DL — SIGNIFICANT CHANGE UP (ref 32–36)
MCV RBC AUTO: 91.6 FL — SIGNIFICANT CHANGE UP (ref 80–100)
MRSA PCR RESULT.: SIGNIFICANT CHANGE UP
PLATELET # BLD AUTO: 261 K/UL — SIGNIFICANT CHANGE UP (ref 150–400)
POTASSIUM SERPL-MCNC: 4.1 MMOL/L — SIGNIFICANT CHANGE UP (ref 3.5–5.3)
POTASSIUM SERPL-SCNC: 4.1 MMOL/L — SIGNIFICANT CHANGE UP (ref 3.5–5.3)
RBC # BLD: 3.23 M/UL — LOW (ref 3.8–5.2)
RBC # FLD: 13.5 % — SIGNIFICANT CHANGE UP (ref 10.3–14.5)
S AUREUS DNA NOSE QL NAA+PROBE: SIGNIFICANT CHANGE UP
S PNEUM AG UR QL: NEGATIVE — SIGNIFICANT CHANGE UP
SODIUM SERPL-SCNC: 132 MMOL/L — LOW (ref 135–145)
SPECIMEN SOURCE: SIGNIFICANT CHANGE UP
SPECIMEN SOURCE: SIGNIFICANT CHANGE UP
WBC # BLD: 6.43 K/UL — SIGNIFICANT CHANGE UP (ref 3.8–10.5)
WBC # FLD AUTO: 6.43 K/UL — SIGNIFICANT CHANGE UP (ref 3.8–10.5)

## 2020-12-16 PROCEDURE — 99233 SBSQ HOSP IP/OBS HIGH 50: CPT

## 2020-12-16 PROCEDURE — 43235 EGD DIAGNOSTIC BRUSH WASH: CPT

## 2020-12-16 RX ORDER — ACETAMINOPHEN 500 MG
650 TABLET ORAL ONCE
Refills: 0 | Status: COMPLETED | OUTPATIENT
Start: 2020-12-16 | End: 2020-12-16

## 2020-12-16 RX ADMIN — Medication 650 MILLIGRAM(S): at 07:04

## 2020-12-16 RX ADMIN — CHLORHEXIDINE GLUCONATE 1 APPLICATION(S): 213 SOLUTION TOPICAL at 06:00

## 2020-12-16 RX ADMIN — PANTOPRAZOLE SODIUM 40 MILLIGRAM(S): 20 TABLET, DELAYED RELEASE ORAL at 06:00

## 2020-12-16 RX ADMIN — PANTOPRAZOLE SODIUM 40 MILLIGRAM(S): 20 TABLET, DELAYED RELEASE ORAL at 18:07

## 2020-12-16 RX ADMIN — Medication 600 MILLIGRAM(S): at 06:00

## 2020-12-16 RX ADMIN — PIPERACILLIN AND TAZOBACTAM 25 GRAM(S): 4; .5 INJECTION, POWDER, LYOPHILIZED, FOR SOLUTION INTRAVENOUS at 06:01

## 2020-12-16 RX ADMIN — CARVEDILOL PHOSPHATE 25 MILLIGRAM(S): 80 CAPSULE, EXTENDED RELEASE ORAL at 18:07

## 2020-12-16 RX ADMIN — AZITHROMYCIN 255 MILLIGRAM(S): 500 TABLET, FILM COATED ORAL at 18:07

## 2020-12-16 RX ADMIN — Medication 600 MILLIGRAM(S): at 18:07

## 2020-12-16 RX ADMIN — PIPERACILLIN AND TAZOBACTAM 25 GRAM(S): 4; .5 INJECTION, POWDER, LYOPHILIZED, FOR SOLUTION INTRAVENOUS at 18:07

## 2020-12-16 NOTE — BRIEF OPERATIVE NOTE - OPERATION/FINDINGS
Informed consent was obtained from the patient via Slovenian language video  in the preprocedure area.  The procedure, its risks, benefits, and alternatives were discussed.  All questions were answered.  The patient was brought to the endoscopy suite and placed in the left lateral decubitus position.  A safety timeout was performed, confirming the patient by name, date of birth and medical record number.  All discrepancies were addressed, confirmed by all present personnel.  The patient was sedated with IV sedation provided by the anesthesiologist.  The Olympus GIFH-190 video endoscope was inserted into the oropharynx and guided under direct vision into the esophagus, stomach and duodenum.  The duodenal bulb and second portion were grossly unremarkable.  The scope was withdrawn to the stomach and retroflexed.  There were two small patches of nonspecific erythema in the antrum and body.  The endoscope and insufflated air were slowly removed from the upper GI tract.  The scope was withdrawn to the esophagus.  The Z-line was irregular.  There was nonerosive esophagitis seen.  The patient tolerated the procedure well and after a brief recovery period in the endoscopy suite was transferred to the recovery area in good condition.  There were no apparent complications, and there was no blood loss.

## 2020-12-16 NOTE — PROGRESS NOTE ADULT - SUBJECTIVE AND OBJECTIVE BOX
Patient seen and examined    I&O's Summary    15 Dec 2020 07:01  -  16 Dec 2020 07:00  --------------------------------------------------------  IN: 300 mL / OUT: 1000 mL / NET: -700 mL    REVIEW OF SYSTEMS:    CONSTITUTIONAL: No F/C  RESPIRATORY: No cough or SOB  CARDIOVASCULAR: No CP/palpitations,    GASTROINTESTINAL: + abdominal pain , NVD   GENITOURINARY: No UTI sx  NEUROLOGICAL: No headaches/wk/numbness  MUSCULOSKELETAL:  No joint pain/swelling; No LBP  EXTREMITIES : no swelling,    Vital Signs Last 24 Hrs  T(C): 36.7 (16 Dec 2020 08:43), Max: 36.9 (15 Dec 2020 15:55)  T(F): 98.1 (16 Dec 2020 08:43), Max: 98.5 (15 Dec 2020 15:55)  HR: 79 (16 Dec 2020 08:43) (72 - 89)  BP: 103/66 (16 Dec 2020 08:43) (98/60 - 107/70)  RR: 18 (16 Dec 2020 08:18) (18 - 18)  SpO2: 95% (16 Dec 2020 08:43) (93% - 97%)    PHYSICAL EXAM:    GENERAL: NAD,   EYES:  conjunctiva and sclera clear  NECK: Supple, No JVD/Bruit  NERVOUS SYSTEM:  A/O x3,   CHEST:  CTA ,No rales or rhonchi  HEART:  RRR, No murmurs  ABDOMEN: Soft, NT/ND BS+  EXTREMITIES:  No Edema;  SKIN: No rashes    LABS:                      9.7    6.43  )-----------( 261      ( 16 Dec 2020 08:43 )             29.6     12-16    132<L>  |  91<L>  |  24.0<H>  ----------------------------<  76  4.1   |  25.0  |  7.86<H>    Ca    8.4<L>      16 Dec 2020 08:43    TPro  6.8  /  Alb  3.1<L>  /  TBili  0.3<L>  /  DBili  x   /  AST  17  /  ALT  13  /  AlkPhos  41  12-15    MEDICATIONS  (STANDING):  azithromycin  IVPB  carvedilol  chlorhexidine 2% Cloths  epoetin leeann-epbx (RETACRIT) Injectable  guaiFENesin ER  pantoprazole  Injectable  piperacillin/tazobactam IVPB..

## 2020-12-16 NOTE — BRIEF OPERATIVE NOTE - COMMENTS
No significant findings on EGD.  Mild NERD (nonerosive reflux disease).  Suggest once daily pantoprazole 40 mg.  In the absence of any overt bleeding, would not work up any further at this time.      GI will sign off.  Please reconsult as needed and call with any questions or concerns.

## 2020-12-16 NOTE — BRIEF OPERATIVE NOTE - NSICDXBRIEFPOSTOP_GEN_ALL_CORE_FT
POST-OP DIAGNOSIS:  Gastritis 16-Dec-2020 11:55:52  HO Chaney  Irregular Z line of esophagus 16-Dec-2020 11:55:29  HO Chaney  Nonerosive esophageal reflux disease 16-Dec-2020 11:55:15  HO Chaney

## 2020-12-16 NOTE — PROGRESS NOTE ADULT - ASSESSMENT
31 y/o female with PMH of ESRD (on HD M/W/F) 2/2 IGA nephropathy came to the ED complaining of fever x 4 days. She reported associated cough productive of white sputum, nausea and vomiting once today. She missed HD today because she was febrile at the center; sent to the ED for evaluation. In the ED, CXR showed multifocal pneumonia. Vancomycin, azithromycin and Rocephin once given. Blood culture negative,  COVID and flu negative         Sepsis 2/2 multifocal PNA possibly gram negative vs atypical  - Afebrile and no leukocytosis  - CXR significant for multifocal PNA  - Cultures NTD, legionella urine AG (-)   - Continue w/ Zosyn and Azithromycin   - Tylenol PRN for fever   - Incentive spirometer, encourage OOB to chair.         ESRD 2/2 IgA Nephropathy  - c/w HD as per Renal   - Monitor renal function daily   - Renally dose medications as needed      Acute on chronic anemia   - Concern for acute blood loss given drop in H/H requiring transfusion and black stools   - GI consulted , plan for EGD today  - Underlying anemia of chronic dx in the setting of ESRD   - Hemodynamically stable and H/H improved s/p 1u PRBC  - Monitor H/H closely and transfusion if Hb < 7  - Iron panel noted and placed on Venofer  - c/w protonix         HTN   - Continue home medications      DVT ppx:  - SCDs        Dispo: EGD today

## 2020-12-16 NOTE — BRIEF OPERATIVE NOTE - NSICDXBRIEFPREOP_GEN_ALL_CORE_FT
PRE-OP DIAGNOSIS:  Dark stools 16-Dec-2020 11:55:02  HO Chaney  ESRD with anemia 16-Dec-2020 11:54:49  HO Chaney

## 2020-12-16 NOTE — PROGRESS NOTE ADULT - ASSESSMENT
Patient with ESRD, multifocal pneumonia, dark stools, aspirin use.     Due to multifocal pneumonia, and ESRD, On  PPI bid. Monitor CBC.     S.P EGD,    HD in AM,

## 2020-12-16 NOTE — PROGRESS NOTE ADULT - SUBJECTIVE AND OBJECTIVE BOX
Chief Complaint:  b/l PNA    SUBJECTIVE / OVERNIGHT EVENTS:  No acute events overngight     Patient denies chest pain, SOB, abd pain, N/V, fever, chills, dysuria or any other complaints. All remainder ROS negative.       I&O's Summary    15 Dec 2020 07:01  -  16 Dec 2020 07:00  --------------------------------------------------------  IN: 300 mL / OUT: 1000 mL / NET: -700 mL          PHYSICAL EXAM:  Vital Signs Last 24 Hrs  T(C): 36.7 (16 Dec 2020 08:43), Max: 36.9 (15 Dec 2020 15:55)  T(F): 98.1 (16 Dec 2020 08:43), Max: 98.5 (15 Dec 2020 15:55)  HR: 79 (16 Dec 2020 08:43) (77 - 89)  BP: 103/66 (16 Dec 2020 08:43) (98/60 - 107/70)  BP(mean): --  RR: 18 (16 Dec 2020 08:18) (18 - 18)  SpO2: 95% (16 Dec 2020 08:43) (93% - 97%)      CONSTITUTIONAL: pt examined bedside, laying comfortably in bed in NAD  HEENT: NC/AT, dry oral mucosa, clear conjunctiva, sclera nonicteric, EOMI  RESPIRATORY: bibasilar rhonchi, no wheezing or rales  CARDIOVASCULAR: RRR, normal S1 and S2, no murmur/rub/gallop  ABDOMEN: soft, NT/ND, normoactive bowel sounds, no rebound/guarding, no HSM  MUSCLOSKELETAL:  no joint swelling or tenderness to palpation  EXTREMITIES: No cynaosis, no clubbing, no lower extremity edema, +AVF LUE, peripheral pulses are 2+ bilaterally  PSYCH: affect appropriate and cooperative  NEUROLOGY: A+O to person, place, and time, no focal neurologic deficits appreciated   SKIN: No rashes or no palpable lesions        LABS:                        9.7    6.43  )-----------( 261      ( 16 Dec 2020 08:43 )             29.6     12-16    132<L>  |  91<L>  |  24.0<H>  ----------------------------<  76  4.1   |  25.0  |  7.86<H>    Ca    8.4<L>      16 Dec 2020 08:43    TPro  6.8  /  Alb  3.1<L>  /  TBili  0.3<L>  /  DBili  x   /  AST  17  /  ALT  13  /  AlkPhos  41  12-15              CAPILLARY BLOOD GLUCOSE            RADIOLOGY & ADDITIONAL TESTS:          MEDICATIONS  (STANDING):  azithromycin  IVPB 500 milliGRAM(s) IV Intermittent every 24 hours  carvedilol 25 milliGRAM(s) Oral every 12 hours  chlorhexidine 2% Cloths 1 Application(s) Topical <User Schedule>  epoetin leeann-epbx (RETACRIT) Injectable 9000 Unit(s) IV Push <User Schedule>  guaiFENesin  milliGRAM(s) Oral every 12 hours  pantoprazole  Injectable 40 milliGRAM(s) IV Push every 12 hours  piperacillin/tazobactam IVPB.. 3.375 Gram(s) IV Intermittent every 12 hours    MEDICATIONS  (PRN):

## 2020-12-17 ENCOUNTER — TRANSCRIPTION ENCOUNTER (OUTPATIENT)
Age: 30
End: 2020-12-17

## 2020-12-17 LAB
ANION GAP SERPL CALC-SCNC: 18 MMOL/L — HIGH (ref 5–17)
BUN SERPL-MCNC: 39 MG/DL — HIGH (ref 8–20)
CALCIUM SERPL-MCNC: 8 MG/DL — LOW (ref 8.6–10.2)
CHLORIDE SERPL-SCNC: 93 MMOL/L — LOW (ref 98–107)
CO2 SERPL-SCNC: 23 MMOL/L — SIGNIFICANT CHANGE UP (ref 22–29)
CREAT SERPL-MCNC: 10.82 MG/DL — HIGH (ref 0.5–1.3)
GLUCOSE SERPL-MCNC: 70 MG/DL — SIGNIFICANT CHANGE UP (ref 70–99)
HCT VFR BLD CALC: 29.1 % — LOW (ref 34.5–45)
HGB BLD-MCNC: 9.5 G/DL — LOW (ref 11.5–15.5)
MAGNESIUM SERPL-MCNC: 2.5 MG/DL — SIGNIFICANT CHANGE UP (ref 1.6–2.6)
MCHC RBC-ENTMCNC: 29.5 PG — SIGNIFICANT CHANGE UP (ref 27–34)
MCHC RBC-ENTMCNC: 32.6 GM/DL — SIGNIFICANT CHANGE UP (ref 32–36)
MCV RBC AUTO: 90.4 FL — SIGNIFICANT CHANGE UP (ref 80–100)
PHOSPHATE SERPL-MCNC: 3.9 MG/DL — SIGNIFICANT CHANGE UP (ref 2.4–4.7)
PLATELET # BLD AUTO: 306 K/UL — SIGNIFICANT CHANGE UP (ref 150–400)
POTASSIUM SERPL-MCNC: 4.3 MMOL/L — SIGNIFICANT CHANGE UP (ref 3.5–5.3)
POTASSIUM SERPL-SCNC: 4.3 MMOL/L — SIGNIFICANT CHANGE UP (ref 3.5–5.3)
RBC # BLD: 3.22 M/UL — LOW (ref 3.8–5.2)
RBC # FLD: 13.3 % — SIGNIFICANT CHANGE UP (ref 10.3–14.5)
SARS-COV-2 RNA SPEC QL NAA+PROBE: SIGNIFICANT CHANGE UP
SODIUM SERPL-SCNC: 134 MMOL/L — LOW (ref 135–145)
WBC # BLD: 6.52 K/UL — SIGNIFICANT CHANGE UP (ref 3.8–10.5)
WBC # FLD AUTO: 6.52 K/UL — SIGNIFICANT CHANGE UP (ref 3.8–10.5)

## 2020-12-17 PROCEDURE — 99239 HOSP IP/OBS DSCHRG MGMT >30: CPT

## 2020-12-17 PROCEDURE — 90937 HEMODIALYSIS REPEATED EVAL: CPT

## 2020-12-17 RX ORDER — LABETALOL HCL 100 MG
2 TABLET ORAL
Qty: 180 | Refills: 0
Start: 2020-12-17 | End: 2021-01-15

## 2020-12-17 RX ORDER — CARVEDILOL PHOSPHATE 80 MG/1
1 CAPSULE, EXTENDED RELEASE ORAL
Qty: 0 | Refills: 0 | DISCHARGE
Start: 2020-12-17

## 2020-12-17 RX ORDER — PANTOPRAZOLE SODIUM 20 MG/1
1 TABLET, DELAYED RELEASE ORAL
Qty: 30 | Refills: 0
Start: 2020-12-17 | End: 2021-01-15

## 2020-12-17 RX ADMIN — AZITHROMYCIN 255 MILLIGRAM(S): 500 TABLET, FILM COATED ORAL at 17:36

## 2020-12-17 RX ADMIN — ERYTHROPOIETIN 9000 UNIT(S): 10000 INJECTION, SOLUTION INTRAVENOUS; SUBCUTANEOUS at 14:10

## 2020-12-17 RX ADMIN — Medication 600 MILLIGRAM(S): at 17:36

## 2020-12-17 RX ADMIN — PIPERACILLIN AND TAZOBACTAM 25 GRAM(S): 4; .5 INJECTION, POWDER, LYOPHILIZED, FOR SOLUTION INTRAVENOUS at 18:23

## 2020-12-17 RX ADMIN — PANTOPRAZOLE SODIUM 40 MILLIGRAM(S): 20 TABLET, DELAYED RELEASE ORAL at 17:38

## 2020-12-17 RX ADMIN — PIPERACILLIN AND TAZOBACTAM 25 GRAM(S): 4; .5 INJECTION, POWDER, LYOPHILIZED, FOR SOLUTION INTRAVENOUS at 05:16

## 2020-12-17 RX ADMIN — PANTOPRAZOLE SODIUM 40 MILLIGRAM(S): 20 TABLET, DELAYED RELEASE ORAL at 05:16

## 2020-12-17 RX ADMIN — CHLORHEXIDINE GLUCONATE 1 APPLICATION(S): 213 SOLUTION TOPICAL at 05:16

## 2020-12-17 RX ADMIN — Medication 600 MILLIGRAM(S): at 05:16

## 2020-12-17 NOTE — DISCHARGE NOTE PROVIDER - PROVIDER TOKENS
PROVIDER:[TOKEN:[[id number]],FOLLOWUP:[1 week]],FREE:[LAST:[primary care provider],PHONE:[(   )    -],FAX:[(   )    -],FOLLOWUP:[1 week]]

## 2020-12-17 NOTE — DISCHARGE NOTE PROVIDER - CARE PROVIDER_API CALL
Marcell Torre  INTERNAL MEDICINE  [address] [place] [address]  Phone: [phone]  Fax: [phone]  Follow Up Time: 1 week    primary care provider,   Phone: (   )    -  Fax: (   )    -  Follow Up Time: 1 week

## 2020-12-17 NOTE — DISCHARGE NOTE PROVIDER - NSDCMRMEDTOKEN_GEN_ALL_CORE_FT
acetaminophen 325 mg oral tablet: 3 tab(s) orally every 6 hours  labetalol 200 mg oral tablet: 2 tab(s) orally every 8 hours   oseltamivir 30 mg oral capsule: 1 cap(s) orally every other day   acetaminophen 325 mg oral tablet: 3 tab(s) orally every 6 hours  carvedilol 25 mg oral tablet: 1 tab(s) orally every 12 hours  labetalol 200 mg oral tablet: 1 tab(s) orally every 8 hours  Hold for SBP &lt; 110 or HR &lt;60  Protonix 40 mg oral delayed release tablet: 1 tab(s) orally once a day    acetaminophen 325 mg oral tablet: 3 tab(s) orally every 6 hours  Augmentin 875 mg-125 mg oral tablet: 1 tab(s) orally 2 times a day   carvedilol 25 mg oral tablet: 1 tab(s) orally every 12 hours  labetalol 200 mg oral tablet: 1 tab(s) orally every 8 hours  Hold for SBP &lt; 110 or HR &lt;60  Protonix 40 mg oral delayed release tablet: 1 tab(s) orally once a day

## 2020-12-17 NOTE — PROGRESS NOTE ADULT - SUBJECTIVE AND OBJECTIVE BOX
Patient was seen and evaluated on dialysis.   No c/o CP SOB NV  no F/C  no swelling    T(C): 36.7 (12-17-20 @ 08:49), Max: 36.8 (12-16-20 @ 23:12)  HR: 76 (12-17-20 @ 08:49) (76 - 82)  BP: 104/69 (12-17-20 @ 08:49) (92/57 - 104/69)  Wt(kg): --  PE ;  NAD  lungs - CTA  CV gr 1 murmer,  No gallop or rub  Abd : soft, NT BS +, No masses  Ext- No edema  Neuro : Grossly intact, moving extremities                         9.7    6.43  )-----------( 261      ( 16 Dec 2020 08:43 )             29.6     12-16    132<L>  |  91<L>  |  24.0<H>  ----------------------------<  76  4.1   |  25.0  |  7.86<H>    Ca    8.4<L>      16 Dec 2020 08:43    MEDICATIONS  (STANDING):  azithromycin  IVPB  carvedilol  chlorhexidine 2% Cloths  epoetin leeann-epbx (RETACRIT) Injectable  guaiFENesin ER  pantoprazole  Injectable  piperacillin/tazobactam IVPB..    Patient stable  Michael HD easily  Continue

## 2020-12-17 NOTE — DISCHARGE NOTE PROVIDER - NSDCFUADDAPPT_GEN_ALL_CORE_FT
**  you have an appointment  with Dr Araceli Sherman  at [facility] on Friday ; December 18,2020 at 9am***  [facility] [id number]

## 2020-12-17 NOTE — PROGRESS NOTE ADULT - ASSESSMENT
29 y/o female with PMH of ESRD (on HD M/W/F) 2/2 IGA nephropathy came to the ED complaining of fever x 4 days. She reported associated cough productive of white sputum, nausea and vomiting once today. She missed HD today because she was febrile at the center; sent to the ED for evaluation. In the ED, CXR showed multifocal pneumonia. Vancomycin, azithromycin and Rocephin once given. Blood culture negative,  COVID and flu negative     Sepsis 2/2 multifocal PNA possibly gram negative vs atypical  - Afebrile and no leukocytosis  - CXR significant for multifocal PNA  - Cultures NTD, legionella urine AG (-)   - Continue w/ Zosyn and Azithromycin   - Incentive spirometer, encourage OOB to chair.     ESRD 2/2 IgA Nephropathy  - HD TIW  - Renally dose medications as needed    Acute on chronic anemia   - Concern for acute blood loss given drop in H/H requiring transfusion and black stools   - GI consulted , plan for EGD today  - Underlying anemia of chronic dx in the setting of ESRD   - Hemodynamically stable and H/H improved s/p 1u PRBC  - Monitor H/H closely and transfusion if Hb < 8 gms.,   - Iron panel noted and placed on Venofer  - c/w Protonix     HTN   - Continue home medications    S/P EGD

## 2020-12-17 NOTE — DISCHARGE NOTE PROVIDER - NSDCCPCAREPLAN_GEN_ALL_CORE_FT
PRINCIPAL DISCHARGE DIAGNOSIS  Diagnosis: ESRD (end stage renal disease)  Assessment and Plan of Treatment: - continue HD per normal schedule   - follow      SECONDARY DISCHARGE DIAGNOSES  Diagnosis: Pneumonia  Assessment and Plan of Treatment:      PRINCIPAL DISCHARGE DIAGNOSIS  Diagnosis: ESRD (end stage renal disease)  Assessment and Plan of Treatment: ESRD 2/2 IgA Nephropathy  - continue HD per normal schedule   - follow up with nephrology within 1 week of Discharge  - Renally dose medications as needed      SECONDARY DISCHARGE DIAGNOSES  Diagnosis: Pneumonia  Assessment and Plan of Treatment: -continue outpatient antibiotics   -monitor for fever    Diagnosis: Anemia, unspecified type  Assessment and Plan of Treatment: continue venfor based on nephrology recommendations  - s/p 1 U PRBC, monitor for signs of bleeding in stool or other sources   - GI, EGD with no acute findings    Diagnosis: Chronic GERD  Assessment and Plan of Treatment: - continue protonix    Diagnosis: HTN (hypertension)  Assessment and Plan of Treatment: - continue home medications

## 2020-12-17 NOTE — DISCHARGE NOTE PROVIDER - HOSPITAL COURSE
31 y/o female with PMH of ESRD (on HD M/W/F) 2/2 IGA nephropathy came to the ED complaining of fever x 4 days. She reported associated cough productive of white sputum, nausea and vomiting once today. She missed HD on day of admission because she was febrile at the center; sent to the ED for evaluation. In the ED, CXR showed multifocal pneumonia. Vancomycin, azithromycin and Rocephin once given. Blood culture negative,  COVID and flu negative. Nephrology consulted for inpatient HD during hospital course. Pt worked up for sepsis, final blood cultures neg x 2, urine culture neg, lactate neg. Pt treated with zosyn and azithromycin IV. Of note legionella urine AG (-) and Strep urine AG (-) for possible source of infx. Hospital course complicated by possible GI bleed. Concern for acute blood loss given drop in H/H requiring transfusion (1 U PRBC) and black stools, with underlying anemia of chronic dx in the setting of ESRD which venofer was started. GI consulted and performed EGD with No significant findings or active bleed.  Mild (nonerosive reflux disease), subsequently patient started on pantoprazole 40 mg. Clinically PNA improved, pt afebrile, leukocytosis down trending and saturating >92% on RA.  All patient questions and concerns addressed prior to d/c and directed on f/u in outpt setting.     Vital Signs Last 24 Hrs  T(C): 36.7 (17 Dec 2020 08:49), Max: 36.8 (16 Dec 2020 23:12)  T(F): 98 (17 Dec 2020 08:49), Max: 98.2 (16 Dec 2020 23:12)  HR: 76 (17 Dec 2020 08:49) (76 - 82)  BP: 104/69 (17 Dec 2020 08:49) (92/57 - 104/69)  BP(mean): --  RR: 20 (17 Dec 2020 08:49) (18 - 20)  SpO2: 99% (17 Dec 2020 08:49) (96% - 99%)    At the time of discharge patient was hemodynamically stable and amenable to all terms of discharge.  The patient has received verbal instructions from myself regarding discharge plans. Patient understands to return to the ER if experiences any recurrence or worsening symptoms.  Discharge planing time 35 minutes.          29 y/o female with PMH of ESRD (on HD M/W/F) 2/2 IGA nephropathy came to the ED complaining of fever x 4 days. She reported associated cough productive of white sputum, nausea and vomiting once today. She missed HD on day of admission because she was febrile at the center; sent to the ED for evaluation. In the ED, CXR showed multifocal pneumonia. Vancomycin, azithromycin and Rocephin once given. Blood culture negative,  COVID and flu negative. Nephrology consulted for inpatient HD during hospital course. Pt worked up for sepsis, final blood cultures neg x 2, urine culture neg, lactate neg. Pt treated with zosyn and azithromycin IV. Of note legionella urine AG (-) and Strep urine AG (-) for possible source of infx. Hospital course complicated by possible GI bleed. Concern for acute blood loss given drop in H/H requiring transfusion (1 U PRBC) and black stools, with underlying anemia of chronic dx in the setting of ESRD which venofer was started. GI consulted and performed EGD with No significant findings or active bleed.  Mild (nonerosive reflux disease), subsequently patient started on pantoprazole 40 mg. Clinically PNA improved, pt afebrile, leukocytosis down trending and saturating >92% on RA.  All patient questions and concerns addressed prior to d/c and directed on f/u in outpt setting.         Physical Exam  Vital Signs Last 24 Hrs  T(C): 36.7 (17 Dec 2020 08:49), Max: 36.8 (16 Dec 2020 23:12)  T(F): 98 (17 Dec 2020 08:49), Max: 98.2 (16 Dec 2020 23:12)  HR: 76 (17 Dec 2020 08:49) (76 - 82)  BP: 104/69 (17 Dec 2020 08:49) (92/57 - 104/69)  BP(mean): --  RR: 20 (17 Dec 2020 08:49) (18 - 20)  SpO2: 99% (17 Dec 2020 08:49) (96% - 99%)      CONSTITUTIONAL: pt examined bedside, laying comfortably in bed in NAD  HEENT: NC/AT, dry oral mucosa, clear conjunctiva, sclera nonicteric, EOMI  RESPIRATORY: bibasilar rhonchi, no wheezing or rales  CARDIOVASCULAR: RRR, normal S1 and S2, no murmur/rub/gallop  ABDOMEN: soft, NT/ND, normoactive bowel sounds, no rebound/guarding, no HSM  MUSCLOSKELETAL:  no joint swelling or tenderness to palpation  EXTREMITIES: No cynaosis, no clubbing, no lower extremity edema, +AVF LUE, peripheral pulses are 2+ bilaterally  PSYCH: affect appropriate and cooperative  NEUROLOGY: A+O to person, place, and time, no focal neurologic deficits appreciated   SKIN: No rashes or no palpable lesions      At the time of discharge patient was hemodynamically stable and amenable to all terms of discharge.  The patient has received verbal instructions from myself regarding discharge plans. Patient understands to return to the ER if experiences any recurrence or worsening symptoms.  Discharge planing time 35 minutes.

## 2020-12-18 PROCEDURE — 99232 SBSQ HOSP IP/OBS MODERATE 35: CPT

## 2020-12-18 PROCEDURE — 99233 SBSQ HOSP IP/OBS HIGH 50: CPT

## 2020-12-18 RX ADMIN — PANTOPRAZOLE SODIUM 40 MILLIGRAM(S): 20 TABLET, DELAYED RELEASE ORAL at 05:15

## 2020-12-18 RX ADMIN — Medication 1 TABLET(S): at 17:26

## 2020-12-18 RX ADMIN — CHLORHEXIDINE GLUCONATE 1 APPLICATION(S): 213 SOLUTION TOPICAL at 05:15

## 2020-12-18 RX ADMIN — Medication 600 MILLIGRAM(S): at 05:15

## 2020-12-18 RX ADMIN — Medication 1 TABLET(S): at 05:15

## 2020-12-18 RX ADMIN — Medication 600 MILLIGRAM(S): at 17:26

## 2020-12-18 NOTE — PROGRESS NOTE ADULT - SUBJECTIVE AND OBJECTIVE BOX
ZULY BARCENAS  ----------------------------------------  The patient was seen and evaluated for pneumonia. Offers no complaints.    Vital Signs Last 24 Hrs  T(C): 36.8 (18 Dec 2020 08:05), Max: 37 (17 Dec 2020 23:32)  T(F): 98.2 (18 Dec 2020 08:05), Max: 98.6 (17 Dec 2020 23:32)  HR: 73 (18 Dec 2020 08:05) (73 - 86)  BP: 88/52 (18 Dec 2020 08:05) (88/52 - 116/73)  BP(mean): --  RR: 18 (18 Dec 2020 08:05) (16 - 18)  SpO2: 98% (18 Dec 2020 08:05) (97% - 99%)    PHYSICAL EXAMINATION:  ----------------------------------------  General appearance: No acute distress, Awake, Alert  HEENT: Normocephalic, Atraumatic, Conjunctiva clear, EOMI  Neck: Supple, No JVD, No tenderness  Lungs: Breath sound equal bilaterally, No wheezes, No rales  Cardiovascular: S1S2, Regular rhythm  Abdomen: Soft, Nontender, Nondistended, No guarding/rebound, Positive bowel sounds  Extremities: No clubbing, No cyanosis, No edema, No calf tenderness, Left upper extremity AV fistula  Neuro: Strength equal bilaterally, No tremors  Psychiatric: Appropriate mood, Normal affect    LABORATORY STUDIES:  ----------------------------------------             9.5    6.52  )-----------( 306      ( 17 Dec 2020 13:34 )             29.1     12-17    134<L>  |  93<L>  |  39.0<H>  ----------------------------<  70  4.3   |  23.0  |  10.82<H>    Ca    8.0<L>      17 Dec 2020 13:34  Phos  3.9     12-17  Mg     2.5     12-17    MEDICATIONS  (STANDING):  amoxicillin  875 milliGRAM(s)/clavulanate 1 Tablet(s) Oral two times a day  carvedilol 25 milliGRAM(s) Oral every 12 hours  chlorhexidine 2% Cloths 1 Application(s) Topical <User Schedule>  epoetin leeann-epbx (RETACRIT) Injectable 9000 Unit(s) IV Push <User Schedule>  guaiFENesin  milliGRAM(s) Oral every 12 hours  pantoprazole  Injectable 40 milliGRAM(s) IV Push every 12 hours      ASSESSMENT / PLAN:  ----------------------------------------  31 y/o female with PMH of ESRD (on HD M/W/F) 2/2 IGA nephropathy came to the ED complaining of fever x 4 days. She reported associated cough productive of white sputum, nausea and vomiting once today. She missed HD today because she was febrile at the center; sent to the ED for evaluation. In the ED, CXR showed multifocal pneumonia. Vancomycin, azithromycin and Rocephin once given. Blood culture negative,  COVID and flu negative     Sepsis / Pneumonia - For completion of antibiotics course with oral agents. Afebrile and without dyspnea or hypoxia.    ESRD - For continuation of hemodialysis. Discussed with Nephrology, to resume outpatient schedule on Monday.    Anemia - Prior transfusion of packed red blood cells for acute blood loss anemia. On erythropoietin. Status post endoscopy noted nonerosive esophagitis, on pantoprazole.    Hypertension - Close blood pressure monitoring. On carvedilol.

## 2020-12-18 NOTE — PROGRESS NOTE ADULT - SUBJECTIVE AND OBJECTIVE BOX
[facility] DIVISION OF KIDNEY DISEASES AND HYPERTENSION -- FOLLOW UP NOTE  --------------------------------------------------------------------------------  Chief Complaint:  ESRD HD  24 hour events/subjective:  Seen/examined  HD in AM      PAST HISTORY  --------------------------------------------------------------------------------  No significant changes to PMH, PSH, FHx, SHx, unless otherwise noted    ALLERGIES & MEDICATIONS  --------------------------------------------------------------------------------  Allergies    No Known Allergies    Intolerances      Standing Inpatient Medications  amoxicillin  875 milliGRAM(s)/clavulanate 1 Tablet(s) Oral two times a day  carvedilol 25 milliGRAM(s) Oral every 12 hours  chlorhexidine 2% Cloths 1 Application(s) Topical <User Schedule>  epoetin leeann-epbx (RETACRIT) Injectable 9000 Unit(s) IV Push <User Schedule>  guaiFENesin  milliGRAM(s) Oral every 12 hours  pantoprazole  Injectable 40 milliGRAM(s) IV Push every 12 hours    PRN Inpatient Medications      REVIEW OF SYSTEMS  --------------------------------------------------------------------------------  Gen: No weight changes, fatigue, fevers/chills, weakness  Skin: No rashes  Head/Eyes/Ears/Mouth: No headache; Normal hearing; Normal vision w/o blurriness; No sinus pain/discomfort, sore throat  Respiratory: No dyspnea, cough, wheezing, hemoptysis  CV: No chest pain, PND, orthopnea  GI: No abdominal pain, diarrhea, constipation, nausea, vomiting, melena, hematochezia  : No increased frequency, dysuria, hematuria, nocturia  MSK: No joint pain/swelling; no back pain; no edema  Neuro: No dizziness/lightheadedness, weakness, seizures, numbness, tingling  Heme: No easy bruising or bleeding  Endo: No heat/cold intolerance  Psych: No significant nervousness, anxiety, stress, depression    All other systems were reviewed and are negative, except as noted.    VITALS/PHYSICAL EXAM  --------------------------------------------------------------------------------  T(C): 36.8 (12-18-20 @ 08:05), Max: 37 (12-17-20 @ 23:32)  HR: 73 (12-18-20 @ 08:05) (73 - 86)  BP: 88/52 (12-18-20 @ 08:05) (88/52 - 103/64)  RR: 18 (12-18-20 @ 08:05) (16 - 18)  SpO2: 98% (12-18-20 @ 08:05) (97% - 98%)  Wt(kg): --        12-17-20 @ 07:01  -  12-18-20 @ 07:00  --------------------------------------------------------  IN: 0 mL / OUT: 0 mL / NET: 0 mL    12-18-20 @ 07:01  -  12-18-20 @ 15:40  --------------------------------------------------------  IN: 400 mL / OUT: 0 mL / NET: 400 mL      Physical Exam:  	Gen: NAD   	HEENT: PERRL, supple neck, clear oropharynx  	Pulm: CTA B/L  	CV: RRR, S1S2; no rub  	Back: No spinal or CVA tenderness; no sacral edema  	Abd: +BS, soft, nontender/nondistended  	: No suprapubic tenderness  	UE: Warm, FROM, no clubbing, intact strength; no edema; no asterixis  	LE: Warm, FROM, no clubbing, intact strength; no edema  	Neuro: No focal deficits, intact gait  	Psych: Normal affect and mood  	Skin: Warm, without rashes  	Vascular access:    LABS/STUDIES  --------------------------------------------------------------------------------              9.5    6.52  >-----------<  306      [12-17-20 @ 13:34]              29.1     134  |  93  |  39.0  ----------------------------<  70      [12-17-20 @ 13:34]  4.3   |  23.0  |  10.82        Ca     8.0     [12-17-20 @ 13:34]      Mg     2.5     [12-17-20 @ 13:34]      Phos  3.9     [12-17-20 @ 13:34]            Creatinine Trend:  SCr 10.82 [12-17 @ 13:34]  SCr 7.86 [12-16 @ 08:43]  SCr 13.69 [12-15 @ 10:07]  SCr 12.81 [12-14 @ 11:44]  SCr 9.27 [12-13 @ 07:33]    Urinalysis - [12-12-20 @ 20:43]      Color Yellow / Appearance Clear / SG 1.010 / pH 8.0      Gluc 100 / Ketone Negative  / Bili Negative / Urobili Negative       Blood Moderate / Protein 100 / Leuk Est Negative / Nitrite Negative      RBC 6-10 / WBC Negative / Hyaline  / Gran  / Sq Epi  / Non Sq Epi Few / Bacteria       Iron 19, TIBC 153, %sat 12      [12-12-20 @ 11:47]  Ferritin 1698      [12-12-20 @ 11:47]  HbA1c 5.7      [07-15-16 @ 08:42]

## 2020-12-19 VITALS
RESPIRATION RATE: 18 BRPM | OXYGEN SATURATION: 100 % | DIASTOLIC BLOOD PRESSURE: 69 MMHG | HEART RATE: 94 BPM | TEMPERATURE: 98 F | SYSTOLIC BLOOD PRESSURE: 101 MMHG

## 2020-12-19 LAB
ANION GAP SERPL CALC-SCNC: 15 MMOL/L — SIGNIFICANT CHANGE UP (ref 5–17)
BUN SERPL-MCNC: 18 MG/DL — SIGNIFICANT CHANGE UP (ref 8–20)
CALCIUM SERPL-MCNC: 8.8 MG/DL — SIGNIFICANT CHANGE UP (ref 8.6–10.2)
CHLORIDE SERPL-SCNC: 97 MMOL/L — LOW (ref 98–107)
CO2 SERPL-SCNC: 25 MMOL/L — SIGNIFICANT CHANGE UP (ref 22–29)
CREAT SERPL-MCNC: 6.94 MG/DL — HIGH (ref 0.5–1.3)
GLUCOSE SERPL-MCNC: 93 MG/DL — SIGNIFICANT CHANGE UP (ref 70–99)
HCT VFR BLD CALC: 29.7 % — LOW (ref 34.5–45)
HGB BLD-MCNC: 9.7 G/DL — LOW (ref 11.5–15.5)
MCHC RBC-ENTMCNC: 30.2 PG — SIGNIFICANT CHANGE UP (ref 27–34)
MCHC RBC-ENTMCNC: 32.7 GM/DL — SIGNIFICANT CHANGE UP (ref 32–36)
MCV RBC AUTO: 92.5 FL — SIGNIFICANT CHANGE UP (ref 80–100)
PLATELET # BLD AUTO: 369 K/UL — SIGNIFICANT CHANGE UP (ref 150–400)
POTASSIUM SERPL-MCNC: 3.6 MMOL/L — SIGNIFICANT CHANGE UP (ref 3.5–5.3)
POTASSIUM SERPL-SCNC: 3.6 MMOL/L — SIGNIFICANT CHANGE UP (ref 3.5–5.3)
RBC # BLD: 3.21 M/UL — LOW (ref 3.8–5.2)
RBC # FLD: 13.2 % — SIGNIFICANT CHANGE UP (ref 10.3–14.5)
SODIUM SERPL-SCNC: 137 MMOL/L — SIGNIFICANT CHANGE UP (ref 135–145)
WBC # BLD: 7.95 K/UL — SIGNIFICANT CHANGE UP (ref 3.8–10.5)
WBC # FLD AUTO: 7.95 K/UL — SIGNIFICANT CHANGE UP (ref 3.8–10.5)

## 2020-12-19 PROCEDURE — 83605 ASSAY OF LACTIC ACID: CPT

## 2020-12-19 PROCEDURE — 36430 TRANSFUSION BLD/BLD COMPNT: CPT

## 2020-12-19 PROCEDURE — 86769 SARS-COV-2 COVID-19 ANTIBODY: CPT

## 2020-12-19 PROCEDURE — 87040 BLOOD CULTURE FOR BACTERIA: CPT

## 2020-12-19 PROCEDURE — 80053 COMPREHEN METABOLIC PANEL: CPT

## 2020-12-19 PROCEDURE — 83735 ASSAY OF MAGNESIUM: CPT

## 2020-12-19 PROCEDURE — 84466 ASSAY OF TRANSFERRIN: CPT

## 2020-12-19 PROCEDURE — T1013: CPT

## 2020-12-19 PROCEDURE — 86901 BLOOD TYPING SEROLOGIC RH(D): CPT

## 2020-12-19 PROCEDURE — 85025 COMPLETE CBC W/AUTO DIFF WBC: CPT

## 2020-12-19 PROCEDURE — 99285 EMERGENCY DEPT VISIT HI MDM: CPT | Mod: 25

## 2020-12-19 PROCEDURE — 82728 ASSAY OF FERRITIN: CPT

## 2020-12-19 PROCEDURE — 96374 THER/PROPH/DIAG INJ IV PUSH: CPT

## 2020-12-19 PROCEDURE — 93005 ELECTROCARDIOGRAM TRACING: CPT

## 2020-12-19 PROCEDURE — 85610 PROTHROMBIN TIME: CPT

## 2020-12-19 PROCEDURE — 87640 STAPH A DNA AMP PROBE: CPT

## 2020-12-19 PROCEDURE — 80048 BASIC METABOLIC PNL TOTAL CA: CPT

## 2020-12-19 PROCEDURE — 99232 SBSQ HOSP IP/OBS MODERATE 35: CPT

## 2020-12-19 PROCEDURE — 84702 CHORIONIC GONADOTROPIN TEST: CPT

## 2020-12-19 PROCEDURE — U0003: CPT

## 2020-12-19 PROCEDURE — 81001 URINALYSIS AUTO W/SCOPE: CPT

## 2020-12-19 PROCEDURE — 85014 HEMATOCRIT: CPT

## 2020-12-19 PROCEDURE — 85018 HEMOGLOBIN: CPT

## 2020-12-19 PROCEDURE — 87449 NOS EACH ORGANISM AG IA: CPT

## 2020-12-19 PROCEDURE — 83540 ASSAY OF IRON: CPT

## 2020-12-19 PROCEDURE — 71045 X-RAY EXAM CHEST 1 VIEW: CPT

## 2020-12-19 PROCEDURE — 87086 URINE CULTURE/COLONY COUNT: CPT

## 2020-12-19 PROCEDURE — 36415 COLL VENOUS BLD VENIPUNCTURE: CPT

## 2020-12-19 PROCEDURE — 85027 COMPLETE CBC AUTOMATED: CPT

## 2020-12-19 PROCEDURE — 85730 THROMBOPLASTIN TIME PARTIAL: CPT

## 2020-12-19 PROCEDURE — 83550 IRON BINDING TEST: CPT

## 2020-12-19 PROCEDURE — 99261: CPT

## 2020-12-19 PROCEDURE — 86923 COMPATIBILITY TEST ELECTRIC: CPT

## 2020-12-19 PROCEDURE — P9016: CPT

## 2020-12-19 PROCEDURE — 96375 TX/PRO/DX INJ NEW DRUG ADDON: CPT

## 2020-12-19 PROCEDURE — 87641 MR-STAPH DNA AMP PROBE: CPT

## 2020-12-19 PROCEDURE — 86850 RBC ANTIBODY SCREEN: CPT

## 2020-12-19 PROCEDURE — 84100 ASSAY OF PHOSPHORUS: CPT

## 2020-12-19 PROCEDURE — 90937 HEMODIALYSIS REPEATED EVAL: CPT

## 2020-12-19 PROCEDURE — 86900 BLOOD TYPING SEROLOGIC ABO: CPT

## 2020-12-19 PROCEDURE — 87637 SARSCOV2&INF A&B&RSV AMP PRB: CPT

## 2020-12-19 RX ADMIN — Medication 1 TABLET(S): at 05:44

## 2020-12-19 RX ADMIN — Medication 600 MILLIGRAM(S): at 17:36

## 2020-12-19 RX ADMIN — CHLORHEXIDINE GLUCONATE 1 APPLICATION(S): 213 SOLUTION TOPICAL at 05:44

## 2020-12-19 RX ADMIN — PANTOPRAZOLE SODIUM 40 MILLIGRAM(S): 20 TABLET, DELAYED RELEASE ORAL at 05:44

## 2020-12-19 RX ADMIN — Medication 1 TABLET(S): at 17:36

## 2020-12-19 RX ADMIN — ERYTHROPOIETIN 9000 UNIT(S): 10000 INJECTION, SOLUTION INTRAVENOUS; SUBCUTANEOUS at 12:18

## 2020-12-19 RX ADMIN — Medication 600 MILLIGRAM(S): at 05:44

## 2020-12-19 NOTE — PROGRESS NOTE ADULT - REASON FOR ADMISSION
fever chills pneumonia bilateral
fever chills pneumonia bilateral
ESRD
fever chills pneumonia bilateral

## 2020-12-19 NOTE — PROGRESS NOTE ADULT - SUBJECTIVE AND OBJECTIVE BOX
[facility] Division of Hospital Medicine    Chief Complaint:  PNA    SUBJECTIVE: reports feeling well, no complains today    OVERNIGHT EVENTS: none    Patient denies chest pain, SOB, abd pain, N/V, fever, chills, dysuria or any other complaints. All remainder ROS negative.     MEDICATIONS  (STANDING):  amoxicillin  875 milliGRAM(s)/clavulanate 1 Tablet(s) Oral two times a day  carvedilol 25 milliGRAM(s) Oral every 12 hours  chlorhexidine 2% Cloths 1 Application(s) Topical <User Schedule>  epoetin leeann-epbx (RETACRIT) Injectable 9000 Unit(s) IV Push <User Schedule>  guaiFENesin  milliGRAM(s) Oral every 12 hours  pantoprazole  Injectable 40 milliGRAM(s) IV Push every 12 hours    MEDICATIONS  (PRN):        I&O's Summary    18 Dec 2020 07:01  -  19 Dec 2020 07:00  --------------------------------------------------------  IN: 400 mL / OUT: 0 mL / NET: 400 mL        PHYSICAL EXAM:  Vital Signs Last 24 Hrs  T(C): 36.4 (19 Dec 2020 07:47), Max: 37 (18 Dec 2020 23:29)  T(F): 97.6 (19 Dec 2020 07:47), Max: 98.6 (18 Dec 2020 23:29)  HR: 71 (19 Dec 2020 07:47) (71 - 82)  BP: 106/68 (19 Dec 2020 07:47) (105/61 - 109/67)  BP(mean): --  RR: 18 (19 Dec 2020 07:47) (18 - 18)  SpO2: 98% (19 Dec 2020 07:47) (96% - 98%)        CONSTITUTIONAL: NAD, well-developed, well-groomed  ENMT: Moist oral mucosa, no pharyngeal injection or exudates; normal dentition; No JVD  RESPIRATORY: Normal respiratory effort; lungs are clear to auscultation bilaterally  CARDIOVASCULAR: Regular rate and rhythm, normal S1 and S2, soft blowing murmur in RUSB 2/6 irradiating to carotid,  No lower extremity edema; Peripheral pulses are 2+ bilaterally, LUE AV fistular with good bruit  ABDOMEN: Nontender to palpation, normoactive bowel sounds, no rebound/guarding; No hepatosplenomegaly  MUSCLOSKELETAL:  Normal gait; no clubbing or cyanosis of digits; no joint swelling or tenderness to palpation  PSYCH: A+O to person, place, and time; affect appropriate  NEUROLOGY: CN 2-12 are intact and symmetric; no gross sensory deficits; was observed moving all 4 ext against gravity cooperating with exam.   SKIN: No rashes; no palpable lesions    LABS:                        9.5    6.52  )-----------( 306      ( 17 Dec 2020 13:34 )             29.1     12-17    134<L>  |  93<L>  |  39.0<H>  ----------------------------<  70  4.3   |  23.0  |  10.82<H>    Ca    8.0<L>      17 Dec 2020 13:34  Phos  3.9     12-17  Mg     2.5     12-17                CAPILLARY BLOOD GLUCOSE            RADIOLOGY & ADDITIONAL TESTS:  Results Reviewed:   Imaging Personally Reviewed:  Electrocardiogram Personally Reviewed:

## 2020-12-19 NOTE — PROGRESS NOTE ADULT - ASSESSMENT
29 y/o female with PMH of ESRD (on HD M/W/F) 2/2 IGA nephropathy came to the ED complaining of fever x 4 days. She reported associated cough productive of white sputum, nausea and vomiting once today. She missed HD today because she was febrile at the center; sent to the ED for evaluation. In the ED, CXR showed multifocal pneumonia. Vancomycin, azithromycin and Rocephin once given. Blood culture negative,  COVID and flu negative     #Sepsis / Pneumoni, has imrpoved   - For completion of antibiotics course with oral agents  -  Afebrile and without dyspnea or hypoxia.    #ESRD on TTS, via LUE fistular  - HD today   - will be getting HD during next week at her not usual HD center due recent COVID + test, now COVID - X 2   -Discussed with Nephrology  - SW/CCM confirmed OP HD spot and transportation from home to HD is also arranged    #Anemia - Prior transfusion of packed red blood cells for acute blood loss anemia. On erythropoietin. Status post endoscopy noted nonerosive esophagitis, on pantoprazole.    #Hypertension - Close blood pressure monitoring. On carvedilol.    # DVT ppx - SCD  # code - full  # LOS - D/C today after HD

## 2020-12-19 NOTE — PROGRESS NOTE ADULT - SUBJECTIVE AND OBJECTIVE BOX
Patient was seen and evaluated on dialysis.   Patient is tolerating the procedure well.   T(C): 36.9 (12-19-20 @ 11:45), Max: 37 (12-18-20 @ 23:29)  HR: 77 (12-19-20 @ 11:45) (71 - 82)  BP: 116/64 (12-19-20 @ 11:45) (105/61 - 116/64)  Continue dialysis:   Dialyzer:  revaclear 300     QB:  400 QD: 500ml.,  Goal UF 1 kg over __ Hours

## 2020-12-19 NOTE — PROGRESS NOTE ADULT - PROVIDER SPECIALTY LIST ADULT
Gastroenterology
Gastroenterology
Hospitalist
Nephrology
Hospitalist
Nephrology

## 2020-12-28 NOTE — CDI QUERY NOTE - NSCDIOTHERTXTBX_GEN_ALL_CORE_HH
Documentation noted sepsis. Clarification is needed on POA status       Supporting Documentation:  12/11 Patient admitted no documentation of sepsis  Ed note: Present on Admission - Sepsis: No.    12/12 Hospitalist note:  Sepsis 2/2 multifocal PNA possibly gram negative vs atypical  - Overnight Tmax 102.3.   - CXR significant for multifocal PNA  - Continue w/ Zosyn and Tylenol PRN for fever   - f/u cultures, legionella urine AG and strep urine AG  - If does not show signs of improvement will consult ID for further recs    V/S on admission  Temp 102.1  HR 92    Pls clarify POA status of sepsis  A) Early sepsis evolving on admission  B) Sepsis POA  C) Sepsis evolved after admission  D) Other, pls specify  E) Not clinically significant

## 2021-04-21 NOTE — PROGRESS NOTE ADULT - PROBLEM SELECTOR PROBLEM 2
[Yes] : Yes [Monthly or less (1 pt)] : Monthly or less (1 point) [1 or 2 (0 pts)] : 1 or 2 (0 points) [Never (0 pts)] : Never (0 points) [No falls in past year] : Patient reported no falls in the past year Anemia [0] : 2) Feeling down, depressed, or hopeless: Not at all (0) [Patient reported mammogram was normal] : Patient reported mammogram was normal [Patient reported PAP Smear was normal] : Patient reported PAP Smear was normal [Patient reported colonoscopy was normal] : Patient reported colonoscopy was normal [] : No [Audit-CScore] : 1 [MammogramDate] : 2018 [MammogramComments] : plans for near future [PapSmearDate] : 2018 [PapSmearComments] : prefers to defer on further gyn exams at this stage of life [BoneDensityDate] : ? [BoneDensityComments] : plans for near future [ColonoscopyDate] : 02/21

## 2021-05-24 ENCOUNTER — EMERGENCY (EMERGENCY)
Facility: HOSPITAL | Age: 31
LOS: 1 days | Discharge: HOME | End: 2021-05-24
Attending: EMERGENCY MEDICINE
Payer: MEDICAID

## 2021-05-24 VITALS
TEMPERATURE: 99 F | SYSTOLIC BLOOD PRESSURE: 170 MMHG | DIASTOLIC BLOOD PRESSURE: 108 MMHG | HEIGHT: 62 IN | RESPIRATION RATE: 18 BRPM | OXYGEN SATURATION: 97 % | HEART RATE: 98 BPM | WEIGHT: 125.66 LBS

## 2021-05-24 DIAGNOSIS — I77.0 ARTERIOVENOUS FISTULA, ACQUIRED: Chronic | ICD-10-CM

## 2021-05-24 DIAGNOSIS — Z98.89 OTHER SPECIFIED POSTPROCEDURAL STATES: Chronic | ICD-10-CM

## 2021-05-24 PROCEDURE — 99285 EMERGENCY DEPT VISIT HI MDM: CPT

## 2021-05-24 NOTE — ED ADULT TRIAGE NOTE - CHIEF COMPLAINT QUOTE
patient states that she has been feeling weak started yesterday and cough today, goes to HD TTS left AVF

## 2021-05-25 VITALS
HEART RATE: 88 BPM | OXYGEN SATURATION: 98 % | DIASTOLIC BLOOD PRESSURE: 99 MMHG | SYSTOLIC BLOOD PRESSURE: 166 MMHG | RESPIRATION RATE: 18 BRPM

## 2021-05-25 LAB
ALBUMIN SERPL ELPH-MCNC: 3.8 G/DL — SIGNIFICANT CHANGE UP (ref 3.3–5.2)
ALP SERPL-CCNC: 65 U/L — SIGNIFICANT CHANGE UP (ref 40–120)
ALT FLD-CCNC: 45 U/L — HIGH
ANION GAP SERPL CALC-SCNC: 19 MMOL/L — HIGH (ref 5–17)
APPEARANCE UR: CLEAR — SIGNIFICANT CHANGE UP
AST SERPL-CCNC: 26 U/L — SIGNIFICANT CHANGE UP
BACTERIA # UR AUTO: ABNORMAL
BASOPHILS # BLD AUTO: 0.04 K/UL — SIGNIFICANT CHANGE UP (ref 0–0.2)
BASOPHILS NFR BLD AUTO: 0.4 % — SIGNIFICANT CHANGE UP (ref 0–2)
BILIRUB SERPL-MCNC: 0.4 MG/DL — SIGNIFICANT CHANGE UP (ref 0.4–2)
BILIRUB UR-MCNC: NEGATIVE — SIGNIFICANT CHANGE UP
BUN SERPL-MCNC: 45 MG/DL — HIGH (ref 8–20)
CALCIUM SERPL-MCNC: 9.2 MG/DL — SIGNIFICANT CHANGE UP (ref 8.6–10.2)
CHLORIDE SERPL-SCNC: 96 MMOL/L — LOW (ref 98–107)
CK SERPL-CCNC: 44 U/L — SIGNIFICANT CHANGE UP (ref 25–170)
CO2 SERPL-SCNC: 23 MMOL/L — SIGNIFICANT CHANGE UP (ref 22–29)
COLOR SPEC: YELLOW — SIGNIFICANT CHANGE UP
CREAT SERPL-MCNC: 9.71 MG/DL — HIGH (ref 0.5–1.3)
DIFF PNL FLD: ABNORMAL
EOSINOPHIL # BLD AUTO: 0.02 K/UL — SIGNIFICANT CHANGE UP (ref 0–0.5)
EOSINOPHIL NFR BLD AUTO: 0.2 % — SIGNIFICANT CHANGE UP (ref 0–6)
EPI CELLS # UR: SIGNIFICANT CHANGE UP
GLUCOSE SERPL-MCNC: 97 MG/DL — SIGNIFICANT CHANGE UP (ref 70–99)
GLUCOSE UR QL: 100 MG/DL
HCG SERPL-ACNC: <4 MIU/ML — SIGNIFICANT CHANGE UP
HCT VFR BLD CALC: 30.5 % — LOW (ref 34.5–45)
HGB BLD-MCNC: 10 G/DL — LOW (ref 11.5–15.5)
HPIV3 RNA SPEC QL NAA+PROBE: DETECTED
IMM GRANULOCYTES NFR BLD AUTO: 0.4 % — SIGNIFICANT CHANGE UP (ref 0–1.5)
KETONES UR-MCNC: ABNORMAL
LEUKOCYTE ESTERASE UR-ACNC: NEGATIVE — SIGNIFICANT CHANGE UP
LIDOCAIN IGE QN: 22 U/L — SIGNIFICANT CHANGE UP (ref 22–51)
LYMPHOCYTES # BLD AUTO: 0.63 K/UL — LOW (ref 1–3.3)
LYMPHOCYTES # BLD AUTO: 6.3 % — LOW (ref 13–44)
MCHC RBC-ENTMCNC: 30.8 PG — SIGNIFICANT CHANGE UP (ref 27–34)
MCHC RBC-ENTMCNC: 32.8 GM/DL — SIGNIFICANT CHANGE UP (ref 32–36)
MCV RBC AUTO: 93.8 FL — SIGNIFICANT CHANGE UP (ref 80–100)
MONOCYTES # BLD AUTO: 0.82 K/UL — SIGNIFICANT CHANGE UP (ref 0–0.9)
MONOCYTES NFR BLD AUTO: 8.3 % — SIGNIFICANT CHANGE UP (ref 2–14)
NEUTROPHILS # BLD AUTO: 8.38 K/UL — HIGH (ref 1.8–7.4)
NEUTROPHILS NFR BLD AUTO: 84.4 % — HIGH (ref 43–77)
NITRITE UR-MCNC: NEGATIVE — SIGNIFICANT CHANGE UP
PH UR: 8 — SIGNIFICANT CHANGE UP (ref 5–8)
PLATELET # BLD AUTO: 165 K/UL — SIGNIFICANT CHANGE UP (ref 150–400)
POTASSIUM SERPL-MCNC: 4.2 MMOL/L — SIGNIFICANT CHANGE UP (ref 3.5–5.3)
POTASSIUM SERPL-SCNC: 4.2 MMOL/L — SIGNIFICANT CHANGE UP (ref 3.5–5.3)
PROT SERPL-MCNC: 6.2 G/DL — LOW (ref 6.6–8.7)
PROT UR-MCNC: 100 MG/DL
RAPID RVP RESULT: DETECTED
RBC # BLD: 3.25 M/UL — LOW (ref 3.8–5.2)
RBC # FLD: 13.6 % — SIGNIFICANT CHANGE UP (ref 10.3–14.5)
RBC CASTS # UR COMP ASSIST: ABNORMAL /HPF (ref 0–4)
SARS-COV-2 RNA SPEC QL NAA+PROBE: SIGNIFICANT CHANGE UP
SODIUM SERPL-SCNC: 138 MMOL/L — SIGNIFICANT CHANGE UP (ref 135–145)
SP GR SPEC: 1.01 — SIGNIFICANT CHANGE UP (ref 1.01–1.02)
TSH SERPL-MCNC: 1.06 UIU/ML — SIGNIFICANT CHANGE UP (ref 0.27–4.2)
UROBILINOGEN FLD QL: NEGATIVE MG/DL — SIGNIFICANT CHANGE UP
WBC # BLD: 9.93 K/UL — SIGNIFICANT CHANGE UP (ref 3.8–10.5)
WBC # FLD AUTO: 9.93 K/UL — SIGNIFICANT CHANGE UP (ref 3.8–10.5)
WBC UR QL: SIGNIFICANT CHANGE UP

## 2021-05-25 PROCEDURE — 85025 COMPLETE CBC W/AUTO DIFF WBC: CPT

## 2021-05-25 PROCEDURE — 71045 X-RAY EXAM CHEST 1 VIEW: CPT | Mod: 26

## 2021-05-25 PROCEDURE — 84443 ASSAY THYROID STIM HORMONE: CPT

## 2021-05-25 PROCEDURE — 93010 ELECTROCARDIOGRAM REPORT: CPT

## 2021-05-25 PROCEDURE — 93005 ELECTROCARDIOGRAM TRACING: CPT

## 2021-05-25 PROCEDURE — 71045 X-RAY EXAM CHEST 1 VIEW: CPT

## 2021-05-25 PROCEDURE — 87086 URINE CULTURE/COLONY COUNT: CPT

## 2021-05-25 PROCEDURE — 36415 COLL VENOUS BLD VENIPUNCTURE: CPT

## 2021-05-25 PROCEDURE — 0225U NFCT DS DNA&RNA 21 SARSCOV2: CPT

## 2021-05-25 PROCEDURE — 83690 ASSAY OF LIPASE: CPT

## 2021-05-25 PROCEDURE — 80053 COMPREHEN METABOLIC PANEL: CPT

## 2021-05-25 PROCEDURE — 81001 URINALYSIS AUTO W/SCOPE: CPT

## 2021-05-25 PROCEDURE — 99284 EMERGENCY DEPT VISIT MOD MDM: CPT | Mod: 25

## 2021-05-25 PROCEDURE — 84702 CHORIONIC GONADOTROPIN TEST: CPT

## 2021-05-25 PROCEDURE — 82550 ASSAY OF CK (CPK): CPT

## 2021-05-25 NOTE — ED PROVIDER NOTE - PATIENT PORTAL LINK FT
You can access the FollowMyHealth Patient Portal offered by [facility] by registering at the following website: [email or web address]. By joining [facility]’s FollowMyHealth portal, you will also be able to view your health information using other applications (apps) compatible with our system.

## 2021-05-25 NOTE — ED ADULT NURSE NOTE - OBJECTIVE STATEMENT
pt arrived c.o weakness. dialysis T [facility] and went Saturday and was able to complete session. pt denies chest pain, sob, dizziness. pt aao x3. respirations even and unlabored. skin color wnl warm and dry. NSR on cm. no distress noted. ambulatory w steady gait and no complaints.

## 2021-05-25 NOTE — ED PROVIDER NOTE - OBJECTIVE STATEMENT
24 hr weakness, last dialisis was sat full session,. due on tue in evening. PT with SPMHX of CKD Last HD was 2 dyas ago  presents to the ED with complaint of weakness over the last 24 hr. PT states that she had a gradual onset of symptoms over the last day that include fatigue, chills, generalized weakness. Pt states that she has recent exposure approx. 10 days ago to someone who was febrile that was COVID neg. PT states that they have not done anything for the pain prior to coming to the ED. Pt dines rash, SOB, diff breathing, cough, CP, HA, dizziness, back pian,

## 2021-05-25 NOTE — ED PROVIDER NOTE - ATTENDING CONTRIBUTION TO CARE
Dale: I performed a face to face bedside interview with patient regarding history of present illness, review of symptoms and past medical history. I completed an independent physical exam.  I have discussed patient's plan of care with advanced care provider.   I agree with note as stated above including HISTORY OF PRESENT ILLNESS, HIV, PAST MEDICAL/SURGICAL/FAMILY/SOCIAL HISTORY, ALLERGIES AND HOME MEDICATIONS, REVIEW OF SYSTEMS, PHYSICAL EXAM, MEDICAL DECISION MAKING and any PROGRESS NOTES during the time I functioned as the attending physician for this patient  unless otherwise noted. My brief assessment is as follows: 30F h/o ESRD on HD p/w chills, fatigue, weakness x 1 day. Denies cough, CP, HA, dizziness. Plan for labs, RVP, covid swab, cxr, reassess.

## 2021-05-25 NOTE — ED PROVIDER NOTE - CLINICAL SUMMARY MEDICAL DECISION MAKING FREE TEXT BOX
PT with stable VS, no acute distress, non toxic appearing, tolerating PO in the ED, Pt with no indication of bacterial infection, Pt with viral infection, will dc home with supportive care, Pt educated that this virus is contagious and that if she is unable to have her HD done latter today due to it to return to the ED for revaluation, educated about when to return to the ED if needed. PT verbalizes that he understands all instructions and results. Pt informed that ED is open and available 24/7 365 days a yr, encouraged to return to the ED if they have any change in condition, or feel the need for revaluation. PT with stable VS, no acute distress, non toxic appearing, tolerating PO in the ED, Pt with no indication of bacterial infection, Pt with viral infection, will dc home with supportive care, Pt educated that this virus is contagious and that if she is unable to have her HD done latter today due to it to return to the ED for revaluation, educated about when to return to the ED if needed. PT verbalizes that he understands all instructions and results. Pt informed that ED is open and available 24/7 365 days a yr, encouraged to return to the ED if they have any change in condition, or feel the need for revaluation.   utilized to obtain History, ROS, Physical Exam, explanations of results and plan of care, as well as follow up instructions.

## 2021-05-25 NOTE — ED PROVIDER NOTE - ADDITIONAL NOTES AND INSTRUCTIONS:
PT was evaluated At [facility] ED and was found to have a condition that warranted time of to rest and heal from WORK/SCHOOL.   Yinka Feng PA-C

## 2021-05-25 NOTE — ED PROVIDER NOTE - NS ED ROS FT
ROS: CONTUSIONAL: +fever,  Denies chills, fatigue, wt loss. HEAD: Denies trauma, HA, Dizziness. EYE: Denies Acute visual changes, diplopia. ENMT: Denies change in hearing, tinnitus, epistaxis, difficulty swallowing, sore throat. CARDIO: Denies CP, palpitations, edema. RESP: Denies Cough, SOB , Diff breathing, hemoptysis. GI: Denies N/V, ABD pain, change in bowel movement. URINARY: Denies difficulty urinating, pelvic pain. MS:  Denies joint pain, back pain, weakness, decreased ROM, swelling. NEURO: Denies change in gait, seizures, loss of sensation, dizziness, confusion LOC.  PSY: NO SI/HI. SKIN: Denies Rash, bruising.

## 2021-05-25 NOTE — ED PROVIDER NOTE - NSFOLLOWUPINSTRUCTIONS_ED_ALL_ED_FT
Please Uses Over the Counter:   1) Tylenol: 500-650mg every 6hours as needed   FOR FEVER and PAIN.     Parainfluenza    LO QUE NECESITA SABER:    ¿Qué es la parainfluenza?La parainfluenza es un tipo muy contagioso de infección respiratoria viral. Es causada por cualquiera de los 4 tipos de virus de la parainfluenza humana. Estos virus pueden causar crup, bronquitis, bronquiolitis, infecciones del oído o neumonía en adultos y niños. La parainfluenza se propaga fácilmente cuando christy persona infectada tose, estornuda o tiene contacto cercano con otras personas. Cualquier persona puede contraer christy infección de parainfluenza, louis es más común en los bebés y niños pequeños.    ¿Cuáles son los signos y síntomas de la parainfluenza?  •Fiebre      •Christy tos regular o christy tos áspera y seca      •Secreción nasal o nariz tapada      •Dolor de garganta, estornudos o dolor de oído      •Ronquera, sibilancia o respiración rápida, ruidosa o con dificultad      •Babear o dificultad para tragar      •Irritabilidad, disminución del apetito o diarrea      ¿Cómo se diagnostica la parainfluenza?Rausch médico le preguntará acerca de karen síntomas y lo examinará. Infórmele si ha estado con personas enfermas o si ha viajado recientemente. Es posible que usted necesite alguno de los siguientes:  •Un exudado de nariz y gargantapodría realizarse para diagnosticar si tiene parainfluenza. Es posible que reciba el resultado en minutos o que manden la muestra al laboratorio para más exámenes.      •Los análisis de sangrepueden mostrar la infección y qué virus está causando los síntomas.      ¿Cómo se trata la parainfluenza?No existe un tratamiento específico para la infección por parainfluenza. Es posible que necesite alguno de los siguientes para aliviar los síntomas:  •Acetaminofénalivia el dolor y baja la fiebre. Está disponible sin receta médica. Pregunte la cantidad y la frecuencia con que debe tomarlos. Siga las indicaciones. Paz las etiquetas de todos los demás medicamentos que esté usando para saber si también contienen acetaminofén, o pregunte a rausch médico o farmacéutico. El acetaminofén puede causar daño en el hígado cuando no se ambreen de forma correcta. No use más de 4 gramos (4000 miligramos) en total de acetaminofeno en un día.      •Los LUX,ijeoma el ibuprofeno, ayudan a disminuir la inflamación, el dolor y la fiebre. Romelia medicamento está disponible con o sin christy receta médica. Los LUX pueden causar sangrado estomacal o problemas renales en ciertas personas. Si usted ambreen un medicamento anticoagulante, siempre pregúntele a rausch médico si los LUX son seguros para usted. Siempre paz la etiqueta de romelia medicamento y siga las instrucciones.      •Los antiviralesayudan a combatir christy infección viral.      ¿Cómo puedo controlar los síntomas?  •Descansetanto ijeoma pueda para recuperarse.      •Use un humidificador de ela fríopara aumentar el nivel de humedad en el aire de rausch hogar. [place] podría ayudarle a respirar más fácilmente y al mismo tiempo disminuir la tos. Unos pocos minutos afuera en el aire fresco de la noche puede ayudarlo a respirar. Sentarse en un baño lleno de vapor de christy ducha caliente también puede ser útil.      •Consuma líquidos según le indicaronpara evitar la deshidratación. Pregunte cuánto líquido debe modesta cada día y cuáles líquidos son los más adecuados para usted.      ¿Cómo puedo prevenir la parainfluenza?  •Lávese las pritesh frecuentemente.Use agua y jabón cada vez que se lave las pritesh. Frótese las pritesh enjabonadas, entrelazando los dedos. Use los dedos de christy mano para restregar debajo de las uñas de la otra mano. Lávese florence al menos 20 segundos. Enjuague con agua corriente caliente florence varios segundos. Luego séquese las pritesh con christy toalla limpia o christy toalla de papel. Use un desinfectante de pritesh antibacterial si no hay agua y jabón disponibles. No se toque los ojos, la nariz o la boca sin antes lavarse las pritesh.   Lavado de pritesh           •Cúbrase al toser o estornudar.Use un pañuelo que cubra la boca y la nariz. Arroje el pañuelo a la basura de inmediato. Use el ángulo del brazo si no tiene un pañuelo disponible. Lávese las pritesh con agua y jabón o use un desinfectante de pritesh. No se pare cerca de nadie que esté estornudando o tosiendo.      •Limpie los artículos que se comparten con christy solución de limpieza byrd gérmenes.Limpie las superficies de las mesas, las manijas de las rui y los interruptores earnest. No comparta toallas, cubiertos ni platos con personas con síntomas de christy enfermedad. Lave las sábanas, toallas, cubiertos y vajilla con agua y jabón.      •Use christy mascarillapara cubrirse la boca y la nariz si está enfermo. La mascarilla puede ayudar a evitar que otras personas se infecten. Use la mascarilla en las zonas comunes de rausch hogar o si asisten a un lugar en busca de atención médica.      •Manténgase alejado de otrossi está enfermo. Quédese en rausch casa hasta que hayan pasado 24 horas después de la fiebre y los síntomas hayan desaparecido.      •Pregunte acerca de la vacuna contra el neumococo.Manténgase al día con la vacuna contra el neumococo. [place] disminuirá rausch riesgo de desarrollar neumonía además de la parainfluenza. Pregunte sobre cualquier otra vacuna que podría necesitar.             Llame al número de emergencias local (911 en los [place]) si:  •Usted tiene dificultad para respirar.          ¿Cuándo guera llamar a mi médico?  •Karen síntomas empeoran o no mejoran con los medicamentos.      •Usted tiene preguntas o inquietudes acerca de rausch condición o cuidado.      ACUERDOS SOBRE RAUSCH CUIDADO:    Usted tiene el derecho de ayudar a planear rausch cuidado. Aprenda todo lo que pueda sobre rausch condición y ijeoma darle tratamiento. Discuta karen opciones de tratamiento con karen médicos para decidir el cuidado que usted desea recibir. Usted siempre tiene el derecho de rechazar el tratamiento.       © Copyright [employer] 2021

## 2021-05-26 LAB
CULTURE RESULTS: SIGNIFICANT CHANGE UP
SPECIMEN SOURCE: SIGNIFICANT CHANGE UP

## 2021-07-16 ENCOUNTER — EMERGENCY (EMERGENCY)
Facility: HOSPITAL | Age: 31
LOS: 1 days | Discharge: HOME | End: 2021-07-16
Attending: EMERGENCY MEDICINE
Payer: MEDICAID

## 2021-07-16 VITALS
RESPIRATION RATE: 17 BRPM | HEART RATE: 97 BPM | DIASTOLIC BLOOD PRESSURE: 101 MMHG | TEMPERATURE: 98 F | SYSTOLIC BLOOD PRESSURE: 163 MMHG | OXYGEN SATURATION: 98 %

## 2021-07-16 VITALS
SYSTOLIC BLOOD PRESSURE: 168 MMHG | HEART RATE: 103 BPM | TEMPERATURE: 99 F | RESPIRATION RATE: 20 BRPM | HEIGHT: 62 IN | DIASTOLIC BLOOD PRESSURE: 98 MMHG | OXYGEN SATURATION: 99 % | WEIGHT: 125.66 LBS

## 2021-07-16 DIAGNOSIS — Z98.89 OTHER SPECIFIED POSTPROCEDURAL STATES: Chronic | ICD-10-CM

## 2021-07-16 DIAGNOSIS — I77.0 ARTERIOVENOUS FISTULA, ACQUIRED: Chronic | ICD-10-CM

## 2021-07-16 LAB
HCG UR QL: NEGATIVE — SIGNIFICANT CHANGE UP
RAPID RVP RESULT: SIGNIFICANT CHANGE UP
SARS-COV-2 RNA SPEC QL NAA+PROBE: SIGNIFICANT CHANGE UP

## 2021-07-16 PROCEDURE — 93010 ELECTROCARDIOGRAM REPORT: CPT

## 2021-07-16 PROCEDURE — 0225U NFCT DS DNA&RNA 21 SARSCOV2: CPT

## 2021-07-16 PROCEDURE — 99284 EMERGENCY DEPT VISIT MOD MDM: CPT

## 2021-07-16 PROCEDURE — 99283 EMERGENCY DEPT VISIT LOW MDM: CPT | Mod: 25

## 2021-07-16 PROCEDURE — 71045 X-RAY EXAM CHEST 1 VIEW: CPT | Mod: 26

## 2021-07-16 PROCEDURE — 81025 URINE PREGNANCY TEST: CPT

## 2021-07-16 PROCEDURE — 93005 ELECTROCARDIOGRAM TRACING: CPT

## 2021-07-16 PROCEDURE — 71045 X-RAY EXAM CHEST 1 VIEW: CPT

## 2021-07-16 NOTE — ED PROVIDER NOTE - NS ED ATTENDING STATEMENT MOD
Pt encountered semi-supine in bed + IVs BUEs, R radial a-line, continuous pulse ox, cardiac monitor, O2 via nasal cannula, grant, pneumatic teds Attending with

## 2021-07-16 NOTE — ED PROVIDER NOTE - ATTENDING CONTRIBUTION TO CARE
I, Savannah Quach, have personally seen and examined this patient. I have fully participated in the care of this patient. I have reviewed all pertinent clinical information, including history, physical exam, plan and the Resident's note and agree except as noted below.     32yo F with ESRD sent from HD for persistent cough after flu. no fever/chills. no CP/SOB. no leg swelling. completed HD today. states hasn't follwoed up for her lt UE fistula in awhile but no issues with working. no swelling. normal strength/sensation. also missed her period. no vaginal bleeding or abd pain     Gen: NAD, AOx3  Head: NCAT  HEENT: EOMI, oral mucosa moist, normal conjunctiva, neck supple  Lung: CTAB, no respiratory distress  CV:  Normal perfusion,+thrill left UE fistula  Abd: soft, NTND  MSK: No edema, no visible deformities  Neuro: No focal neurologic deficits  Skin: No rash   Psych: normal affect     patient with clear CXR/lungs, likely residual cough. no acute interventions. recommend outpt Follow up for fistula- has thrill and functions normally. Upreg negative. outpot fg/u

## 2021-07-16 NOTE — ED PROVIDER NOTE - CLINICAL SUMMARY MEDICAL DECISION MAKING FREE TEXT BOX
30 y/o female with PMH of ESRD (on HD T/Th/Sat) 2/2 IGA nephropathy came to the ED due to persistent cough, concern for pregnancy and evaluation of fistula.  No obvious abnormalities on examination of fistula and patient states no changes.  Xray normal.  Explained to patient that cough can persist for months after flu, heartburn and allergies may also be cuasative, follow up with PCM.  UPreg performed, negative.  Patient is now feeling improved and workup is unremarkable for any emergent process.   Patient/family was given full return precautions.  Patient was counseled on red flag symptoms such as fever, severe pain, or focal deficits and advised to return to the ED for these reasons or any reason that was concerning to them.  Patient/ family advised to make close follow up with their primary care provider and specialty clinics (as applicable) to follow up with this visit and continue investigation/treatment.  All questions were answered. Patient/family has shown adequate competence and understanding. Patient/family is agreeable to the plan. 32 y/o female with PMH of ESRD (on HD T/Th/Sat) 2/2 IGA nephropathy came to the ED due to persistent cough, concern for pregnancy and evaluation of fistula.  No obvious abnormalities on examination of fistula and patient states no changes.  Xray normal.  Explained to patient that cough can persist for months after flu, heartburn and allergies may also be cuasative, follow up with PCM.  UPreg performed, negative.  Patient was told their blood pressure was high in the ED today.  Patient advised to follow up with their PCM regarding this reading. Patient is now feeling improved and workup is unremarkable for any emergent process.   Patient/family was given full return precautions.  Patient was counseled on red flag symptoms such as fever, severe pain, or focal deficits and advised to return to the ED for these reasons or any reason that was concerning to them.  Patient/ family advised to make close follow up with their primary care provider and specialty clinics (as applicable) to follow up with this visit and continue investigation/treatment.  All questions were answered. Patient/family has shown adequate competence and understanding. Patient/family is agreeable to the plan.

## 2021-07-16 NOTE — ED ADULT NURSE REASSESSMENT NOTE - NS ED NURSE REASSESS COMMENT FT1
pt hemodynamically stable, refer to flowsheet and chart, pt to be discharged, pt understood discharge instructions and plan of care. Pt medically cleared by MD Quach.

## 2021-07-16 NOTE — ED PROVIDER NOTE - PHYSICAL EXAMINATION
General: NAD, well appearing  HEENT: Normocephalic, EOM intact  Neck: No apparent stiffness or JVD  Pulm: Chest wall symmetric and nontender, lungs clear to ascultation   Cardiac: Regular rate and regular rhythm  Abdomen: Nontender and nondistended  Skin: Skin in warm, dry and intact without rashes or lesions.  Neuro: No apparent motor or sensory deficits  Psych: Alert, oriented, and cooperative   Fistula: Prominent fistula on left arm.  Strong pulse without evidence of bleeding.

## 2021-07-16 NOTE — ED PROVIDER NOTE - OBJECTIVE STATEMENT
32 y/o female with PMH of ESRD (on HD T/Th/Sat) 2/2 IGA nephropathy came to the ED due to persistent cough.  Patient had the flu almost 2 months ago, although the other symptoms resolved, she has had a persistent cough, minimally productive and nonbloody.  She has had pneumonia in the past but she says this is different because she is not as sick today.  She also wants someone to check her fistula because no one has looked at it for over a year, denies changes or issues with the fistula. Additionally she has missed her period this month and is concerned she may be pregnant.  She is not on BC but not try to become pregnant.  Feels safe at home and at work.  Otherwise denies pain, bleeding, SOB, chest pain, abdominal pain or fevers.  Denies other pertinent medical problems.  Denies any overt substance use.

## 2021-07-16 NOTE — ED PROVIDER NOTE - NSFOLLOWUPINSTRUCTIONS_ED_ALL_ED_FT
Cough    Coughing is a reflex that clears your throat and your airways. Coughing helps to heal and protect your lungs. It is normal to cough occasionally, but a cough that happens with other symptoms or lasts a long time may be a sign of a condition that needs treatment. Coughing may be caused by infections, asthma or COPD, smoking, postnasal drip, gastroesophageal reflux, as well as other medical conditions. Take medicines only as instructed by your health care provider. Avoid environments or triggers that causes you to cough at work or at home.    SEEK IMMEDIATE MEDICAL CARE IF YOU HAVE ANY OF THE FOLLOWING SYMPTOMS: coughing up blood, shortness of breath, rapid heart rate, chest pain, unexplained weight loss or night sweats. Cough    Coughing is a reflex that clears your throat and your airways. Coughing helps to heal and protect your lungs. It is normal to cough occasionally, but a cough that happens with other symptoms or lasts a long time may be a sign of a condition that needs treatment. Coughing may be caused by infections, asthma or COPD, smoking, postnasal drip, gastroesophageal reflux, as well as other medical conditions. Take medicines only as instructed by your health care provider. Avoid environments or triggers that causes you to cough at work or at home.    SEEK IMMEDIATE MEDICAL CARE IF YOU HAVE ANY OF THE FOLLOWING SYMPTOMS: coughing up blood, shortness of breath, rapid heart rate, chest pain, unexplained weight loss or night sweats.    Hypertension    Hypertension, commonly called high blood pressure, is when the force of blood pumping through your arteries is too strong. Hypertension forces your heart to work harder to pump blood. Your arteries may become narrow or stiff. Having untreated or uncontrolled hypertension for a long period of time can cause heart attack, stroke, kidney disease, and other problems. If started on a medication, take exactly as prescribed by your health care professional. Maintain a healthy lifestyle and follow up with your primary care physician.    SEEK IMMEDIATE MEDICAL CARE IF YOU HAVE ANY OF THE FOLLOWING SYMPTOMS: severe headache, confusion, chest pain, abdominal pain, vomiting, or shortness of breath.    If you received a pregnancy test today and it was positive, please follow up with your PCM to get proper OB care.

## 2021-07-16 NOTE — ED PROVIDER NOTE - PATIENT PORTAL LINK FT
You can access the FollowMyHealth Patient Portal offered by [facility] by registering at the following website: [email or web address]. By joining [employer]’s FollowMyHealth portal, you will also be able to view your health information using other applications (apps) compatible with our system.

## 2021-07-16 NOTE — ED ADULT TRIAGE NOTE - CHIEF COMPLAINT QUOTE
pt tested +Flu approx 1 month ago. pt states was sent from dialysis center for cough. pt denies fever. pt also requesting to have fistula evaluated

## 2021-08-09 ENCOUNTER — APPOINTMENT (OUTPATIENT)
Dept: VASCULAR SURGERY | Facility: CLINIC | Age: 31
End: 2021-08-09
Payer: MEDICAID

## 2021-08-09 PROCEDURE — 93990 DOPPLER FLOW TESTING: CPT

## 2021-08-09 PROCEDURE — 99213 OFFICE O/P EST LOW 20 MIN: CPT

## 2021-08-09 NOTE — ASSESSMENT
[FreeTextEntry1] : Patient with renal failure.  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  Plan for left fistulogram and possible angioplasty.

## 2021-08-18 ENCOUNTER — APPOINTMENT (OUTPATIENT)
Dept: ENDOVASCULAR SURGERY | Facility: CLINIC | Age: 31
End: 2021-08-18
Payer: MEDICAID

## 2021-08-25 ENCOUNTER — NON-APPOINTMENT (OUTPATIENT)
Age: 31
End: 2021-08-25

## 2021-08-25 ENCOUNTER — RESULT REVIEW (OUTPATIENT)
Age: 31
End: 2021-08-25

## 2021-08-25 ENCOUNTER — APPOINTMENT (OUTPATIENT)
Dept: ENDOVASCULAR SURGERY | Facility: CLINIC | Age: 31
End: 2021-08-25
Payer: MEDICAID

## 2021-08-25 PROCEDURE — 36902Z: CUSTOM

## 2021-08-25 NOTE — PAST MEDICAL HISTORY
[FreeTextEntry1] : Malignant Hyperthermia Screening Tool and Risk of Bleeding Assessment \par \par Ms. ZULY HUBBARD denies family of unexpected death following Anesthesia or Exercise.\par Denies Family history of Malignant Hyperthermia, Muscle or Neuromuscular disorder and High Temperature  following exercise.\par \par Ms. ZULY HUBBARD denies history of Muscle Spasm, Dark or Chocolate- Colored and Unanticipated fever immediately following anaesthesia or serious exercise.\par Ms. ZULY HUBBARD also denies bleeding tendencies/Risks of Bleeding.\par

## 2021-08-25 NOTE — HISTORY OF PRESENT ILLNESS
[FreeTextEntry1] : alert and oriented x 3 \par accompanied by Adair Christy [phone]\par feels ok\par no meds today\par no reported falls \par pregnancy test  [FreeTextEntry4] : Saturday [FreeTextEntry5] : yesterday 8 pm [FreeTextEntry6] : Dr. Frey

## 2021-09-10 NOTE — DISCHARGE NOTE ANTEPARTUM - PRINCIPAL DIAGNOSIS
Chronic kidney disease (CKD) stage G5/A1, glomerular filtration rate (GFR) less than or equal to 15 mL/min/1.73 square meter and albuminuria creatinine ratio less than 30 mg/g
10-Sep-2021

## 2021-09-15 NOTE — PATIENT PROFILE OB - PT NEEDS ASSIST
Arthena,     Urine sample is showing some abnormal protein, this means there is strain on the kidneys from the high blood pressure. This should improve with better blood pressure control.     Please do not hesitate to call us at [phone] if you have any questions or concerns.    Thank you,    Sarah Bhatia MD MPH    no

## 2021-09-28 ENCOUNTER — RESULT REVIEW (OUTPATIENT)
Age: 31
End: 2021-09-28

## 2021-09-28 ENCOUNTER — APPOINTMENT (OUTPATIENT)
Dept: ENDOVASCULAR SURGERY | Facility: CLINIC | Age: 31
End: 2021-09-28
Payer: MEDICAID

## 2021-09-28 VITALS
HEART RATE: 91 BPM | BODY MASS INDEX: 24.44 KG/M2 | OXYGEN SATURATION: 98 % | HEIGHT: 59 IN | RESPIRATION RATE: 18 BRPM | DIASTOLIC BLOOD PRESSURE: 98 MMHG | WEIGHT: 121.25 LBS | SYSTOLIC BLOOD PRESSURE: 150 MMHG | TEMPERATURE: 97.7 F

## 2021-09-28 PROCEDURE — 36902Z: CUSTOM

## 2021-09-28 RX ORDER — MULTIVIT-MIN/FOLIC/VIT K/LYCOP 400-300MCG
25 MCG TABLET ORAL DAILY
Qty: 90 | Refills: 3 | Status: DISCONTINUED | COMMUNITY
Start: 2017-12-14 | End: 2021-09-28

## 2021-09-28 RX ORDER — ASPIRIN 81 MG/1
81 TABLET ORAL DAILY
Qty: 90 | Refills: 3 | Status: DISCONTINUED | COMMUNITY
Start: 2019-08-13 | End: 2021-09-28

## 2021-09-28 RX ORDER — SEVELAMER HYDROCHLORIDE 800 MG/1
800 TABLET, FILM COATED ORAL 3 TIMES DAILY
Qty: 270 | Refills: 3 | Status: DISCONTINUED | COMMUNITY
Start: 2019-08-13 | End: 2021-09-28

## 2021-09-28 RX ORDER — SODIUM PHOSPHATE, DIBASIC, ANHYDROUS, POTASSIUM PHOSPHATE, MONOBASIC, AND SODIUM PHOSPHATE, MONOBASIC, MONOHYDRATE 852; 155; 130 MG/1; MG/1; MG/1
155-852-130 TABLET, COATED ORAL
Qty: 30 | Refills: 2 | Status: DISCONTINUED | COMMUNITY
Start: 2017-10-10 | End: 2021-09-28

## 2021-09-28 RX ORDER — ASPIRIN ENTERIC COATED TABLETS 81 MG 81 MG/1
81 TABLET, DELAYED RELEASE ORAL DAILY
Qty: 30 | Refills: 5 | Status: DISCONTINUED | COMMUNITY
Start: 2017-09-13 | End: 2021-09-28

## 2021-09-28 NOTE — ASSESSMENT
[FreeTextEntry1] : Referral from dialysis for prolonged bleeding, plan for left arm fistula fistulagram and possible intervention

## 2021-09-28 NOTE — HISTORY OF PRESENT ILLNESS
[] : left radiocephalic fistula [FreeTextEntry1] : alert and oriented x 3 \par accompanied by Adair Christy [phone]\par no medications taken\par no reported falls \par HCG test negative\par Not covid vaccinated\par Covid not detected 9/22/21 [FreeTextEntry4] : yesterday [FreeTextEntry5] : yesterday 11pm [FreeTextEntry6] : Dr. Frey

## 2021-11-04 ENCOUNTER — OUTPATIENT (OUTPATIENT)
Dept: OUTPATIENT SERVICES | Facility: HOSPITAL | Age: 31
LOS: 1 days | End: 2021-11-04
Payer: MEDICAID

## 2021-11-04 VITALS
TEMPERATURE: 98 F | HEART RATE: 90 BPM | RESPIRATION RATE: 16 BRPM | HEIGHT: 58.66 IN | SYSTOLIC BLOOD PRESSURE: 138 MMHG | WEIGHT: 115.08 LBS | OXYGEN SATURATION: 98 % | DIASTOLIC BLOOD PRESSURE: 80 MMHG

## 2021-11-04 DIAGNOSIS — Z98.51 TUBAL LIGATION STATUS: Chronic | ICD-10-CM

## 2021-11-04 DIAGNOSIS — I77.0 ARTERIOVENOUS FISTULA, ACQUIRED: ICD-10-CM

## 2021-11-04 DIAGNOSIS — N18.6 END STAGE RENAL DISEASE: ICD-10-CM

## 2021-11-04 DIAGNOSIS — Z98.89 OTHER SPECIFIED POSTPROCEDURAL STATES: Chronic | ICD-10-CM

## 2021-11-04 DIAGNOSIS — Z01.818 ENCOUNTER FOR OTHER PREPROCEDURAL EXAMINATION: ICD-10-CM

## 2021-11-04 DIAGNOSIS — I77.0 ARTERIOVENOUS FISTULA, ACQUIRED: Chronic | ICD-10-CM

## 2021-11-04 DIAGNOSIS — Z78.9 OTHER SPECIFIED HEALTH STATUS: ICD-10-CM

## 2021-11-04 LAB
ANION GAP SERPL CALC-SCNC: 15 MMOL/L — SIGNIFICANT CHANGE UP (ref 5–17)
BUN SERPL-MCNC: 24 MG/DL — HIGH (ref 7–23)
CALCIUM SERPL-MCNC: 9.8 MG/DL — SIGNIFICANT CHANGE UP (ref 8.4–10.5)
CHLORIDE SERPL-SCNC: 96 MMOL/L — SIGNIFICANT CHANGE UP (ref 96–108)
CO2 SERPL-SCNC: 26 MMOL/L — SIGNIFICANT CHANGE UP (ref 22–31)
CREAT SERPL-MCNC: 3.93 MG/DL — HIGH (ref 0.5–1.3)
GLUCOSE SERPL-MCNC: 82 MG/DL — SIGNIFICANT CHANGE UP (ref 70–99)
HCG SERPL-ACNC: <2 MIU/ML — SIGNIFICANT CHANGE UP
HCT VFR BLD CALC: 31.1 % — LOW (ref 34.5–45)
HGB BLD-MCNC: 10 G/DL — LOW (ref 11.5–15.5)
MCHC RBC-ENTMCNC: 31.3 PG — SIGNIFICANT CHANGE UP (ref 27–34)
MCHC RBC-ENTMCNC: 32.2 GM/DL — SIGNIFICANT CHANGE UP (ref 32–36)
MCV RBC AUTO: 97.2 FL — SIGNIFICANT CHANGE UP (ref 80–100)
NRBC # BLD: 0 /100 WBCS — SIGNIFICANT CHANGE UP (ref 0–0)
PLATELET # BLD AUTO: 178 K/UL — SIGNIFICANT CHANGE UP (ref 150–400)
POTASSIUM SERPL-MCNC: 3.2 MMOL/L — LOW (ref 3.5–5.3)
POTASSIUM SERPL-SCNC: 3.2 MMOL/L — LOW (ref 3.5–5.3)
RBC # BLD: 3.2 M/UL — LOW (ref 3.8–5.2)
RBC # FLD: 16.5 % — HIGH (ref 10.3–14.5)
SODIUM SERPL-SCNC: 137 MMOL/L — SIGNIFICANT CHANGE UP (ref 135–145)
WBC # BLD: 5.89 K/UL — SIGNIFICANT CHANGE UP (ref 3.8–10.5)
WBC # FLD AUTO: 5.89 K/UL — SIGNIFICANT CHANGE UP (ref 3.8–10.5)

## 2021-11-04 PROCEDURE — G0463: CPT

## 2021-11-04 PROCEDURE — 85027 COMPLETE CBC AUTOMATED: CPT

## 2021-11-04 PROCEDURE — 80048 BASIC METABOLIC PNL TOTAL CA: CPT

## 2021-11-04 PROCEDURE — 84702 CHORIONIC GONADOTROPIN TEST: CPT

## 2021-11-04 RX ORDER — SODIUM CHLORIDE 9 MG/ML
3 INJECTION INTRAMUSCULAR; INTRAVENOUS; SUBCUTANEOUS EVERY 8 HOURS
Refills: 0 | Status: DISCONTINUED | OUTPATIENT
Start: 2021-11-10 | End: 2021-11-25

## 2021-11-04 RX ORDER — CEFAZOLIN SODIUM 1 G
2000 VIAL (EA) INJECTION ONCE
Refills: 0 | Status: DISCONTINUED | OUTPATIENT
Start: 2021-11-10 | End: 2021-11-25

## 2021-11-04 RX ORDER — LABETALOL HCL 100 MG
1 TABLET ORAL
Qty: 0 | Refills: 0 | DISCHARGE

## 2021-11-04 RX ORDER — LIDOCAINE HCL 20 MG/ML
0.2 VIAL (ML) INJECTION ONCE
Refills: 0 | Status: DISCONTINUED | OUTPATIENT
Start: 2021-11-10 | End: 2021-11-25

## 2021-11-04 NOTE — H&P PST ADULT - NSICDXPASTMEDICALHX_GEN_ALL_CORE_FT
PAST MEDICAL HISTORY:  IgA nephropathy ESRD  on dialysis since 2016     PAST MEDICAL HISTORY:  H/O influenza 5/2021    IgA nephropathy ESRD  on dialysis since 2016    Pneumonia 12/2020

## 2021-11-04 NOTE — H&P PST ADULT - HISTORY OF PRESENT ILLNESS
27 year old female with ESRD IgA nephropathy.- on HD (MWF) stage G5/A1,A-V fistula: GALO, 16,History of : ,  H/O cervical biopsy: in the context of ELISE II, presents wit worsening SOB and cough and fever and malaise , started Monday , patient missed her HD on Wednesday because she felt too sick and couldn't even get up to go for HD, complains of Fever as high as 102, chills and nausea and vomiting,    for the past 4 days has cough has been worsening , no one else at her house is sick and she has been worried about her 3 children( 14,8,3 years old ) getting sick.  no relieving factors ,moderate to severe SOB and Cough and nausea Pt. 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 Pt. tested positive RVP panel 5/2021 and negative for COVID-19 - states had a cough for several months and just now is feeling better and has no more cough, X-ray from 7/16/21 with mild pulmonary venous congestion and moderate cardiomegaly, was given furosemide for some "fluid" in the lungs. Has not had menses for ~ 5 months and has not seen Gyn (s/p tubal ligation, however HcG had already been drawn while in PST). Pt. denies close contact with anyone that has been ill, no fever, cough, dyspnea in past two weeks, has not received COVID-19 vaccine.    Pt. was given phone # for [facility] close to her home for pre-op COVID-19 test

## 2021-11-04 NOTE — H&P PST ADULT - PROBLEM SELECTOR PLAN 1
Revision and Resection of Left Arm AV Fistula Aneurysm  Pre-op instructions, including Chlorhexidine soap, provided - all questions answered

## 2021-11-04 NOTE — H&P PST ADULT - NSICDXPASTSURGICALHX_GEN_ALL_CORE_FT
PAST SURGICAL HISTORY:  A-V fistula AMBARE, 16    H/O cervical biopsy in the context of ELISE II    History of  ,      PAST SURGICAL HISTORY:  A-V fistula AMBARE, 16    H/O cervical biopsy in the context of ELISE II    History of  ,     S/P tubal ligation

## 2021-11-04 NOTE — H&P PST ADULT - RS GEN PE MLT RESP DETAILS PC
fine rales on right base/respirations non-labored/no chest wall tenderness/no intercostal retractions/no wheezes

## 2021-11-05 NOTE — DISCHARGE NOTE OB - BREASTFEEDING PROVIDES MATERNAL HEALTH BENEFITS, DECREASED PREMENOPAUSAL BREAST CANCER, OVARIAN CANCER AND TYPE II DIABETES MELLITUS
Detail Level: Detailed Note Text (......Xxx Chief Complaint.): This diagnosis correlates with the Other (Free Text): Recent onset within the past few days.  DDx reviewed  with fu one month if persists Other (Free Text): Pt states cut nail with knife when cutting bread. Render Risk Assessment In Note?: no Statement Selected

## 2021-11-09 ENCOUNTER — APPOINTMENT (OUTPATIENT)
Dept: ENDOVASCULAR SURGERY | Facility: CLINIC | Age: 31
End: 2021-11-09
Payer: MEDICAID

## 2021-11-09 ENCOUNTER — TRANSCRIPTION ENCOUNTER (OUTPATIENT)
Age: 31
End: 2021-11-09

## 2021-11-09 ENCOUNTER — RESULT REVIEW (OUTPATIENT)
Age: 31
End: 2021-11-09

## 2021-11-09 VITALS
DIASTOLIC BLOOD PRESSURE: 99 MMHG | HEIGHT: 58.66 IN | BODY MASS INDEX: 23.87 KG/M2 | RESPIRATION RATE: 18 BRPM | TEMPERATURE: 97.4 F | WEIGHT: 116.84 LBS | OXYGEN SATURATION: 99 % | SYSTOLIC BLOOD PRESSURE: 156 MMHG | HEART RATE: 92 BPM

## 2021-11-09 PROCEDURE — 76937 US GUIDE VASCULAR ACCESS: CPT

## 2021-11-09 PROCEDURE — 77001 FLUOROGUIDE FOR VEIN DEVICE: CPT

## 2021-11-09 PROCEDURE — 36558 INSERT TUNNELED CV CATH: CPT

## 2021-11-10 ENCOUNTER — RESULT REVIEW (OUTPATIENT)
Age: 31
End: 2021-11-10

## 2021-11-10 ENCOUNTER — OUTPATIENT (OUTPATIENT)
Dept: OUTPATIENT SERVICES | Facility: HOSPITAL | Age: 31
LOS: 1 days | End: 2021-11-10
Payer: MEDICAID

## 2021-11-10 ENCOUNTER — APPOINTMENT (OUTPATIENT)
Dept: VASCULAR SURGERY | Facility: HOSPITAL | Age: 31
End: 2021-11-10
Payer: MEDICAID

## 2021-11-10 VITALS
HEIGHT: 58.66 IN | RESPIRATION RATE: 18 BRPM | HEART RATE: 93 BPM | TEMPERATURE: 98 F | OXYGEN SATURATION: 100 % | WEIGHT: 115.08 LBS | DIASTOLIC BLOOD PRESSURE: 93 MMHG | SYSTOLIC BLOOD PRESSURE: 152 MMHG

## 2021-11-10 DIAGNOSIS — Z98.89 OTHER SPECIFIED POSTPROCEDURAL STATES: Chronic | ICD-10-CM

## 2021-11-10 DIAGNOSIS — N18.6 END STAGE RENAL DISEASE: ICD-10-CM

## 2021-11-10 DIAGNOSIS — I77.0 ARTERIOVENOUS FISTULA, ACQUIRED: Chronic | ICD-10-CM

## 2021-11-10 DIAGNOSIS — I77.0 ARTERIOVENOUS FISTULA, ACQUIRED: ICD-10-CM

## 2021-11-10 DIAGNOSIS — Z98.51 TUBAL LIGATION STATUS: Chronic | ICD-10-CM

## 2021-11-10 LAB
HCG UR QL: NEGATIVE — SIGNIFICANT CHANGE UP
POTASSIUM BLDV-SCNC: 4 MMOL/L — SIGNIFICANT CHANGE UP (ref 3.5–5.1)

## 2021-11-10 PROCEDURE — 36832 AV FISTULA REVISION OPEN: CPT | Mod: 82,RT

## 2021-11-10 PROCEDURE — 36832 AV FISTULA REVISION OPEN: CPT | Mod: LT

## 2021-11-10 PROCEDURE — 88304 TISSUE EXAM BY PATHOLOGIST: CPT | Mod: 26

## 2021-11-10 RX ORDER — HYDROMORPHONE HYDROCHLORIDE 2 MG/ML
0.5 INJECTION INTRAMUSCULAR; INTRAVENOUS; SUBCUTANEOUS
Refills: 0 | Status: DISCONTINUED | OUTPATIENT
Start: 2021-11-10 | End: 2021-11-11

## 2021-11-10 RX ORDER — FOLIC ACID 0.8 MG
1 TABLET ORAL
Qty: 0 | Refills: 0 | DISCHARGE

## 2021-11-10 RX ORDER — FUROSEMIDE 40 MG
1 TABLET ORAL
Qty: 0 | Refills: 0 | DISCHARGE

## 2021-11-10 RX ORDER — CALCIUM ACETATE 667 MG
3 TABLET ORAL
Qty: 0 | Refills: 0 | DISCHARGE

## 2021-11-10 RX ORDER — ONDANSETRON 8 MG/1
4 TABLET, FILM COATED ORAL ONCE
Refills: 0 | Status: COMPLETED | OUTPATIENT
Start: 2021-11-10 | End: 2021-11-10

## 2021-11-10 RX ORDER — OXYCODONE HYDROCHLORIDE 5 MG/1
1 TABLET ORAL
Qty: 12 | Refills: 0
Start: 2021-11-10 | End: 2021-11-11

## 2021-11-10 RX ADMIN — HYDROMORPHONE HYDROCHLORIDE 0.5 MILLIGRAM(S): 2 INJECTION INTRAMUSCULAR; INTRAVENOUS; SUBCUTANEOUS at 18:30

## 2021-11-10 RX ADMIN — ONDANSETRON 4 MILLIGRAM(S): 8 TABLET, FILM COATED ORAL at 21:44

## 2021-11-10 RX ADMIN — HYDROMORPHONE HYDROCHLORIDE 0.5 MILLIGRAM(S): 2 INJECTION INTRAMUSCULAR; INTRAVENOUS; SUBCUTANEOUS at 18:23

## 2021-11-10 RX ADMIN — SODIUM CHLORIDE 3 MILLILITER(S): 9 INJECTION INTRAMUSCULAR; INTRAVENOUS; SUBCUTANEOUS at 23:08

## 2021-11-10 RX ADMIN — HYDROMORPHONE HYDROCHLORIDE 0.5 MILLIGRAM(S): 2 INJECTION INTRAMUSCULAR; INTRAVENOUS; SUBCUTANEOUS at 18:51

## 2021-11-10 NOTE — ASU DISCHARGE PLAN (ADULT/PEDIATRIC) - ASU DC SPECIAL INSTRUCTIONSFT
PAIN CONTROL: Take over the counter medications as directed on bottle for pain. Take additional prescribed medications as needed, as directed.    DRESSINGS: Take outer dressings, including clear dressing with gauze, off in 48 hours. You have a dressing called SteriStrips in place over your incision. Do not remove. These will fall off on their own.     DRAINS: Monitor and record drain output on sheet given to you. Make sure your drains stay to suction.    SHOWER: 48 hours after surgery, it is OK to shower. Do not take a bath. You may let the water run over the incision, but do not scrub. Pat dry. Do not put lotion on incision.    ACTIVITY: No heavy lifting or straining. Otherwise, you may return to your usual level of physical activity. If you are taking narcotic pain medication (such as Percocet) DO NOT drive a car, operate machinery or make important decisions.    DIET: Return to your usual diet.    NOTIFY YOUR SURGEON IF: You have any bleeding that does not stop, any pus draining from your wound(s), any fever (over 100.4 F) or chills, persistent nausea/vomiting, persistent diarrhea, or if your pain is not controlled on your discharge pain medications.    Please follow-up with your surgeon in 1 week, call the office to confirm appointment.

## 2021-11-10 NOTE — PRE-ANESTHESIA EVALUATION ADULT - NSANTHRISKNONERD_GEN_ALL_CORE
Encounter Summary
  Created on: 2020
 
 SantosVeda
 External Reference #: [id number]
 : 41
 Sex: Female
 
 Demographics
 
 
+-----------------------+----------------------+
| Address               | [address]     |
|                       | [place]  [address] |
+-----------------------+----------------------+
| Home Phone            | [phone]      |
+-----------------------+----------------------+
| Preferred Language    | Unknown              |
+-----------------------+----------------------+
| Marital Status        |               |
+-----------------------+----------------------+
| Hinduism Affiliation | [id number]                 |
+-----------------------+----------------------+
| Race                  | Unknown              |
+-----------------------+----------------------+
| Ethnic Group          | Unknown              |
+-----------------------+----------------------+
 
 
 Author
 
 
+--------------+--------------------------------------------+
| Author       | [facility]  |
|              | and [facility]                                |
+--------------+--------------------------------------------+
| Organization | [facility]  |
|              | and [facility]                                |
+--------------+--------------------------------------------+
| Address      | Unknown                                    |
+--------------+--------------------------------------------+
| Phone        | Unavailable                                |
+--------------+--------------------------------------------+
 
 
 
 Support
 
 
+----------------+--------------+-----------------+-----------------+
| Name           | Relationship | Address         | Phone           |
+----------------+--------------+-----------------+-----------------+
| Johan Santos | [place]         | [address] | [phone] |
|                |              | [place]  [address] |                 |
+----------------+--------------+-----------------+-----------------+
| Ariel Santos   | [place]         | Unknown         | [phone] |
+----------------+--------------+-----------------+-----------------+
| Luis Santos   | [place]         | Unknown         | [phone] |
 
+----------------+--------------+-----------------+-----------------+
 
 
 
 Care Team Providers
 
 
+--------------------------+------+-----------------+
| Care Team Member Name    | Role | Phone           |
+--------------------------+------+-----------------+
| William Ferguson MD | PCP  | [phone] |
+--------------------------+------+-----------------+
 
 
 
 Reason for Visit
 
 
+----------------+----------+
| Reason         | Comments |
+----------------+----------+
| CPAP Follow Up |          |
+----------------+----------+
 
 
 
 Encounter Details
 
 
+--------+---------+----------------------+---------------------+----------------------+
| Date   | Type    | Department           | Care Team           | Description          |
+--------+---------+----------------------+---------------------+----------------------+
| / | Office  |   [facility] [place]      |   Johan Shook  | EDMUNDO (obstructive     |
| 2017   | Visit   | SLEEP DISORDER  [address]  | [address]   | sleep apnea)         |
|        |         | [address]  [place]      | [address]  [place]    | (Primary Dx);        |
|        |         | [place] [address] | [place] [address]     | Restless legs        |
|        |         |   [phone]       | [phone]        | syndrome; History of |
|        |         |                      | [phone] (Fax)  |  anemia              |
+--------+---------+----------------------+---------------------+----------------------+
 
 
 
 Social History
 
 
+---------------+------------+-----------+--------+------------------+
| Tobacco Use   | Types      | Packs/Day | Years  | Date             |
|               |            |           | Used   |                  |
+---------------+------------+-----------+--------+------------------+
| Former Smoker | Cigarettes | 0.25      | 5      | Quit: 1968 |
+---------------+------------+-----------+--------+------------------+
 
 
 
+---------------------+---+---+---+
| Smokeless Tobacco:  |   |   |   |
| Never Used          |   |   |   |
+---------------------+---+---+---+
 
 
 
 
+-------------+-------------+---------+--------------+
| Alcohol Use | Drinks/Week | oz/Week | Comments     |
+-------------+-------------+---------+--------------+
| Yes         |             |         | Twice a year |
+-------------+-------------+---------+--------------+
 
 
 
+------------------+---------------+
| Sex Assigned at  | Date Recorded |
| Birth            |               |
+------------------+---------------+
| Not on file      |               |
+------------------+---------------+
 
 
 
+----------------+-------------+-------------+
| Job Start Date | Occupation  | Industry    |
+----------------+-------------+-------------+
| Not on file    | Not on file | Not on file |
+----------------+-------------+-------------+
 
 
 
+----------------+--------------+------------+
| Travel History | Travel Start | Travel End |
+----------------+--------------+------------+
 
 
 
+-------------------------------------+
| No recent travel history available. |
+-------------------------------------+
 documented as of this encounter
 
 Last Filed Vital Signs
 
 
+-------------------+----------------------+----------------------+----------+
| Vital Sign        | Reading              | Time Taken           | Comments |
+-------------------+----------------------+----------------------+----------+
| Blood Pressure    | 152/70               | 2017  1:40 PM  |          |
|                   |                      | PDT                  |          |
+-------------------+----------------------+----------------------+----------+
| Pulse             | 91                   | 2017  1:40 PM  |          |
|                   |                      | PDT                  |          |
+-------------------+----------------------+----------------------+----------+
| Temperature       | -                    | -                    |          |
+-------------------+----------------------+----------------------+----------+
| Respiratory Rate  | 16                   | 2017  1:40 PM  |          |
|                   |                      | PDT                  |          |
+-------------------+----------------------+----------------------+----------+
| Oxygen Saturation | 96%                  | 2017  1:40 PM  |          |
|                   |                      | PDT                  |          |
+-------------------+----------------------+----------------------+----------+
| Inhaled Oxygen    | -                    | -                    |          |
| Concentration     |                      |                      |          |
+-------------------+----------------------+----------------------+----------+
 
| Weight            | 94.2 kg (207 lb 11.2 | 2017  1:40 PM  |          |
|                   |  oz)                 | PDT                  |          |
+-------------------+----------------------+----------------------+----------+
| Height            | -                    | -                    |          |
+-------------------+----------------------+----------------------+----------+
| Body Mass Index   | 39.24                | 2017  9:32 AM  |          |
|                   |                      | PDT                  |          |
+-------------------+----------------------+----------------------+----------+
 documented in this encounter
 
 Patient Instructions
 Patient Instructions Johan Shook Jr., MD - 2017  2:04 PM PDT
 Restless Legs Syndrome: What You Can Do
 Symptoms of restless leg syndrome (RLS) can be treated. Together, you and your health care 
provider can work on your treatment plan. If needed, medications may be prescribed. Also daniel
rn what you can do to ease your discomfort. Good sleep habits and a healthy lifestyle will h
elp you rest better at night and have more energy during the day.
 
 Working with your health care provider
 RLS may occur on its own and may be passed on in families. It is sometimes linked to other 
medical problems. Lowiron may cause some RLS symptoms. Your health care provider may order
 a lab test to check your iron level. Other medical problems associated with RLS are kidney 
disease, diabetes, and multiple sclerosis. Your doctor mayprescribe medications to reduce 
your symptoms and help you sleep better.
 Tips for temporary relief
 To reduce your discomfort, try the following:
  Walking or stretching
  Rubbing your legs
  Having a massage
  Taking a hot or cold bath
  Doing activities that make muscles in your hands or legs work
  Relaxing with yoga or meditation
 Good sleep habits
 Even though you have RLS, you can still have restful sleep. Try these good sleeping habits:
  Keep a regular sleep schedule. Go to bed and get up at the same time each day.
  Avoid or limit naps.
  Make sure the bedroom is quiet, dark, and not too hot or too cold.
  Use your bed only for sleep and sex.
 Healthy lifestyle
 Your lifestyle affects your health and your sleep. Here are some healthy habits:
  Eat a balanced diet. To get enough vitamins and minerals, you may also need to take supp
lements.
  Manage stress and learn ways to relax. Deep breathing techniques and visualization can h
elp to relax your muscles and calm your mind.
  Exercise regularly. It can help reduce stress. Also, you will have more energy during th
e day and be more tired at bedtime. Afternoon exercise is best. Nighttime exercise may affec
t how well you sleep.
  Avoid alcohol, nicotine, and caffeine.
 Date Last Reviewed: 2015-2016 The "[employer]. [address] [place] [address], [place] [address]. All righ
ts reserved. This information is not intended as a substitute for professional medical care.
 Always follow your healthcare professional's instructions.
 
 Electronically signed by Johan Shook Jr., MD at 2017  2:04 PM PDT
 documented in this encounter
 
 Progress Notes
 Johan Shook Jr., MD - 2017  1:45 PM PDTThe patient comes in for follow-up after 
undergoing diagnostic polysomnography. My interpretation of the patient's sleep study, which
 I have reviewed with the patient, is as follows:
 
 
 Polysomnogram Report on Veda Santos performed on 2017.
 
 Clinical Information: Veda Santos is a 72 y.o.  female who underwent diagnostic no
cturnal polysomnography on 2017 on referral from Dr. Katia Queen because of poss
ible obstructive sleep apnea and restless leg syndrome in a patient who also has hypertensio
n, chronic renal disease, hypothyroidism, and for plasmacytic lymphoma with IgM monoclonal g
ammopathy.  Polysomnography performed in  demonstrated a low sleep efficiency and a prol
onged latency to sleep onset and an Apnea Hypopnea Index of 29.9.  She has been on CPAP subs
equently but discontinued it about 6 months ago.  She comes in now for reevaluation..
 
 Technical Information: Please see technical data which is attached.
 
 Definitions (The AASM Manual for the Scoring of Sleep and Associated Events, Version 2.4; 2
017):
                      Apnea: There is a drop in the peak signal excursion by 90% or greater 
of pre-event baseline using an oronasal thermal sensor (diagnostic study), PAP device flow (
titration study), or an alternative apnea sensor (diagnostic study); the duration of the 90%
 or greater drop in sensor signal is 10 seconds or longer.
                                           Obstructive Apnea: Event associated with continue
d or increased inspiratory effort throughout the entire period of absent airflow.
                                           Central Apnea: Event associated with absent inspi
ratory effort throughout the entire period of absent airflow.
                                           Mixed Apnea: Event associated with absent inspira
tory effort in the initial portion of the event followed by resumption of inspiratory effort
 during the second portion of the event.
                      Hypopnea: The peak signal excursions drop by greater than or equal to 
30% of pre-event baseline using a recommended or alternative airflow sensor and the duration
 of the >= 30% drop in signal excursion is greater than or equal to 10 seconds and there is 
a greater than or equal to a 4% oxygen desaturation from pre-event baseline.
                      Respiratory Event Related Arousal: A sequence of breaths lasting 10 se
conds or longer characterized by increasing respiratory effort or by flattening of the inspi
ratory portion of the nasal pressure (diagnostic study) or PAP device flow (titration study)
 waveform leading to arousal from sleep when the sequence of breaths does not meet criteria 
for an apnea or hypopnea.
 
 Sleep Architecture:  Lights out was recorded at 2042 hundred hours on 2017 and lig
hts on was recorded at 0936 hundred hours on 2017. The latency to sleep onset was 1
2 minutes but the latency to persistent sleep was quite prolonged at 236 minutes. The patien
t slept for 580 minutes out of 774.5 minutes of study time resulting an a sleep efficiency t
hat low at 74.9 %. The amount of N1 sleep was  elevated at 18.1 % of the Total Sleep Time; t
he amount of N2 sleep was normal at 61.8 % of the Total Sleep Time; the amount of N3 sleep w
as low at 0 % of the Total Sleep Time; the amount of REM sleep was normal at 20.1 % of the T
otal Sleep Time and the latency to REM sleep prolonged at 348.5 minutes.
 
 Sleep in the following positions was recorded: left lateral decubitus 7.7 %, right lateral 
decubitus 0 %, supine 92.3 %, prone 0 %.
 
 Sleep was significantly fragmented; the Arousal Index was 44.2.
 
 The patient reported this to be a usual night's sleep.
 
 Cardiopulmonary Monitoring: The heart rate averaged in the low 50s beats per minute.  Mild 
rate variability was noted. The rhythm was sinus.
 
 In the course of the evening there were 20 obstructive apneas, 0 mixed apneas, 1 central ap
neas, 101 hypopneas, and 286 Respiratory Effort Related Arousals (RERA's). The Respiratory D
isturbance Index (RDI) was elevated at 42.2; the Apnea-Hypopnea Index 12.6; the Apnea Index 
(AI) 2.2. The respiratory events were not sleep stage dependent. The respiratory events were
 not significantly positional. 
 
 
 The respiratory events occasioned severe sleep fragmentation; the Respiratory Arousal Index
 was 37.6.
 
 The soraya oxygen saturation was 83 % and the patient spent 2.3 minutes with an oxygen satur
ation of less than 88%.
 
 ETCO2 was not pathologically elevated.
 
 Limb Movement Monitoring: There were 182 Periodic Limb Movements (PLMS Index of 18.8) of wh
ich 18 were associated with arousals; the PLMS Arousal Index was normal at 1.9.
 
 Interpretation: This polysomnogram is abnormal secondary to:
 
 Obstructive sleep apnea is diagnosed and this results in significant sleep fragmentation an
d mild oxygen desaturation.
 Periodic limb movements of sleep are present but they do not seem to significantly fragment
 sleep.
 The combination of a prolonged latency to persistent sleep, late wake time suggests a delay
ed sleep phase syndrome.
 
 Suggestions:
 1. The principles of sleep hygiene should be reviewed with the patient in the context of a 
Delayed Sleep Phase Syndrome
 2. Treatment of obstructive sleep apnea is advised.
 3. A ferritin level should be checked. If the ferritin level is less than 50, iron suppleme
ntation should be considered to raise the ferritin to above 50. This may help with PLMS. Onc
e the ferritin level is above 50, pharmacologic therapy of PLMS/RLS should be considered if 
they are felt to be clinically significant.
 
 /70  | Pulse 91  | Resp 16  | Wt 94.2 kg (207 lb 11.2 oz)  | SpO2 96%  | BMI 39.24 kg
/m 
 
 A: EDMUNDO: The patient does have clinically significant obstructive sleep apnea.  I have discu
ssed this with her.  I am suggesting CPAP therapy.  She is in agreement with this.
      Restless leg syndrome/periodic limb movements of sleep: We will check a ferritin level
 today to assure that it's above 50. I've discussed Restless Legs Syndrome with the patient.
 I've also discussed Periodic Limb Movements of Sleep. I've discussed the relationship betwe
en the two. I've also discussed that I generally offer treatment symptomatically. I've also 
discussed an overview of treatment: 1) maintain a ferritin level above 50; 2)  Bedtime leg/a
rm massage; 3) review the need for medications that can worsen RLS/PLMS (such as antidepress
ants (except for bupropion) and antihistamines); 4) prescribe medications such as a) dopamin
ergics, b) benzodiazepine receptor agonists, c) opiates, and/or d) atypical anti-seizure age
nts.  The patient has been anemic in the past and it is conceivable that she is low in iron.
  If her ferritin is less than 50 supplemented with iron can be helpful.
      Delayed Sleep Phase Syndrome: I have suggested she try to get as much bright light as 
possible morning upon awakening and that she try to awaken at the same time every day.
 
 P: Ferritin level
      Resmed AirSense 10 autoset CPAP 5-20cm is prescribed.
 F/u in 1 week with our Clinical Sleep Educator and our PAP Adherence [facility].
 
 Today, 15 minutes was spent face to face with the patient; the majority of time was spent c
gaeleling regarding EDMUNDO and RLS/PLMS.
 
 Electronically signed by Johan Shook Jr., MD at 2017  2:12 PM PDTdocumented in th
is encounter
 
 Plan of Treatment
 
 
 
+--------+---------+------------+----------------------+-------------+
| Date   | Type    | Specialty  | Care Team            | Description |
+--------+---------+------------+----------------------+-------------+
| / | Office  | Nephrology |   Dante Escudero MD  |             |
| 2020   | Visit   |            |  [address] [facility]  |             |
|        |         |            | [address]  [place], [place]   |             |
|        |         |            | [address]  [phone]  |             |
|        |         |            |  [phone] (Fax)  |             |
+--------+---------+------------+----------------------+-------------+
 documented as of this encounter
 
 Results
 Ferritin (2017  2:28 PM PDT)
 
+-----------+-------+----------------+-------------+--------------+
| Component | Value | Ref Range      | Performed   | Pathologist  |
|           |       |                | At          | Signature    |
+-----------+-------+----------------+-------------+--------------+
| FERRITIN  | 38    | 11 - 307 ng/mL | [facility]  |              |
|           |       |                [facility]    |              |
|           |       |                | [facility]     |              |
|           |       |                | [facility] -    |              |
|           |       |                | LABORATORY  |              |
+-----------+-------+----------------+-------------+--------------+
 
 
 
+----------+
| Specimen |
+----------+
| Blood    |
+----------+
 
 
 
+----------------------+--------------------+--------------------+----------------+
| Performing           | Address            | City/State/Zipcode | Phone Number   |
| Organization         |                    |                    |                |
+----------------------+--------------------+--------------------+----------------+
|   [facility]     |   [address] |   [place]  |   [phone] |
| [facility]  |                    | [address]              |                |
| - LABORATORY         |                    |                    |                |
+----------------------+--------------------+--------------------+----------------+
 documented in this encounter
 
 Visit Diagnoses
 
 
+----------------------------------------------------------------------------------------+
| Diagnosis                                                                              |
+----------------------------------------------------------------------------------------+
|   EDMUNDO (obstructive sleep apnea) - Primary  Obstructive sleep apnea (adult) (pediatric) |
+----------------------------------------------------------------------------------------+
|   Restless legs syndrome  Restless legs syndrome (RLS)                                 |
+----------------------------------------------------------------------------------------+
|   History of anemia  Personal history of diseases of blood and blood-forming organs    |
+----------------------------------------------------------------------------------------+
 documented in this encounter No risk alerts present

## 2021-11-10 NOTE — ASU DISCHARGE PLAN (ADULT/PEDIATRIC) - CARE PROVIDER_API CALL
Vinny Poe)  Vascular Surgery  [address], [address]  [place] [address]  Phone: [phone]  Fax: [phone]  Follow Up Time:

## 2021-11-10 NOTE — PAST MEDICAL HISTORY
[No therapy indicated for cases scheduled for less than one hour] : No therapy indicated for cases scheduled for less than one hour. [FreeTextEntry1] : Malignant Hyperthermia Screening Tool and Risk of Bleeding Assessment\par \par Ms. ZULY BARCENAS denies family history of unexpected death following Anesthesia or Exercise.\par Denies Family history of Malignant Hyperthermia, Muscle or Neuromuscular disorder and High Temperature following exercise.\par \par Ms. ZULY BARCENAS denies history of Muscle Spasm, Dark or Chocolate - Colored urine and Unanticipated fever immediately following anesthesia or serious exercise. \par Ms. BARCENAS also denies bleeding tendencies/ Risks of Bleeding.

## 2021-11-10 NOTE — HISTORY OF PRESENT ILLNESS
[] : left radiocephalic fistula [FreeTextEntry1] : Dr. Baumann 2016  [FreeTextEntry4] : Friday [FreeTextEntry5] : Yesterday 11pm  [FreeTextEntry6] : Dr. Frey

## 2021-11-11 VITALS
OXYGEN SATURATION: 98 % | HEART RATE: 98 BPM | SYSTOLIC BLOOD PRESSURE: 154 MMHG | TEMPERATURE: 98 F | DIASTOLIC BLOOD PRESSURE: 80 MMHG | RESPIRATION RATE: 16 BRPM

## 2021-11-11 PROCEDURE — G1004: CPT

## 2021-11-11 PROCEDURE — 88304 TISSUE EXAM BY PATHOLOGIST: CPT

## 2021-11-11 PROCEDURE — 36833 AV FISTULA REVISION: CPT

## 2021-11-11 PROCEDURE — 81025 URINE PREGNANCY TEST: CPT

## 2021-11-11 PROCEDURE — 73206 CT ANGIO UPR EXTRM W/O&W/DYE: CPT | Mod: 26,LT,MG

## 2021-11-11 PROCEDURE — 84132 ASSAY OF SERUM POTASSIUM: CPT

## 2021-11-11 PROCEDURE — 73206 CT ANGIO UPR EXTRM W/O&W/DYE: CPT | Mod: MG

## 2021-11-11 PROCEDURE — C1889: CPT

## 2021-11-11 RX ORDER — DIPHENHYDRAMINE HCL 50 MG
25 CAPSULE ORAL ONCE
Refills: 0 | Status: DISCONTINUED | OUTPATIENT
Start: 2021-11-11 | End: 2021-11-11

## 2021-11-11 RX ORDER — ACETAMINOPHEN 500 MG
1000 TABLET ORAL ONCE
Refills: 0 | Status: COMPLETED | OUTPATIENT
Start: 2021-11-11 | End: 2021-11-11

## 2021-11-11 RX ORDER — DIPHENHYDRAMINE HCL 50 MG
25 CAPSULE ORAL ONCE
Refills: 0 | Status: DISCONTINUED | OUTPATIENT
Start: 2021-11-11 | End: 2021-11-25

## 2021-11-11 RX ORDER — OXYCODONE HYDROCHLORIDE 5 MG/1
5 TABLET ORAL EVERY 6 HOURS
Refills: 0 | Status: DISCONTINUED | OUTPATIENT
Start: 2021-11-11 | End: 2021-11-11

## 2021-11-11 RX ORDER — OXYCODONE HYDROCHLORIDE 5 MG/1
1 TABLET ORAL
Qty: 12 | Refills: 0
Start: 2021-11-11 | End: 2021-11-12

## 2021-11-11 RX ORDER — OXYCODONE HYDROCHLORIDE 5 MG/1
10 TABLET ORAL EVERY 6 HOURS
Refills: 0 | Status: DISCONTINUED | OUTPATIENT
Start: 2021-11-11 | End: 2021-11-11

## 2021-11-11 RX ADMIN — OXYCODONE HYDROCHLORIDE 10 MILLIGRAM(S): 5 TABLET ORAL at 06:09

## 2021-11-11 RX ADMIN — Medication 400 MILLIGRAM(S): at 02:05

## 2021-11-11 RX ADMIN — SODIUM CHLORIDE 3 MILLILITER(S): 9 INJECTION INTRAMUSCULAR; INTRAVENOUS; SUBCUTANEOUS at 06:11

## 2021-11-11 RX ADMIN — Medication 400 MILLIGRAM(S): at 09:25

## 2021-11-11 RX ADMIN — Medication 1000 MILLIGRAM(S): at 02:20

## 2021-11-11 RX ADMIN — OXYCODONE HYDROCHLORIDE 10 MILLIGRAM(S): 5 TABLET ORAL at 07:00

## 2021-11-11 RX ADMIN — Medication 1000 MILLIGRAM(S): at 11:01

## 2021-11-11 NOTE — CHART NOTE - NSCHARTNOTEFT_GEN_A_CORE
Surgery Post op Note    Procedure     SUBJECTIVE: Pt seen and examined at bedside several hours after surgery.   SOB:  [ ] YES [ ] NO  Chest Discomfort: [ ] YES [ ] NO    Nausea: [ ] YES [ ] NO           Vomiting: [ ] YES [ ] NO  Flatus: [ ] YES [ ] NO             Bowel Movement: [ ] YES [ ] NO  Void: [ ]YES [ ]No         Pain Control Adequate: [ ] YES [ ] NO      Physical exam  General Appearance: Appears well, NAD  Respiratory: No labored breathing  CV: Pulse regularly present  Abdomen: Soft, nontender, nondistended  LUE: consistent bleeding from the incision, ecchymosis and hematoma seen in medial side of cubital fossa. Thrill palpable, radial and ulnar papable    Vital Signs Last 24 Hrs  T(C): 37 (11 Nov 2021 01:30), Max: 37 (11 Nov 2021 01:30)  T(F): 98.6 (11 Nov 2021 01:30), Max: 98.6 (11 Nov 2021 01:30)  HR: 82 (11 Nov 2021 03:00) (76 - 97)  BP: 151/74 (11 Nov 2021 03:00) (146/77 - 168/85)  BP(mean): 105 (11 Nov 2021 03:00) (105 - 125)  RR: 16 (11 Nov 2021 03:00) (14 - 20)  SpO2: 97% (11 Nov 2021 03:00) (93% - 100%)  I&O's Summary    I&O's Detail      MEDICATIONS  (STANDING):  ceFAZolin   IVPB 2000 milliGRAM(s) IV Intermittent once  lidocaine 1% Injectable 0.2 milliLiter(s) Local Injection once  sodium chloride 0.9% lock flush 3 milliLiter(s) IV Push every 8 hours    MEDICATIONS  (PRN):  HYDROmorphone  Injectable 0.5 milliGRAM(s) IV Push every 10 minutes PRN Moderate Pain (4 - 6)      LABS:                  Plan Surgery Post op Note    Procedure: Revision, dialysis AV fistula    SUBJECTIVE/INTERVETION: Pt seen and examined at bedside around 3 hours after surgery. Consistent bleeding from the incision, ecchymosis and hematoma seen in medial side of cubital fossa. Thrill palpable, radial and ulnar palpable.  Got a CTA of left upper arm. Preliminary reading shows perifistula hematoma and foci of air with small focus of active extravasation near the anastomosis. Talked to Dr. Holt, who saw the patient and discussed with vascular fellow on call. Will keep observe.   Patient vitals stable, only mild pain in left arm, sensory and motion intact, no tingling or numbness.       SOB:  [ ] YES [ ] NO  Chest Discomfort: [ ] YES [ ] NO    Nausea: [ ] YES [ ] NO           Vomiting: [ ] YES [ ] NO  Flatus: [ ] YES [ ] NO             Bowel Movement: [ ] YES [ ] NO  Void: [ ]YES [ ]No         Pain Control Adequate: [ ] YES [ ] NO      Physical exam  General Appearance: Appears well, NAD  Respiratory: No labored breathing  CV: Pulse regularly present  Abdomen: Soft, nontender, nondistended  LUE: consistent bleeding from the incision, ecchymosis and hematoma seen in medial side of cubital fossa. Thrill palpable, radial and ulnar palpable. sensory and motion intact, no tingling or numbness.     Vital Signs Last 24 Hrs  T(C): 37 (11 Nov 2021 01:30), Max: 37 (11 Nov 2021 01:30)  T(F): 98.6 (11 Nov 2021 01:30), Max: 98.6 (11 Nov 2021 01:30)  HR: 82 (11 Nov 2021 03:00) (76 - 97)  BP: 151/74 (11 Nov 2021 03:00) (146/77 - 168/85)  BP(mean): 105 (11 Nov 2021 03:00) (105 - 125)  RR: 16 (11 Nov 2021 03:00) (14 - 20)  SpO2: 97% (11 Nov 2021 03:00) (93% - 100%)  I&O's Summary    I&O's Detail      MEDICATIONS  (STANDING):  ceFAZolin   IVPB 2000 milliGRAM(s) IV Intermittent once  lidocaine 1% Injectable 0.2 milliLiter(s) Local Injection once  sodium chloride 0.9% lock flush 3 milliLiter(s) IV Push every 8 hours    MEDICATIONS  (PRN):  HYDROmorphone  Injectable 0.5 milliGRAM(s) IV Push every 10 minutes PRN Moderate Pain (4 - 6)

## 2021-11-22 ENCOUNTER — APPOINTMENT (OUTPATIENT)
Dept: VASCULAR SURGERY | Facility: CLINIC | Age: 31
End: 2021-11-22
Payer: MEDICAID

## 2021-11-22 VITALS
DIASTOLIC BLOOD PRESSURE: 93 MMHG | BODY MASS INDEX: 23.7 KG/M2 | WEIGHT: 116 LBS | HEIGHT: 58.66 IN | SYSTOLIC BLOOD PRESSURE: 144 MMHG | HEART RATE: 94 BPM

## 2021-11-22 PROBLEM — N02.8 RECURRENT AND PERSISTENT HEMATURIA WITH OTHER MORPHOLOGIC CHANGES: Chronic | Status: ACTIVE | Noted: 2021-11-04

## 2021-11-22 PROBLEM — J18.9 PNEUMONIA, UNSPECIFIED ORGANISM: Chronic | Status: ACTIVE | Noted: 2021-11-04

## 2021-11-22 PROBLEM — Z87.09 PERSONAL HISTORY OF OTHER DISEASES OF THE RESPIRATORY SYSTEM: Chronic | Status: ACTIVE | Noted: 2021-11-04

## 2021-11-22 PROCEDURE — 99024 POSTOP FOLLOW-UP VISIT: CPT

## 2021-11-22 NOTE — REASON FOR VISIT
[de-identified] : Status post repair of left brachiocephalic fistula aneurysms.  Patient has developed edema of the left upper extremity around the wound.  There is a hematoma which is slowly resolving.  Incision is clean.  Incision is intact.  Follow-up in 2 to 3 weeks.  Patient may benefit from fistulogram after the wound heals.

## 2021-11-23 ENCOUNTER — APPOINTMENT (OUTPATIENT)
Dept: VASCULAR SURGERY | Facility: CLINIC | Age: 31
End: 2021-11-23

## 2021-12-03 LAB — SURGICAL PATHOLOGY STUDY: SIGNIFICANT CHANGE UP

## 2021-12-06 ENCOUNTER — APPOINTMENT (OUTPATIENT)
Dept: VASCULAR SURGERY | Facility: CLINIC | Age: 31
End: 2021-12-06
Payer: MEDICAID

## 2021-12-06 VITALS
SYSTOLIC BLOOD PRESSURE: 151 MMHG | BODY MASS INDEX: 24.14 KG/M2 | HEIGHT: 58 IN | WEIGHT: 115 LBS | HEART RATE: 104 BPM | DIASTOLIC BLOOD PRESSURE: 102 MMHG

## 2021-12-06 PROCEDURE — 99024 POSTOP FOLLOW-UP VISIT: CPT

## 2021-12-06 PROCEDURE — 93990 DOPPLER FLOW TESTING: CPT

## 2021-12-06 NOTE — REASON FOR VISIT
[de-identified] : Status post revision of left arm AV fistula by resection of aneurysms.  Patient has developed a hematoma with an fluid collection around the arterial anastomosis.  This is slowly resolving.  Continue close observation.\par \par Patient will need fistulogram when the wound is healed and the hematoma is mostly resolved

## 2021-12-20 ENCOUNTER — APPOINTMENT (OUTPATIENT)
Dept: VASCULAR SURGERY | Facility: CLINIC | Age: 31
End: 2021-12-20
Payer: MEDICAID

## 2021-12-20 VITALS
HEART RATE: 94 BPM | HEIGHT: 58 IN | BODY MASS INDEX: 24.14 KG/M2 | DIASTOLIC BLOOD PRESSURE: 90 MMHG | WEIGHT: 115 LBS | SYSTOLIC BLOOD PRESSURE: 145 MMHG

## 2021-12-20 PROCEDURE — 93990 DOPPLER FLOW TESTING: CPT

## 2021-12-20 PROCEDURE — 99024 POSTOP FOLLOW-UP VISIT: CPT

## 2021-12-20 NOTE — REASON FOR VISIT
[de-identified] : left upper extremity avf revision  [de-identified] : 11/9/21 [de-identified] : pt doing well without any complaints \par currently on hd via permcath \par pt states that the hematoma has decreased is size slightly

## 2021-12-20 NOTE — DISCUSSION/SUMMARY
[FreeTextEntry1] : 32 yo female with history of esrd on hd via permcath s/p left upper extremity brachial cephalic avf revision presents for follow up \par \par duplex shows patent avf with 50-75% stenosis at the junction with mild diffused fluid noted around the juxta anastomosis with flow rate of 2714 \par \par incision is healed pt advised to start accessing avf and will plan for permcath removal in 2 weeks if no difficulty with hd

## 2021-12-20 NOTE — PHYSICAL EXAM
[2+] : left 2+ [Alert] : alert [JVD] : no jugular venous distention  [Ankle Swelling (On Exam)] : not present [Skin Ulcer] : no ulcer [de-identified] : appears well  [de-identified] : incision healed, no ulcers or wounds, palpable thrill over the upper extremity, distal upper arm with small hematoma noted

## 2022-01-01 NOTE — PATIENT PROFILE OB - HOW PATIENT ADDRESSED, OB PROFILE
[address] 18 FF     Patient:  Baby Boy Wilian Mccarthy PCP:  [facility]   MRN:  [id number] Hospital Provider:  Odette Gaitan Physician   Infant Name after D/C:  TBD Date of Note:  2022     YOB: 2024  7:00 AM  Birth Wt: Birth Weight: 5 lb 15.9 oz (2.72 kg) Most Recent Wt:  Weight - Scale: 6 lb 1 oz (2.75 kg) Percent loss since birth weight:  1%    Information for the patient's mother:  April Waddell [[id number]]   37w4d       Birth Length:  Length: 19.29\" (49 cm)  Birth Head Circumference:  Birth Head Circumference: 34.7 cm (13.68\")    Last Serum Bilirubin:   Total Bilirubin   Date/Time Value Ref Range Status   2022 06:15 AM 7.3 (H) 0.0 - 7.2 mg/dL Final     Comment:     Specimen hemolysis has exceeded the interference as defined by Roche. Value may be falsely increased. Suggest recollection if clinically  indicated. Last Transcutaneous Bilirubin:   Time Taken: 039 (22 0551)    Transcutaneous Bilirubin Result: 8.8    Slemp Screening and Immunization:   Hearing Screen:                                                  Slemp Metabolic Screen:    Metabolic Screen Form #: [id number] (22 2913)   Congenital Heart Screen 1:     Congenital Heart Screen 2:  NA     Congenital Heart Screen 3: NA     Immunizations: There is no immunization history for the selected administration types on file for this patient. Maternal Data:    Information for the patient's mother:  April Waddell [[id number]]   29 y.o. Information for the patient's mother:  April Waddell [[id number]]   37w4d       /Para:   Information for the patient's mother:  April Waddell [[id number]]           Prenatal History & Labs:   Information for the patient's mother:  April Waddell [[id number]]     Lab Results   Component Value Date    [address] A POS 2022    ABOEXTERN A 10/04/2021    RHEXTERN + 10/04/2021    LABANTI NEG 2022    HEPBEXTERN negative 10/04/2021    RUBEXTERN immune 10/04/2021        HIV: Information for the patient's mother:  Fernando Jimenes [[id number]]   No results found for: Gely Jony, RKW88RX, HIVAG/AB     COVID-19:   Information for the patient's mother:  Fernando Jimenes [[id number]]     Lab Results   Component Value Date    [address] Not Detected 2022    COVID19 Not Detected 2020      Admission RPR:   Information for the patient's mother:  Fernando Jimenes [[id number]]     Lab Results   Component Value Date    [address] [place] Non-Reactive 2022         Hepatitis C:   Information for the patient's mother:  Fernando Jimenes [[id number]]   No results found for: HEPCAB, HCVABI, HEPATITISCRNAPCRQUANT, HEPCABCIAIND, HEPCABCIAINT, HCVQNTNAATLG, HCVQNTNAAT     GBS status:    Information for the patient's mother:  Fernando Jimenes [[id number]]   No results found for: [place], GBSAG            GBS treatment:  NA; send out yesterday : FU results    GC and Chlamydia:   Information for the patient's mother:  Fernando Jimenes [[id number]]     Lab Results   Component Value Date    Mayra Quarto negative 10/19/2021        Maternal Toxicology:     Information for the patient's mother:  Fernando Jimenes [[id number]]     Lab Results   Component Value Date    [address] Neg 2022    BARBSCNU Neg 2022    LABBENZ Neg 2022    CANSU Neg 2022    BUPRENUR Neg 2022    COCAIMETSCRU Neg 2022    OPIATESCREENURINE Neg 2022    PHENCYCLIDINESCREENURINE Neg 2022    LABMETH Neg 2022    PROPOX Neg 2022        Information for the patient's mother:  Fernando Jimenes [[id number]]     Lab Results   Component Value Date    OXYCODONEUR Neg 2022        Information for the patient's mother:  Fernando Jimenes [[id number]]     Past Medical History:   Diagnosis Date    Diabetes mellitus ([facility] [address]       Other significant maternal history:  None. Maternal ultrasounds:  Family not expecting diagnosis of down syndrome.     [place] Information:  Information for the patient's mother:  Fernando Jimenes [[id number]] : 2024  7:00 AM   (ROM x augustine birth not sure of extent of ROM))       Delivery Method: Vaginal, Spontaneous  Rupture date:     Rupture time:       Additional  Information:  Complications:  None   Information for the patient's mother:  Mireille Solitario [[id number]]         Reason for  section (if applicable): NA    Apgars:   APGAR One: N/A;  APGAR Five: N/A;  APGAR Ten: N/A  Resuscitation:      Objective:   Reviewed pregnancy & family history as well as nursing notes & vitals. Physical Exam:  \  BP 94/45   Pulse 140   Temp 98.2 °F (36.8 °C) (Axillary)   Resp 37   Ht 19.29\" (49 cm)   Wt 6 lb 1 oz (2.75 kg)   HC 34.7 cm (13.68\") Comment: Filed from Delivery Summary  SpO2 92%   BMI 11.45 kg/m²     Constitutional: VSS. Alert and appropriate to exam.   No distress. Head: Fontanelles are open, soft and flat. No significant molding present. Ears:  External ears normal.   Nose: Nostrils without airway obstruction. Nose appears visually straight   Mouth/Throat:  Mucous membranes are moist. No cleft palate palpated. Eyes: Red reflex is present bilaterally on admission exam.   Cardiovascular: Normal rate, regular rhythm, S1 & S2 normal.  Distal  pulses are palpable. No murmur noted. Pulmonary/Chest: Effort normal.  Breath sounds equal and normal. No respiratory distress - no nasal flaring, stridor, grunting or retraction. No chest deformity noted. Abdominal: Soft. Bowel sounds are normal. No tenderness. No distension, mass or organomegaly. Umbilicus appears grossly normal     Genitourinary: Normal male external genitalia. Musculoskeletal: Normal ROM. Neg- [address]. Clavicles & spine intact. Neurological: . Hypotonic for gestation. Suck & root normal. Symmetric and full [place]. Symmetric grasp & movement. Skin:  Skin is warm & dry. Capillary refill less than 3 seconds. No cyanosis or pallor.     Phenotypical features suggestive of Trisomy 21: mongoloid slant, simian crease , clinodactyly, nuchal fold    Recent Labs:   Recent Results (from the past 120 hour(s))   SPECIMEN REJECTION    Collection Time: 03/18/22  7:11 AM   Result Value Ref Range    Rejected Test BILFN     Reason for Rejection see below    CBC with Auto Differential    Collection Time: 03/20/22  1:52 PM   Result Value Ref Range    WBC 6.7 (L) 9.0 - 30.0 K/uL    RBC 6.00 (H) 3.90 - 5.30 M/uL    Hemoglobin 22.6 (HH) 13.5 - 19.5 g/dL    Hematocrit 66.2 (H) 42.0 - 60.0 %    .4 98.0 - 118.0 fL    MCH 37.6 (H) 31.0 - 37.0 pg    MCHC 34.1 30.0 - 36.0 g/dL    RDW 18.4 (H) 13.0 - 18.0 %    Platelets see below 913 - 350 K/uL    MPV 10.2 5.0 - 10.5 fL    PLATELET SLIDE REVIEW Clumped     Neutrophils % 50.0 %    Lymphocytes % 30.0 %    Monocytes % 13.0 %    Eosinophils % 6.0 %    Basophils % 0.0 %    Neutrophils Absolute 3.4 (L) 6.0 - 29.1 K/uL    Lymphocytes Absolute 2.0 1.9 - 12.9 K/uL    Monocytes Absolute 0.9 0.0 - 3.6 K/uL    Eosinophils Absolute 0.4 0.0 - 1.2 K/uL    Basophils Absolute 0.0 0.0 - 0.3 K/uL    Bands Relative 1 0 - 10 %    Macrocytes 2+ (A)     Polychromasia 2+ (A)    CBC with Auto Differential    Collection Time: 03/22/22  6:10 AM   Result Value Ref Range    WBC 5.0 (L) 9.0 - 30.0 K/uL    RBC 5.50 (H) 3.90 - 5.30 M/uL    Hemoglobin 20.6 (H) 13.5 - 19.5 g/dL    Hematocrit 60.9 (H) 42.0 - 60.0 %    .7 98.0 - 118.0 fL    MCH 37.5 (H) 31.0 - 37.0 pg    MCHC 33.9 30.0 - 36.0 g/dL    RDW 17.8 13.0 - 18.0 %    Platelets see below 148 - 350 K/uL    MPV see below 5.0 - 10.5 fL    PLATELET SLIDE REVIEW Clumped     SLIDE REVIEW see below     Path Consult No     Neutrophils % 48.0 %    Lymphocytes % 35.0 %    Monocytes % 8.0 %    Eosinophils % 1.0 %    Basophils % 0.0 %    Neutrophils Absolute 2.7 (L) 6.0 - 29.1 K/uL    Lymphocytes Absolute 1.9 1.9 - 12.9 K/uL    Monocytes Absolute 0.4 0.0 - 3.6 K/uL    Eosinophils Absolute 0.1 0.0 - 1.2 K/uL    Basophils Absolute 0.0 0.0 - 0.3 K/uL    Bands Relative 5 0 - 10 %    Atypical Lymphocytes Relative 3 0 - 6 %    Anisocytosis 2+ (A)     Polychromasia 1+ (A)       Medications   Vitamin K and Erythromycin Opthalmic Ointment given at delivery. Assessment:     Patient Active Problem List   Diagnosis Code    Single liveborn infant delivered vaginally Z38.00      infant of 39 completed weeks of gestation P36.37     infant P80.30   39 week home birth family NOT aware of any issues in pregnancy except GDM in mom  High suspicion of Trisomy 21 based on phenotypical features    Feeding Method: Feeding Method Used: Bottle,NG/OG/NJ/NE tube (50% bottle)  Urine output:   established   Stool output:   established  Percent weight change from birth:  1%    Maternal labs pending: GBS, HIV  Plan:   1. Gestational Age: 37w2d  7-day old Home birth     2 FEN :   Weight change: -0.9 oz (-0.025 kg)    3/15: will start PO /ng feeds NS 22 : 15 ml X 2 then advance to 20 ml  3/16: reduce IVF to 40 ml/kg advance feeds to 25 ml q 3h po/ng NS 22 /EBM  3/17: DC IVF , advance feeds to 30 ml may nipple with cues  3/18: advance feeds to 35 ml q 3h po/ng NS 22  3/19: takes some PO, continue feeds at 35mL q3  3/20: Took 16% PO. Advance feeds to 40 mL q3h (118 mL/kg/d)   3/21 Patient took 28% PO and gained weight will keep feeds at 40ml q3h for now   3/22 Patient lost 25g from previous day, but did take 65% of oral feeds by mouth. I will give the patient a minimum of 40ml q3h and see if he will take more. He was showing hunger signs 20-30 minutes prior to feed    3 Resp : monitor need for O2 : may need canula O2 : CXR  mild RDS/TTN : improving : max o2 need so far: 2 L 35 %   3/18: on 1 L RA mild retractions : wean as tolerated, currently requiring 25%  3/20: On 1 L 21%. No A/B/D events recorded   3/21 while on nasal canula. Patient had a destaturations down into the 80s, pt needed blow by to improve.  Will monitor for at least 5 days to ensure no further episodes, if continues will transfer an airway evaluation. 3/22 no episodes. Still needing oxygen at 1L and 21% and saturating at 92-99%    4 CVS: HDS no murmur : ECHO at 3weeks of age/after discharge    5 ID : will get a CBC and blood  cx and do a 36 h r/o amp + gent  FU blood cx is NGTD   DC amp + gent after 36 h ; well appearing     6 Social : family made aware of the diagnosis of trisomy 24 : chromosomes will be sent; having issues with blood draw   3/20: Nayeli Coles spoke with parents in person with  phone. Mother stated that she had never heard of Down syndrome/Trisomy 21 before. Explained that this is a lifelong condition that babies are born with that can cause problems with feeding, learning disability, heart disease and blood disorders. Updated mother on how infant is feeding and explained he is receiving nasal cannula flow but not requiring oxygen. Explained flow might help with feeding. Explained that discharge criteria are safely feeding, not requiring nasal cannula and ruling out other medical problems. Explained that if he does not progress with feeding over coming weeks, he might be transfer to [facility] for further workup and treatments. 7 Genetics: DW on call  : send high resolution chromosomes to [facility] and genetics clinic FU as OP    8 : Heme : bilirubin low risk 3/19 : platelet count low (but likely collection issues : multiple cbc samples have clotted ) no petechiae : observe. If able to obtain blood, will repeat CBC.  3/21: cbc had polycythemia, will attempt to repeat venous sample   3/22 polycythemia still present. The patient is having a neutrapenia, as well. Discussed patient with hematology at [facility], [facility]. She recommended to monitor and if decreasing absolute neutrophil count is below 1000 to call. Will repeat in am and if still decreasing       Family updated at the bedside with a  on a video call.      Addendum: blood obtained 3/20/22 for repeat CBC and karyotype, needs follow up     Wen Vanessa MD [place]

## 2022-01-01 NOTE — PATIENT PROFILE OB - PRO BLOOD TYPE INFANT
initiation of breastfeeding/breast milk feeding B positive 74yo M history of HTN DM CLL on chemo followed by Dr. Neel TEJADA sp fall- unknown downtime, no anticoagulation/antiplatelets. Per EMS, pt was found down by family at home, non ambulatory, confused, initially hypotensive 80s/palp, given narcan, mild improvement. Currently pt with no complaints. +head injury, unknown LOC, no pain, no numbness/focal weakness   Con: Well appearing NAD non toxic.   HEENT: +dried blood on face, PERRLA EOMI conjunctiva nml. No nasal discharge. MMM. Airway intact. C-Collar in place. No midline CTL spine ttp.   CV: RRR no MRG +S1S2.   Pulm: CTA b/l. No flail chest. No crepitus  Abd: s NT ND +BS. Pelvis stable.  Chest/Back: No sx trauma throughout- no ecchymoses, abrasions, hematomas, lacerations  Ext: WWP x4, moving all extremities, no edema. 2+ equal pulses throughout.   Neuro: CN2-12 grossly intact no sensory or motor deficits throughout.   Psy: Cooperative, appropriate   ap- trauma, ams- labs imaging reeval, trauma alert

## 2022-01-19 ENCOUNTER — APPOINTMENT (OUTPATIENT)
Dept: ENDOVASCULAR SURGERY | Facility: CLINIC | Age: 32
End: 2022-01-19
Payer: MEDICAID

## 2022-01-19 PROCEDURE — 36589 REMOVAL TUNNELED CV CATH: CPT | Mod: 58

## 2022-01-19 NOTE — HISTORY OF PRESENT ILLNESS
[FreeTextEntry1] : left arm fistula + thrill, no problem with dialysis\par right tunneled catheter removed with local\par pressure held, bleeding stopped and pressure dressing applied.\par reviewed discharge instructions with pt, verbalizes understanding

## 2022-02-08 NOTE — DISCHARGE NOTE ANTEPARTUM - IF YOU HAVE MORE THAN 4 CONTRACTIONS IN 1 HOUR AND ARE NOT DUE TO DELIVER, CALL YOUR MD
Rheumatology consultation note    CC: Follow-up for chronic pain syndrome    HPI: Doretha is 43 years old.  On January 6, 2022 she was diagnosed with Covid and she stayed overnight in the emergency room due to breathing difficulties and Covid pneumonia.  She has recovered completely without any major residual symptoms.  She was able to taper down her prednisone to 8 mg daily.  During her Covid this was not increased.  She is determined to taper off of prednisone and lose weight so that she can have reversal of her colostomy.  She also is trying to reduce the number of medications that she is taking.  She has noticed some swelling in her left arm and hand, and this is reminiscent of some lymphedema she has had in the past.     Brief Past Medical History: she was diagnosed with a metastatic melanoma with an unknown primary, successfully with nivolumab, amongst other things, and she is considered to be cured from her melanoma.  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  She has also developed inflammatory arthritis and she was started on several medications including etanercept, adalimumab, tocilizumab, that did not really seem to be of much benefit.  Because of her arthritis she was started on prednisone and has had major difficulty to tapering the prednisone. She is gained significant amount of weight and she currently has diabetes, obesity, obstructive sleep apnea.  She is using CPAP.    Physical examination  I reviewed her vital signs they are normal and her BMI is 43.5  She has some puffiness at the dorsum of her left hand as compared to the right hand, but no evidence for synovitis.    Impression/plan  1.  Prednisone taper.  Because of previous failures to taper off the prednisone I have submitted a referral to endocrine for evaluation for adrenal insufficiency.  In the meantime she will plan to taper 1 mg every 3 to 4  weeks.  2.  The plan is to reevaluate her CPAP settings and she has an appointment.  3.  She has fibromyalgia-like symptoms that hopefully will improve with tapering of the prednisone, the adjustment of her CPAP, increase exercise and weight loss, all in preparation for a successful reversal of her colostomy.  4.  Plan for follow-up in 3 months.    Addendum  I reviewed today's blood work that was essentially reassuring.  It shows essentially normal chemistries, CRP of 18.8, which is probably acceptable given her BMI, and it has come down nicely from 88 on 6 January when she had Covid.  Her CBC showed a white count of 12 and an ESR of 17.    I spent 30 minutes face to face with the patient, with 25 minutes on counseling and coordination of care.   Statement Selected

## 2022-02-13 NOTE — DISCHARGE NOTE ANTEPARTUM - PAIN IN THE CALVES OF YOUR LEGS
Case Management Assessment & Discharge Planning Note    Patient name Jose Canada  Location [address] [place] [address] [address] [id number][address]-* MRN [id number]  : 1964 Date 2022       Current Admission Date: 2/10/2022  Current Admission Diagnosis:Acute on chronic combined systolic and diastolic congestive heart failure [facility])   Patient Active Problem List    Diagnosis Date Noted    Elevated troponin 2022    Other artificial openings of urinary tract status ([facility] ) 02/10/2022    Continuous opioid dependence ([facility] ) 02/10/2022    Adrenal nodule ([facility] ) 02/10/2022    Stable angina ([facility] ) 02/10/2022    Volume overload 02/10/2022    Acquired hypothyroidism 10/14/2021    Mixed hyperlipidemia 10/14/2021    Gastroesophageal reflux disease without esophagitis 10/13/2021    Other proteinuria 2021    CKD (chronic kidney disease) 2021    Acute on chronic combined systolic and diastolic congestive heart failure ([facility] ) 2021    Prolonged QT interval 2021    Urinary tract infection associated with cystostomy catheter ([facility] ) 2021    Neurogenic bladder 2021    Morbid obesity with BMI of 45 0-49 9, adult ([facility] ) 06/15/2021    History of heart artery stent 06/15/2021    Acute renal failure superimposed on stage 4 chronic kidney disease ([facility] ) 2021    Memory impairment 2021    Chronic bronchitis ([facility] ) 2021    Severe episode of recurrent major depressive disorder, without psychotic features ([facility] ) 2021    Skin ulcer of hand, limited to breakdown of skin ([facility] ) 2021    HTN (hypertension) 2020    Diabetic ulcer of toe of right foot associated with type 2 diabetes mellitus, with muscle involvement without evidence of necrosis ([facility] ) 2020    Head lump 2020    Need for follow-up by  2020    Normochromic normocytic anemia 2020    Abnormal LFTs 2020    S/P cervical spinal fusion 2020    Major depressive disorder 09/02/2020    Type 2 diabetes mellitus with stage 4 chronic kidney disease, with long-term current use of insulin ([facility] ) 09/02/2020    Anxiety 09/02/2020    CAD (coronary artery disease) 09/02/2020    Osteoarthritis of left knee 09/02/2020    Cellulitis of finger of right hand 08/26/2020      LOS (days): 3  Geometric Mean LOS (GMLOS) (days): 4 30  Days to GMLOS:1 3     OBJECTIVE:    Risk of Unplanned Readmission Score: 41         Current admission status: Inpatient       Preferred Pharmacy:   [facility], [address]  [facility]  [place] [address]  Phone: [phone] Fax: [phone]    Primary Care Provider: CHERELLE Krueger    Primary Insurance: [facility] DUAL COMPLETE [address] REP  Secondary Insurance: [address]    ASSESSMENT:  [address] [place] [address] Representative - Daughter   Primary Phone: [phone] (Mobile)                              Patient Information  Admitted from[de-identified] Home  Mental Status: Alert  During Assessment patient was accompanied by: Not accompanied during assessment  Assessment information provided by[de-identified] Patient  Primary Caregiver: Family  Caregiver's Name[de-identified] Dre Vergara Relationship to Patient[de-identified] Family Member  Caregiver's Telephone Number[de-identified] [phone]  Support Systems: Children,Friends/neighbors,Family members  [place] of Residence: [address] [place] [address] do you live in?: [place] entry access options   Select all that apply : Stairs  Number of steps to enter home : 3  Type of Current Residence: Apartment  Floor Level: 1  Upon entering residence, is there a bedroom on the main floor (no further steps)?: Yes  Upon entering residence, is there a bathroom on the main floor (no further steps)?: Yes  In the last 12 months, was there a time when you were not able to pay the mortgage or rent on time?: No  In the last 12 months, how many places have you lived?: 1  In the last 12 months, was there a time when you did not have a steady place to sleep or slept in a shelter (including now)?: No  Homeless/housing insecurity resource given?: N/A  Living Arrangements: Lives w/ Daughter    Activities of Daily Living Prior to Admission  Functional Status: Assistance  Completes ADLs independently?: No  Level of ADL dependence: Assistance  Ambulates independently?: No  Level of ambulatory dependence: Assistance  Does patient use assisted devices?: Yes  Assisted Devices (DME) used: Straight Cane,Rollator  Does patient currently own DME?: Yes  What DME does the patient currently own?: Karen Tristan  Does patient have a history of Outpatient Therapy (PT/OT)?: No  Does the patient have a history of Short-Term Rehab?: No  Does patient have a history of HHC?: Yes ([facility])  [facility] No         Patient Information Continued  Income Source: SSI/SSD  Does patient have prescription coverage?: Yes  Within the past 12 months, you worried that your food would run out before you got the money to buy more : Never true  Within the past 12 months, the food you bought just didnt last and you didnt have money to get more : Never true  Food insecurity resource given?: N/A  Does patient receive dialysis treatments?: No  Does patient have a history of substance abuse?: No  Does patient have a history of Mental Health Diagnosis?: Yes  Is patient receiving treatment for mental health?: Yes  Has patient received inpatient treatment related to mental health in the last 2 years?: No         Means of Transportation  Means of Transport to Appts[de-identified] Family transport  In the past 12 months, has lack of transportation kept you from medical appointments or from getting medications?: No  In the past 12 months, has lack of transportation kept you from meetings, work, or from getting things needed for daily living?: No  Was application for public transport provided?: N/A        DISCHARGE DETAILS:    Discharge planning discussed with[de-identified] Patient and daughter        CM contacted family/caregiver?: Yes  Were Treatment Team discharge recommendations reviewed with patient/caregiver?: Yes  Did patient/caregiver verbalize understanding of patient care needs?: Yes  Were patient/caregiver advised of the risks associated with not following Treatment Team discharge recommendations?: Yes    Contacts  Patient Contacts: Valeria Winston (daughter)  Relationship to Patient[de-identified] Family  Contact Method: Phone  Phone Number: [phone]  Reason/Outcome: Continuity of Care,Emergency Contact              Other Referral/Resources/Interventions Provided:  Programs[de-identified] CHF               Transport at Discharge : Fatuma Dodge by: Family member              Additional Comments: CM met with pt and spoke with daughter on the phone  Pt lives with her daughter who is her primary caregiver  Both pt and daughter are open to the idea of having a VN come to the home to do some reeducation on CHF   Pt's daughter is working on making improvement in her mother's diet and fluid intake to hopeful decrease the frequency of her having to come into the hospital  Statement Selected

## 2022-04-04 ENCOUNTER — APPOINTMENT (OUTPATIENT)
Dept: VASCULAR SURGERY | Facility: CLINIC | Age: 32
End: 2022-04-04

## 2022-07-12 ENCOUNTER — INPATIENT (INPATIENT)
Facility: HOSPITAL | Age: 32
LOS: 0 days | Discharge: HOME | DRG: 640 | End: 2022-07-13
Attending: INTERNAL MEDICINE | Admitting: STUDENT IN AN ORGANIZED HEALTH CARE EDUCATION/TRAINING PROGRAM
Payer: MEDICAID

## 2022-07-12 VITALS
HEART RATE: 83 BPM | DIASTOLIC BLOOD PRESSURE: 93 MMHG | HEIGHT: 62 IN | RESPIRATION RATE: 16 BRPM | OXYGEN SATURATION: 98 % | TEMPERATURE: 99 F | SYSTOLIC BLOOD PRESSURE: 160 MMHG

## 2022-07-12 DIAGNOSIS — N18.6 END STAGE RENAL DISEASE: ICD-10-CM

## 2022-07-12 DIAGNOSIS — Z98.51 TUBAL LIGATION STATUS: Chronic | ICD-10-CM

## 2022-07-12 DIAGNOSIS — I77.0 ARTERIOVENOUS FISTULA, ACQUIRED: Chronic | ICD-10-CM

## 2022-07-12 DIAGNOSIS — Z86.79 PERSONAL HISTORY OF OTHER DISEASES OF THE CIRCULATORY SYSTEM: ICD-10-CM

## 2022-07-12 DIAGNOSIS — R55 SYNCOPE AND COLLAPSE: ICD-10-CM

## 2022-07-12 DIAGNOSIS — Z98.89 OTHER SPECIFIED POSTPROCEDURAL STATES: Chronic | ICD-10-CM

## 2022-07-12 LAB
ALBUMIN SERPL ELPH-MCNC: 4.2 G/DL — SIGNIFICANT CHANGE UP (ref 3.3–5.2)
ALP SERPL-CCNC: 230 U/L — HIGH (ref 40–120)
ALT FLD-CCNC: 27 U/L — SIGNIFICANT CHANGE UP
ANION GAP SERPL CALC-SCNC: 15 MMOL/L — SIGNIFICANT CHANGE UP (ref 5–17)
APPEARANCE UR: CLEAR — SIGNIFICANT CHANGE UP
AST SERPL-CCNC: 23 U/L — SIGNIFICANT CHANGE UP
BACTERIA # UR AUTO: ABNORMAL
BASOPHILS # BLD AUTO: 0.03 K/UL — SIGNIFICANT CHANGE UP (ref 0–0.2)
BASOPHILS NFR BLD AUTO: 0.3 % — SIGNIFICANT CHANGE UP (ref 0–2)
BILIRUB SERPL-MCNC: 0.9 MG/DL — SIGNIFICANT CHANGE UP (ref 0.4–2)
BILIRUB UR-MCNC: NEGATIVE — SIGNIFICANT CHANGE UP
BUN SERPL-MCNC: 17.9 MG/DL — SIGNIFICANT CHANGE UP (ref 8–20)
CALCIUM SERPL-MCNC: 9.3 MG/DL — SIGNIFICANT CHANGE UP (ref 8.6–10.2)
CHLORIDE SERPL-SCNC: 89 MMOL/L — LOW (ref 98–107)
CO2 SERPL-SCNC: 25 MMOL/L — SIGNIFICANT CHANGE UP (ref 22–29)
COLOR SPEC: YELLOW — SIGNIFICANT CHANGE UP
CREAT SERPL-MCNC: 3.97 MG/DL — HIGH (ref 0.5–1.3)
DIFF PNL FLD: ABNORMAL
EGFR: 14 ML/MIN/1.73M2 — LOW
EOSINOPHIL # BLD AUTO: 0.07 K/UL — SIGNIFICANT CHANGE UP (ref 0–0.5)
EOSINOPHIL NFR BLD AUTO: 0.7 % — SIGNIFICANT CHANGE UP (ref 0–6)
EPI CELLS # UR: ABNORMAL
GLUCOSE SERPL-MCNC: 85 MG/DL — SIGNIFICANT CHANGE UP (ref 70–99)
GLUCOSE UR QL: 50 MG/DL
HCT VFR BLD CALC: 34.1 % — LOW (ref 34.5–45)
HGB BLD-MCNC: 11.9 G/DL — SIGNIFICANT CHANGE UP (ref 11.5–15.5)
IMM GRANULOCYTES NFR BLD AUTO: 0.6 % — SIGNIFICANT CHANGE UP (ref 0–1.5)
KETONES UR-MCNC: NEGATIVE — SIGNIFICANT CHANGE UP
LEUKOCYTE ESTERASE UR-ACNC: ABNORMAL
LIDOCAIN IGE QN: 64 U/L — HIGH (ref 22–51)
LYMPHOCYTES # BLD AUTO: 0.74 K/UL — LOW (ref 1–3.3)
LYMPHOCYTES # BLD AUTO: 7.9 % — LOW (ref 13–44)
MAGNESIUM SERPL-MCNC: 1.8 MG/DL — SIGNIFICANT CHANGE UP (ref 1.6–2.6)
MCHC RBC-ENTMCNC: 31.5 PG — SIGNIFICANT CHANGE UP (ref 27–34)
MCHC RBC-ENTMCNC: 34.9 GM/DL — SIGNIFICANT CHANGE UP (ref 32–36)
MCV RBC AUTO: 90.2 FL — SIGNIFICANT CHANGE UP (ref 80–100)
MONOCYTES # BLD AUTO: 0.92 K/UL — HIGH (ref 0–0.9)
MONOCYTES NFR BLD AUTO: 9.8 % — SIGNIFICANT CHANGE UP (ref 2–14)
NEUTROPHILS # BLD AUTO: 7.55 K/UL — HIGH (ref 1.8–7.4)
NEUTROPHILS NFR BLD AUTO: 80.7 % — HIGH (ref 43–77)
NITRITE UR-MCNC: NEGATIVE — SIGNIFICANT CHANGE UP
OSMOLALITY UR: 125 MOSM/KG — LOW (ref 300–1000)
PH UR: 8 — SIGNIFICANT CHANGE UP (ref 5–8)
PLATELET # BLD AUTO: 156 K/UL — SIGNIFICANT CHANGE UP (ref 150–400)
POTASSIUM SERPL-MCNC: 3.5 MMOL/L — SIGNIFICANT CHANGE UP (ref 3.5–5.3)
POTASSIUM SERPL-SCNC: 3.5 MMOL/L — SIGNIFICANT CHANGE UP (ref 3.5–5.3)
PROT SERPL-MCNC: 7.9 G/DL — SIGNIFICANT CHANGE UP (ref 6.6–8.7)
PROT UR-MCNC: 30 MG/DL
RAPID RVP RESULT: SIGNIFICANT CHANGE UP
RBC # BLD: 3.78 M/UL — LOW (ref 3.8–5.2)
RBC # FLD: 13.5 % — SIGNIFICANT CHANGE UP (ref 10.3–14.5)
RBC CASTS # UR COMP ASSIST: SIGNIFICANT CHANGE UP /HPF (ref 0–4)
SARS-COV-2 RNA SPEC QL NAA+PROBE: SIGNIFICANT CHANGE UP
SODIUM SERPL-SCNC: 129 MMOL/L — LOW (ref 135–145)
SODIUM UR-SCNC: 43 MMOL/L — SIGNIFICANT CHANGE UP
SP GR SPEC: 1.01 — SIGNIFICANT CHANGE UP (ref 1.01–1.02)
TROPONIN T SERPL-MCNC: <0.01 NG/ML — SIGNIFICANT CHANGE UP (ref 0–0.06)
TROPONIN T SERPL-MCNC: <0.01 NG/ML — SIGNIFICANT CHANGE UP (ref 0–0.06)
UROBILINOGEN FLD QL: NEGATIVE MG/DL — SIGNIFICANT CHANGE UP
WBC # BLD: 9.37 K/UL — SIGNIFICANT CHANGE UP (ref 3.8–10.5)
WBC # FLD AUTO: 9.37 K/UL — SIGNIFICANT CHANGE UP (ref 3.8–10.5)
WBC UR QL: SIGNIFICANT CHANGE UP /HPF (ref 0–5)

## 2022-07-12 PROCEDURE — 99285 EMERGENCY DEPT VISIT HI MDM: CPT

## 2022-07-12 PROCEDURE — 99222 1ST HOSP IP/OBS MODERATE 55: CPT

## 2022-07-12 PROCEDURE — 71045 X-RAY EXAM CHEST 1 VIEW: CPT | Mod: 26

## 2022-07-12 PROCEDURE — 93010 ELECTROCARDIOGRAM REPORT: CPT

## 2022-07-12 PROCEDURE — 99223 1ST HOSP IP/OBS HIGH 75: CPT

## 2022-07-12 RX ORDER — ACETAMINOPHEN 500 MG
650 TABLET ORAL EVERY 6 HOURS
Refills: 0 | Status: DISCONTINUED | OUTPATIENT
Start: 2022-07-12 | End: 2022-07-13

## 2022-07-12 RX ORDER — LANOLIN ALCOHOL/MO/W.PET/CERES
3 CREAM (GRAM) TOPICAL AT BEDTIME
Refills: 0 | Status: DISCONTINUED | OUTPATIENT
Start: 2022-07-12 | End: 2022-07-13

## 2022-07-12 RX ORDER — LABETALOL HCL 100 MG
100 TABLET ORAL
Refills: 0 | Status: DISCONTINUED | OUTPATIENT
Start: 2022-07-12 | End: 2022-07-13

## 2022-07-12 RX ORDER — ONDANSETRON 8 MG/1
4 TABLET, FILM COATED ORAL ONCE
Refills: 0 | Status: COMPLETED | OUTPATIENT
Start: 2022-07-12 | End: 2022-07-12

## 2022-07-12 RX ORDER — LABETALOL HCL 100 MG
1 TABLET ORAL
Qty: 0 | Refills: 0 | DISCHARGE

## 2022-07-12 RX ORDER — ACETAMINOPHEN 500 MG
975 TABLET ORAL ONCE
Refills: 0 | Status: COMPLETED | OUTPATIENT
Start: 2022-07-12 | End: 2022-07-12

## 2022-07-12 RX ORDER — OMEPRAZOLE 10 MG/1
1 CAPSULE, DELAYED RELEASE ORAL
Qty: 0 | Refills: 0 | DISCHARGE

## 2022-07-12 RX ORDER — CALCIUM ACETATE 667 MG
1334 TABLET ORAL
Refills: 0 | Status: DISCONTINUED | OUTPATIENT
Start: 2022-07-12 | End: 2022-07-13

## 2022-07-12 RX ORDER — SODIUM CHLORIDE 9 MG/ML
500 INJECTION INTRAMUSCULAR; INTRAVENOUS; SUBCUTANEOUS ONCE
Refills: 0 | Status: COMPLETED | OUTPATIENT
Start: 2022-07-12 | End: 2022-07-12

## 2022-07-12 RX ORDER — FOLIC ACID 0.8 MG
1 TABLET ORAL DAILY
Refills: 0 | Status: DISCONTINUED | OUTPATIENT
Start: 2022-07-12 | End: 2022-07-13

## 2022-07-12 RX ORDER — ONDANSETRON 8 MG/1
4 TABLET, FILM COATED ORAL EVERY 8 HOURS
Refills: 0 | Status: DISCONTINUED | OUTPATIENT
Start: 2022-07-12 | End: 2022-07-13

## 2022-07-12 RX ADMIN — SODIUM CHLORIDE 500 MILLILITER(S): 9 INJECTION INTRAMUSCULAR; INTRAVENOUS; SUBCUTANEOUS at 14:05

## 2022-07-12 RX ADMIN — Medication 975 MILLIGRAM(S): at 16:15

## 2022-07-12 RX ADMIN — SODIUM CHLORIDE 500 MILLILITER(S): 9 INJECTION INTRAMUSCULAR; INTRAVENOUS; SUBCUTANEOUS at 12:37

## 2022-07-12 RX ADMIN — Medication 975 MILLIGRAM(S): at 14:35

## 2022-07-12 RX ADMIN — Medication 650 MILLIGRAM(S): at 22:54

## 2022-07-12 NOTE — CONSULT NOTE ADULT - ASSESSMENT
31 yo female with PMHx ESRD 2/2 IgA nephropathy on HD (//Sat),  presenting with lightheadedness, weakness, and n/v s/p dialysis. Patient states she received dialysis at approximately 9 AM this AM after which she went home and felt lightheaded, nauseous, as well as palpitations at which point she called EMS. Denies fevers, CP, SOB, abdominal pain, back pain, peripheral edema, syncope. Denies CAD, stents, smoking, alcohol use. Denies fevers, chills, dizziness, dysphagia, dysarthria, diplopia, photophobia, syncope, cough, congestion, SOB, CP, abdominal pain, neck pain, back pain, diarrhea, dysuria, hematuria, hematochezia, hematemesis, recent travel, sick contacts, leg swelling.  A cardiac consult was called due to ekg changes noted, t waves inversion in inferior leads and lateral leads.  Patient states she was in the hospital at [facility] with pericarditis and drained a fluid out of her chest, approximately 2 months ago.    Patient states she never followed up with the cardiologist.  On exam, Patient is lying on stretcher, no chest pain, lightheadedness has resolved, on exam appear euvolemic and +3/6 murmur noted.

## 2022-07-12 NOTE — ED PROVIDER NOTE - OBJECTIVE STATEMENT
Patient is a 33 yo female with PMHx ESRD 2/2 IgA nephropathy on HD (//Sat),  presenting with lightheadedness, weakness, and n/v s/p dialysis. Patient states she received dialysis at approximately 9 AM this AM after which she went home and felt lightheaded, nauseous, as well as palpitations at which point she called EMS. Denies fevers, CP, SOB, abdominal pain, back pain, peripheral edema, syncope. Denies CAD, stents, smoking, alcohol use. Denies fevers, chills, dizziness, dysphagia, dysarthria, diplopia, photophobia, syncope, cough, congestion, SOB, CP, abdominal pain, neck pain, back pain, diarrhea, dysuria, hematuria, hematochezia, hematemesis, recent travel, sick contacts, leg swelling.

## 2022-07-12 NOTE — ED PROVIDER NOTE - NSICDXPASTSURGICALHX_GEN_ALL_CORE_FT
PAST SURGICAL HISTORY:  A-V fistula AMBARE, 16    H/O cervical biopsy in the context of ELISE II    History of  ,     S/P tubal ligation

## 2022-07-12 NOTE — ED PROVIDER NOTE - PROGRESS NOTE DETAILS
JK - patient vss, resting comfortably in no distress. reports mild improvement in her nausea but patient still feels weak and lightheaded. Found to be hyponatremic to 129; labs otherwise WNL, troponin negative. Nephrology consulted, serum osmolality, urine creatinine and sodium ordered. Will continue to monitor. Alessio: I spoke to Dr. Torre who says this is likely chronic (prior labs show she has been hyponatremic to 129 in the past), recommends cardiology, nothing to do from nephrology perspective. JK - patient VSS, resting comfortably in no apparent distress. Reports feeling weak and lightheaded at the time. Cardiology consulted, recommended TTE as patient has a history of pericarditis with ECG showing new T wave inversions in the inferior and lateral leads. Patient currently stable, ready and agreeable to admission for TTE and further monitoring.

## 2022-07-12 NOTE — CONSULT NOTE ADULT - SUBJECTIVE AND OBJECTIVE BOX
[facility]                                              CARDIOLOGY AT [address], [place] [address]                                             Telephone: [phone]. Fax:[phone]                                                       CARDIOLOGY CONSULTATION NOTE                                                                                             History obtained by: Patient and medical record  Community Cardiologist:MD Moy Lynn    obtained: Yes [ x ] No [  ]  Reason for Consultation:   Available out pt records reviewed: Yes [  ] No [  ]    Chief complaint:    Patient is a 32y old  Female who presents with a chief complaint of Lightheadedness dizziness and feeling of passing out.      HPI:      CARDIAC TESTING  currently unable - sees MD Moy Lynn with [facility].  They are currently closed.    33 yo female with PMHx ESRD 2/2 IgA nephropathy on HD (//Sat),  presenting with lightheadedness, weakness, and n/v s/p dialysis. Patient states she received dialysis at approximately 9 AM this AM after which she went home and felt lightheaded, nauseous, as well as palpitations at which point she called EMS. Denies fevers, CP, SOB, abdominal pain, back pain, peripheral edema, syncope. Denies CAD, stents, smoking, alcohol use. Denies fevers, chills, dizziness, dysphagia, dysarthria, diplopia, photophobia, syncope, cough, congestion, SOB, CP, abdominal pain, neck pain, back pain, diarrhea, dysuria, hematuria, hematochezia, hematemesis, recent travel, sick contacts, leg swelling.  ECHO:    STRESS:    CATH:     ELECTROPHYSIOLOGY:     PAST MEDICAL HISTORY  No pertinent past medical history  Chronic kidney disease (CKD) stage G5/A1, glomerular filtration rate (GFR) less than or equal to 15 mL/min/1.73 square meter and albuminuria creatinine ratio less than 30 mg/g  IgA nephropathy  H/O influenza  Pneumonia  Pericardial effusion    PAST SURGICAL HISTORY  No significant past surgical history  H/O cervical biopsy  History of   A-V fistula  S/P tubal ligation      SOCIAL HISTORY:  Denies smoking/alcohol/drugs    FAMILY HISTORY:    Family History of Cardiovascular Disease:  Yes [  ] No [x  ]  Coronary Artery Disease in first degree relative: Yes [  ] No [x  ]  Sudden Cardiac Death in First degree relative: Yes [  ] No [x  ]    HOME MEDICATIONS:  calcium acetate 667 mg oral tablet: 3 tab(s) orally 3 times a day (10 Nov 2021 15:42)  folic acid 1 mg oral tablet: 1 tab(s) orally once a day (10 Nov 2021 15:42)  furosemide 40 mg oral tablet: 1 tab(s) orally once a day (10 Nov 2021 15:42)  omeprazole 40 mg oral delayed release capsule: 1 cap(s) orally once a day (10 Nov 2021 15:42)  Sydni-Christopher oral tablet: 1 tab(s) orally once a day (10 Nov 2021 15:42)  Renal Vitamin oral tablet: 1 tab(s) orally once a day (10 Nov 2021 15:42)      CURRENT CARDIAC MEDICATIONS:      CURRENT OTHER MEDICATIONS:      ALLERGIES:   No Known Allergies    REVIEW OF SYMPTOMS:   CONSTITUTIONAL: No fever, no chills, no weight loss, no weight gain, no fatigue   ENMT:  No vertigo; No sinus or throat pain  NECK: No pain or stiffness  CARDIOVASCULAR: No chest pain, no dyspnea, no syncope/presyncope (see HPI), no palpitations, no dizziness, no Orthopnea, no Paroxsymal nocturnal dyspnea  RESPIRATORY: no Shortness of breath, no cough, no wheezing  : No dysuria, no hematuria   GI: No dark color stool, no nausea, no diarrhea, no constipation, no abdominal pain   NEURO: No headache, no slurred speech   MUSCULOSKELETAL: No joint pain or swelling; No muscle, back, or extremity pain  PSYCH: No agitation, no anxiety.    ALL OTHER REVIEW OF SYSTEMS ARE NEGATIVE.    VITAL SIGNS:  T(C): 36.8 (22 @ 15:42), Max: 37 (22 @ 11:42)  T(F): 98.3 (22 @ 15:42), Max: 98.6 (22 @ 11:42)  HR: 82 (22 @ 15:42) (82 - 83)  BP: 138/84 (22 @ 15:42) (138/84 - 160/93)  RR: 18 (22 @ 15:42) (16 - 18)  SpO2: 99% (22 @ 15:42) (98% - 99%)    INTAKE AND OUTPUT:     PHYSICAL EXAM:  Constitutional: Comfortable . No acute distress.   HEENT: Atraumatic and normocephalic , neck is supple . no JVD. No carotid bruit.  CNS: A&Ox3. No focal deficits.   Respiratory: CTAB, unlabored   Cardiovascular: RRR normal s1 s2. + 3/6 murmur. No rubs or gallop.  Gastrointestinal: Soft, non-tender. +Bowel sounds.   Extremities: 2+ Peripheral Pulses, No clubbing, cyanosis, or edema  Psychiatric: Calm . no agitation.   Skin: Warm and dry, no ulcers on extremities     LABS:  ( 2022 12:51 )  Troponin T  <0.01,  CPK  X    , CKMB  X    , BNP X                    11.9   9.37  )-----------( 156      ( 2022 12:51 )             34.1     07-12    129<L>  |  89<L>  |  17.9  ----------------------------<  85  3.5   |  25.0  |  3.97<H>    Ca    9.3      2022 12:51  Mg     1.8     07-12    TPro  7.9  /  Alb  4.2  /  TBili  0.9  /  DBili  x   /  AST  23  /  ALT  27  /  AlkPhos  230<H>  07-12    INTERPRETATION OF TELEMETRY: Sr, no acute alarms noted    ECG: Ischmeic changes noted in inferior leads and anterior leads.    Prior ECG: Yes [x  ] No [  ] - no ischemic changes noted.                                                    [facility]                                              CARDIOLOGY AT [facility]                                                   [address] [facility], [place] [address]                                             Telephone: [phone]. Fax:[phone]                                                       CARDIOLOGY CONSULTATION NOTE                                                                                             History obtained by: Patient and medical record  Community Cardiologist:MD Moy Lynn    obtained: Yes [ x ] No [  ]  Reason for Consultation:   Available out pt records reviewed: Yes [  ] No [  ]    Chief complaint:    Patient is a 32y old  Female who presents with a chief complaint of Lightheadedness dizziness and feeling of passing out.      HPI:  31 yo female with PMHx ESRD 2/2 IgA nephropathy on HD (//Sat),  presenting with lightheadedness, weakness, and n/v s/p dialysis. Patient states she received dialysis at approximately 9 AM this AM after which she went home and felt lightheaded, nauseous, as well as palpitations at which point she called EMS. Denies fevers, CP, SOB, abdominal pain, back pain, peripheral edema, syncope. Denies CAD, stents, smoking, alcohol use. Denies fevers, chills, dizziness, dysphagia, dysarthria, diplopia, photophobia, syncope, cough, congestion, SOB, CP, abdominal pain, neck pain, back pain, diarrhea, dysuria, hematuria, hematochezia, hematemesis, recent travel, sick contacts, leg swelling.  A cardiac consult was called due to ekg changes noted, t waves inversion in inferior leads and lateral leads.  Patient states she was in the hospital at [facility] with pericarditis and drained a fluid out of her chest, approximately 2 months ago.    Patient states she never followed up with the cardiologist.  On exam, Patient is lying on stretcher, no chest pain, lightheadedness has resolved, on exam appear euvolemic and +3/6 murmur noted.     CARDIAC TESTING  currently unable - sees MD Moy Lynn with [facility].  They are currently closed.    Patient is a poor historian did not know her cardiologist name and does not have a PCP.  Testing is unknown.      ECKO    STRESS:    CATH:     ELECTROPHYSIOLOGY:     PAST MEDICAL HISTORY  No pertinent past medical history  Chronic kidney disease (CKD) stage G5/A1, glomerular filtration rate (GFR) less than or equal to 15 mL/min/1.73 square meter and albuminuria creatinine ratio less than 30 mg/g  IgA nephropathy  H/O influenza  Pneumonia  Pericardial effusion    PAST SURGICAL HISTORY  No significant past surgical history  H/O cervical biopsy  History of   A-V fistula  S/P tubal ligation      SOCIAL HISTORY:  Denies smoking/alcohol/drugs    FAMILY HISTORY:    Family History of Cardiovascular Disease:  Yes [  ] No [x  ]  Coronary Artery Disease in first degree relative: Yes [  ] No [x  ]  Sudden Cardiac Death in First degree relative: Yes [  ] No [x  ]    HOME MEDICATIONS:  calcium acetate 667 mg oral tablet: 3 tab(s) orally 3 times a day (10 Nov 2021 15:42)  folic acid 1 mg oral tablet: 1 tab(s) orally once a day (10 Nov 2021 15:42)  furosemide 40 mg oral tablet: 1 tab(s) orally once a day (10 Nov 2021 15:42)  omeprazole 40 mg oral delayed release capsule: 1 cap(s) orally once a day (10 Nov 2021 15:42)  Sydni-Christopher oral tablet: 1 tab(s) orally once a day (10 Nov 2021 15:42)  Renal Vitamin oral tablet: 1 tab(s) orally once a day (10 Nov 2021 15:42)      CURRENT CARDIAC MEDICATIONS:      CURRENT OTHER MEDICATIONS:      ALLERGIES:   No Known Allergies    REVIEW OF SYMPTOMS:   CONSTITUTIONAL: No fever, no chills, no weight loss, no weight gain, no fatigue   ENMT:  No vertigo; No sinus or throat pain  NECK: No pain or stiffness  CARDIOVASCULAR: No chest pain, no dyspnea, no syncope/presyncope (see HPI), no palpitations, no dizziness, no Orthopnea, no Paroxsymal nocturnal dyspnea  RESPIRATORY: no Shortness of breath, no cough, no wheezing  : No dysuria, no hematuria   GI: No dark color stool, no nausea, no diarrhea, no constipation, no abdominal pain   NEURO: No headache, no slurred speech   MUSCULOSKELETAL: No joint pain or swelling; No muscle, back, or extremity pain  PSYCH: No agitation, no anxiety.    ALL OTHER REVIEW OF SYSTEMS ARE NEGATIVE.    VITAL SIGNS:  T(C): 36.8 (22 @ 15:42), Max: 37 (22 @ 11:42)  T(F): 98.3 (22 @ 15:42), Max: 98.6 (22 @ 11:42)  HR: 82 (22 @ 15:42) (82 - 83)  BP: 138/84 (22 @ 15:42) (138/84 - 160/93)  RR: 18 (22 @ 15:42) (16 - 18)  SpO2: 99% (22 @ 15:42) (98% - 99%)    INTAKE AND OUTPUT:     PHYSICAL EXAM:  Constitutional: Comfortable . No acute distress.   HEENT: Atraumatic and normocephalic , neck is supple . no JVD. No carotid bruit.  CNS: A&Ox3. No focal deficits.   Respiratory: CTAB, unlabored   Cardiovascular: RRR normal s1 s2. + 3/6 murmur. No rubs or gallop.  Gastrointestinal: Soft, non-tender. +Bowel sounds.   Extremities: 2+ Peripheral Pulses, No clubbing, cyanosis, or edema  Psychiatric: Calm . no agitation.   Skin: Warm and dry, no ulcers on extremities     LABS:  ( 2022 12:51 )  Troponin T  <0.01,  CPK  X    , CKMB  X    , BNP X                    11.9   9.37  )-----------( 156      ( 2022 12:51 )             34.1     -    129<L>  |  89<L>  |  17.9  ----------------------------<  85  3.5   |  25.0  |  3.97<H>    Ca    9.3      2022 12:51  Mg     1.8     -    TPro  7.9  /  Alb  4.2  /  TBili  0.9  /  DBili  x   /  AST  23  /  ALT  27  /  AlkPhos  230<H>  12    INTERPRETATION OF TELEMETRY: Sr, no acute alarms noted    ECG: Ischemic changes noted in inferior leads and anterior leads.    Prior ECG: Yes [x  ] No [  ] - no ischemic changes noted.  21

## 2022-07-12 NOTE — H&P ADULT - ASSESSMENT
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 Patient said she completed her dialysis section this morning, went home; few hours later, started feeling weak, lightheaded and nauseous so she called EMS. In the ED, ECG noted to have TWI in inferior and lateral leads. Cardiology consulted     Presyncope   Admit to telemetry   Likely due to dehydration; patient s/p HD and also on Lasix 40mg daily   NS 500ml once given in the ED   ECG as noted above   Troponin x 2 negative   Echo ordered   Cardiology on board     ESRD on HD  Last HD today, completed session   Nephrology on board  Calcium acetate with meals     Hyponatremia   Na: 129  Likely due to diuretic   Hold Lasix 40mg   Monitor BMP     HTN  Labetalol 100mg bid with holding parameters    Supportive   DVT prophylaxis: CSD   Diet: renal     Plan of care discussed with patient

## 2022-07-12 NOTE — ED PROVIDER NOTE - NSICDXPASTMEDICALHX_GEN_ALL_CORE_FT
PAST MEDICAL HISTORY:  H/O influenza 5/2021    IgA nephropathy ESRD  on dialysis since 2016    Pneumonia 12/2020

## 2022-07-12 NOTE — ED PROVIDER NOTE - CLINICAL SUMMARY MEDICAL DECISION MAKING FREE TEXT BOX
Patient is a 33 yo female with PMHx ESRD 2/2 IgA nephropathy on HD (//Sat),  presenting with lightheadedness, weakness, and n/v s/p dialysis. Patient states she received dialysis at approximately 9 AM this AM after which she went home and felt lightheaded, nauseous, as well as palpitations at which point she called EMS. vss, resting comfortably, lungs ctab, belly soft nontender, no peripheral edema, 2+ pulses, capillary refill < 2 seconds. Will hydrate and give zofran for n/v, check labs kidney functions lipase to r/o electrolyte abnormalities and pancreatitis, troponin ecg to r/o acs and continue to monitor.

## 2022-07-12 NOTE — CONSULT NOTE ADULT - PROBLEM SELECTOR RECOMMENDATION 9
Lightheadedness, nausea, and weakness past dialysis   Symptoms have currently resolved  Consider fluid shifts as cause- Patient with sodium 129 - nephology has been consulted.    Ct telemetry  Trend trops  Trend ekg  TTE

## 2022-07-12 NOTE — ED ADULT NURSE NOTE - OBJECTIVE STATEMENT
32 yof presents to ed stating she had dialysis (@ [facility], T, TH, Sat via left avf) here for generalized weakness, denies fever/ cough/chills. pt has hx of recent open heart surgery with fluid taken off.

## 2022-07-12 NOTE — CONSULT NOTE ADULT - PROBLEM SELECTOR RECOMMENDATION 2
Patient with new ekg change  7/12/22 ECG: Ischemic changes noted in inferior leads and anterior leads.    Prior ECG: one year ago - no ischemic changes noted.  7/16/21  EKG may be related to Hx of pericarditis and pericardial drain 2 months ago at [facility]  EKG in am  TTE has been ordered

## 2022-07-12 NOTE — H&P ADULT - HISTORY OF PRESENT ILLNESS
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 Patient said she completed her dialysis section this morning, went home; few hours later, started feeling weak, lightheaded and nauseous so she called EMS. She has no chest pain, shortness of breath, vomiting, abdominal pain, change in bowel/urinary habit, fever, chills.

## 2022-07-12 NOTE — ED PROVIDER NOTE - ATTENDING CONTRIBUTION TO CARE
I personally saw the patient with the resident, and completed the key components of the history and physical exam. I then discussed the management plan with the resident.    33 y/o F with PMH ESRD on HD T/T/S, pericardial effusion s/p window presents for feeling lightheaded, nauseas, palpitations s/p HD today, still feeling "unwell." She vomited twice prior to arrival. She has been getting HD x 6 years and never had these symptoms before, takes her BP medication prior to HD.     PE - NAD, well appearing, RRR, lungs CTA B/L, abd soft, NT/ND, no LE edema.    labs, EKG, fluids, will reassess.

## 2022-07-12 NOTE — ED ADULT TRIAGE NOTE - CHIEF COMPLAINT QUOTE
Pt states at the end of her dialysis treatment today she had episode of nausea and vomiting and state snow feeling generalized weakness. Pt denies chest pain/SOB/fever/chills.

## 2022-07-12 NOTE — ED PROVIDER NOTE - PHYSICAL EXAMINATION
Constitutional: Uncomfortable appearing, awake, alert, oriented to person, place, and time/situation and in no apparent distress  ENMT: Airway patent nasal mucosa clear. Mouth with normal mucosa. Throat has no vesicles no oropharyngeal exudates and uvula is midline. No blood in the oropharynx  EYES: clear bilaterally, pupils equal, round and reactive to light  Cardiac: Regular rate, regular rhythm. Heart sounds S1, S2. No murmurs, rubs or gallops. Good capillary refill, 2+ pulses, no peripheral edema  Respiratory: Lungs CTAB, no use of accessory muscles, no crackles, satting 99% on RA in no distress  Gastrointestinal: Abdomen nondistended, non-tender, no rebound guarding or peritoneal signs  Genitourinary: No CVA tenderness, pelvis nontender, bladder nondistended  Musculoskeletal: Spine appears normal, range of motion is not limited, no muscle or joint tenderness  Neurological: Alert and oriented, no focal deficits, no motor or sensory deficits. CN 2-12 intact, PERRLA, EOMI, No FND, moving all extremities equally, sensation intact, No dysmetria, no ataxia, negative heel-shin, 5/5 strength   Skin: L arm fistula with positive thrill

## 2022-07-12 NOTE — H&P ADULT - NSHPPHYSICALEXAM_GEN_ALL_CORE
Vital Signs Last 24 Hrs  T(C): 36.9 (12 Jul 2022 21:03), Max: 37 (12 Jul 2022 11:42)  T(F): 98.4 (12 Jul 2022 21:03), Max: 98.6 (12 Jul 2022 11:42)  HR: 73 (12 Jul 2022 21:03) (73 - 83)  BP: 123/74 (12 Jul 2022 21:03) (123/74 - 160/93)  BP(mean): --  RR: 18 (12 Jul 2022 21:03) (16 - 18)  SpO2: 97% (12 Jul 2022 21:03) (97% - 99%)    Parameters below as of 12 Jul 2022 21:03  Patient On (Oxygen Delivery Method): room air

## 2022-07-12 NOTE — CONSULT NOTE ADULT - NS ATTEND AMEND GEN_ALL_CORE FT
33 yo female with PMHx ESRD 2/2 IgA nephropathy on HD (//Sat),  presenting with lightheadedness, weakness, and n/v s/p dialysis. Patient states she received dialysis at approximately 9 AM this AM after which she went home and felt lightheaded, nauseous, as well as palpitations at which point she called EMS. Denies fevers, CP, SOB, abdominal pain, back pain, peripheral edema, syncope. Denies CAD, stents, smoking, alcohol use. Denies fevers, chills, dizziness, dysphagia, dysarthria, diplopia, photophobia, syncope, cough, congestion, SOB, CP, abdominal pain, neck pain, back pain, diarrhea, dysuria, hematuria, hematochezia, hematemesis, recent travel, sick contacts, leg swelling.  A cardiac consult was called due to ekg changes noted, t waves inversion in inferior leads and lateral leads.  Patient states she was in the hospital at [facility] with pericarditis and drained a fluid out of her chest, approximately 2 months ago.    Patient states she never followed up with the cardiologist.  On exam, Patient is lying on stretcher, no chest pain, lightheadedness has resolved, on exam appear euvolemic and +3/6 murmur noted.     : Kj Goodwin      Chaperone: Carol Monsalve  VITALS:   T(C): 36.8 (22 @ 15:42), Max: 37 (22 @ 11:42)  HR: 82 (22 @ 15:42) (82 - 83)  BP: 138/84 (22 @ 15:42) (138/84 - 160/93)  RR: 18 (22 @ 15:42) (16 - 18)  SpO2: 99% (22 @ 15:42) (98% - 99%)    GENERAL: NAD, lying in bed comfortably  HEAD:  Atraumatic, Normocephalic  EYES: EOMI, PERRLA, conjunctiva and sclera clear  ENT: Moist mucous membranes  NECK: Supple, No JVD  CHEST/LUNG: Clear to auscultation bilaterally; No rales, rhonchi, wheezing, or rubs. Unlabored respirations  HEART: Regular rate and rhythm; No murmurs, rubs, or gallops  ABDOMEN: BSx4; Soft, nontender, nondistended  EXTREMITIES:  2+ Peripheral Pulses, brisk capillary refill. No clubbing, cyanosis, or edema  NERVOUS SYSTEM:  A&Ox3, no focal deficits   SKIN: No rashes or lesions    A/P  1. Abnormal EKG- probably related to her recent pericarditis  2. ESRD on HD  3. Near Syncope probably due to dehydration    - Will get echo  - Follow EKG    Patient seen and examined by me.  I have discussed my recommendation with the PA which are outlined above.  Will follow.

## 2022-07-12 NOTE — PATIENT PROFILE OB - PRO PAIN LIFE ADAPT
Pt had called through [facility] ([facility]) wanting to change her appt for today into a VV. ECC let her know that we would call her back after speaking with the provider. Per Dr. Michael Chowdary this Pt needs to come in office. I called and informed the Pt that she needs to come in office. Pt was upset asking why the appt can not be a VV because at her last appt Dr. Michael Chowdary said that her next appt could be a VV. I informed her that due to her being hospitalized she needs to be seen in person. Pt the states that she just went to the podiatrist and that Dr. Michael Chowdary would not be doing anything different than him and that she wants Dr. Michael Chowdary to call her. I informed her that Dr. Michael Chowdary is seeing Pt's today but that I would send a message back. Pt would like Dr. Michael Chowdary to call when she has the time. none

## 2022-07-13 ENCOUNTER — TRANSCRIPTION ENCOUNTER (OUTPATIENT)
Age: 32
End: 2022-07-13

## 2022-07-13 VITALS
HEART RATE: 75 BPM | DIASTOLIC BLOOD PRESSURE: 71 MMHG | SYSTOLIC BLOOD PRESSURE: 121 MMHG | OXYGEN SATURATION: 99 % | RESPIRATION RATE: 18 BRPM | TEMPERATURE: 99 F

## 2022-07-13 LAB
ANION GAP SERPL CALC-SCNC: 17 MMOL/L — SIGNIFICANT CHANGE UP (ref 5–17)
BUN SERPL-MCNC: 36.9 MG/DL — HIGH (ref 8–20)
CALCIUM SERPL-MCNC: 9.2 MG/DL — SIGNIFICANT CHANGE UP (ref 8.6–10.2)
CHLORIDE SERPL-SCNC: 90 MMOL/L — LOW (ref 98–107)
CO2 SERPL-SCNC: 23 MMOL/L — SIGNIFICANT CHANGE UP (ref 22–29)
CREAT SERPL-MCNC: 5.64 MG/DL — HIGH (ref 0.5–1.3)
EGFR: 10 ML/MIN/1.73M2 — LOW
GLUCOSE SERPL-MCNC: 94 MG/DL — SIGNIFICANT CHANGE UP (ref 70–99)
HCT VFR BLD CALC: 31.9 % — LOW (ref 34.5–45)
HGB BLD-MCNC: 11.2 G/DL — LOW (ref 11.5–15.5)
MCHC RBC-ENTMCNC: 32.6 PG — SIGNIFICANT CHANGE UP (ref 27–34)
MCHC RBC-ENTMCNC: 35.1 GM/DL — SIGNIFICANT CHANGE UP (ref 32–36)
MCV RBC AUTO: 92.7 FL — SIGNIFICANT CHANGE UP (ref 80–100)
PLATELET # BLD AUTO: 176 K/UL — SIGNIFICANT CHANGE UP (ref 150–400)
POTASSIUM SERPL-MCNC: 3.8 MMOL/L — SIGNIFICANT CHANGE UP (ref 3.5–5.3)
POTASSIUM SERPL-SCNC: 3.8 MMOL/L — SIGNIFICANT CHANGE UP (ref 3.5–5.3)
RBC # BLD: 3.44 M/UL — LOW (ref 3.8–5.2)
RBC # FLD: 13.6 % — SIGNIFICANT CHANGE UP (ref 10.3–14.5)
SODIUM SERPL-SCNC: 130 MMOL/L — LOW (ref 135–145)
TROPONIN T SERPL-MCNC: <0.01 NG/ML — SIGNIFICANT CHANGE UP (ref 0–0.06)
TROPONIN T SERPL-MCNC: <0.01 NG/ML — SIGNIFICANT CHANGE UP (ref 0–0.06)
WBC # BLD: 8.85 K/UL — SIGNIFICANT CHANGE UP (ref 3.8–10.5)
WBC # FLD AUTO: 8.85 K/UL — SIGNIFICANT CHANGE UP (ref 3.8–10.5)

## 2022-07-13 PROCEDURE — 99232 SBSQ HOSP IP/OBS MODERATE 35: CPT

## 2022-07-13 PROCEDURE — 93306 TTE W/DOPPLER COMPLETE: CPT | Mod: 26

## 2022-07-13 PROCEDURE — 93010 ELECTROCARDIOGRAM REPORT: CPT | Mod: 76

## 2022-07-13 PROCEDURE — 99239 HOSP IP/OBS DSCHRG MGMT >30: CPT

## 2022-07-13 RX ORDER — LANOLIN ALCOHOL/MO/W.PET/CERES
1 CREAM (GRAM) TOPICAL
Qty: 0 | Refills: 0 | DISCHARGE
Start: 2022-07-13

## 2022-07-13 RX ORDER — HEPARIN SODIUM 5000 [USP'U]/ML
5000 INJECTION INTRAVENOUS; SUBCUTANEOUS EVERY 12 HOURS
Refills: 0 | Status: DISCONTINUED | OUTPATIENT
Start: 2022-07-13 | End: 2022-07-13

## 2022-07-13 RX ORDER — ACETAMINOPHEN 500 MG
2 TABLET ORAL
Qty: 0 | Refills: 0 | DISCHARGE
Start: 2022-07-13

## 2022-07-13 RX ADMIN — Medication 1 MILLIGRAM(S): at 08:15

## 2022-07-13 RX ADMIN — Medication 100 MILLIGRAM(S): at 05:51

## 2022-07-13 RX ADMIN — Medication 650 MILLIGRAM(S): at 00:00

## 2022-07-13 RX ADMIN — Medication 1334 MILLIGRAM(S): at 08:15

## 2022-07-13 RX ADMIN — Medication 1 TABLET(S): at 08:15

## 2022-07-13 NOTE — PROGRESS NOTE ADULT - SUBJECTIVE AND OBJECTIVE BOX
[facility]                                                         CARDIOLOGY AT [facility]                                                                  [address], [place] [address]                                                         Telephone: [phone]. Fax:[phone]                                                                             PROGRESS NOTE    Reason for follow up: hypovolemia post HD yesterday   Update: patient reports she is feeling better today and is without chest pain or dizziness/lightheadedness. pending TTE.  records obtained from visit at [facility] from 4/2022 visit.       Review of symptoms:   Cardiac:  No chest pain. No dyspnea. No palpitations.  Respiratory: no cough. No dyspnea  Gastrointestinal: No diarrhea. No abdominal pain. No bleeding.   Neuro: No focal neuro complaints.    Vitals:  T(C): 36.5 (07-13-22 @ 07:43), Max: 36.9 (07-12-22 @ 21:03)  HR: 63 (07-13-22 @ 07:43) (63 - 82)  BP: 105/69 (07-13-22 @ 07:43) (105/69 - 138/84)  RR: 18 (07-13-22 @ 07:43) (18 - 18)  SpO2: 99% (07-13-22 @ 07:43) (97% - 99%)  Wt(kg): --  I&O's Summary        PHYSICAL EXAM:  Appearance: Comfortable. No acute distress  HEENT:  Atraumatic. Normocephalic.  Normal oral mucosa  Neurologic: A & O x 3, no gross focal deficits.  Cardiovascular: RRR S1 S2, No murmur, no rubs/gallops. No JVD  Respiratory: Lungs clear to auscultation, unlabored   Gastrointestinal:  Soft, Non-tender, + BS  Lower Extremities: 2+ Peripheral Pulses, No clubbing, cyanosis, or edema  Psychiatry: Patient is calm. No agitation.   Skin: warm and dry.    CURRENT CARDIAC MEDICATIONS:  labetalol 100 milliGRAM(s) Oral two times a day      CURRENT OTHER MEDICATIONS:  acetaminophen     Tablet .. 650 milliGRAM(s) Oral every 6 hours PRN Temp greater or equal to 38C (100.4F), Mild Pain (1 - 3)  melatonin 3 milliGRAM(s) Oral at bedtime PRN Insomnia  ondansetron Injectable 4 milliGRAM(s) IV Push every 8 hours PRN Nausea and/or Vomiting  aluminum hydroxide/magnesium hydroxide/simethicone Suspension 30 milliLiter(s) Oral every 4 hours PRN Dyspepsia  calcium acetate 1334 milliGRAM(s) Oral three times a day with meals  folic acid 1 milliGRAM(s) Oral daily  heparin   Injectable 5000 Unit(s) SubCutaneous every 12 hours  multivitamin 1 Tablet(s) Oral daily      LABS:	 	  ( 13 Jul 2022 06:54 )  Troponin T  <0.01,  CPK  X    , CKMB  X    , BNP X        , ( 13 Jul 2022 00:33 )  Troponin T  <0.01,  CPK  X    , CKMB  X    , BNP X        , ( 12 Jul 2022 18:17 )  Troponin T  <0.01,  CPK  X    , CKMB  X    , BNP X                                  11.2   8.85  )-----------( 176      ( 13 Jul 2022 03:10 )             31.9     07-13    130<L>  |  90<L>  |  36.9<H>  ----------------------------<  94  3.8   |  23.0  |  5.64<H>    Ca    9.2      13 Jul 2022 00:33  Mg     1.8     07-12    TPro  7.9  /  Alb  4.2  /  TBili  0.9  /  DBili  x   /  AST  23  /  ALT  27  /  AlkPhos  230<H>  07-12      Lipid Profile:   HgA1c:   TSH:     TELEMETRY: not on telemetry  ECG:    DIAGNOSTIC TESTING:  [ x] Echocardiogram: pending TTE here    TTE at [facility] on 4/12/22: LVEF 50-55%, elevated LA pressure, mild LVH, normal systolic function, no AS +1 mild AR, mild to mod MV regurg, severe tricuspid valve regurg,     [ ]  Catheterization:  [ ] Stress Test:    OTHER:

## 2022-07-13 NOTE — DISCHARGE NOTE PROVIDER - NSDCMRMEDTOKEN_GEN_ALL_CORE_FT
acetaminophen 325 mg oral tablet: 2 tab(s) orally every 6 hours, As needed, Temp greater or equal to 38C (100.4F), Mild Pain (1 - 3)  calcium acetate 667 mg oral tablet: 3 tab(s) orally 3 times a day  folic acid 1 mg oral tablet: 1 tab(s) orally once a day  furosemide 40 mg oral tablet: 1 tab(s) orally once a day  labetalol 100 mg oral tablet: 1 tab(s) orally 2 times a day  melatonin 3 mg oral tablet: 1 tab(s) orally once a day (at bedtime), As needed, Insomnia  Sydni-Christopher oral tablet: 1 tab(s) orally once a day

## 2022-07-13 NOTE — PROGRESS NOTE ADULT - NS ATTEND AMEND GEN_ALL_CORE FT
33 yo female with PMHx ESRD 2/2 IgA nephropathy on HD (//Sat),  presenting with lightheadedness, weakness, and n/v s/p dialysis. Patient states she received dialysis at approximately 9 AM this AM after which she went home and felt lightheaded, nauseous, as well as palpitations at which point she called EMS. Denies fevers, CP, SOB, abdominal pain, back pain, peripheral edema, syncope. Denies CAD, stents, smoking, alcohol use. Denies fevers, chills, dizziness, dysphagia, dysarthria, diplopia, photophobia, syncope, cough, congestion, SOB, CP, abdominal pain, neck pain, back pain, diarrhea, dysuria, hematuria, hematochezia, hematemesis, recent travel, sick contacts, leg swelling.  A cardiac consult was called due to ekg changes noted, t waves inversion in inferior leads and lateral leads.  Patient states she was in the hospital at [facility] with pericarditis and drained a fluid out of her chest, approximately 2 months ago.    Patient states she never followed up with the cardiologist.  On exam, Patient is lying on stretcher, no chest pain, lightheadedness has resolved, on exam appear euvolemic and +3/6 murmur noted.     : Eveline Zhou NP      Chaperone: Eveline Zhou NP        VITALS:   T(C): 36.6 (22 @ 12:19), Max: 36.9 (22 @ 21:03)  HR: 74 (22 @ 12:19) (63 - 82)  BP: 126/78 (22 @ 12:19) (105/69 - 138/84)  RR: 18 (22 @ 12:19) (18 - 18)  SpO2: 99% (22 @ 12:19) (97% - 99%)    GENERAL: NAD, lying in bed comfortably  HEAD:  Atraumatic, Normocephalic  EYES: EOMI, PERRLA, conjunctiva and sclera clear  ENT: Moist mucous membranes  NECK: Supple, No JVD  CHEST/LUNG: Clear to auscultation bilaterally; No rales, rhonchi, wheezing, or rubs. Unlabored respirations  HEART: Regular rate and rhythm; No murmurs, rubs, or gallops  ABDOMEN: BSx4; Soft, nontender, nondistended  EXTREMITIES:  2+ Peripheral Pulses, brisk capillary refill. No clubbing, cyanosis, or edema  NERVOUS SYSTEM:  A&Ox3, no focal deficits   SKIN: No rashes or lesions    Troponins are negative.   A/P  1. Abnormal EKG- probably related to her recent pericarditis  2. ESRD on HD  3. Near Syncope probably due to dehydration    [facility] records reviewed, EKG on this admission is unchanged compared to EKG from [facility] in 2022  Patient was advised to f/u [facility] based on [facility] d/c summary, she was advised multiple other specialists, she did not f/u with any one. Patient states she has no insurance.  Social Service Consult suggested    Patient seen and examined by me.  I have discussed my recommendation with the PA which are outlined above.  Will sign off

## 2022-07-13 NOTE — DISCHARGE NOTE NURSING/CASE MANAGEMENT/SOCIAL WORK - PATIENT PORTAL LINK FT
You can access the FollowMyHealth Patient Portal offered by [facility] by registering at the following website: [email or web address]. By joining [employer]’s FollowMyHealth portal, you will also be able to view your health information using other applications (apps) compatible with our system.

## 2022-07-13 NOTE — PROGRESS NOTE ADULT - SUBJECTIVE AND OBJECTIVE BOX
[facility] Division of Hospital Medicine    Chief Complaint:      SUBJECTIVE / OVERNIGHT EVENTS:        PHYSICAL EXAM:  Vital Signs Last 24 Hrs  T(C): 36.5 (2022 07:43), Max: 37 (2022 11:42)  T(F): 97.7 (2022 07:43), Max: 98.6 (2022 11:42)  HR: 63 (2022 07:43) (63 - 83)  BP: 105/69 (2022 07:43) (105/69 - 160/93)  BP(mean): --  RR: 18 (2022 07:43) (16 - 18)  SpO2: 99% (2022 07:43) (97% - 99%)    Parameters below as of 2022 07:43  Patient On (Oxygen Delivery Method): room air            CONSTITUTIONAL: NAD, well-developed, well-groomed  ENMT: Moist oral mucosa, no pharyngeal injection or exudates; normal dentition  RESPIRATORY: Normal respiratory effort; lungs are clear to auscultation bilaterally  CARDIOVASCULAR: Regular rate and rhythm, normal S1 and S2, no murmur/rub/gallop; No lower extremity edema; Peripheral pulses are 2+ bilaterally  ABDOMEN: Nontender to palpation, normoactive bowel sounds, no rebound/guarding; No hepatosplenomegaly  MUSCLOSKELETAL:  Normal gait; no clubbing or cyanosis of digits; no joint swelling or tenderness to palpation  PSYCH: A+O to person, place, and time; affect appropriate  NEUROLOGY: CN 2-12 are intact and symmetric; no gross sensory deficits;   SKIN: No rashes; no palpable lesions    LABS:                        11.2   8.85  )-----------( 176      ( 2022 03:10 )             31.9     07-13    130<L>  |  90<L>  |  36.9<H>  ----------------------------<  94  3.8   |  23.0  |  5.64<H>    Ca    9.2      2022 00:33  Mg     1.8     07-12    TPro  7.9  /  Alb  4.2  /  TBili  0.9  /  DBili  x   /  AST  23  /  ALT  27  /  AlkPhos  230<H>        CARDIAC MARKERS ( 2022 06:54 )  x     / <0.01 ng/mL / x     / x     / x      CARDIAC MARKERS ( 2022 00:33 )  x     / <0.01 ng/mL / x     / x     / x      CARDIAC MARKERS ( 2022 18:17 )  x     / <0.01 ng/mL / x     / x     / x      CARDIAC MARKERS ( 2022 12:51 )  x     / <0.01 ng/mL / x     / x     / x          Urinalysis Basic - ( 2022 18:08 )    Color: Yellow / Appearance: Clear / S.010 / pH: x  Gluc: x / Ketone: Negative  / Bili: Negative / Urobili: Negative mg/dL   Blood: x / Protein: 30 mg/dL / Nitrite: Negative   Leuk Esterase: Trace / RBC: 0-2 /HPF / WBC 3-5 /HPF   Sq Epi: x / Non Sq Epi: Moderate / Bacteria: Occasional        CAPILLARY BLOOD GLUCOSE            RADIOLOGY & ADDITIONAL TESTS:  Results Reviewed:   Imaging Personally Reviewed:  Electrocardiogram Personally Reviewed:          MEDICATIONS  (STANDING):  calcium acetate 1334 milliGRAM(s) Oral three times a day with meals  folic acid 1 milliGRAM(s) Oral daily  labetalol 100 milliGRAM(s) Oral two times a day  multivitamin 1 Tablet(s) Oral daily    MEDICATIONS  (PRN):  acetaminophen     Tablet .. 650 milliGRAM(s) Oral every 6 hours PRN Temp greater or equal to 38C (100.4F), Mild Pain (1 - 3)  aluminum hydroxide/magnesium hydroxide/simethicone Suspension 30 milliLiter(s) Oral every 4 hours PRN Dyspepsia  melatonin 3 milliGRAM(s) Oral at bedtime PRN Insomnia  ondansetron Injectable 4 milliGRAM(s) IV Push every 8 hours PRN Nausea and/or Vomiting        I&O's Summary          Chest Xray IMPRESSION: Left hilar linear scarring. Cardiomegaly.      CT ANgio RUE   IMPRESSION:  Right upper extremity AV fistula with soft tissue hematoma adjacent to the anastomosis with a tiny focus of contrast extravasation suspected.      EKG Normal sinus rhythm  Possible Left atrial enlargement  RSR' or QR pattern in V1 suggests right ventricular conduction delay  Anterior infarct , age undetermined  T wave abnormalit                     [facility] Division of Hospital Medicine    Chief Complaint:      SUBJECTIVE / OVERNIGHT EVENTS:    Patient feeling better  no nauseas, no vomiting  no chest pain   no more lightheadedness   no diarrhea  PHYSICAL EXAM:  Vital Signs Last 24 Hrs  T(C): 36.5 (2022 07:43), Max: 37 (2022 11:42)  T(F): 97.7 (2022 07:43), Max: 98.6 (2022 11:42)  HR: 63 (2022 07:43) (63 - 83)  BP: 105/69 (2022 07:43) (105/69 - 160/93)  BP(mean): --  RR: 18 (2022 07:43) (16 - 18)  SpO2: 99% (2022 07:43) (97% - 99%)    Parameters below as of 2022 07:43  Patient On (Oxygen Delivery Method): room air  CONSTITUTIONAL: NAD, well-developed, well-groomed  ENMT: Moist oral mucosa, no pharyngeal injection or exudates; normal dentition  RESPIRATORY: Normal respiratory effort; lungs are clear to auscultation bilaterally  CARDIOVASCULAR: Regular rate and rhythm, normal S1 and S2, no murmur/rub/gallop; No lower extremity edema; Peripheral pulses are 2+ bilaterally  LEft arm AV fistula, no sign of bleeding or infection   ABDOMEN: Nontender to palpation, normoactive bowel sounds, no rebound/guarding; No hepatosplenomegaly  MUSCLOSKELETAL:  Normal gait; no clubbing or cyanosis of digits; no joint swelling or tenderness to palpation  PSYCH: A+O to person, place, and time; affect appropriate  NEUROLOGY: CN 2-12 are intact and symmetric; no gross sensory deficits;   SKIN: No rashes; no palpable lesions    LABS:                        11.2   8.85  )-----------( 176      ( 2022 03:10 )             31.9     07-13    130<L>  |  90<L>  |  36.9<H>  ----------------------------<  94  3.8   |  23.0  |  5.64<H>    Ca    9.2      2022 00:33  Mg     1.8     07-12    TPro  7.9  /  Alb  4.2  /  TBili  0.9  /  DBili  x   /  AST  23  /  ALT  27  /  AlkPhos  230<H>  07-12      CARDIAC MARKERS ( 2022 06:54 )  x     / <0.01 ng/mL / x     / x     / x      CARDIAC MARKERS ( 2022 00:33 )  x     / <0.01 ng/mL / x     / x     / x      CARDIAC MARKERS ( 2022 18:17 )  x     / <0.01 ng/mL / x     / x     / x      CARDIAC MARKERS ( 2022 12:51 )  x     / <0.01 ng/mL / x     / x     / x          Urinalysis Basic - ( 2022 18:08 )    Color: Yellow / Appearance: Clear / S.010 / pH: x  Gluc: x / Ketone: Negative  / Bili: Negative / Urobili: Negative mg/dL   Blood: x / Protein: 30 mg/dL / Nitrite: Negative   Leuk Esterase: Trace / RBC: 0-2 /HPF / WBC 3-5 /HPF   Sq Epi: x / Non Sq Epi: Moderate / Bacteria: Occasional        CAPILLARY BLOOD GLUCOSE            RADIOLOGY & ADDITIONAL TESTS:  Results Reviewed:   Imaging Personally Reviewed:  Electrocardiogram Personally Reviewed:          MEDICATIONS  (STANDING):  calcium acetate 1334 milliGRAM(s) Oral three times a day with meals  folic acid 1 milliGRAM(s) Oral daily  labetalol 100 milliGRAM(s) Oral two times a day  multivitamin 1 Tablet(s) Oral daily    MEDICATIONS  (PRN):  acetaminophen     Tablet .. 650 milliGRAM(s) Oral every 6 hours PRN Temp greater or equal to 38C (100.4F), Mild Pain (1 - 3)  aluminum hydroxide/magnesium hydroxide/simethicone Suspension 30 milliLiter(s) Oral every 4 hours PRN Dyspepsia  melatonin 3 milliGRAM(s) Oral at bedtime PRN Insomnia  ondansetron Injectable 4 milliGRAM(s) IV Push every 8 hours PRN Nausea and/or Vomiting        I&O's Summary          Chest Xray IMPRESSION: Left hilar linear scarring. Cardiomegaly.      CT ANgio RUE   IMPRESSION:  Right upper extremity AV fistula with soft tissue hematoma adjacent to the anastomosis with a tiny focus of contrast extravasation suspected.      EKG Normal sinus rhythm  Possible Left atrial enlargement  RSR' or QR pattern in V1 suggests right ventricular conduction delay  Anterior infarct , age undetermined  T wave abnormalit

## 2022-07-13 NOTE — PATIENT PROFILE ADULT - FALL HARM RISK - UNIVERSAL INTERVENTIONS
Bed in lowest position, wheels locked, appropriate side rails in place/Call bell, personal items and telephone in reach/Instruct patient to call for assistance before getting out of bed or chair/Non-slip footwear when patient is out of bed/[place] to call system/Physically safe environment - no spills, clutter or unnecessary equipment/Purposeful Proactive Rounding/Room/bathroom lighting operational, light cord in reach

## 2022-07-13 NOTE — PROGRESS NOTE ADULT - PROBLEM SELECTOR PLAN 3
.  - 7/12/22 ECG: Ischemic changes noted in inferior leads and anterior leads.    - Prior ECG: one year ago - no ischemic changes noted.  7/16/21  - reports obtained from [facility] admission in April 2022 due to pericarditis  - s/p pericardial window at [facility] for moderate pericardial effusion  - EKG unchanged from April 2022   - chest pain free at this time  - patient was advised to follow up at [facility] for various issues, patient has not yet followed up, reasons unclear      If TTE is stable, compared to TTE report from [facility] (April 2022) then no further inpatient cardiac workup is warranted. Patient should establish care with a PCP or follow up at [facility] for management of various medical conditions as outpatient.       Plan d/w Dr. Burdick and Dr. Jonelle Velazco

## 2022-07-13 NOTE — DISCHARGE NOTE NURSING/CASE MANAGEMENT/SOCIAL WORK - NSDCPEFALRISK_GEN_ALL_CORE
For information on Fall & Injury Prevention, visit: [email or web address] OR  [email or web address] OR  https://www.cdc.gov/steadi/patient.html

## 2022-07-13 NOTE — DISCHARGE NOTE PROVIDER - NSDCCPCAREPLAN_GEN_ALL_CORE_FT
PRINCIPAL DISCHARGE DIAGNOSIS  Diagnosis: Pre-syncope  Assessment and Plan of Treatment:       SECONDARY DISCHARGE DIAGNOSES  Diagnosis: ESRD on dialysis  Assessment and Plan of Treatment:     Diagnosis: H/O pericarditis  Assessment and Plan of Treatment:     Diagnosis: EKG abnormalities  Assessment and Plan of Treatment:     Diagnosis: HTN (hypertension)  Assessment and Plan of Treatment:      PRINCIPAL DISCHARGE DIAGNOSIS  Diagnosis: Pre-syncope  Assessment and Plan of Treatment:       SECONDARY DISCHARGE DIAGNOSES  Diagnosis: ESRD on dialysis  Assessment and Plan of Treatment:     Diagnosis: H/O pericarditis  Assessment and Plan of Treatment:     Diagnosis: EKG abnormalities  Assessment and Plan of Treatment:     Diagnosis: HTN (hypertension)  Assessment and Plan of Treatment:     Diagnosis: Chronic combined systolic and diastolic heart failure  Assessment and Plan of Treatment:

## 2022-07-13 NOTE — PROGRESS NOTE ADULT - PROBLEM SELECTOR PLAN 1
.  - hypovolemic upon admission, likely due to HD  - symptoms now resolved  - EKG is consistent with previous EKG performed at [facility] in April 2022, no new changes noted  - troponin remain negative  - TTE at [facility] on 4/12/22: LVEF 50-55%, elevated LA pressure, mild LVH, normal systolic function, no AS +1 mild AR, mild to mod MV regurg, severe tricuspid valve regurg, (official report in chart)   - pending TTE for evaluation of cardiac function and any valvular abnormalities  - hold lasix   - continue BB

## 2022-07-13 NOTE — DISCHARGE NOTE PROVIDER - HOSPITAL COURSE
31 y/o female with PMH of 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 Patient said she completed her dialysis section this morning, went home; few hours later, started feeling weak, lightheaded and nauseous so she called EMS. In the ED, ECG noted to have TWI in inferior and lateral leads. Cardiology consulted Ordered ECHO . Near likely due to dehydration; patient s/p HD and also on Lasix 40mg daily . NS 500ml once given in the ED ECG as noted above . Troponin x 2 negative . Patient feeling better. #ESRD on HD Last HD today, completed session . Nephrology on board, Calcium acetate with meals .   #Mild Hyponatremia in a ESRD patient , Asymptomatic .  #HTN Labetalol 100mg bid with holding parameters.     31 y/o female with PMH of ESRD due to IgA nephropathy on HD (//Sat), came to the ED complaining of lightheadedness and weakness. Patient said she completed her dialysis section this morning, went home; few hours later, started feeling weak, lightheaded and nauseous so she called EMS. In the ED, ECG noted to have TWI in inferior and lateral leads. Cardiology consulted Ordered ECHO . Near likely due to dehydration; patient s/p HD and also on Lasix 40mg daily . NS 500ml once given in the ED ECG as noted above . Troponin x 2 negative . Patient feeling better. #ESRD on HD Last HD today, completed session . Nephrology on board, Calcium acetate with meals .   #Mild Hyponatremia in a ESRD patient , Asymptomatic .  #HTN Labetalol 100mg bid with holding parameters. Patient improved and was optimized for discharge. ECHo Result as below Discussed  with Dr Burdick, he agreed with DC Patient and follow up as outpatient .           130<L>  |  90<L>  |  36.9<H>  ----------------------------<  94  3.8   |  23.0  |  5.64<H>    Ca    9.2      2022 00:33  Mg     1.8         TPro  7.9  /  Alb  4.2  /  TBili  0.9  /  DBili  x   /  AST  23  /  ALT  27  /  AlkPhos  230<H>                              11.2   8.85  )-----------( 176      ( 2022 03:10 )             31.9       CARDIAC MARKERS ( 2022 06:54 )  x     / <0.01 ng/mL / x     / x     / x      CARDIAC MARKERS ( 2022 00:33 )  x     / <0.01 ng/mL / x     / x     / x      CARDIAC MARKERS ( 2022 18:17 )  x     / <0.01 ng/mL / x     / x     / x      CARDIAC MARKERS ( 2022 12:51 )  x     / <0.01 ng/mL / x     / x     / x            LIVER FUNCTIONS - ( 2022 12:51 )  Alb: 4.2 g/dL / Pro: 7.9 g/dL / ALK PHOS: 230 U/L / ALT: 27 U/L / AST: 23 U/L / GGT: x                 Urinalysis Basic - ( 2022 18:08 )    Color: Yellow / Appearance: Clear / S.010 / pH: x  Gluc: x / Ketone: Negative  / Bili: Negative / Urobili: Negative mg/dL   Blood: x / Protein: 30 mg/dL / Nitrite: Negative   Leuk Esterase: Trace / RBC: 0-2 /HPF / WBC 3-5 /HPF   Sq Epi: x / Non Sq Epi: Moderate / Bacteria: Occasional        ECHo Summary:   1. There is mild concentric left ventricular hypertrophy.   2. Mildly decreased global left ventricular systolic function.   3. Left ventricular ejection fraction, by visual estimation, is 45 to   50%.   4. Spectral Doppler shows pseudonormal pattern of left ventricular   myocardial filling (Grade II diastolic dysfunction).   5. Mildly enlarged right ventricle.   6. Mildly enlarged right atrium.   7. Normal left atrial size.   8. Mild aortic regurgitation.   9. Mild mitral valve regurgitation.  10. Moderate-severe tricuspid regurgitation.  11. Estimated pulmonary artery systolic pressure is 42.4 mmHg assuming a   right atrial pressure of 3 mmHg, which is consistent with mild pulmonary   hypertension.

## 2022-07-13 NOTE — DISCHARGE NOTE NURSING/CASE MANAGEMENT/SOCIAL WORK - NSDCFUADDAPPT_GEN_ALL_CORE_FT
You have been given a prescheduled appt at [facility] on Monday 7/18/22 @ 10 AM.  Address: [address] [place] [address], [place] [address]   Phone: [phone]

## 2022-07-13 NOTE — DISCHARGE NOTE NURSING/CASE MANAGEMENT/SOCIAL WORK - NSDCVIVACCINE_GEN_ALL_CORE_FT
Tdap; 11-Feb-2018 23:20; Jocelynn Madrigal (RN); [employer]; [id number]; IntraMuscular; Deltoid Right.; 0.5 milliLiter(s); VIS (VIS Published: 09-May-2013, VIS Presented: 11-Feb-2018);

## 2022-07-13 NOTE — PROGRESS NOTE ADULT - ASSESSMENT
· Assessment    33 y/o female with PMH of 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 Patient said she completed her dialysis section this morning, went home; few hours later, started feeling weak, lightheaded and nauseous so she called EMS. In the ED, ECG noted to have TWI in inferior and lateral leads. Cardiology consulted     #Presyncope   Patient on telemetry   Likely due to dehydration; patient s/p HD and also on Lasix 40mg daily   NS 500ml once given in the ED   ECG as noted above   Troponin x 2 negative   Echo ordered   Cardiology consulted     #ESRD on HD  Last HD today, completed session   Nephrology on board  Calcium acetate with meals     #Hyponatremia   Na: 129  Likely due to diuretic   Hold Lasix 40mg   Monitor BMP     #HTN  Labetalol 100mg bid with holding parameters    Supportive   DVT prophylaxis: CSD   Diet: renal     Plan of care discussed with patient · Assessment    33 y/o female with PMH of 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 Patient said she completed her dialysis section this morning, went home; few hours later, started feeling weak, lightheaded and nauseous so she called EMS. In the ED, ECG noted to have TWI in inferior and lateral leads. Cardiology consulted     #Near Syncope   Patient on telemetry   Likely due to dehydration; patient s/p HD and also on Lasix 40mg daily   NS 500ml once given in the ED   ECG as noted above   Troponin x 2 negative   Echo ordered   Cardiology consulted   07/13 Patient feeling better no more episodes     #ESRD on HD  Last HD today, completed session   Nephrology on board  Calcium acetate with meals     #Hyponatremia   Na: 129  Likely due to diuretic   Hold Lasix 40mg   Monitor BMP     #HTN  Labetalol 100mg bid with holding parameters        Supportive   DVT prophylaxis: CSD Heparin SC   Diet: renal     Plan of care discussed with patient  Likely Dc later today Pending Echo and Cardiology Eval

## 2022-08-11 PROCEDURE — 93005 ELECTROCARDIOGRAM TRACING: CPT

## 2022-08-11 PROCEDURE — 81001 URINALYSIS AUTO W/SCOPE: CPT

## 2022-08-11 PROCEDURE — 84702 CHORIONIC GONADOTROPIN TEST: CPT

## 2022-08-11 PROCEDURE — 71045 X-RAY EXAM CHEST 1 VIEW: CPT

## 2022-08-11 PROCEDURE — 36415 COLL VENOUS BLD VENIPUNCTURE: CPT

## 2022-08-11 PROCEDURE — 85025 COMPLETE CBC W/AUTO DIFF WBC: CPT

## 2022-08-11 PROCEDURE — 83690 ASSAY OF LIPASE: CPT

## 2022-08-11 PROCEDURE — 99285 EMERGENCY DEPT VISIT HI MDM: CPT | Mod: 25

## 2022-08-11 PROCEDURE — 0225U NFCT DS DNA&RNA 21 SARSCOV2: CPT

## 2022-08-11 PROCEDURE — 83735 ASSAY OF MAGNESIUM: CPT

## 2022-08-11 PROCEDURE — 85027 COMPLETE CBC AUTOMATED: CPT

## 2022-08-11 PROCEDURE — 83935 ASSAY OF URINE OSMOLALITY: CPT

## 2022-08-11 PROCEDURE — 84484 ASSAY OF TROPONIN QUANT: CPT

## 2022-08-11 PROCEDURE — 83930 ASSAY OF BLOOD OSMOLALITY: CPT

## 2022-08-11 PROCEDURE — 93306 TTE W/DOPPLER COMPLETE: CPT

## 2022-08-11 PROCEDURE — 84300 ASSAY OF URINE SODIUM: CPT

## 2022-08-11 PROCEDURE — 80048 BASIC METABOLIC PNL TOTAL CA: CPT

## 2022-08-11 PROCEDURE — 80053 COMPREHEN METABOLIC PANEL: CPT

## 2022-08-11 PROCEDURE — 96360 HYDRATION IV INFUSION INIT: CPT

## 2022-09-20 NOTE — DISCHARGE NOTE ANTEPARTUM - PLAN OF CARE
continued dialysis daily regular diet, follow-up as scheduled. Render Post-Care Instructions In Note?: no Duration Of Freeze Thaw-Cycle (Seconds): 5 Number Of Freeze-Thaw Cycles: 1 freeze-thaw cycle Total Number Of Aks Treated: 3 Detail Level: Zone Post-Care Instructions: Pt may apply Vaseline to crusted or scabbing areas. Consent: The patient's consent was obtained including but not limited to risks of crusting, blistering, scarring, pigmentary change.

## 2022-11-28 ENCOUNTER — APPOINTMENT (OUTPATIENT)
Dept: VASCULAR SURGERY | Facility: CLINIC | Age: 32
End: 2022-11-28

## 2023-01-18 NOTE — DISCHARGE NOTE ANTEPARTUM - PROVIDER RX CONTACT NUMBER
Recheck bp  Labs today (add ferritin to already drawn labs but will need different draw for cbc/path rev)  RV in 3 mo with cbc prior  
[phone]

## 2023-04-04 NOTE — DISCHARGE NOTE ANTEPARTUM - FUNCTIONAL STATUS DATE
[FreeTextEntry1] : Pt is a 52 yo F, with PMHx of active smoker, FMHx of MI (father), hypethyroidism, COPD/asthma, HTN, postnasal drip is referred into cardiology clinic from pulm. As per pulm notes, cardiology evaluation for any cardiac causes for orthopnea/PND. \par \par ECG 4/4: NSR, LVH\par TTE 2015: EF 55-60%, LVH\par exercise stress test 2014: negative\par a1c 4.3% 5/2022\par lipid panel 5/2022: chol 156, , , LDL 36\par \par \par #orthopnea and PND, r/o cardiac causes\par - not volume overloaded on exam\par - check bloodwork: pro-BNP\par - repeat TTE\par - exercise stress test\par - RTC once above tests are completed\par 
19-Jan-2018

## 2023-05-31 NOTE — H&P PST ADULT - HIV STATUS
"Chief Complaint   Patient presents with    N/V     Pt hasn't been able to keep anything down for the last couple of months, but getting worse over the last few weeks. Per , the vomiting is intermittent. Some days are better than others. They were starting to do a GI work up and get a CT scan but the vomiting got worse.     Weakness    Low Back Pain     Started to have low back pain today.      Denies dysuria but states her urine has been dark and foul smelling. Denies any recent fevers.     Hx of pylo a few times    Hx of HTN, anxiety, ETOH abuse- last drink yesterday (2 glasses of wine)    BP (!) 147/17   Pulse (!) 121   Temp 35.9 °C (96.6 °F) (Temporal)   Resp 18   Ht 1.6 m (5' 3\")   Wt 61.7 kg (136 lb 0.4 oz)   SpO2 94% Comment: Room air  BMI 24.10 kg/m²   " Negative/Unknown

## 2023-06-25 NOTE — PROGRESS NOTE ADULT - ATTENDING COMMENTS
No significant past surgical history
Pt. seen on HD, tolerating HD well without any UF, AVF working well and BP well maintained during HD. Plan for next maintenance HD tomorrow. Monitor BMP.
pt. seen while receiving HD, tolerating HD well without any intra-dialytic complications or access issues.   BP well maintained and AVF working well during dialysis.   Plan for next maintenance HD without UF tomorrow.   Monitor BMP.
Male

## 2023-07-11 NOTE — PATIENT PROFILE ADULT - HISTORY OF COVID-19 VACCINATION
Vaccine status unknown Detail Level: Simple Render Risk Assessment In Note?: no Additional Notes: Discussed taking one tablet three times weekly

## 2023-07-14 NOTE — DISCHARGE NOTE OB - NSCORESITESY/N_GEN_A_CORE_RD
07/14/23  0419   HGB 10.4*   HCT 30.5*   WBC 3.3*   PLT 85*       Discharge Medications:     Medication List        START taking these medications      pantoprazole 40 MG tablet  Commonly known as: PROTONIX  Take 1 tablet by mouth every morning (before breakfast)            CHANGE how you take these medications      cyanocobalamin 1000 MCG tablet  Take 1 tablet by mouth daily  Start taking on: July 15, 2023  What changed: how much to take            CONTINUE taking these medications      acyclovir 200 MG capsule  Commonly known as: ZOVIRAX     amLODIPine 5 MG tablet  Commonly known as: NORVASC     aspirin 81 MG chewable tablet     dilTIAZem 60 MG tablet  Commonly known as: CARDIZEM     DULoxetine 30 MG extended release capsule  Commonly known as: CYMBALTA     LORazepam 1 MG tablet  Commonly known as: ATIVAN     losartan 25 MG tablet  Commonly known as: COZAAR     sodium bicarbonate 650 MG tablet            STOP taking these medications      bumetanide 1 MG tablet  Commonly known as: BUMEX     dexlansoprazole 60 MG Cpdr delayed release capsule  Commonly known as: DEXILANT     HYDROcodone-acetaminophen 7.5-325 MG per tablet  Commonly known as: NORCO     nebivolol 5 MG tablet  Commonly known as: BYSTOLIC     sucralfate 1 GM/10ML suspension  Commonly known as: CARAFATE               Where to Get Your Medications        These medications were sent to [address] [phone] Janine Dover [phone]  [address] [place] [phone]      Phone: [phone]   cyanocobalamin 1000 MCG tablet  pantoprazole 40 MG tablet             DISPOSITION:    Home with Family: x      Home with HH/PT/OT/RN:    SNF/LTC:    AZUL:    OTHER:            Code status:   Recommended diet: regular diet  Recommended activity: activity as tolerated  Wound care: None      Follow up with:   PCP : Arnold Camejo MD  [address] MD  [address] No

## 2023-08-07 NOTE — DISCHARGE NOTE ANTEPARTUM - NS AS DC PROVIDER CONTACT Y/N MULTI
Post Operative Instructions for a Laparoscopic Hernia Repair    At any time, please feel free to call the General Surgery office with any questions or concerns at [phone].     Activity: Please limit lifting/pushing/pulling to 10 lbs for 4 weeks post-operatively. You may then advance your activity slowly as tolerated. Please use your pain as a guide to advance activity. If an abdominal binder or undergarment was provided to you, you should wear this as needed for comfort or when active. NO DRIVING while you are on prescription pain medications.      Diet: You may advance your diet as tolerated.    Pain: We recommend you use scheduled ibuprofen (Motrin/Advil) and acetaminophen (Tylenol) for pain control for the first 48-72 hours after surgery or until no longer needed. You may take 600 mg ibuprofen (Motrin/Advil) every 6 hours. Do not exceed 2400 mg of ibuprofen in 24 hours. Additionally, you should alternate with 650 mg acetaminophen (Tylenol) every 6 hours. Do not exceed 4000 mg of Tylenol in 24 hours. Alternating ibuprofen (Motrin/Advil) and acetaminophen (Tylenol) means you should be taking a dose every 3 hours. For example, if you begin taking 600 mg ibuprofen (Motrin/Advil) at 12:00 PM you would take 650 mg acetaminophen (Tylenol) at 3:00 PM, 600 mg ibuprofen (Motrin/Advil) at 6:00 PM, 650 mg acetaminophen (Tylenol) at 9:00 PM.     A script for pain medication has been sent to your pharmacy. This medication is called Tramadol (Ultram). If your pain is not well managed with ibuprofen (Motrin/Advil) and acetaminophen (Tylenol), you may take this pain medication.  Please only use it if needed.. You may take 50 mg tramadol (Ultram) every 6 hours. You may alternate Tramadol (Ultram) with ibuprofen (Motrin/Advil) and acetaminophen (Tylenol).     Additionally, you may use ice packs as needed for incisional pain. We recommend applying an ice pack to your incisions for 20 minutes at a time every 2-3 hours.       Constipation: If you struggle with constipation or have not had a bowel movement after two days, we recommend Miralax over the counter.     Incision: It is okay to shower 24 hours after surgery. Please do not soak incision sites in tub or pool for two weeks. If you have a clear bandage on your abdomen, you may shower over this bandage. Remove 5 days after surgery date.     Bloating and Gassiness: Laparoscopic surgery requires inflating your abdomen with carbon dioxide gas which may cause pain in the back, neck, and shoulders. Symptoms usually resolve after 5-7 days. Walking and over the counter Gas-X (simethicone) may help relieve bloating and gas pain.     When to call your provider  Call your healthcare provider if you have any of the following:  Swelling, oozing, worsening pain, or abnormal redness near the incision  A fever of 100.4°F (38°C) or higher, or as directed by your healthcare provider  Worsening abdominal pain that does not improve with pain medication  Severe diarrhea, bloating, or constipation  Worsening nausea and/or vomiting    Follow up with our office as scheduled or sooner if you are having problems.     You were prescribed an opioid pain medication, Tramadol (Ultram).    What you need to know about opioid pain medications:    What are opioids?  Opioids are strong prescription pain medicines that are used to manage severe pain.     What are the serious risks of using opioids?   - Opioids have serious risks of addiction and overdose  - Too much opioid medicine in your body can cause your breathing to stop - which could lead to death. This risk is greater for people taking other medicines that make you feel sleepy or people with sleep apnea.     How to take opioid pain medicine safely?   - Tell your healthcare provider about all the medicines you are taking, including vitamins, herbal supplements, and other over-the-counter medicines  - Read the Medications guide that comes with your  prescription  - Take your opioid medicine exactly as prescribed  - Do not cut, break, chew, crush, or dissolve your medicine  - Call your healthcare provider if the opioid medicine is not controlling your pain. Do not increase the dose on your own.   - Do not operate heavy machinery or drive until you know how your opioid medicine affects you. Your medicine can make you sleepy, dizzy, or lightheaded.   - Do not share or give your opioid medicine to anyone else. This opioid and the dose was selected just for you. A dose that is okay for you could cause an overdose for someone else.     When taking opioid pain medication you should avoid the following:   - Alcohol  - Benzodiazepines (like Valium or Xanax)  - Muscle relaxants (like Soma or Flexeril)  - Sleep medicines (like Ambien or Lunesta)   - Other prescription opioid medicines    How to dispose of your unused opioid medicine:  - The best way to dispose of most types of unused or  medicines (both prescription and over the counter) is to drop off the medicine at a drug take back site, location, or program immediately.  - If you cannot get to a drug take back location promptly, or there is none near you, and your medicine is  on the FDA flush list (Tramadol (Ultram) is), your next best option is to immediately flush these down the toilet  - FDA flush list can be found here: https://www.fda.gov/drugdisposal     If you have any concerns following your Procedure/Surgery and cannot reach your physician's office, please contact [facility] at [phone] for assistance.    Care After Anesthesia or Sedation    After discharge   Due to the medicine given, someone must drive you home. It is strongly recommended to have someone stay with you at home the day of discharge and the night after surgery.   If you have infants or small children at home, please have someone help you for at least 24 hours after your surgery.   Do not drive for at least 24  hours after surgery (or as told by your doctor).   Rest for the remainder of the day. Go up and down stairs slowly.   Do not smoke after surgery. Smoking can delay healing.   Do not operate heavy or potential harmful equipment.   Do not make legally binding decisions.   Do not drink alcohol for 24 hours.    Diet   Drink plenty of fluids (6 to 8 glasses of water a day).  Resume your regular diet as able.  Avoid greasy or spicy foods for 24 hours.   Start eating a bland diet (toast, gelatin, 7-up, hot cereal, crackers, sherbet, broth soup).    Nausea   Nausea may be expected for the first 24 to 48 hours.  Drink small amounts of fluids such as water or sports drink  Try sucking on hard candy      Urination   The effects of anesthetics may cause some people to have trouble passing urine the day of surgery. Drink a lot of fluids to help prevent this.   Try to urinate within 8 hours of surgery.   If you are unable to pass urine and feel like you need to, call your doctor or the hospital.    Pain control   Some incisions are injected with a long acting local anesthetic; it will wear off in 4 to 6 hours. You can expect to have some pain at this time.   Treat your pain with the prescribed pain medicine before it wears off. Do not wait until your pain becomes severe.   Ask your nurse when you had your last pain medicine, so you know when you can take another one after you get home.    Call your doctor if you have:   Nausea and vomiting that does not stop   Fever over 101° F   Pain not relieved by pain medication   If you are unable to pass your urine or you have not passed urine in the last 8 hours.   Unusual changes in behavior   Dizziness   Hives  If you are not able to reach your doctor, you may call the emergency department.    Wound Healing: What You Should Know    Do I have an infection?  Your body may show signs your wound is infected. If you see one or more of these signs, please call your health care provider:    Redness and swelling - Increased redness and swelling around the wound (some redness is expected in the first 48 hours).   Warmth - The area around the wound is warmer than the surrounding skin.   Fever - Your temperature is above 101º F or stays above 100º F.   Odor - A new or stronger, sweet or foul smell coming from the wound.   Swelling - Swelling spreading to other areas.   Drainage - Large amounts of drainage that involves blood, clear fluid or pus from the wound. Or, the drainage amount increases or changes color. (It is normal to have a small amount of drainage.)   Pain - Increase in your pain or change in the location of the pain.   Separation - Any place where the incision is  or opening.    How can I help prevent the spread of infection when I take care of my wound?  1. Use good handwashing techniques before and after contact with your wound. Here are the steps to follow:  Handwashing with soap and water:   Wet hands with warm water and apply soap.   Rub well over all surfaces for at least 15 seconds.   Rinse well under water.   Dry hands with clean towels.   Turn off faucet with clean towels to prevent reinfecting hands.  Handwashing with a waterless alcohol-based product or gel:  Apply approximately a nickel sized amount of product to palm of hand.  Rub hands together, covering all surfaces including nails, until product evaporates, about 10 to 15 seconds.  Use clean latex or vinyl gloves if cleaning or touching wound surface.  Keep nails short and remove nail polish. Long nails or polish can harbor bacteria.  Keep jewelry clean or remove during wound care.  Use hypoallergenic lotions with anti-bacterial agents on skin surrounding wound to maintain moisture and prevent irritation.    What else can I do to help my wound heal quickly and prevent other wounds?   Stay active - Activity and exercise promote good circulation, which leads to wound healing.   Avoid smoking - Smoking narrows your blood  vessels, which decreases both the blood supply to your wound and the amount of oxygen in your blood. This will decrease your ability to heal and increase your chance of infection.   Avoid alcohol - Alcohol decreases the ability of your body to fight infection.   Avoid sitting with legs or ankles crossed - This can compress the blood vessels in your legs and decrease the blood supply to your wound.   Eat a balanced diet - If you are unable to eat a balanced diet or required foods, you may benefit from a vitamin or mineral supplement.    Pain Management  Special Note: Be sure to follow any specific post-op instructions from your surgeon or nurse.     Once you’re home, you may have some pain, since even minor surgery causes swelling and breakdown of tissue. When it comes to effective pain management, the tips you learned in the hospital also work at home. To get the best pain relief possible, remember these points:    Use your medication only as directed  If your pain is not relieved or if it gets worse, call your doctor.  If pain lessens, try taking your medication less often.  Remember that medications need time to work  Most pain relievers taken by mouth need at least 20-30 minutes to take effect.  Take pain medication at regular times as directed. Don’t wait until the pain gets bad to take it.  Time your medication  Try to time your medication so that you take it before beginning an activity, such as dressing or sitting at the table for dinner.  Taking your medication at night may help you get a good night’s rest.  Eat lots of fruit and vegetables  Constipation is a common side effect with some pain medications. Eating fruit and vegetables can help.  Drink lots of fluids.  Avoid drinking alcohol while taking pain medication  Mixing alcohol and pain medication can cause dizziness and slow your respiratory system. It can even be fatal.  Avoid driving or operating machinery while taking pain medication.  In addition,  other ways to decrease pain     Relaxing techniques-slow, deep breathing, imagery, watching TV, listening to music      Heat or cold      Massage      Rest and changing your position in bed         Want to Say “Thank You” to a Nurse?  The TRACY Award® was created in memory of BILL Ferguson by his family to say thank you to bedside nurses who provide an outstanding level of care.  Submit a nomination using any method below.     OR    [email or web address]       During your pre-operative assessment today you received a sleep apnea screening.  Based on your score, it is recommended that you follow up with your primary care provider for further evaluation.    Yes

## 2023-08-10 NOTE — DISCHARGE NOTE ANTEPARTUM - MEDICATION SUMMARY - MEDICATIONS TO TAKE
I will START or STAY ON the medications listed below when I get home from the hospital:  None
No adenopathy or splenomegaly. No cervical or inguinal lymphadenopathy.

## 2023-09-03 NOTE — DISCHARGE NOTE ANTEPARTUM - YES, WALK AS TOLERATED
A complete 10-point review of systems was obtained and is negative except as stated in the interval history. Statement Selected

## 2024-01-01 NOTE — ED PROVIDER NOTE - WR ORDER ID 1
You can access the FollowMyHealth Patient Portal offered by [facility] by registering at the following website: [email or web address]. By joining [facility]’s FollowMyHealth portal, you will also be able to view your health information using other applications (apps) compatible with our system. [id number]

## 2024-01-29 NOTE — DISCHARGE NOTE ANTEPARTUM - CALL YOUR HEALTHCARE PROVIDER IF YOU ARE EXPERIENCING SYMPTOMS OF DEPRESSION THAT LAST MORE THAN THREE DAYS
Patient Quality Outreach    Patient is due for the following:   Colon Cancer Screening  Physical Preventive Adult Physical      Topic Date Due    COVID-19 Vaccine (1) Never done    Flu Vaccine (1) Never done    Zoster (Shingles) Vaccine (2 of 2) 11/02/2023       Next Steps:   Schedule a Adult Preventative    Type of outreach:    Sent [employer] message.      Questions for provider review:    None           Violeta Gardner        
Statement Selected

## 2024-03-11 NOTE — DISCHARGE NOTE ANTEPARTUM - VISION (WITH CORRECTIVE LENSES IF THE PATIENT USUALLY WEARS THEM):
Patient calling regarding disability forms need to be resent. RN reviewed chart and stated they were refaxed to [phone] [facility]ability.     Patient stated she spoke with [employer] on 3/11 and missing page 3, question 4A.        Patient asked for copy of form and call back either way after faxed.     RN spoke with CALVIN ANTHONY and she will follow up.    Ana Vargas RN  [facility] - Registered Nurse  Clinic Triage [place]   March 11, 2024     Normal vision: sees adequately in most situations; can see medication labels, newsprint

## 2024-05-06 NOTE — PATIENT PROFILE OB - CURRENT PREGNANCY COMPLICATIONS, OB PROFILE
Patient Name: Dina Simpson  YOB: 1993  MRN:  [id number]  Admit Date:  5/6/2024  Discharge Date:  5/6/2024   Admitting MD:  Faisal Upton MD  Attending MD:  Faisal Upton MD    OPERATIVE REPORT  DATE OF PROCEDURE:  5/6/2024    PREOPERATIVE DIAGNOSIS:  Dysphagia change in bowel habits    POSTOPERATIVE DIAGNOSIS:  Dysphagia  Colon polyps x2    PROCEDURE PERFORMED:  Colonoscopy with biopsy and snare polypectomy  Esophagogastroduodenoscopy with biopsy and balloon dilation    Surgeon Faisal Upton MD  Assistant:  None  Assistant tasks: None  INDICATIONS FOR PROCEDURE:  30 year old female referred for upper and lower endoscopy for dysphagia change in bowel habits    SEDATION AND MEDICATIONS::  Monitored anesthesia care  Procedural sedation time:  Not applicable  DESCRIPTION OF PROCEDURE:  Procedure was explained, risks, rationale, and alternatives discussed, patient's questions answered and informed consent obtained before sedation.  Pre-Procedure evaluation included cardiopulmonary auscultation and was believed stable for sedation and endoscopy.  The patient was placed in left lateral position as pulse oximeter, blood pressure and telemetry monitoring were stable.  The Olympus video gastroscope was introduced and the esophagus intubated.   The diagnostic Olympus diagnostic adult gastroscope was advanced into the esophagus:    ESOPHAGOGASTRODUODENOSCOPY FINDINGS:  Esophagus:  Unremarkable.  Given patient's symptoms random esophageal biopsies were taken balloon dilation was also performed with a 15-18 mm balloon fully inflated.  Stomach:  Unremarkable.  Normal retroflexion in the stomach.  Duodenum:  Normal to the 3rd portion.  Given patient's symptoms small-bowel biopsies were taken.    The procedure was terminated.  The patient was positioned for colonoscopy.   Inspection of the anus and digital exam was Normal    The Olympus pediatric video colonoscope was introduced and advanced.  The prep was complete  ([place] Bowel Preperation scale is greater than or equal to 6 with no individual segment score less than 2).  Then, ileocecal valve, appendiceal orifice appeared negative.  The terminal ileum has been intubated.    Careful inspection of the colon was performed during withdrawal from the cecum.     Findings:  2 polyps were identified both were snare excised tissue retrieved sent to pathology.  Random colon biopsies were also taken given patient's symptoms.  No colitis or diverticulosis was seen    Retroflexion in the rectum was unremarkable.  The cecal withdrawal time was measured at 6 minutes.   The colon was decompressed, the instrument was removed and the patient tolerated the procedure well with no immediate complication evident and was taken to recovery satisfactorily.    PHOTOGRAPH:  Yes    Specimens removed:  ID Type Source Tests Collected by Time   A : small bowel Tissue Small Intestine, Duodenum SURGICAL PATHOLOGY Faisal Upton MD 5/6/2024 0934   B : esophagus Tissue Esophagus SURGICAL PATHOLOGY Faisal Upton MD 5/6/2024 0935   C : random colon Tissue Colon SURGICAL PATHOLOGY Faisal Upton MD 5/6/2024 0945   D : sigmoid Polyp Large Intestine, Sigmoid Colon SURGICAL PATHOLOGY Faisal Upton MD 5/6/2024 0948   E : rectum Polyp Rectum SURGICAL PATHOLOGY Faisal Upton MD 5/6/2024 0950       IMPLANT:  * No implants in log *      Complications: No        ESTIMATED BLOOD LOSS:  None    ASSESSMENT:  1. Successful upper and lower endoscopy with findings and treatment as outlined above.   2.         PLAN:  1. Usual post-procedural monitoring  2. Await biopsy results.  Follow up endoscopy to be determined based on biopsy results.    Faisal Upton MD  5/6/2024        stage 5 renal disease

## 2024-05-14 NOTE — ASSESSMENT
[FreeTextEntry1] : Patient with ESRD on HD via LUE AVF presents after US demonstrated greater than 75% stenosis at the cephalic/subclavian junction. Plan is for LUE fistulogram, possible angioplasty, possible stent with sedation. Witnessed informed consent obtained. \par \par Fistulogram - no significant stenosis.
No

## 2024-06-11 ENCOUNTER — APPOINTMENT (OUTPATIENT)
Dept: VASCULAR SURGERY | Facility: CLINIC | Age: 34
End: 2024-06-11
Payer: MEDICAID

## 2024-06-11 PROCEDURE — T1013: CPT

## 2024-06-11 PROCEDURE — 99214 OFFICE O/P EST MOD 30 MIN: CPT | Mod: 25

## 2024-06-11 PROCEDURE — G0506: CPT

## 2024-06-11 PROCEDURE — 93990 DOPPLER FLOW TESTING: CPT

## 2024-06-11 NOTE — REASON FOR VISIT
[Follow-Up Visit] : a follow-up visit for [Aneurysm] : aneurysm [[employer] ] : provided by [employer]   [Time Spent: ____ minutes] : Total time spent using  services: [unfilled] minutes. The patient's primary language is not English thus required  services. [Interpreters_IDNumber] : [id number] [Interpreters_FullName] : elizabeth

## 2024-06-11 NOTE — HISTORY OF PRESENT ILLNESS
[FreeTextEntry1] : 33-year-old female currently on hemodialysis via left-sided brachiocephalic fistula.  She recently underwent operative revision due to the large size of the aneurysms.  She presents to the office today complaining of pain over the aneurysms.  The fistula is functioning well. [] : left brachiocephalic fistula

## 2024-06-11 NOTE — PHYSICAL EXAM
[Thrill] : thrill [Pulsatile Thrill] : no pulsatile thrill [Aneurysm] : aneurysm [Bleeding] : no bleeding [Hand well perfused] : hand well perfused [Warm Extremities] : warm extremities [2+] : left 2+ [Normal] : coordination grossly intact, no focal deficits [FreeTextEntry1] : In the office today patient underwent a duplex study which shows a well-functioning fistula.  There is no evidence of significant stenosis.  Most likely the pain she is having is due to ongoing postoperative discomfort.  There is no evidence of steal at this time.  Patient will follow-up in 3 to 4 months with a repeat fistula duplex.

## 2024-09-26 ENCOUNTER — APPOINTMENT (OUTPATIENT)
Dept: VASCULAR SURGERY | Facility: CLINIC | Age: 34
End: 2024-09-26
Payer: MEDICAID

## 2024-09-26 PROCEDURE — 99214 OFFICE O/P EST MOD 30 MIN: CPT

## 2024-09-26 PROCEDURE — 93990 DOPPLER FLOW TESTING: CPT

## 2024-09-26 NOTE — HISTORY OF PRESENT ILLNESS
[FreeTextEntry1] : 34-year-old female currently on hemodialysis via left-sided brachiocephalic fistula.  She recently underwent operative revision due to the large size of the aneurysms.  She presents to the office today complaining of pain over the aneurysms.  The fistula is functioning well. [] : left brachiocephalic fistula

## 2024-09-26 NOTE — REASON FOR VISIT
[Follow-Up Visit] : a follow-up visit for [Aneurysm] : aneurysm [[employer] ] : provided by [employer]   [Interpreters_IDNumber] : [id number] [Interpreters_FullName] : elizabeth

## 2025-01-21 ENCOUNTER — APPOINTMENT (OUTPATIENT)
Dept: VASCULAR SURGERY | Facility: CLINIC | Age: 35
End: 2025-01-21

## 2025-01-22 NOTE — DISCHARGE NOTE ANTEPARTUM - PRINCIPAL DIAGNOSIS
Consent: The patient's consent was obtained including but not limited to risks of crusting, scabbing, blistering, scarring, darker or lighter pigmentary change, recurrence, incomplete removal and infection. Medical Necessity Information: It is in your best interest to select a reason for this procedure from the list below. All of these items fulfill various CMS LCD requirements except the new and changing color options. Add 52 Modifier (Optional): no Spray Paint Text: The liquid nitrogen was applied to the skin utilizing a spray paint frosting technique. Medical Necessity Clause: This procedure was medically necessary because the lesions that were treated were: Show Aperture Variable?: Yes Detail Level: Detailed Post-Care Instructions: Liquid Nitrogen (Freezing or Cryotherapy): This involves having a very cold spray applied to the lesion. This can be painful (topical anesthetic can be applied before or after treatment). After treatment, the area may form into a blister. The blister can be popped with a sterile needle, and covered with a dry bandage. You can resume normal activity; it is fine to get the area wet. It generally takes 4-6 treatments for the wart to resolve, treatments are done 2-3 weeks apart. Chronic kidney disease (CKD) stage G5/A1, glomerular filtration rate (GFR) less than or equal to 15 mL/min/1.73 square meter and albuminuria creatinine ratio less than 30 mg/g

## 2025-02-06 ENCOUNTER — APPOINTMENT (OUTPATIENT)
Dept: VASCULAR SURGERY | Facility: CLINIC | Age: 35
End: 2025-02-06

## 2025-02-13 ENCOUNTER — APPOINTMENT (OUTPATIENT)
Dept: VASCULAR SURGERY | Facility: CLINIC | Age: 35
End: 2025-02-13

## 2025-03-13 ENCOUNTER — APPOINTMENT (OUTPATIENT)
Dept: VASCULAR SURGERY | Facility: CLINIC | Age: 35
End: 2025-03-13
Payer: MEDICAID

## 2025-03-13 PROCEDURE — 93990 DOPPLER FLOW TESTING: CPT

## 2025-05-28 NOTE — PATIENT PROFILE OB - MATERNAL EMPLOYMENT STATUS, OB PROFILE
CM call made to Bridgette at [phone] for possible rehabilitation placement for debility at home -- PT / OT is pending  -yet to be done --a CM will need to follow in AM tomorrow.    homemaker

## 2025-06-13 NOTE — CONSULT NOTE ADULT - ASSESSMENT
Key Assessments:  Chart was reviewed and patient was discharged to an Assisted Living Facility, with Palliative Care. Patient's care coordination and day to day needs are being managed.     Actions Taken:  Care Management Program closed.    Program Plan:   No further outreach at this time. Writer would be happy to work with the patient if she is discharged to home or if she has questions or concerns about her care needs.    See hyperlinks within encounter for full documentation    
ESRD - In [facility] HD,    Anemia,    Multi Focal Pneumonia - COVID Negative,    P : HD This AM,    PRBC Transfusion .    Medical Management,  Antibiotics,     Blood C/S - P :
Patient with ESRD, multifocal pneumonia, dark stools, aspirin use. Due to multifocal pneumonia, and ESRD, at this time, will defer EGD. Continue PPI bid. Monitor CBC. EGD once cough has improved. Avoid aspirin.

## 2025-06-13 NOTE — DISCHARGE NOTE ANTEPARTUM - PRINCIPAL DIAGNOSIS
Urine sent to lab. Ambulatory to the bathroom with steady gait.    Chronic kidney disease (CKD) stage G5/A1, glomerular filtration rate (GFR) less than or equal to 15 mL/min/1.73 square meter and albuminuria creatinine ratio less than 30 mg/g

## 2025-08-05 ENCOUNTER — APPOINTMENT (OUTPATIENT)
Dept: VASCULAR SURGERY | Facility: CLINIC | Age: 35
End: 2025-08-05
Payer: MEDICAID

## 2025-08-05 DIAGNOSIS — Z98.890 OTHER SPECIFIED POSTPROCEDURAL STATES: ICD-10-CM

## 2025-08-05 DIAGNOSIS — T82.858A STENOSIS OF OTHER VASCULAR PROSTHETIC, INITIAL ENCOUNTER: ICD-10-CM

## 2025-08-05 DIAGNOSIS — Z99.2 END STAGE RENAL DISEASE: ICD-10-CM

## 2025-08-05 DIAGNOSIS — N18.6 END STAGE RENAL DISEASE: ICD-10-CM

## 2025-08-05 PROCEDURE — 93990 DOPPLER FLOW TESTING: CPT

## 2025-08-05 PROCEDURE — 99214 OFFICE O/P EST MOD 30 MIN: CPT

## 2025-08-05 PROCEDURE — T1013: CPT

## 2025-08-21 ENCOUNTER — APPOINTMENT (OUTPATIENT)
Dept: ENDOVASCULAR SURGERY | Facility: CLINIC | Age: 35
End: 2025-08-21
Payer: MEDICAID

## 2025-08-21 ENCOUNTER — RESULT REVIEW (OUTPATIENT)
Age: 35
End: 2025-08-21

## 2025-08-21 VITALS
WEIGHT: 115 LBS | HEART RATE: 80 BPM | SYSTOLIC BLOOD PRESSURE: 162 MMHG | BODY MASS INDEX: 24.14 KG/M2 | DIASTOLIC BLOOD PRESSURE: 83 MMHG | RESPIRATION RATE: 18 BRPM | OXYGEN SATURATION: 99 % | TEMPERATURE: 98.1 F | HEIGHT: 58 IN

## 2025-08-21 PROCEDURE — 36907Z: CUSTOM | Mod: 59

## 2025-08-21 PROCEDURE — 36902Z: CUSTOM

## 2025-08-21 RX ORDER — LABETALOL HYDROCHLORIDE 100 MG/1
100 TABLET, FILM COATED ORAL
Refills: 0 | Status: ACTIVE | COMMUNITY